# Patient Record
Sex: FEMALE | Race: WHITE | NOT HISPANIC OR LATINO | Employment: OTHER | URBAN - NONMETROPOLITAN AREA
[De-identification: names, ages, dates, MRNs, and addresses within clinical notes are randomized per-mention and may not be internally consistent; named-entity substitution may affect disease eponyms.]

---

## 2017-02-15 ENCOUNTER — OFFICE VISIT (OUTPATIENT)
Dept: FAMILY MEDICINE | Facility: OTHER | Age: 47
End: 2017-02-15
Payer: COMMERCIAL

## 2017-02-15 VITALS
OXYGEN SATURATION: 97 % | SYSTOLIC BLOOD PRESSURE: 136 MMHG | WEIGHT: 293 LBS | RESPIRATION RATE: 20 BRPM | DIASTOLIC BLOOD PRESSURE: 80 MMHG | HEIGHT: 72 IN | BODY MASS INDEX: 39.68 KG/M2 | HEART RATE: 108 BPM | TEMPERATURE: 97.6 F

## 2017-02-15 DIAGNOSIS — Z13.1 SCREENING FOR DIABETES MELLITUS: ICD-10-CM

## 2017-02-15 DIAGNOSIS — Z30.013 ENCOUNTER FOR INITIAL PRESCRIPTION OF INJECTABLE CONTRACEPTIVE: ICD-10-CM

## 2017-02-15 DIAGNOSIS — Z12.4 SCREENING FOR CERVICAL CANCER: ICD-10-CM

## 2017-02-15 DIAGNOSIS — Z00.00 ROUTINE GENERAL MEDICAL EXAMINATION AT A HEALTH CARE FACILITY: Primary | ICD-10-CM

## 2017-02-15 DIAGNOSIS — N94.6 DYSMENORRHEA: ICD-10-CM

## 2017-02-15 DIAGNOSIS — Z13.6 CARDIOVASCULAR SCREENING; LDL GOAL LESS THAN 160: ICD-10-CM

## 2017-02-15 LAB — BETA HCG QUAL IFA URINE: NEGATIVE

## 2017-02-15 PROCEDURE — 87624 HPV HI-RISK TYP POOLED RSLT: CPT | Performed by: NURSE PRACTITIONER

## 2017-02-15 PROCEDURE — 99386 PREV VISIT NEW AGE 40-64: CPT | Mod: 25 | Performed by: NURSE PRACTITIONER

## 2017-02-15 PROCEDURE — G0145 SCR C/V CYTO,THINLAYER,RESCR: HCPCS | Performed by: NURSE PRACTITIONER

## 2017-02-15 PROCEDURE — 84703 CHORIONIC GONADOTROPIN ASSAY: CPT | Performed by: NURSE PRACTITIONER

## 2017-02-15 PROCEDURE — 96372 THER/PROPH/DIAG INJ SC/IM: CPT | Performed by: NURSE PRACTITIONER

## 2017-02-15 RX ORDER — MEDROXYPROGESTERONE ACETATE 150 MG/ML
150 INJECTION, SUSPENSION INTRAMUSCULAR
Qty: 1 ML | Refills: 0 | OUTPATIENT
Start: 2017-02-15 | End: 2019-05-08

## 2017-02-15 RX ORDER — ALBUTEROL SULFATE 90 UG/1
1-2 AEROSOL, METERED RESPIRATORY (INHALATION)
COMMUNITY
Start: 2015-04-22 | End: 2017-02-15
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
## 2017-02-15 NOTE — PATIENT INSTRUCTIONS
Referral to OB/GYN for contraception discussion.     The results of the pap test take about 2 weeks to come back.  We will send a letter with these results and the recommendations for further pap tests in the future.     Return for fasting labs tomorrow.  I will call or send a letter with results.         Preventive Health Recommendations  Female Ages 40 to 49    Yearly exam:     See your health care provider every year in order to  1. Review health changes.   2. Discuss preventive care.    3. Review your medicines if your doctor prescribed any.      Get a Pap test every three years (unless you have an abnormal result and your provider advises testing more often).      If you get Pap tests with HPV test, you only need to test every 5 years, unless you have an abnormal result. You do not need a Pap test if your uterus was removed (hysterectomy) and you have not had cancer.      You should be tested each year for STDs (sexually transmitted diseases), if you're at risk.       Ask your doctor if you should have a mammogram.      Have a colonoscopy (test for colon cancer) if someone in your family has had colon cancer or polyps before age 50.       Have a cholesterol test every 5 years.       Have a diabetes test (fasting glucose) after age 45. If you are at risk for diabetes, you should have this test every 3 years.    Shots: Get a flu shot each year. Get a tetanus shot every 10 years.     Nutrition:     Eat at least 5 servings of fruits and vegetables each day.    Eat whole-grain bread, whole-wheat pasta and brown rice instead of white grains and rice.    Talk to your provider about Calcium and Vitamin D.     Lifestyle    Exercise at least 150 minutes a week (an average of 30 minutes a day, 5 days a week). This will help you control your weight and prevent disease.    Limit alcohol to one drink per day.    No smoking.     Wear sunscreen to prevent skin cancer.    See your dentist every six months for an exam and  cleaning.

## 2017-02-15 NOTE — MR AVS SNAPSHOT
After Visit Summary   2/15/2017    Paola Lobo    MRN: [id number]           Patient Information     Date Of Birth          1970        Visit Information        Provider Department      2/15/2017 2:20 PM Roshni Luevano APRN [facility]        Today's Diagnoses     Routine general medical examination at a health care facility    -  1    Screening for diabetes mellitus        CARDIOVASCULAR SCREENING; LDL GOAL LESS THAN 160        Screening for cervical cancer        Encounter for initial prescription of injectable contraceptive          Care Instructions    Referral to OB/GYN for contraception discussion.     The results of the pap test take about 2 weeks to come back.  We will send a letter with these results and the recommendations for further pap tests in the future.     Return for fasting labs tomorrow.  I will call or send a letter with results.         Preventive Health Recommendations  Female Ages 40 to 49    Yearly exam:     See your health care provider every year in order to  1. Review health changes.   2. Discuss preventive care.    3. Review your medicines if your doctor prescribed any.      Get a Pap test every three years (unless you have an abnormal result and your provider advises testing more often).      If you get Pap tests with HPV test, you only need to test every 5 years, unless you have an abnormal result. You do not need a Pap test if your uterus was removed (hysterectomy) and you have not had cancer.      You should be tested each year for STDs (sexually transmitted diseases), if you're at risk.       Ask your doctor if you should have a mammogram.      Have a colonoscopy (test for colon cancer) if someone in your family has had colon cancer or polyps before age 50.       Have a cholesterol test every 5 years.       Have a diabetes test (fasting glucose) after age 45. If you are at risk for diabetes, you should have this test every 3  years.    Shots: Get a flu shot each year. Get a tetanus shot every 10 years.     Nutrition:     Eat at least 5 servings of fruits and vegetables each day.    Eat whole-grain bread, whole-wheat pasta and brown rice instead of white grains and rice.    Talk to your provider about Calcium and Vitamin D.     Lifestyle    Exercise at least 150 minutes a week (an average of 30 minutes a day, 5 days a week). This will help you control your weight and prevent disease.    Limit alcohol to one drink per day.    No smoking.     Wear sunscreen to prevent skin cancer.    See your dentist every six months for an exam and cleaning.        Follow-ups after your visit        Your next 10 appointments already scheduled     Feb 16, 2017  8:30 AM CST   LAB with NL LAB [facility]    [address] [facility] [address] [phone]           Patient must bring picture ID.  Patient should be prepared to give a urine specimen  Please do not eat 10-12 hours before your appointment if you are coming in fasting for labs on lipids, cholesterol, or glucose (sugar).  Pregnant women should follow their Care Team instructions. Water with medications is okay. Do not drink coffee or other fluids.   If you have concerns about taking  your medications, please ask at office or if scheduling via [employer], send a message by clicking on Secure Messaging, Message Your Care Team.              Future tests that were ordered for you today     Open Future Orders        Priority Expected Expires Ordered    Glucose Routine  2/15/2018 2/15/2017    Lipid Profile (Chol, Trig, HDL, LDL calc) Routine  2/15/2018 2/15/2017            Who to contact     If you have questions or need follow up information about today's clinic visit or your schedule please contact [facility] directly at [phone].  Normal or non-critical lab and imaging results will be communicated to you by MyChart, letter or phone within 4  "business days after the clinic has received the results. If you do not hear from us within 7 days, please contact the clinic through [employer] or phone. If you have a critical or abnormal lab result, we will notify you by phone as soon as possible.  Submit refill requests through [employer] or call your pharmacy and they will forward the refill request to us. Please allow 3 business days for your refill to be completed.          Additional Information About Your Visit        [employer] Information     [employer] lets you send messages to your doctor, view your test results, renew your prescriptions, schedule appointments and more. To sign up, go to [email or web address] . Click on \"Log in\" on the left side of the screen, which will take you to the Welcome page. Then click on \"Sign up Now\" on the right side of the page.     You will be asked to enter the access code listed below, as well as some personal information. Please follow the directions to create your username and password.     Your access code is: [email or web address]  Expires: 2017  3:24 PM     Your access code will  in 90 days. If you need help or a new code, please call your [facility] clinic or [phone].        Care EveryWhere ID     This is your Care EveryWhere ID. This could be used by other organizations to access your [facility] medical records  [id number]        Your Vitals Were     Pulse Temperature Respirations Height Last Period Pulse Oximetry    108 97.6  F (36.4  C) (Tympanic) 20 5' 11.5\" (1.816 m) 2017 97%    Breastfeeding? BMI (Body Mass Index)                No 53.09 kg/m2           Blood Pressure from Last 3 Encounters:   02/15/17 136/80   13 134/74    Weight from Last 3 Encounters:   02/15/17 (!) 386 lb (175.1 kg)   13 (!) 362 lb 6.4 oz (164.4 kg)              We Performed the Following     Beta HCG qual IFA urine     HPV High Risk Types DNA Cervical     Pap imaged thin layer screen with HPV - recommended age 30 - 65        " Primary Care Provider    None Specified       No primary provider on file.        Thank you!     Thank you for choosing [facility]  for your care. Our goal is always to provide you with excellent care. Hearing back from our patients is one way we can continue to improve our services. Please take a few minutes to complete the written survey that you may receive in the mail after your visit with us. Thank you!             Your Updated Medication List - Protect others around you: Learn how to safely use, store and throw away your medicines at www.disposemymeds.org.          This list is accurate as of: 2/15/17  3:24 PM.  Always use your most recent med list.                   Brand Name Dispense Instructions for use    cetirizine HCl 10 MG Caps

## 2017-02-15 NOTE — NURSING NOTE
"Chief Complaint   Patient presents with     Physical       Initial /80 (BP Location: Right arm, Cuff Size: Adult Large)  Pulse 108  Temp 97.6  F (36.4  C) (Tympanic)  Resp 20  Ht 5' 11.5\" (1.816 m)  Wt (!) 386 lb (175.1 kg)  LMP 02/06/2017  SpO2 97%  Breastfeeding? No  BMI 53.09 kg/m2 Estimated body mass index is 53.09 kg/(m^2) as calculated from the following:    Height as of this encounter: 5' 11.5\" (1.816 m).    Weight as of this encounter: 386 lb (175.1 kg).  Medication Reconciliation: complete   ................Nehemiah Lang LPN,   February 15, 2017,      2:49 PM,   [facility]     "

## 2017-02-15 NOTE — NURSING NOTE
Prior to injection verified patient identity using patient's name and date of birth.  Per orders of Roshni Luevano APRN CNP, injection(s) given by Nehemiah Lang. Patient instructed to remain in clinic for 15-20 minutes afterwards, and to report any adverse reaction to me immediately. VIS given.  ................Nehemiah Lang LPN,   February 15, 2017,      4:46 PM,   [facility]     BLOOD PRESSURE: 136/80    DATE OF LAST PAP or ANNUAL EXAM: No results found for: PAP  URINE HCG:negative    The following medication was given:     MEDICATION: Depo Provera 150mg  ROUTE: IM  SITE: LUQ - Gluteus  : [employer]  LOT #: [id number]  EXPIRATION:8/30/19  NEXT INJECTION DUE: May 3-17, 2017  Provider: Roshni Luevano APRN CNP

## 2017-02-16 DIAGNOSIS — Z13.1 SCREENING FOR DIABETES MELLITUS: ICD-10-CM

## 2017-02-16 DIAGNOSIS — Z13.6 CARDIOVASCULAR SCREENING; LDL GOAL LESS THAN 160: ICD-10-CM

## 2017-02-16 LAB
CHOLEST SERPL-MCNC: 171 MG/DL
GLUCOSE SERPL-MCNC: 94 MG/DL (ref 70–99)
HDLC SERPL-MCNC: 41 MG/DL
LDLC SERPL CALC-MCNC: 103 MG/DL
NONHDLC SERPL-MCNC: 130 MG/DL
TRIGL SERPL-MCNC: 136 MG/DL

## 2017-02-16 PROCEDURE — 36415 COLL VENOUS BLD VENIPUNCTURE: CPT | Performed by: NURSE PRACTITIONER

## 2017-02-16 PROCEDURE — 82947 ASSAY GLUCOSE BLOOD QUANT: CPT | Performed by: NURSE PRACTITIONER

## 2017-02-16 PROCEDURE — 80061 LIPID PANEL: CPT | Performed by: NURSE PRACTITIONER

## 2017-02-16 NOTE — LETTER
[facility]  [address] [facility] [id number] [phone]      February 17, 2017      Paola Lobo  [address] [place] [address]        Dear Paola,      1.  Your glucose came back normal. No signs of diabetes.   2.  Your cholesterol levels came back just slightly elevated.  This can put you at increased risk for heart attacks and strokes.  At this time, you do not need medications, but I would like you to watch your diet and increase your exercise.  We will recheck these levels in 1 year.  I have included some information about ways to decrease your cholesterol.                 How can I control my cholesterol level?   High cholesterol may run in families. Know your family history and discuss it with your healthcare provider.   You can often control cholesterol levels by   eating right   exercising   not smoking   losing weight if you are overweight.   If you have a high risk for heart disease, your healthcare provider may prescribe cholesterol-lowering medicine as well as changes in lifestyle.   Eat right.   To control the cholesterol and types and amounts of fat you eat:   Check food labels for fat and cholesterol content. Choose the foods with less fat per serving.   Limit the amount of butter and margarine you eat.   Use olive, canola, sunflower, safflower, soybean, or corn oil. Avoid tropical oils such as palm or coconut oil. Also avoid oils that have been hydrogenated or partially hydrogenated.   Use salad dressings and margarine made with polyunsaturated and monounsaturated fats.   Use egg whites or egg substitutes rather than whole eggs.   Replace whole-milk dairy products with nonfat or low-fat milk, cheese, spreads, and yogurt.   Eat skinless chicken, turkey, fish, and meatless entrees more often than red meat.   Choose lean cuts of meat and trim off all visible fat. Keep portion sizes moderate.   Avoid fatty desserts such as ice cream, cream-filled cakes, and cheesecakes.  "Choose fresh fruits, nonfat frozen yogurt, Popsicles, etc.   Reduce the amount of fried foods, vending machine food, and fast food you eat.   Eat several daily servings of fruits and vegetables (especially fresh fruits and leafy vegetables), beans, and whole grains (such as whole wheat, bran, brown rice, oats, and oatmeal). The fiber in these foods helps lower cholesterol.   Eat 4 to 5 servings of nuts a week. Examples of nuts that can be a part of a healthy diet are walnuts, almonds, hazelnuts, peanuts, pecans, and pistachio nuts.   Look for low-fat or nonfat varieties of the foods you like to eat, or look for substitutes.   Exercise.   Exercise goes hand-in-hand with a healthy diet for controlling cholesterol. Exercise helps because it:   Keeps your weight down.   Decreases your total cholesterol level.   Decreases your LDL (bad cholesterol).   Increases your HDL (good cholesterol).   A good exercise program includes aerobic exercise. Aerobic exercise is any activity that keeps your heart rate up (such as swimming, jogging, walking, and bicycling). You should get at least 30 minutes of moderate aerobic exercise most days of the week. Moderate aerobic exercise is generally defined as requiring the energy it takes to walk 2 miles in 30 minutes. You may need to exercise 60 minutes a day to prevent weight gain and 90 minutes a day to lose weight.   If you haven't been exercising, ask your healthcare provider for an exercise prescription and start your new exercise program slowly.   Don't smoke.   Do not smoke. Smoking increases your risk of heart disease because it lowers HDL levels, increases your risk of blood clots, and decreases oxygen to the tissues.   Lose excess weight.   Extra weight increases your risk for heart and blood vessel disease. One way it does this is by causing your LDL (\"bad\") cholesterol to go up. Extra weight can also make you tired. It takes a lot of energy to carry all those pounds around. The " "result is that you are less active. This can mean that you don't get enough exercise and gain even more weight.   Losing excess weight:   Improves not only the bad LDL cholesterol but other blood fats as well.   Lowers your risk for heart attack or stroke.   Increases your energy and helps you feel better (both physically and mentally) and become more active.   Your weight is primarily the result of 2 factors. One is the number of calories you consume. The other is the number of calories you \"burn\". If you eat more calories than you use, your body will store the extra calories as fat and your weight will go up. If your body uses more calories than you eat, you will lose weight.   Here are some things you can do to lose weight.   Talk to your healthcare provider about your weight. Ask how a change in diet and exercise will change your cholesterol levels. Plan for gradual weight loss, just 1 or 2 pounds per week   Eat fewer calories.   Get more physical activity.   Keep a diary of your food and exercise for a couple of weeks to become more aware of your habits.     In summary, changes that you can make in your lifestyle to control your cholesterol level are:   Eat healthy.   Get regular exercise.   Don't smoke.   Keep a healthy weight.   Have your cholesterol levels checked as often as your provider recommends.     Sincerely,  Roshni Luevano APRN CNP    SAE/jamie,DERRICK  "

## 2017-02-17 LAB
COPATH REPORT: NORMAL
PAP: NORMAL

## 2017-02-17 NOTE — PROGRESS NOTES
Letter sent to patient informing of lab results.  ................Nehemiah Lang LPN,   February 17, 2017,      7:41 AM,   [facility]

## 2017-02-21 LAB
FINAL DIAGNOSIS: NORMAL
HPV HR 12 DNA CVX QL NAA+PROBE: NEGATIVE
HPV16 DNA SPEC QL NAA+PROBE: NEGATIVE
HPV18 DNA SPEC QL NAA+PROBE: NEGATIVE
SPECIMEN DESCRIPTION: NORMAL

## 2017-02-24 DIAGNOSIS — R87.618 BENIGN-APPEARING ENDOMETRIAL CELLS ON CERVICAL PAP SMEAR: Primary | ICD-10-CM

## 2017-02-24 DIAGNOSIS — E66.01 MORBID OBESITY DUE TO EXCESS CALORIES (H): ICD-10-CM

## 2017-03-02 ENCOUNTER — HOSPITAL ENCOUNTER (OUTPATIENT)
Dept: ULTRASOUND IMAGING | Facility: CLINIC | Age: 47
Discharge: HOME | End: 2017-03-02
Attending: NURSE PRACTITIONER | Admitting: NURSE PRACTITIONER
Payer: COMMERCIAL

## 2017-03-02 DIAGNOSIS — R87.618 BENIGN-APPEARING ENDOMETRIAL CELLS ON CERVICAL PAP SMEAR: ICD-10-CM

## 2017-03-02 DIAGNOSIS — E66.01 MORBID OBESITY DUE TO EXCESS CALORIES (H): ICD-10-CM

## 2017-03-02 PROCEDURE — 76830 TRANSVAGINAL US NON-OB: CPT

## 2017-03-03 NOTE — PROGRESS NOTES
Called pt - informed of NL u/s. Patient voiced understanding.   ................Nehemiah Lang LPN,   March 3, 2017,      2:42 PM,   [facility]

## 2017-03-24 ENCOUNTER — OFFICE VISIT (OUTPATIENT)
Dept: FAMILY MEDICINE | Facility: CLINIC | Age: 47
End: 2017-03-24
Payer: COMMERCIAL

## 2017-03-24 ENCOUNTER — TELEPHONE (OUTPATIENT)
Dept: FAMILY MEDICINE | Facility: OTHER | Age: 47
End: 2017-03-24

## 2017-03-24 ENCOUNTER — OFFICE VISIT (OUTPATIENT)
Dept: URGENT CARE | Facility: RETAIL CLINIC | Age: 47
End: 2017-03-24
Payer: COMMERCIAL

## 2017-03-24 VITALS
BODY MASS INDEX: 39.68 KG/M2 | WEIGHT: 293 LBS | SYSTOLIC BLOOD PRESSURE: 172 MMHG | HEIGHT: 72 IN | DIASTOLIC BLOOD PRESSURE: 91 MMHG | TEMPERATURE: 98.3 F | OXYGEN SATURATION: 99 % | HEART RATE: 101 BPM

## 2017-03-24 VITALS
TEMPERATURE: 97.1 F | OXYGEN SATURATION: 100 % | HEART RATE: 91 BPM | SYSTOLIC BLOOD PRESSURE: 146 MMHG | DIASTOLIC BLOOD PRESSURE: 86 MMHG

## 2017-03-24 DIAGNOSIS — Z76.89 ENCOUNTER FOR INCISION AND DRAINAGE PROCEDURE: ICD-10-CM

## 2017-03-24 DIAGNOSIS — L03.319 CELLULITIS AND ABSCESS OF TRUNK: Primary | ICD-10-CM

## 2017-03-24 DIAGNOSIS — L02.219 CELLULITIS AND ABSCESS OF TRUNK: Primary | ICD-10-CM

## 2017-03-24 PROCEDURE — 99207 ZZC NO CHARGE LOS: CPT | Performed by: INTERNAL MEDICINE

## 2017-03-24 PROCEDURE — 10060 I&D ABSCESS SIMPLE/SINGLE: CPT | Performed by: NURSE PRACTITIONER

## 2017-03-24 PROCEDURE — 99213 OFFICE O/P EST LOW 20 MIN: CPT | Mod: 25 | Performed by: NURSE PRACTITIONER

## 2017-03-24 RX ORDER — CEPHALEXIN 500 MG/1
500 CAPSULE ORAL 3 TIMES DAILY
Qty: 30 CAPSULE | Refills: 0 | Status: SHIPPED | OUTPATIENT
Start: 2017-03-24 | End: 2017-04-20

## 2017-03-24 NOTE — MR AVS SNAPSHOT
After Visit Summary   3/24/2017    Paola Lobo    MRN: [id number]           Patient Information     Date Of Birth          1970        Visit Information        Provider Department      3/24/2017 3:00 PM Liz Bell NP [facility]        Today's Diagnoses     Cellulitis and abscess of trunk    -  1    Encounter for incision and drainage procedure          Care Instructions    Take full course of antibiotics.  Take with daily yogurt or probiotics. Follow up if symptoms persist or worsen as we discussed.   Abscess (Incision & Drainage)  An abscess (sometimes called a  boil ) occurs when bacteria get trapped under the skin and begin to grow. Pus forms inside the abscess as the body responds to the bacteria. An abscess can occur with an insect bite, ingrown hair, blocked oil gland, pimple, cyst, or puncture wound.  Treatment of your abscess has required an incision to drain the pus. If the abscess pocket was large, a gauze packing may have been inserted. This will need to be removed and possibly replaced on your next visit. Antibiotics are not required in the treatment of a simple abscess, unless the infection is spreading into the skin around the wound (known as  cellulitis ).  Healing of the wound will take about one to two weeks depending on the size of the abscess. Healthy tissue will grow from the bottom and sides of the opening until it seals over.  Home care  The following home care guidelines can help your wound heal:    The wound may drain for the first two days. Cover the wound with a clean dry dressing. If the dressing becomes soaked with blood or pus, change it.    If a gauze packing was placed inside the abscess cavity, you may be advised to remove it yourself. You may do this in the shower. Once the packing is removed, you should wash the area in the shower or bath 3 to 4 times a day, until the skin opening has closed. Make sure you wash your hands after  changing the packing or cleaning the wound.    If you were prescribed antibiotics, take them as directed until they are all gone.    You may use acetaminophen (Tylenol) or ibuprofen (Motrin, Advil) to control pain, unless another pain medicine was prescribed. If you have liver disease or ever had a stomach ulcer, talk with your doctor before using these medicines.  Follow-up care  Follow up with your doctor as advised by our staff. If a gauze packing was inserted in your wound, it should be removed in 1 to 2 days. Check your wound every day for the signs of worsening infection listed below.  When to seek medical care  Get prompt medical attention if any of the following occur:    Increasing redness or swelling    Red streaks in the skin leading away from the wound    Increasing local pain or swelling    Continued pus draining from the wound two days after treatment    Fever of 100.4 F (38 C) or higher, or as directed by your health care provider    Boil returns when you are at home.    [id number] [employer]. [address] [place] [address]. All rights reserved. This information is not intended as a substitute for professional medical care. Always follow your healthcare professional's instructions.              Follow-ups after your visit        Follow-up notes from your care team     Return if symptoms worsen or fail to improve.      Who to contact     Normal or non-critical lab and imaging results will be communicated to you by [employer], letter or phone within 4 business days after the clinic has received the results. If you do not hear from us within 7 days, please contact the clinic through [employer] or phone. If you have a critical or abnormal lab result, we will notify you by phone as soon as possible.  Submit refill requests through [employer] or call your pharmacy and they will forward the refill request to us. Please allow 3 business days for your refill to be completed.          If you  need to speak with a  for additional information , please call: [phone]             Additional Information About Your Visit        Care EveryWhere ID     This is your Care EveryWhere ID. This could be used by other organizations to access your [place] medical records  [id number]        Your Vitals Were     Pulse Temperature Height Pulse Oximetry BMI (Body Mass Index)       101 98.3  F (36.8  C) (Oral) 6' (1.829 m) 99% 52.68 kg/m2        Blood Pressure from Last 3 Encounters:   03/24/17 (!) 172/91   03/24/17 146/86   02/15/17 136/80    Weight from Last 3 Encounters:   03/24/17 (!) 388 lb 6.4 oz (176.2 kg)   02/15/17 (!) 386 lb (175.1 kg)   11/19/13 (!) 362 lb 6.4 oz (164.4 kg)              Today, you had the following     No orders found for display         Today's Medication Changes          These changes are accurate as of: 3/24/17  3:03 PM.  If you have any questions, ask your nurse or doctor.               Start taking these medicines.        Dose/Directions    cephALEXin 500 MG capsule   Commonly known as:  KEFLEX   Used for:  Cellulitis and abscess of trunk, Encounter for incision and drainage procedure   Started by:  Liz Bell NP        Dose:  500 mg   Take 1 capsule (500 mg) by mouth 3 times daily   Quantity:  30 capsule   Refills:  0            Where to get your medicines      These medications were sent to [facility] - [id number] [facility] [address]  [address] [place] [address]     Phone:  [phone]     cephALEXin 500 MG capsule                Primary Care Provider Office Phone #    [facility] [phone]       No address on file        Thank you!     [facility]  for your care. Our goal is always to provide you with excellent care. Hearing back from our patients is one way we can continue to improve our services. Please take a few minutes to complete the written survey that you may receive in  the mail after your visit with us. Thank you!             Your Updated Medication List - Protect others around you: Learn how to safely use, store and throw away your medicines at www.disposemymeds.org.          This list is accurate as of: 3/24/17  3:03 PM.  Always use your most recent med list.                   Brand Name Dispense Instructions for use    cephALEXin 500 MG capsule    KEFLEX    30 capsule    Take 1 capsule (500 mg) by mouth 3 times daily       cetirizine HCl 10 MG Caps          medroxyPROGESTERone 150 MG/ML injection    DEPO-PROVERA    1 mL    Inject 1 mL (150 mg) into the muscle every 3 months

## 2017-03-24 NOTE — PATIENT INSTRUCTIONS
Take full course of antibiotics.  Take with daily yogurt or probiotics. Follow up if symptoms persist or worsen as we discussed.   Abscess (Incision & Drainage)  An abscess (sometimes called a  boil ) occurs when bacteria get trapped under the skin and begin to grow. Pus forms inside the abscess as the body responds to the bacteria. An abscess can occur with an insect bite, ingrown hair, blocked oil gland, pimple, cyst, or puncture wound.  Treatment of your abscess has required an incision to drain the pus. If the abscess pocket was large, a gauze packing may have been inserted. This will need to be removed and possibly replaced on your next visit. Antibiotics are not required in the treatment of a simple abscess, unless the infection is spreading into the skin around the wound (known as  cellulitis ).  Healing of the wound will take about one to two weeks depending on the size of the abscess. Healthy tissue will grow from the bottom and sides of the opening until it seals over.  Home care  The following home care guidelines can help your wound heal:    The wound may drain for the first two days. Cover the wound with a clean dry dressing. If the dressing becomes soaked with blood or pus, change it.    If a gauze packing was placed inside the abscess cavity, you may be advised to remove it yourself. You may do this in the shower. Once the packing is removed, you should wash the area in the shower or bath 3 to 4 times a day, until the skin opening has closed. Make sure you wash your hands after changing the packing or cleaning the wound.    If you were prescribed antibiotics, take them as directed until they are all gone.    You may use acetaminophen (Tylenol) or ibuprofen (Motrin, Advil) to control pain, unless another pain medicine was prescribed. If you have liver disease or ever had a stomach ulcer, talk with your doctor before using these medicines.  Follow-up care  Follow up with your doctor as advised by our  staff. If a gauze packing was inserted in your wound, it should be removed in 1 to 2 days. Check your wound every day for the signs of worsening infection listed below.  When to seek medical care  Get prompt medical attention if any of the following occur:    Increasing redness or swelling    Red streaks in the skin leading away from the wound    Increasing local pain or swelling    Continued pus draining from the wound two days after treatment    Fever of 100.4 F (38 C) or higher, or as directed by your health care provider    Boil returns when you are at home.    [id number] [employer]. [address] [place] [address]. All rights reserved. This information is not intended as a substitute for professional medical care. Always follow your healthcare professional's instructions.

## 2017-03-24 NOTE — PROGRESS NOTES
SUBJECTIVE:                                                    Paola Lobo is a 46 year old female who presents to clinic today for the following health issues:      ED/UC Followup:    Facility:  [facility]  Date of visit: 3/24/17  Reason for visit: spider bite-abcess  Current Status: told to come to clinic       Problem list and histories reviewed & adjusted, as indicated.  Additional history: as documented    There is no problem list on file for this patient.    Past Surgical History:   Procedure Laterality Date     ORTHOPEDIC SURGERY         Social History   Substance Use Topics     Smoking status: Never Smoker     Smokeless tobacco: Never Used     Alcohol use Yes      Comment: social     History reviewed. No pertinent family history.      Current Outpatient Prescriptions   Medication Sig Dispense Refill     cephALEXin (KEFLEX) 500 MG capsule Take 1 capsule (500 mg) by mouth 3 times daily 30 capsule 0     medroxyPROGESTERone (DEPO-PROVERA) 150 MG/ML injection Inject 1 mL (150 mg) into the muscle every 3 months 1 mL 0     cetirizine HCl 10 MG CAPS        Allergies   Allergen Reactions     Walnuts [Nuts]      Unable to breathe     Shellfish Allergy      Nausea     Grass      SOB, asthma       Prednisone Other (See Comments)     Severe headache     Wheat      Rash, Derm     BP Readings from Last 3 Encounters:   03/24/17 (!) 172/91   03/24/17 146/86   02/15/17 136/80    Wt Readings from Last 3 Encounters:   03/24/17 (!) 388 lb 6.4 oz (176.2 kg)   02/15/17 (!) 386 lb (175.1 kg)   11/19/13 (!) 362 lb 6.4 oz (164.4 kg)            Labs reviewed in EPIC    Reviewed and updated as needed this visit by clinical staff  Tobacco  Allergies  Meds  Med Hx  Surg Hx  Fam Hx  Soc Hx      Reviewed and updated as needed this visit by Provider         ROS:  Constitutional, HEENT, cardiovascular, pulmonary, GI, , musculoskeletal, neuro, skin, endocrine and psych systems are negative, except as otherwise  noted.    OBJECTIVE:                                                    BP (!) 172/91  Pulse 101  Temp 98.3  F (36.8  C) (Oral)  Ht 6' (1.829 m)  Wt (!) 388 lb 6.4 oz (176.2 kg)  SpO2 99%  BMI 52.68 kg/m2  Body mass index is 52.68 kg/(m^2).  GENERAL: healthy, alert and no distress  RESP: lungs clear to auscultation - no rales, rhonchi or wheezes  CV: regular rate and rhythm, normal S1 S2, no S3 or S4, no murmur, click or rub, no peripheral edema and peripheral pulses strong  ABDOMEN: soft, nontender, no hepatosplenomegaly, no masses and bowel sounds normal  MS: no gross musculoskeletal defects noted, no edema  SKIN: no suspicious rashes  POSITIVE for small abscess to L hip/ abdomen, approx 20 mm, firm. Erythema surrounding area.   NEURO: A&O, mentation intact and speech normal    Diagnostic Test Results:  none      I&D Procedure: Effected area cleaned with betadine x 3 then wiped away with alcoholol.  1 pecent lidocaine with epineprhine used to infiltrate the area with good anethesia.  Number 11 scapel used to make a stab incion.  Purlent drainage was removed . No gauze was needed as area was small. Appropraite wound care dressing applied.  Pt tolerated preocedure well.    ASSESSMENT/PLAN:                                                          ICD-10-CM    1. Cellulitis and abscess of trunk L03.319 cephALEXin (KEFLEX) 500 MG capsule    L02.219     Left hip area   2. Encounter for incision and drainage procedure Z01.89 cephALEXin (KEFLEX) 500 MG capsule       See Patient Instructions: Take full course of antibiotics.  Take with daily yogurt or probiotics. Follow up if symptoms persist or worsen as we discussed.     Liz Bell, [facility]

## 2017-03-24 NOTE — NURSING NOTE
"Chief Complaint   Patient presents with     Insect Bites     on left hip has been there for about 2 weeks getting worse       Initial /86  Pulse 91  Temp 97.1  F (36.2  C) (Oral)  SpO2 100% Estimated body mass index is 53.09 kg/(m^2) as calculated from the following:    Height as of 2/15/17: 5' 11.5\" (1.816 m).    Weight as of 2/15/17: 386 lb (175.1 kg).  Medication Reconciliation: complete   Denisse Rain      "

## 2017-03-24 NOTE — MR AVS SNAPSHOT
"              After Visit Summary   3/24/2017    Paola Lobo    MRN: [id number]           Patient Information     Date Of Birth          1970        Visit Information        Provider Department      3/24/2017 12:40 PM Nayan Garcia MD [facility]        Today's Diagnoses     Cellulitis and abscess of trunk    -  1       Follow-ups after your visit        Who to contact     You can reach your care team any time of the day by calling [phone].  Notification of test results:  If you have an abnormal lab result, we will notify you by phone as soon as possible.         Additional Information About Your Visit        MyChart Information     Ranker lets you send messages to your doctor, view your test results, renew your prescriptions, schedule appointments and more. To sign up, go to [email or web address] . Click on \"Log in\" on the left side of the screen, which will take you to the Welcome page. Then click on \"Sign up Now\" on the right side of the page.     You will be asked to enter the access code listed below, as well as some personal information. Please follow the directions to create your username and password.     Your access code is: [email or web address]  Expires: 2017  4:24 PM     Your access code will  in 90 days. If you need help or a new code, please call your [place] clinic or [phone].        Care EveryWhere ID     This is your [facility] EveryWhere ID. This could be used by other organizations to access your [place] medical records  [id number]        Your Vitals Were     Pulse Temperature Pulse Oximetry             91 97.1  F (36.2  C) (Oral) 100%          Blood Pressure from Last 3 Encounters:   17 146/86   02/15/17 136/80   13 134/74    Weight from Last 3 Encounters:   02/15/17 (!) 386 lb (175.1 kg)   13 (!) 362 lb 6.4 oz (164.4 kg)              Today, you had the following     No orders found for display       Primary Care Provider " Office Phone #    [facility] [phone]       No address on file        Thank you!     Thank you for choosing [facility]  for your care. Our goal is always to provide you with excellent care. Hearing back from our patients is one way we can continue to improve our services. Please take a few minutes to complete the written survey that you may receive in the mail after your visit with us. Thank you!             Your Updated Medication List - Protect others around you: Learn how to safely use, store and throw away your medicines at www.disposemymeds.org.          This list is accurate as of: 3/24/17  1:02 PM.  Always use your most recent med list.                   Brand Name Dispense Instructions for use    cetirizine HCl 10 MG Caps          medroxyPROGESTERone 150 MG/ML injection    DEPO-PROVERA    1 mL    Inject 1 mL (150 mg) into the muscle every 3 months

## 2017-03-24 NOTE — NURSING NOTE
Chief Complaint   Patient presents with     Mass       Initial BP (!) 172/91  Pulse 101  Temp 98.3  F (36.8  C) (Oral)  Ht 6' (1.829 m)  Wt (!) 388 lb 6.4 oz (176.2 kg)  SpO2 99%  BMI 52.68 kg/m2 Estimated body mass index is 52.68 kg/(m^2) as calculated from the following:    Height as of this encounter: 6' (1.829 m).    Weight as of this encounter: 388 lb 6.4 oz (176.2 kg).  Medication Reconciliation: complete     Radha Felder MA

## 2017-03-24 NOTE — TELEPHONE ENCOUNTER
Reason for call:  Patient reporting a symptom    Symptom or request: spider bite Possibly  infected  Duration (how long have symptoms been present): 2 weeks    Have you been treated for this before? No    Additional comments: Patient bit by a spider two weeks ago on left hip. Spider bite is the size of a half dollar. Center of the bite is hard and a purple ring around the bite.     Phone Number patient can be reached at:  Home number on file [phone] (home)    Best Time:  Anytime    Can we leave a detailed message on this number:  YES    Call taken on 3/24/2017 at 10:15 AM by Marleen Browning

## 2017-03-24 NOTE — NURSING NOTE
The following medication was given:     MEDICATION: 1% lidocaine with epi  LOT #: [id number]  :  [place]  EXPIRATION DATE:  10/1/17  NDC#: [id number]

## 2017-03-24 NOTE — TELEPHONE ENCOUNTER
Patient is reporting she was bit by a spider 2 weeks ago.  She usually has reaction to spider bites, but this one is believed to be infected.  She is reporting on Monday, 3/20/17 the site was the size of f rachael, but today, 3/24/17, it is the size of a 1/2 dollar.  She states she has had a history of blood boils in other areas of her body, but this one is different and 3x the size.  She states it has a purple [place] around it, it is red raised, and hot to the touch.  She is reporting it is very painful and when she accidentally bumps it, it feels like she is being bit all over again.  She is also reporting a headache for the past 3 days.      She is reporting she cannot take Benadryl as it makes her crazy.  She does take a daily dose of Zyrtec for allergies.      She is informed she needs to be seen for this as it sounds infected.  She is informed there are no openings in clinics from [place] to [place].  She can try [facility] in either [place], or [place], or go to the ED for further evaluation.  Patient understands and agrees to this plan.  Closing this encounter.  Monica Saba RN

## 2017-04-20 ENCOUNTER — OFFICE VISIT (OUTPATIENT)
Dept: FAMILY MEDICINE | Facility: OTHER | Age: 47
End: 2017-04-20
Payer: COMMERCIAL

## 2017-04-20 VITALS
RESPIRATION RATE: 24 BRPM | DIASTOLIC BLOOD PRESSURE: 90 MMHG | TEMPERATURE: 98.2 F | WEIGHT: 293 LBS | SYSTOLIC BLOOD PRESSURE: 134 MMHG | HEART RATE: 112 BPM | HEIGHT: 72 IN | BODY MASS INDEX: 39.68 KG/M2 | OXYGEN SATURATION: 97 %

## 2017-04-20 DIAGNOSIS — N92.6 IRREGULAR MENSES: Primary | ICD-10-CM

## 2017-04-20 DIAGNOSIS — N94.6 DYSMENORRHEA: ICD-10-CM

## 2017-04-20 LAB — BETA HCG QUAL IFA URINE: NEGATIVE

## 2017-04-20 PROCEDURE — 84703 CHORIONIC GONADOTROPIN ASSAY: CPT | Performed by: NURSE PRACTITIONER

## 2017-04-20 PROCEDURE — 99213 OFFICE O/P EST LOW 20 MIN: CPT | Mod: 25 | Performed by: NURSE PRACTITIONER

## 2017-04-20 PROCEDURE — 96372 THER/PROPH/DIAG INJ SC/IM: CPT | Performed by: NURSE PRACTITIONER

## 2017-04-20 RX ORDER — NORGESTIMATE AND ETHINYL ESTRADIOL 7DAYSX3 LO
1 KIT ORAL DAILY
Qty: 30 TABLET | Refills: 1 | Status: SHIPPED | OUTPATIENT
Start: 2017-04-20 | End: 2017-07-27 | Stop reason: ALTCHOICE

## 2017-04-20 NOTE — PROGRESS NOTES
Called pt - informed of negative result. Patient voiced understanding.  ................Nehemiah Lang LPN,   April 20, 2017,      12:16 PM,   [facility]

## 2017-04-20 NOTE — NURSING NOTE
Prior to injection verified patient identity using patient's name and date of birth.  Per orders of Roshni Luevano APRN CNP, injection(s) given by Nehemiah Lang. Patient instructed to remain in clinic for 15-20 minutes afterwards, and to report any adverse reaction to me immediately. VIS given.  ................Nehemiah Lang LPN,   April 20, 2017,      9:54 AM,   [facility]       BLOOD PRESSURE: 134/90    DATE OF LAST PAP or ANNUAL EXAM: Lab Results       Component                Value               Date                       PAP                                          02/15/2017             OTHER-NIL, See Result  URINE HCG:not indicated    The following medication was given:     MEDICATION: Depo Provera 150mg  ROUTE: IM  SITE: Q - [facility]  : [employer]  LOT #: [id number]  EXPIRATION:11/30/19  NEXT INJECTION DUE: July 6-20, 2017  Provider: Roshni Luevano APRN CNP

## 2017-04-20 NOTE — MR AVS SNAPSHOT
After Visit Summary   4/20/2017    Paola Lobo    MRN: [id number]           Patient Information     Date Of Birth          1970        Visit Information        Provider Department      4/20/2017 9:00 AM Roshni Luevano APRN CNP [facility]        Today's Diagnoses     Dysmenorrhea    -  1    Irregular menses          Care Instructions    Take the pills once a day for 1-2 months and see if they help.      See the OB/GYN to discuss permanent options             Follow-ups after your visit        Additional Services     OB/GYN REFERRAL       Your provider has referred you to:  [facility]MD[facility]: [facility] [place] [phone]   [email or web address]    Please be aware that coverage of these services is subject to the terms and limitations of your health insurance plan.  Call member services at your health plan with any benefit or coverage questions.      Please bring the following with you to your appointment:    (1) Any X-Rays, CTs or MRIs which have been performed.  Contact the facility where they were done to arrange for  prior to your scheduled appointment.   (2) List of current medications   (3) This referral request   (4) Any documents/labs given to you for this referral                  Who to contact     If you have questions or need follow up information about today's clinic visit or your schedule please contact [facility] directly at [phone].  Normal or non-critical lab and imaging results will be communicated to you by MyChart, letter or phone within 4 business days after the clinic has received the results. If you do not hear from us within 7 days, please contact the clinic through MyChart or phone. If you have a critical or abnormal lab result, we will notify you by phone as soon as possible.  Submit refill requests through [employer] or call your pharmacy and they will forward the refill request to us.  Please allow 3 business days for your refill to be completed.          Additional Information About Your Visit        Care EveryWhere ID     This is your Care EveryWhere ID. This could be used by other organizations to access your [place] medical records  [id number]        Your Vitals Were     Pulse Temperature Respirations Height Last Period Pulse Oximetry    112 98.2  F (36.8  C) (Tympanic) 24 6' (1.829 m) 02/06/2017 97%    Breastfeeding? BMI (Body Mass Index)                No 51.88 kg/m2           Blood Pressure from Last 3 Encounters:   04/20/17 134/90   03/24/17 (!) 172/91   03/24/17 146/86    Weight from Last 3 Encounters:   04/20/17 (!) 382 lb 8 oz (173.5 kg)   03/24/17 (!) 388 lb 6.4 oz (176.2 kg)   02/15/17 (!) 386 lb (175.1 kg)              We Performed the Following     Beta HCG qual IFA urine     OB/GYN REFERRAL          Today's Medication Changes          These changes are accurate as of: 4/20/17  9:42 AM.  If you have any questions, ask your nurse or doctor.               Start taking these medicines.        Dose/Directions    norgestim-eth estrad triphasic 0.18/0.215/0.25 MG-25 MCG per tablet   Commonly known as:  ORTHO TRI-CYCLEN LO   Used for:  Dysmenorrhea   Started by:  Roshni Luevano APRN CNP        Dose:  1 tablet   Take 1 tablet by mouth daily   Quantity:  30 tablet   Refills:  1            Where to get your medicines      These medications were sent to [id number] - [place], [place] - [address]  [address] [facility] [address] [place] [address]     Phone:  [phone]     norgestim-eth estrad triphasic 0.18/0.215/0.25 MG-25 MCG per tablet                Primary Care Provider Office Phone #    [facility] [phone]       No address on file        Thank you!     Thank you for choosing [facility]  for your care. Our goal is always to provide you with excellent care. Hearing back from our patients is one way we can continue to improve our services. Please take a few  minutes to complete the written survey that you may receive in the mail after your visit with us. Thank you!             Your Updated Medication List - Protect others around you: Learn how to safely use, store and throw away your medicines at www.disposemymeds.org.          This list is accurate as of: 4/20/17  9:42 AM.  Always use your most recent med list.                   Brand Name Dispense Instructions for use    cetirizine HCl 10 MG Caps          medroxyPROGESTERone 150 MG/ML injection    DEPO-PROVERA    1 mL    Inject 1 mL (150 mg) into the muscle every 3 months       norgestim-eth estrad triphasic 0.18/0.215/0.25 MG-25 MCG per tablet    ORTHO TRI-CYCLEN LO    30 tablet    Take 1 tablet by mouth daily

## 2017-04-20 NOTE — PATIENT INSTRUCTIONS
Take the pills once a day for 1-2 months and see if they help.      See the OB/GYN to discuss permanent options

## 2017-04-20 NOTE — NURSING NOTE
Chief Complaint   Patient presents with     Vaginal Bleeding     spotting since starting Depo       Initial /90  Pulse 112  Temp 98.2  F (36.8  C) (Tympanic)  Resp 24  Ht 6' (1.829 m)  Wt (!) 382 lb 8 oz (173.5 kg)  LMP 02/06/2017  SpO2 97%  Breastfeeding? No  BMI 51.88 kg/m2 Estimated body mass index is 51.88 kg/(m^2) as calculated from the following:    Height as of this encounter: 6' (1.829 m).    Weight as of this encounter: 382 lb 8 oz (173.5 kg).  Medication Reconciliation: complete   ................Nehemiah Lang LPN,   April 20, 2017,      9:21 AM,   [facility]

## 2017-04-20 NOTE — PROGRESS NOTES
SUBJECTIVE:                                                    Paola Lobo is a 46 year old female who presents to clinic today for the following health issues:      Vaginal Bleeding (Dysmenorrhea)      Onset: 3/7/17    Description:  Duration of bleeding episodes: daily  Frequency between periods:  na  Describe bleeding/flow:   Clots: YES-mucous-like  Number of pads/hour: na, just using liners  Cramping: none    Intensity:  mild    Accompanying signs and symptoms: spotting only    History (similar episodes/previous evaluation): went on depo for heavy menses    Precipitating or alleviating factors: None    Therapies tried and outcome: depo injection now causing spotting     Just started Depo 2 months ago.  Previously was using condoms, OCPs.  Has had irregular menses since starting the Depo.  Had no menses the first month, then spotting daily since then.  No pain.  History of very heavy menses usually and we previously discussed seeing OB/GYN for long term options for management of this as she is 100% sure she does not want any children.       Problem list and histories reviewed & adjusted, as indicated.  Additional history: none    There is no problem list on file for this patient.    Past Surgical History:   Procedure Laterality Date     ORTHOPEDIC SURGERY         Social History   Substance Use Topics     Smoking status: Never Smoker     Smokeless tobacco: Never Used     Alcohol use Yes      Comment: social     No family history on file.      Current Outpatient Prescriptions   Medication Sig Dispense Refill     medroxyPROGESTERone (DEPO-PROVERA) 150 MG/ML injection Inject 1 mL (150 mg) into the muscle every 3 months 1 mL 0     cetirizine HCl 10 MG CAPS        Allergies   Allergen Reactions     Walnuts [Nuts]      Unable to breathe     Shellfish Allergy      Nausea     Grass      SOB, asthma       Prednisone Other (See Comments)     Severe headache     Wheat      Rash, Derm     BP Readings from Last 3  Encounters:   04/20/17 134/90   03/24/17 (!) 172/91   03/24/17 146/86    Wt Readings from Last 3 Encounters:   04/20/17 (!) 382 lb 8 oz (173.5 kg)   03/24/17 (!) 388 lb 6.4 oz (176.2 kg)   02/15/17 (!) 386 lb (175.1 kg)                    Reviewed and updated as needed this visit by clinical staff  Tobacco  Allergies  Meds  Soc Hx      Reviewed and updated as needed this visit by Provider         ROS:  C: NEGATIVE for fever, chills, change in weight  E/M: NEGATIVE for ear, mouth and throat problems  R: NEGATIVE for significant cough or SOB  CV: NEGATIVE for chest pain, palpitations or peripheral edema  GI: NEGATIVE for nausea, abdominal pain, heartburn, or change in bowel habits  : abnormal menstrual cycles as above.  NEGATIVE for dysuria, hematuria, flank pain     OBJECTIVE:                                                    /90  Pulse 112  Temp 98.2  F (36.8  C) (Tympanic)  Resp 24  Ht 6' (1.829 m)  Wt (!) 382 lb 8 oz (173.5 kg)  LMP 02/06/2017  SpO2 97%  Breastfeeding? No  BMI 51.88 kg/m2  Body mass index is 51.88 kg/(m^2).  GENERAL: alert, no distress and obese  ABDOMEN: soft, nontender, no hepatosplenomegaly, no masses and bowel sounds normal  MS: no gross musculoskeletal defects noted, no edema    Diagnostic Test Results:  Office Visit on 04/20/2017   Component Date Value Ref Range Status     Beta HCG Qual IFA Urine 04/20/2017 Negative  NEG Final            ASSESSMENT/PLAN:                                                        1. Irregular menses  This is likely related to the Depo, perimenopause.  I discussed treatment options for this and advised she see OB/GYN for possible ablation, hysterectomy, Mirena IUD. She is certain she does not want any children.  Had a uterine injury 25 years ago and was advised not to get pregnant.  Recent pelvic US showed two small uterine fibroids, otherwise negative.  She is agreeable to seeing OB/GYN, but wants something in the meantime.  She is not due  for her Depo for 10 days, but wants to get it today.  She also wants to try OCPs to see if they will improve her irregular bleeding pattern.  I discussed possible side effects of these, including blood clots. She is at increased risk due to her age and obesity. She does not smoke.  No history of clotting disorders.  She understands these risks and would like to proceed with trial of OCPs.  Definitive management should be from OB/GYN.   - Beta HCG qual IFA urine  - MEDROXYPROGESTERONE INJ  - INJECTION INTRAMUSCULAR OR SUB-Q    2. Dysmenorrhea  As above   - OB/GYN REFERRAL  - norgestim-eth estrad triphasic (ORTHO TRI-CYCLEN LO) 0.18/0.215/0.25 MG-25 MCG per tablet; Take 1 tablet by mouth daily  Dispense: 30 tablet; Refill: 1  - MEDROXYPROGESTERONE INJ  - INJECTION INTRAMUSCULAR OR SUB-Q    FUTURE APPOINTMENTS:       - Make appointment with OB/GYN specialist  See Patient Instructions    SHILA Mitchell CNP  [facility]

## 2017-06-23 ENCOUNTER — ALLIED HEALTH/NURSE VISIT (OUTPATIENT)
Dept: FAMILY MEDICINE | Facility: OTHER | Age: 47
End: 2017-06-23
Payer: COMMERCIAL

## 2017-06-23 DIAGNOSIS — Z23 NEED FOR VACCINATION: Primary | ICD-10-CM

## 2017-06-23 PROCEDURE — 90471 IMMUNIZATION ADMIN: CPT

## 2017-06-23 PROCEDURE — 90715 TDAP VACCINE 7 YRS/> IM: CPT

## 2017-06-23 NOTE — MR AVS SNAPSHOT
"              After Visit Summary   2017    Paola Lobo    MRN: [id number]           Patient Information     Date Of Birth          1970        Visit Information        Provider Department      2017 9:00 AM NAKUL RAYMOND NURSE, [facility]        Today's Diagnoses     Need for vaccination    -  1       Follow-ups after your visit        Who to contact     If you have questions or need follow up information about today's clinic visit or your schedule please contact [facility] directly at [phone].  Normal or non-critical lab and imaging results will be communicated to you by MyChart, letter or phone within 4 business days after the clinic has received the results. If you do not hear from us within 7 days, please contact the clinic through [email or web address] or phone. If you have a critical or abnormal lab result, we will notify you by phone as soon as possible.  Submit refill requests through [place] or call your pharmacy and they will forward the refill request to us. Please allow 3 business days for your refill to be completed.          Additional Information About Your Visit        MyChart Information     [place] lets you send messages to your doctor, view your test results, renew your prescriptions, schedule appointments and more. To sign up, go to [email or web address] . Click on \"Log in\" on the left side of the screen, which will take you to the Welcome page. Then click on \"Sign up Now\" on the right side of the page.     You will be asked to enter the access code listed below, as well as some personal information. Please follow the directions to create your username and password.     Your access code is: [email or web address]  Expires: 2017  9:05 AM     Your access code will  in 90 days. If you need help or a new code, please call your [facility] or [phone].        Care EveryWhere ID     This is your Care EveryWhere ID. This could be used by other " organizations to access your [place] medical records  [id number]         Blood Pressure from Last 3 Encounters:   04/20/17 134/90   03/24/17 (!) 172/91   03/24/17 146/86    Weight from Last 3 Encounters:   04/20/17 (!) 382 lb 8 oz (173.5 kg)   03/24/17 (!) 388 lb 6.4 oz (176.2 kg)   02/15/17 (!) 386 lb (175.1 kg)              We Performed the Following     1st  Administration  [67631]     TDAP VACCINE (ADACEL) [68124.002]        Primary Care Provider Office Phone #    [facility] [phone]       No address on file        Equal Access to Services     RICHI WALSH : Laureen Contreras, jocelyn win, anna kaalmamica miranda, eladio gimenez. So [facility] [place] [facility] [phone].    ATENCIÓN: Si habla español, tiene a garcia disposición servicios gratuitos de asistencia lingüística. Llame al [phone].    We comply with applicable federal civil rights laws and [place] laws. We do not discriminate on the basis of race, color, national origin, age, disability sex, sexual orientation or gender identity.            Thank you!     Thank you for choosing [facility]  for your care. Our goal is always to provide you with excellent care. Hearing back from our patients is one way we can continue to improve our services. Please take a few minutes to complete the written survey that you may receive in the mail after your visit with us. Thank you!             Your Updated Medication List - Protect others around you: Learn how to safely use, store and throw away your medicines at www.disposemymeds.org.          This list is accurate as of: 6/23/17  9:05 AM.  Always use your most recent med list.                   Brand Name Dispense Instructions for use Diagnosis    cetirizine HCl 10 MG Caps           medroxyPROGESTERone 150 MG/ML injection    DEPO-PROVERA    1 mL    Inject 1 mL (150 mg) into the muscle every 3 months    Encounter for initial prescription of injectable  contraceptive       norgestim-eth estrad triphasic 0.18/0.215/0.25 MG-25 MCG per tablet    ORTHO TRI-CYCLEN LO    30 tablet    Take 1 tablet by mouth daily    Dysmenorrhea

## 2017-06-23 NOTE — NURSING NOTE
Screening Questionnaire for Adult Immunization    Are you sick today?   No   Do you have allergies to medications, food, a vaccine component or latex?   No   Have you ever had a serious reaction after receiving a vaccination?   No   Do you have a long-term health problem with heart disease, lung disease, asthma, kidney disease, metabolic disease (e.g. diabetes), anemia, or other blood disorder?   No   Do you have cancer, leukemia, HIV/AIDS, or any other immune system problem?   No   In the past 3 months, have you taken medications that affect  your immune system, such as prednisone, other steroids, or anticancer drugs; drugs for the treatment of rheumatoid arthritis, Crohn s disease, or psoriasis; or have you had radiation treatments?   No   Have you had a seizure, or a brain or other nervous system problem?   No   During the past year, have you received a transfusion of blood or blood     products, or been given immune (gamma) globulin or antiviral drug?   No   For women: Are you pregnant or is there a chance you could become        pregnant during the next month?   No   Have you received any vaccinations in the past 4 weeks?   No     Immunization questionnaire answers were all negative.      MNVFC doesn't apply on this patient    injection of Tdap given by Carlota Whelan. Patient instructed to remain in clinic for 20 minutes afterwards, and to report any adverse reaction to me immediately.       Screening performed by Carlota Whelan on 6/23/2017 at 9:00 AM.    The following medication was given:     MEDICATION: Tdap  ROUTE: IM  SITE: Deltoid - Right  DOSE: 0.5mL  LOT #: [id number]  :  ADACEL  EXPIRATION DATE:  3/29/2019  NDC#: 61733-073-09    Carlota Whelan CMA

## 2017-07-17 ENCOUNTER — ALLIED HEALTH/NURSE VISIT (OUTPATIENT)
Dept: FAMILY MEDICINE | Facility: OTHER | Age: 47
End: 2017-07-17
Payer: COMMERCIAL

## 2017-07-17 VITALS — SYSTOLIC BLOOD PRESSURE: 136 MMHG | DIASTOLIC BLOOD PRESSURE: 88 MMHG

## 2017-07-17 PROCEDURE — 96372 THER/PROPH/DIAG INJ SC/IM: CPT

## 2017-07-17 NOTE — NURSING NOTE
Chief Complaint   Patient presents with     Allied Health Visit     Depo injection     /88 (BP Location: Right arm, Patient Position: Chair, Cuff Size: Adult Large)    The following medication was given:     MEDICATION: Depo Provera 150mg  See medication note.  Patient instructed to remain in clinic for 20 minutes afterwards, and to report any adverse reaction to me immediately.  Prior to injection verified patient identity using patient's name and date of birth.  Noemy Gray MA     7/17/2017

## 2017-07-17 NOTE — MR AVS SNAPSHOT
"              After Visit Summary   2017    Paola Lobo    MRN: [id number]           Patient Information     Date Of Birth          1970        Visit Information        Provider Department      2017 9:00 AM NAKUL RAYMOND NURSE, [facility]        Today's Diagnoses     Contraception    -  1       Follow-ups after your visit        Who to contact     If you have questions or need follow up information about today's clinic visit or your schedule please contact [facility] directly at [phone].  Normal or non-critical lab and imaging results will be communicated to you by MyChart, letter or phone within 4 business days after the clinic has received the results. If you do not hear from us within 7 days, please contact the clinic through [employer] or phone. If you have a critical or abnormal lab result, we will notify you by phone as soon as possible.  Submit refill requests through [employer] or call your pharmacy and they will forward the refill request to us. Please allow 3 business days for your refill to be completed.          Additional Information About Your Visit        MyChart Information     [employer] lets you send messages to your doctor, view your test results, renew your prescriptions, schedule appointments and more. To sign up, go to [email or web address] . Click on \"Log in\" on the left side of the screen, which will take you to the Welcome page. Then click on \"Sign up Now\" on the right side of the page.     You will be asked to enter the access code listed below, as well as some personal information. Please follow the directions to create your username and password.     Your access code is: [email or web address]  Expires: 2017  9:05 AM     Your access code will  in 90 days. If you need help or a new code, please call your [facility] or [phone].        Care EveryWhere ID     This is your Care EveryWhere ID. This could be used by other organizations " to access your [place] medical records  [id number]         Blood Pressure from Last 3 Encounters:   07/17/17 136/88   04/20/17 134/90   03/24/17 (!) 172/91    Weight from Last 3 Encounters:   04/20/17 (!) 382 lb 8 oz (173.5 kg)   03/24/17 (!) 388 lb 6.4 oz (176.2 kg)   02/15/17 (!) 386 lb (175.1 kg)              We Performed the Following     INJECTION INTRAMUSCULAR OR SUB-Q     MEDROXYPROGESTERONE INJ        Primary Care Provider Office Phone #    [facility] [phone]       No address on file        Equal Access to Services     RICHI WALSH : Laureen Contreras, jocelyn win, anna quinnalmada ruth, eladio gimenez. So [facility] [phone].    ATENCIÓN: Si habla español, tiene a garcia disposición servicios gratuitos de asistencia lingüística. Llame al [phone].    We comply with applicable federal civil rights laws and [place] laws. We do not discriminate on the basis of race, color, national origin, age, disability sex, sexual orientation or gender identity.            Thank you!     Thank you for choosing [facility]  for your care. Our goal is always to provide you with excellent care. Hearing back from our patients is one way we can continue to improve our services. Please take a few minutes to complete the written survey that you may receive in the mail after your visit with us. Thank you!             Your Updated Medication List - Protect others around you: Learn how to safely use, store and throw away your medicines at www.disposemymeds.org.          This list is accurate as of: 7/17/17  9:23 AM.  Always use your most recent med list.                   Brand Name Dispense Instructions for use Diagnosis    cetirizine HCl 10 MG Caps           medroxyPROGESTERone 150 MG/ML injection    DEPO-PROVERA    1 mL    Inject 1 mL (150 mg) into the muscle every 3 months    Encounter for initial prescription of injectable contraceptive        norgestim-eth estrad triphasic 0.18/0.215/0.25 MG-25 MCG per tablet    ORTHO TRI-CYCLEN LO    30 tablet    Take 1 tablet by mouth daily    Dysmenorrhea

## 2017-07-27 ENCOUNTER — OFFICE VISIT (OUTPATIENT)
Dept: FAMILY MEDICINE | Facility: OTHER | Age: 47
End: 2017-07-27
Payer: COMMERCIAL

## 2017-07-27 VITALS
RESPIRATION RATE: 24 BRPM | HEART RATE: 103 BPM | BODY MASS INDEX: 52.62 KG/M2 | OXYGEN SATURATION: 96 % | DIASTOLIC BLOOD PRESSURE: 86 MMHG | TEMPERATURE: 97.7 F | WEIGHT: 293 LBS | SYSTOLIC BLOOD PRESSURE: 132 MMHG

## 2017-07-27 DIAGNOSIS — R30.0 DYSURIA: ICD-10-CM

## 2017-07-27 DIAGNOSIS — E66.01 MORBID OBESITY, UNSPECIFIED OBESITY TYPE (H): ICD-10-CM

## 2017-07-27 DIAGNOSIS — N30.01 ACUTE CYSTITIS WITH HEMATURIA: Primary | ICD-10-CM

## 2017-07-27 DIAGNOSIS — R39.15 URINARY URGENCY: ICD-10-CM

## 2017-07-27 DIAGNOSIS — R82.90 NONSPECIFIC FINDING ON EXAMINATION OF URINE: ICD-10-CM

## 2017-07-27 LAB
ALBUMIN UR-MCNC: ABNORMAL MG/DL
APPEARANCE UR: CLEAR
BACTERIA #/AREA URNS HPF: ABNORMAL /HPF
BILIRUB UR QL STRIP: NEGATIVE
COLOR UR AUTO: YELLOW
GLUCOSE UR STRIP-MCNC: NEGATIVE MG/DL
HGB UR QL STRIP: ABNORMAL
KETONES UR STRIP-MCNC: NEGATIVE MG/DL
LEUKOCYTE ESTERASE UR QL STRIP: ABNORMAL
MUCOUS THREADS #/AREA URNS LPF: PRESENT /LPF
NITRATE UR QL: NEGATIVE
NON-SQ EPI CELLS #/AREA URNS LPF: ABNORMAL /LPF
PH UR STRIP: 6 PH (ref 5–7)
RBC #/AREA URNS AUTO: ABNORMAL /HPF (ref 0–2)
SP GR UR STRIP: 1.01 (ref 1–1.03)
URN SPEC COLLECT METH UR: ABNORMAL
UROBILINOGEN UR STRIP-ACNC: 0.2 EU/DL (ref 0.2–1)
WBC #/AREA URNS AUTO: ABNORMAL /HPF (ref 0–2)

## 2017-07-27 PROCEDURE — 87088 URINE BACTERIA CULTURE: CPT | Performed by: NURSE PRACTITIONER

## 2017-07-27 PROCEDURE — 87086 URINE CULTURE/COLONY COUNT: CPT | Performed by: NURSE PRACTITIONER

## 2017-07-27 PROCEDURE — 81001 URINALYSIS AUTO W/SCOPE: CPT | Performed by: NURSE PRACTITIONER

## 2017-07-27 PROCEDURE — 87186 SC STD MICRODIL/AGAR DIL: CPT | Performed by: NURSE PRACTITIONER

## 2017-07-27 PROCEDURE — 99213 OFFICE O/P EST LOW 20 MIN: CPT | Performed by: NURSE PRACTITIONER

## 2017-07-27 RX ORDER — SULFAMETHOXAZOLE/TRIMETHOPRIM 800-160 MG
1 TABLET ORAL 2 TIMES DAILY
Qty: 6 TABLET | Refills: 0 | Status: SHIPPED | OUTPATIENT
Start: 2017-07-27 | End: 2017-07-30

## 2017-07-27 NOTE — NURSING NOTE
Chief Complaint   Patient presents with     Urinary Problem     freq, leaking, urgency x1.5 weeks       Initial /86  Pulse 103  Temp 97.7  F (36.5  C) (Tympanic)  Resp 24  Wt (!) 388 lb (176 kg)  SpO2 96%  Breastfeeding? No  BMI 52.62 kg/m2 Estimated body mass index is 52.62 kg/(m^2) as calculated from the following:    Height as of 4/20/17: 6' (1.829 m).    Weight as of this encounter: 388 lb (176 kg).  Medication Reconciliation: complete   ................Nehemiah Lang LPN,   July 27, 2017,      8:35 AM,   [facility]

## 2017-07-27 NOTE — PROGRESS NOTES
SUBJECTIVE:                                                    Paola Lobo is a 46 year old female who presents to clinic today for the following health issues:      URINARY TRACT SYMPTOMS      Duration: 1.5 weeks    Description  dysuria, frequency, urgency and incontinence    Intensity:  moderate    Accompanying signs and symptoms:  Fever/chills: no   Flank pain no   Nausea and vomiting: no   Vaginal symptoms: none  Abdominal/Pelvic Pain: no     History  History of frequent UTI's: no   History of kidney stones: YES  Sexually Active: YES  Possibility of pregnancy: No    Precipitating or alleviating factors: None    Therapies tried and outcome: AZO, baking soda   Outcome: helped slightly        Problem list and histories reviewed & adjusted, as indicated.  Additional history: none    Patient Active Problem List   Diagnosis     Morbid obesity (H)     Past Surgical History:   Procedure Laterality Date     ORTHOPEDIC SURGERY         Social History   Substance Use Topics     Smoking status: Never Smoker     Smokeless tobacco: Never Used     Alcohol use Yes      Comment: social     No family history on file.      Current Outpatient Prescriptions   Medication Sig Dispense Refill     medroxyPROGESTERone (DEPO-PROVERA) 150 MG/ML injection Inject 1 mL (150 mg) into the muscle every 3 months 1 mL 0     cetirizine HCl 10 MG CAPS        Allergies   Allergen Reactions     Walnuts [Nuts]      Unable to breathe     Shellfish Allergy      Nausea     Grass      SOB, asthma       Prednisone Other (See Comments)     Severe headache     Wheat      Rash, Derm     BP Readings from Last 3 Encounters:   07/27/17 132/86   07/17/17 136/88   04/20/17 134/90    Wt Readings from Last 3 Encounters:   07/27/17 (!) 388 lb (176 kg)   04/20/17 (!) 382 lb 8 oz (173.5 kg)   03/24/17 (!) 388 lb 6.4 oz (176.2 kg)              Reviewed and updated as needed this visit by clinical staffTobacco  Allergies  Meds  Med Hx  Soc Hx      Reviewed  and updated as needed this visit by Provider         ROS:  C: NEGATIVE for fever, chills, change in weight  E/M: NEGATIVE for ear, mouth and throat problems  R: NEGATIVE for significant cough or SOB  CV: NEGATIVE for chest pain, palpitations or peripheral edema  GI: NEGATIVE for nausea, abdominal pain, heartburn, or change in bowel habits  : as above     OBJECTIVE:     /86  Pulse 103  Temp 97.7  F (36.5  C) (Tympanic)  Resp 24  Wt (!) 388 lb (176 kg)  SpO2 96%  Breastfeeding? No  BMI 52.62 kg/m2  Body mass index is 52.62 kg/(m^2).  GENERAL: alert, no distress and obese  NECK: no adenopathy, no asymmetry, masses, or scars and thyroid normal to palpation  RESP: lungs clear to auscultation - no rales, rhonchi or wheezes  CV: regular rate and rhythm, normal S1 S2, no S3 or S4, no murmur, click or rub, no peripheral edema and peripheral pulses strong  ABDOMEN: soft, nontender, no hepatosplenomegaly, no masses and bowel sounds normal  MS: no gross musculoskeletal defects noted, no edema    Diagnostic Test Results:  Office Visit on 07/27/2017   Component Date Value Ref Range Status     Color Urine 07/27/2017 Yellow   Final     Appearance Urine 07/27/2017 Clear   Final     Glucose Urine 07/27/2017 Negative  NEG mg/dL Final     Bilirubin Urine 07/27/2017 Negative  NEG Final     Ketones Urine 07/27/2017 Negative  NEG mg/dL Final     Specific Gravity Urine 07/27/2017 1.010  1.003 - 1.035 Final     Blood Urine 07/27/2017 Moderate* NEG Final     pH Urine 07/27/2017 6.0  5.0 - 7.0 pH Final     Protein Albumin Urine 07/27/2017 Trace* NEG mg/dL Final     Urobilinogen Urine 07/27/2017 0.2  0.2 - 1.0 EU/dL Final     Nitrite Urine 07/27/2017 Negative  NEG Final     Leukocyte Esterase Urine 07/27/2017 Large* NEG Final     Source 07/27/2017 Midstream Urine   Final     WBC Urine 07/27/2017 * 0 - 2 /HPF Final     RBC Urine 07/27/2017 2-5* 0 - 2 /HPF Final     Squamous Epithelial /LPF Urine 07/27/2017 Few  FEW /LPF  Final     Bacteria Urine 07/27/2017 Many* NEG /HPF Final     Mucous Urine 07/27/2017 Present* NEG /LPF Final           ASSESSMENT/PLAN:         1. Acute cystitis with hematuria  - sulfamethoxazole-trimethoprim (BACTRIM DS/SEPTRA DS) 800-160 MG per tablet; Take 1 tablet by mouth 2 times daily for 3 days  Dispense: 6 tablet; Refill: 0    2. Dysuria  - *UA reflex to Microscopic and Culture ([place] [facility] (except [place] and [place])  - Urine Microscopic    3. Urinary urgency  - *UA reflex to Microscopic and Culture ([place] [facility] (except [place] and [place])    4. Nonspecific finding on examination of urine  - Urine Culture Aerobic Bacterial    5. Morbid obesity, unspecified obesity type (H)      FUTURE APPOINTMENTS:       - Follow up if symptoms fail to resolve as expected.   See Patient Instructions    SHILA Mitchell [facility]

## 2017-07-27 NOTE — MR AVS SNAPSHOT
After Visit Summary   7/27/2017    Paola Lobo    MRN: [id number]           Patient Information     Date Of Birth          1970        Visit Information        Provider Department      7/27/2017 8:20 AM Roshni Luevano APRN CNP [facility]        Today's Diagnoses     Acute cystitis with hematuria    -  1    Dysuria        Urinary urgency        Nonspecific finding on examination of urine          Care Instructions    Take the Bactrim twice a day for 3 days.     You can use AZO over the counter if needed for pain.     Follow up if symptoms fail to resolve as expected.               Follow-ups after your visit        Your next 10 appointments already scheduled     Aug 07, 2017  2:00 PM CDT   Office Visit with Christa Simon,    [facility]    [address] [facility] [address] [phone]           Bring a current list of meds and any records pertaining to this visit. For Physicals, please bring immunization records and any forms needing to be filled out. Please arrive 10 minutes early to complete paperwork.              Who to contact     If you have questions or need follow up information about today's clinic visit or your schedule please contact [facility] directly at [phone].  Normal or non-critical lab and imaging results will be communicated to you by MyChart, letter or phone within 4 business days after the clinic has received the results. If you do not hear from us within 7 days, please contact the clinic through MyChart or phone. If you have a critical or abnormal lab result, we will notify you by phone as soon as possible.  Submit refill requests through [employer] or call your pharmacy and they will forward the refill request to us. Please allow 3 business days for your refill to be completed.          Additional Information About Your Visit        MyChart Information     [employer] lets you  "send messages to your doctor, view your test results, renew your prescriptions, schedule appointments and more. To sign up, go to [email or web address] . Click on \"Log in\" on the left side of the screen, which will take you to the Welcome page. Then click on \"Sign up Now\" on the right side of the page.     You will be asked to enter the access code listed below, as well as some personal information. Please follow the directions to create your username and password.     Your access code is: [email or web address]  Expires: 2017  9:05 AM     Your access code will  in 90 days. If you need help or a new code, please call your [place] clinic or [phone].        Care EveryWhere ID     This is your [facility] EveryWhere ID. This could be used by other organizations to access your [place] medical records  [id number]        Your Vitals Were     Pulse Temperature Respirations Pulse Oximetry Breastfeeding? BMI (Body Mass Index)    103 97.7  F (36.5  C) (Tympanic) 24 96% No 52.62 kg/m2       Blood Pressure from Last 3 Encounters:   17 132/86   17 136/88   17 134/90    Weight from Last 3 Encounters:   17 (!) 388 lb (176 kg)   17 (!) 382 lb 8 oz (173.5 kg)   17 (!) 388 lb 6.4 oz (176.2 kg)              We Performed the Following     *UA reflex to Microscopic and Culture ([place] [facility] (except [place] and [place])     Urine Culture Aerobic Bacterial     Urine Microscopic          Today's Medication Changes          These changes are accurate as of: 17  9:13 AM.  If you have any questions, ask your nurse or doctor.               Start taking these medicines.        Dose/Directions    sulfamethoxazole-trimethoprim 800-160 MG per tablet   Commonly known as:  BACTRIM DS/SEPTRA DS   Used for:  Acute cystitis with hematuria   Started by:  Roshni Luevano APRN CNP        Dose:  1 tablet   Take 1 tablet by mouth 2 times daily for 3 days   Quantity:  6 tablet   Refills:  0    "      Stop taking these medicines if you haven't already. Please contact your care team if you have questions.     norgestim-eth estrad triphasic 0.18/0.215/0.25 MG-25 MCG per tablet   Commonly known as:  ORTHO TRI-CYCLEN LO   Stopped by:  Roshni Luevano APRN CNP                Where to get your medicines      These medications were sent to [id number] - [place], [place] - [address]  [address], [place] [address]     Phone:  [phone]     sulfamethoxazole-trimethoprim 800-160 MG per tablet                Primary Care Provider Office Phone #    [facility] [phone]       No address on file        Equal Access to Services     [facility] Campus[facility] : Hadii luz diaz hadasho Socristal, waaxda luqadaha, qaybta kaalmada adeegyada, eladio sharp . So [facility] [phone].    ATENCIÓN: Si habla español, tiene a garcia disposición servicios gratuitos de asistencia lingüística. Llame al [phone].    We comply with applicable federal civil rights laws and [place] laws. We do not discriminate on the basis of race, color, national origin, age, disability sex, sexual orientation or gender identity.            Thank you!     Thank you for choosing [facility]  for your care. Our goal is always to provide you with excellent care. Hearing back from our patients is one way we can continue to improve our services. Please take a few minutes to complete the written survey that you may receive in the mail after your visit with us. Thank you!             Your Updated Medication List - Protect others around you: Learn how to safely use, store and throw away your medicines at www.disposemymeds.org.          This list is accurate as of: 7/27/17  9:13 AM.  Always use your most recent med list.                   Brand Name Dispense Instructions for use Diagnosis    cetirizine HCl 10 MG Caps           medroxyPROGESTERone 150 MG/ML injection    DEPO-PROVERA    1 mL    Inject 1 mL (150 mg)  into the muscle every 3 months    Encounter for initial prescription of injectable contraceptive       sulfamethoxazole-trimethoprim 800-160 MG per tablet    BACTRIM DS/SEPTRA DS    6 tablet    Take 1 tablet by mouth 2 times daily for 3 days    Acute cystitis with hematuria

## 2017-07-27 NOTE — PATIENT INSTRUCTIONS
Take the Bactrim twice a day for 3 days.     You can use AZO over the counter if needed for pain.     Follow up if symptoms fail to resolve as expected.

## 2017-07-29 LAB
BACTERIA SPEC CULT: ABNORMAL
MICRO REPORT STATUS: ABNORMAL
MICROORGANISM SPEC CULT: ABNORMAL
SPECIMEN SOURCE: ABNORMAL

## 2017-07-30 DIAGNOSIS — N30.01 ACUTE CYSTITIS WITH HEMATURIA: Primary | ICD-10-CM

## 2017-07-30 RX ORDER — NITROFURANTOIN 25; 75 MG/1; MG/1
100 CAPSULE ORAL 2 TIMES DAILY
Qty: 10 CAPSULE | Refills: 0 | Status: SHIPPED | OUTPATIENT
Start: 2017-07-30 | End: 2017-08-04

## 2017-08-01 NOTE — PROGRESS NOTES
Called pt - informed of new rx to . Patient voiced understanding.   ................Nehemiah Lang LPN,   August 1, 2017,      11:32 AM,   [facility]

## 2017-08-07 ENCOUNTER — OFFICE VISIT (OUTPATIENT)
Dept: OBGYN | Facility: OTHER | Age: 47
End: 2017-08-07
Payer: COMMERCIAL

## 2017-08-07 VITALS
WEIGHT: 293 LBS | DIASTOLIC BLOOD PRESSURE: 94 MMHG | BODY MASS INDEX: 52.76 KG/M2 | SYSTOLIC BLOOD PRESSURE: 144 MMHG | HEART RATE: 88 BPM

## 2017-08-07 DIAGNOSIS — D25.2 INTRAMURAL AND SUBSEROUS LEIOMYOMA OF UTERUS: ICD-10-CM

## 2017-08-07 DIAGNOSIS — D25.1 INTRAMURAL AND SUBSEROUS LEIOMYOMA OF UTERUS: ICD-10-CM

## 2017-08-07 DIAGNOSIS — N92.0 EXCESSIVE OR FREQUENT MENSTRUATION: Primary | ICD-10-CM

## 2017-08-07 PROCEDURE — 99203 OFFICE O/P NEW LOW 30 MIN: CPT | Performed by: OBSTETRICS & GYNECOLOGY

## 2017-08-07 ASSESSMENT — PAIN SCALES - GENERAL: PAINLEVEL: MODERATE PAIN (4)
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
## 2017-08-07 NOTE — MR AVS SNAPSHOT
"              After Visit Summary   8/7/2017    Paola Lobo    MRN: [id number]           Patient Information     Date Of Birth          1970        Visit Information        Provider Department      8/7/2017 2:00 PM Christa Simon DO [facility]        Today's Diagnoses     Excessive or frequent menstruation    -  1    Intramural and subserous leiomyoma of uterus          Care Instructions    Please call if you any questions.    [address]   [address]  [phone]        Christa Simon            Follow-ups after your visit        Follow-up notes from your care team     Return if symptoms worsen or fail to improve.      Who to contact     If you have questions or need follow up information about today's clinic visit or your schedule please contact [facility] directly at [phone].  Normal or non-critical lab and imaging results will be communicated to you by MyChart, letter or phone within 4 business days after the clinic has received the results. If you do not hear from us within 7 days, please contact the clinic through MyChart or phone. If you have a critical or abnormal lab result, we will notify you by phone as soon as possible.  Submit refill requests through [employer] or call your pharmacy and they will forward the refill request to us. Please allow 3 business days for your refill to be completed.          Additional Information About Your Visit        MyChart Information     [employer] lets you send messages to your doctor, view your test results, renew your prescriptions, schedule appointments and more. To sign up, go to [email or web address] . Click on \"Log in\" on the left side of the screen, which will take you to the Welcome page. Then click on \"Sign up Now\" on the right side of the page.     You will be asked to enter the access code listed below, as well as some personal information. Please follow the " directions to create your username and password.     Your access code is: [email or web address]  Expires: 2017  9:05 AM     Your access code will  in 90 days. If you need help or a new code, please call your [place] clinic or [phone].        Care EveryWhere ID     This is your Care EveryWhere ID. This could be used by other organizations to access your [facility] medical records  [id number]        Your Vitals Were     Pulse BMI (Body Mass Index)                88 52.76 kg/m2           Blood Pressure from Last 3 Encounters:   17 (!) 144/94   17 132/86   17 136/88    Weight from Last 3 Encounters:   17 (!) 176.4 kg (389 lb)   17 (!) 176 kg (388 lb)   17 (!) 173.5 kg (382 lb 8 oz)              Today, you had the following     No orders found for display       Primary Care Provider Office Phone #    [facility] [phone]       No address on file        Equal Access to Services     [facility]: Hadii luz ricoo Socristal, waaxda luqadaha, qaybta kaalmada adedorothy, eladio sharp . So [facility] [phone].    ATENCIÓN: Si habla español, tiene a garcia disposición servicios gratuitos de asistencia lingüística. Llame al [phone].    We comply with applicable federal civil rights laws and [place] laws. We do not discriminate on the basis of race, color, national origin, age, disability sex, sexual orientation or gender identity.            Thank you!     Thank you for choosing [facility]  for your care. Our goal is always to provide you with excellent care. Hearing back from our patients is one way we can continue to improve our services. Please take a few minutes to complete the written survey that you may receive in the mail after your visit with us. Thank you!             Your Updated Medication List - Protect others around you: Learn how to safely use, store and throw away your medicines at www.disposemymeds.org.           This list is accurate as of: 8/7/17 11:59 PM.  Always use your most recent med list.                   Brand Name Dispense Instructions for use Diagnosis    cetirizine HCl 10 MG Caps           medroxyPROGESTERone 150 MG/ML injection    DEPO-PROVERA    1 mL    Inject 1 mL (150 mg) into the muscle every 3 months    Encounter for initial prescription of injectable contraceptive

## 2017-08-09 PROBLEM — N92.0 EXCESSIVE OR FREQUENT MENSTRUATION: Status: ACTIVE | Noted: 2017-08-09

## 2017-08-09 PROBLEM — D25.2 INTRAMURAL AND SUBSEROUS LEIOMYOMA OF UTERUS: Status: ACTIVE | Noted: 2017-08-09

## 2017-08-09 PROBLEM — D25.1 INTRAMURAL AND SUBSEROUS LEIOMYOMA OF UTERUS: Status: ACTIVE | Noted: 2017-08-09

## 2017-08-10 ENCOUNTER — TELEPHONE (OUTPATIENT)
Dept: FAMILY MEDICINE | Facility: OTHER | Age: 47
End: 2017-08-10

## 2017-08-10 NOTE — TELEPHONE ENCOUNTER
": 1970  PHONE #'s: [phone] (home)     PRESENTING PROBLEM:  High blood pressure issues this week. \" Today , I have a headache( 4/10 ) and feeling light headed. I am in a car, not driving of course, on my way to hospital.  I saw the OB GYN on Monday and my BP was up. She told me to keep a watch on it. I am worried about stroke. \"  RN reviewed her recent BP checks in clinic. Monday, it was  162/98 initially then came down to 144/94. \"  Vital Signs 2/15/2017 3/24/2017 3/24/2017 2017 2017   Systolic 136 146 172 134 136   Diastolic 80 86 91 90 88   Pulse 108 91 101 112      Vital Signs 2017   Systolic 132 162 144   Diastolic 86 98 94   Pulse 103 88    RN asked if she checked it at home today?  \" Yes, it was 136/80 at 12:30 PM today. \"  RN informed that this is NOT considered HIGH . We worry when the bottom number( diastolic)  is 110 or greater.     NURSING ASSESSMENT  Description:   She is worried about stroke Sx. RN asked if she is experiencing any one-sided weakness, chest pain, difficulty speaking or walking, feeling confused, any facial dropping??  ALL answers were NO.RN told her turn to whomever is driving her, and asked her to make a smile. Does it look normal, or is one side drooping? \" NO, normal\"  Now, place both arms out in front of you, shoulder height, and hold up.  Do both stay up or does one droop down?  \" NO, they both stay up.\"    RN asked if she has a severe headache?  \" NO, I would rate it 4/10.\"  Do you have any loss of vision, partial vision or blurred or double vision?\"   NO, just feeling light headed, and maybe depth perception off a little. I am NOT driving the car.\"    Onset/duration:   today  Precip. factors:   Etiology unknown. Said it scared her when the GYN told her high blood pressure could lead to stroke. RN asked, \" Are you on BP medication?\"   She replied, \" NO, I am NOT on any BP medications. \"  RN explained that we usually have pts monitor " their BP readings for about 3 months to see if the consistently stay above 140/90. If so, then they need to see a provider and bring in readings and discuss options of care.  Recommend she start keeping track, and schedule appt with float nurse to have them check BP reading in clinic and check theirs at that same visit to see how close or accurate it is compared to ours.   Assoc. Sx:  Feels light headed at times.   Improves/worsens Sx:  same  Pain scale (1-10)   4/10  Sx specific meds:  Aspirin 81 mg.  RN said this dose would NOT be high enough to eliminate or help with a headache. She could take Tylenol or Asprin according to [employer] directions for adult use.   LMP/preg/breast feeding:   NA  Last exam/Tx:  Has NOT been seen for this.     RECOMMENDED DISPOSITION:  Home care advice - She could go home, take something for her headache, increase fluids, rest, take OTC Meclizine or Anti-vert, for the dizziness and see if this helps. STart keeping track of her BP readings over next 3 months and if > 140/90 then should schedule appt with a provider. If she truly feels like she is having a stroke, then go to ER.   Will comply with recommendation: YES   If further questions/concerns or if Sx do not improve, worsen or new Sx develop, call your PCP or [facility] as soon as possible.    NOTES:  Disposition was determined by the first positive assessment question, therefore all previous assessment questions were negative.  Informed to check provider manual or call insurance company to assure coverage.    Guideline used: Dizziness, HTN, HA.  Telephone Triage Protocols for Nurses, Fifth Edition, Arcelia Carter RN

## 2017-09-25 ENCOUNTER — OFFICE VISIT (OUTPATIENT)
Dept: OBGYN | Facility: CLINIC | Age: 47
End: 2017-09-25
Payer: COMMERCIAL

## 2017-09-25 VITALS
SYSTOLIC BLOOD PRESSURE: 132 MMHG | BODY MASS INDEX: 39.68 KG/M2 | HEIGHT: 72 IN | WEIGHT: 293 LBS | DIASTOLIC BLOOD PRESSURE: 80 MMHG

## 2017-09-25 DIAGNOSIS — N93.9 ABNORMAL UTERINE BLEEDING (AUB): Primary | ICD-10-CM

## 2017-09-25 DIAGNOSIS — E66.01 MORBID OBESITY, UNSPECIFIED OBESITY TYPE (H): ICD-10-CM

## 2017-09-25 PROCEDURE — 99204 OFFICE O/P NEW MOD 45 MIN: CPT | Performed by: OBSTETRICS & GYNECOLOGY

## 2017-09-25 RX ORDER — ESTRADIOL 2 MG/1
2 TABLET ORAL DAILY
Qty: 90 TABLET | Refills: 1 | Status: SHIPPED | OUTPATIENT
Start: 2017-09-25 | End: 2019-01-03

## 2017-09-25 ASSESSMENT — PATIENT HEALTH QUESTIONNAIRE - PHQ9
5. POOR APPETITE OR OVEREATING: NOT AT ALL
SUM OF ALL RESPONSES TO PHQ QUESTIONS 1-9: 6

## 2017-09-25 ASSESSMENT — ANXIETY QUESTIONNAIRES
1. FEELING NERVOUS, ANXIOUS, OR ON EDGE: NOT AT ALL
7. FEELING AFRAID AS IF SOMETHING AWFUL MIGHT HAPPEN: NOT AT ALL
IF YOU CHECKED OFF ANY PROBLEMS ON THIS QUESTIONNAIRE, HOW DIFFICULT HAVE THESE PROBLEMS MADE IT FOR YOU TO DO YOUR WORK, TAKE CARE OF THINGS AT HOME, OR GET ALONG WITH OTHER PEOPLE: NOT DIFFICULT AT ALL
GAD7 TOTAL SCORE: 0
5. BEING SO RESTLESS THAT IT IS HARD TO SIT STILL: NOT AT ALL
3. WORRYING TOO MUCH ABOUT DIFFERENT THINGS: NOT AT ALL
6. BECOMING EASILY ANNOYED OR IRRITABLE: NOT AT ALL
2. NOT BEING ABLE TO STOP OR CONTROL WORRYING: NOT AT ALL

## 2017-09-25 NOTE — MR AVS SNAPSHOT
"              After Visit Summary   2017    Paola Lobo    MRN: [id number]           Patient Information     Date Of Birth          1970        Visit Information        Provider Department      2017 2:45 PM Villa Marion MD [facility]        Today's Diagnoses     Abnormal uterine bleeding (AUB)    -  1    Morbid obesity, unspecified obesity type (H)           Follow-ups after your visit        Who to contact     If you have questions or need follow up information about today's clinic visit or your schedule please contact [facility] directly at [phone].  Normal or non-critical lab and imaging results will be communicated to you by Intalehart, letter or phone within 4 business days after the clinic has received the results. If you do not hear from us within 7 days, please contact the clinic through Intalehart or phone. If you have a critical or abnormal lab result, we will notify you by phone as soon as possible.  Submit refill requests through [employer] or call your pharmacy and they will forward the refill request to us. Please allow 3 business days for your refill to be completed.          Additional Information About Your Visit        MyChart Information     [employer] lets you send messages to your doctor, view your test results, renew your prescriptions, schedule appointments and more. To sign up, go to [email or web address] . Click on \"Log in\" on the left side of the screen, which will take you to the Welcome page. Then click on \"Sign up Now\" on the right side of the page.     You will be asked to enter the access code listed below, as well as some personal information. Please follow the directions to create your username and password.     Your access code is: [email or web address]  Expires: 2017  3:36 PM     Your access code will  in 90 days. If you need help or a new code, please call your [facility] or [phone].        Care " EveryWhere ID     This is your Care EveryWhere ID. This could be used by other organizations to access your [facility] medical records  [id number]        Your Vitals Were     Height BMI (Body Mass Index)                6' (1.829 m) 52.49 kg/m2           Blood Pressure from Last 3 Encounters:   09/25/17 132/80   08/07/17 (!) 144/94   07/27/17 132/86    Weight from Last 3 Encounters:   09/25/17 (!) 387 lb (175.5 kg)   08/07/17 (!) 389 lb (176.4 kg)   07/27/17 (!) 388 lb (176 kg)              Today, you had the following     No orders found for display         Today's Medication Changes          These changes are accurate as of: 9/25/17  3:36 PM.  If you have any questions, ask your nurse or doctor.               Start taking these medicines.        Dose/Directions    estradiol 2 MG tablet   Commonly known as:  ESTRACE   Used for:  Abnormal uterine bleeding (AUB)   Started by:  Villa Marion MD        Dose:  2 mg   Take 1 tablet (2 mg) by mouth daily   Quantity:  90 tablet   Refills:  1            Where to get your medicines      These medications were sent to [id number] - [place], [place] - [address]  [address], [place] [address]     Phone:  [phone]     estradiol 2 MG tablet                Primary Care Provider Office Phone #    [facility] [phone]       No address on file        Equal Access to Services     RICHI WALSH AH: Hadii luz solo Socristal, waaxda luqadaha, qaybta kaalmada ruth, eladio sharp . So [facility] [phone].    ATENCIÓN: Si habla español, tiene a garcia disposición servicios gratuitos de asistencia lingüística. Llame al [phone].    We comply with applicable federal civil rights laws and [place] laws. We do not discriminate on the basis of race, color, national origin, age, disability sex, sexual orientation or gender identity.            Thank you!     Thank you for choosing [facility]  for your  care. Our goal is always to provide you with excellent care. Hearing back from our patients is one way we can continue to improve our services. Please take a few minutes to complete the written survey that you may receive in the mail after your visit with us. Thank you!             Your Updated Medication List - Protect others around you: Learn how to safely use, store and throw away your medicines at www.disposemymeds.org.          This list is accurate as of: 9/25/17  3:36 PM.  Always use your most recent med list.                   Brand Name Dispense Instructions for use Diagnosis    cetirizine HCl 10 MG Caps           estradiol 2 MG tablet    ESTRACE    90 tablet    Take 1 tablet (2 mg) by mouth daily    Abnormal uterine bleeding (AUB)       medroxyPROGESTERone 150 MG/ML injection    DEPO-PROVERA    1 mL    Inject 1 mL (150 mg) into the muscle every 3 months    Encounter for initial prescription of injectable contraceptive       VALERIAN ROOT PO      Take by mouth as needed

## 2017-09-25 NOTE — PROGRESS NOTES
SUBJECTIVE:                                                   Paola Lobo is a 46 year old female who presents to clinic today for the following health issue(s):  Patient presents with:  Consult: referred by OB/Gyn provider to discuss Davinci hysterectomy        HPI:  Patient is seen for consultation regarding treatment for menorrhagia.  The patient states that she is always had very heavy menses.  2 years ago they became even heavier and she occasionally would have more than 1 period per month.  In February she was started on Depo-Provera.  After period of vaccination she is generally doing well.  Her main complaint is that she has spotting.  An ultrasound examination in March showed a normal size uterus with 2 small fibroids.  Endometrial lining appeared normal at 7 mm.  She does have a family history for uterine and ovarian cancer.  She is wondering what her options are in regards to the menorrhagia.      The patient is also in weight loss program.      No LMP recorded. Patient has had an injection..   Patient is sexually active,   Using depo for contraception.    reports that she has never smoked. She has never used smokeless tobacco.  STD testing offered?  Accepted  Health maintenance updated:  yes    Today's PHQ-9 Score:   PHQ-9 SCORE 2017   Total Score 6     Today's SYDNI-7 Score:   SYDNI-7 SCORE 2017   Total Score 0       Problem list and histories reviewed & adjusted, as indicated.  Additional history: as documented.    Patient Active Problem List   Diagnosis     Morbid obesity (H)     Excessive or frequent menstruation     Intramural and subserous leiomyoma of uterus     Past Surgical History:   Procedure Laterality Date     ORTHOPEDIC SURGERY        Social History   Substance Use Topics     Smoking status: Never Smoker     Smokeless tobacco: Never Used     Alcohol use Yes      Comment: social      Problem (# of Occurrences) Relation (Name,Age of Onset)    Uterine Cancer (6) Mother,  Maternal Aunt, Maternal Aunt, Maternal Aunt, Maternal Aunt, Maternal Aunt            Current Outpatient Prescriptions   Medication Sig     VALERIAN ROOT PO Take by mouth as needed     estradiol (ESTRACE) 2 MG tablet Take 1 tablet (2 mg) by mouth daily     medroxyPROGESTERone (DEPO-PROVERA) 150 MG/ML injection Inject 1 mL (150 mg) into the muscle every 3 months     cetirizine HCl 10 MG CAPS      No current facility-administered medications for this visit.      Allergies   Allergen Reactions     Walnuts [Nuts]      Unable to breathe     Shellfish Allergy      Nausea     Grass      SOB, asthma       Prednisone Other (See Comments)     Severe headache     Wheat      Rash, Derm       ROS:  12 point review of systems negative other than symptoms noted below.  Genitourinary: Irregular Menses, Pelvic Pain, Significant PMS and Spotting  Skin: Skin Dryness    OBJECTIVE:     /80  Ht 6' (1.829 m)  Wt (!) 387 lb (175.5 kg)  BMI 52.49 kg/m2  Body mass index is 52.49 kg/(m^2).    Exam:  Constitutional:  Appearance: Well nourished, well developed alert, in no acute distress  Chest:  Respiratory Effort:  Breathing unlabored  Cardiovascular: No edema  Neurologic/Psychiatric:  Mental Status:  Oriented X3   No Pelvic Exam performed     In-Clinic Test Results:  No results found for this or any previous visit (from the past 24 hour(s)).    ASSESSMENT/PLAN:                                                        ICD-10-CM    1. Abnormal uterine bleeding (AUB) N93.9 estradiol (ESTRACE) 2 MG tablet   2. Morbid obesity, unspecified obesity type (H) E66.01          Plan: After long discussion with the patient reviewed several different options.    #1.  Continue with the Depo-Provera.  It's not uncommon with Depo-Provera shots to have spotting secondary to thinning of the endometrium.  This is usually controllable by taking extra estrogen for a short time.  The patient be certainly a candidate to try this.    #2.  Perform an  examination under anesthesia with hysteroscopy.  An endometrial biopsy couldn't be done at that time as well as to ensure the fibroids do not encroach on the uterine lining.  At the same time the patient could have an endometrial ablation and placement of her Mirena IUD.  This should control spotting and she currently has as well as give her local progestational suppression without defects of the systemic Depo-Provera.    #3.  Consider a DaVinci / laparoscopic total abdominal hysterectomy.  This would be the most permanent method of dealing with the bleeding and preventing uterine cancer.  In light of her size also the most risky.  I reviewed the risks of anesthesia, infection, hemorrhage and injury to other organs.    At this point the patient would like to continue with her current Depot Provera regimen and try the additional estrogen to see if that would control her spotting.  Patient should be followed by an ultrasound examination at least yearly.  She was given information on endometrial ablation.  She is also invited to call if she has any additional questions.    Villa Marion MD  [facility] [place]

## 2017-09-26 ASSESSMENT — ANXIETY QUESTIONNAIRES: GAD7 TOTAL SCORE: 0

## 2017-10-13 ENCOUNTER — ALLIED HEALTH/NURSE VISIT (OUTPATIENT)
Dept: FAMILY MEDICINE | Facility: OTHER | Age: 47
End: 2017-10-13
Payer: COMMERCIAL

## 2017-10-13 VITALS — SYSTOLIC BLOOD PRESSURE: 132 MMHG | DIASTOLIC BLOOD PRESSURE: 70 MMHG

## 2017-10-13 PROCEDURE — 96372 THER/PROPH/DIAG INJ SC/IM: CPT

## 2017-10-13 NOTE — MR AVS SNAPSHOT
"              After Visit Summary   10/13/2017    Paola Lobo    MRN: [id number]           Patient Information     Date Of Birth          1970        Visit Information        Provider Department      10/13/2017 3:15 PM NAKUL RAYMOND NURSE, [facility] [place]        Today's Diagnoses     Contraception    -  1       Follow-ups after your visit        Who to contact     If you have questions or need follow up information about today's clinic visit or your schedule please contact [facility] directly at [phone].  Normal or non-critical lab and imaging results will be communicated to you by Tower Visionhart, letter or phone within 4 business days after the clinic has received the results. If you do not hear from us within 7 days, please contact the clinic through Tower Vision[employer] or phone. If you have a critical or abnormal lab result, we will notify you by phone as soon as possible.  Submit refill requests through [email or web address] or call your pharmacy and they will forward the refill request to us. Please allow 3 business days for your refill to be completed.          Additional Information About Your Visit        MyChart Information     [email or web address] lets you send messages to your doctor, view your test results, renew your prescriptions, schedule appointments and more. To sign up, go to [email or web address] . Click on \"Log in\" on the left side of the screen, which will take you to the Welcome page. Then click on \"Sign up Now\" on the right side of the page.     You will be asked to enter the access code listed below, as well as some personal information. Please follow the directions to create your username and password.     Your access code is: [email or web address]  Expires: 2017  3:36 PM     Your access code will  in 90 days. If you need help or a new code, please call your [facility] or [phone].        Care EveryWhere ID     This is your Care EveryWhere ID. This could be used by other " organizations to access your [facility] medical records  [id number]         Blood Pressure from Last 3 Encounters:   10/13/17 132/70   09/25/17 132/80   08/07/17 (!) 144/94    Weight from Last 3 Encounters:   09/25/17 (!) 387 lb (175.5 kg)   08/07/17 (!) 389 lb (176.4 kg)   07/27/17 (!) 388 lb (176 kg)              We Performed the Following     INJECTION INTRAMUSCULAR OR SUB-Q     MEDROXYPROGESTERONE INJ        Primary Care Provider Office Phone # Fax #    [facility] [phone]       [address] [facility] [address]        Equal Access to Services     RICHI WALSH : Laureen Contreras, jocelyn win, anna miranda, eladio gimenez. So [facility] [phone].    ATENCIÓN: Si habla español, tiene a garcia disposición servicios gratuitos de asistencia lingüística. Llame al [phone].    We comply with applicable federal civil rights laws and [place] laws. We do not discriminate on the basis of race, color, national origin, age, disability, sex, sexual orientation, or gender identity.            Thank you!     Thank you for choosing [facility]  for your care. Our goal is always to provide you with excellent care. Hearing back from our patients is one way we can continue to improve our services. Please take a few minutes to complete the written survey that you may receive in the mail after your visit with us. Thank you!             Your Updated Medication List - Protect others around you: Learn how to safely use, store and throw away your medicines at www.disposemymeds.org.          This list is accurate as of: 10/13/17  3:26 PM.  Always use your most recent med list.                   Brand Name Dispense Instructions for use Diagnosis    cetirizine HCl 10 MG Caps           estradiol 2 MG tablet    ESTRACE    90 tablet    Take 1 tablet (2 mg) by mouth daily    Abnormal uterine bleeding (AUB)       medroxyPROGESTERone 150 MG/ML  injection    DEPO-PROVERA    1 mL    Inject 1 mL (150 mg) into the muscle every 3 months    Encounter for initial prescription of injectable contraceptive       VALERIAN ROOT PO      Take by mouth as needed

## 2017-10-13 NOTE — NURSING NOTE
BP: 132/70    LAST PAP/EXAM:   Lab Results   Component Value Date    PAP OTHER-NIL, See Result 02/15/2017     URINE HCG:not indicated    The following medication was given:     MEDICATION: Depo Provera 150mg  ROUTE: IM  SITE: RUQ - Gluteus  : Color Eight  LOT #: [id number]  EXP:02/20  NEXT INJECTION DUE: 12/29/17 - 1/12/18   Provider: Bassem    Patient instructed to remain in clinic for 20 minutes afterwards, and to report any adverse reaction to me immediately.  Prior to injection verified patient identity using patient's name and date of birth.  Noemy Gray MA     10/13/2017

## 2018-01-10 ENCOUNTER — ALLIED HEALTH/NURSE VISIT (OUTPATIENT)
Dept: FAMILY MEDICINE | Facility: OTHER | Age: 48
End: 2018-01-10
Payer: COMMERCIAL

## 2018-01-10 DIAGNOSIS — Z23 NEED FOR PROPHYLACTIC VACCINATION AND INOCULATION AGAINST INFLUENZA: Primary | ICD-10-CM

## 2018-01-10 PROCEDURE — 90686 IIV4 VACC NO PRSV 0.5 ML IM: CPT

## 2018-01-10 PROCEDURE — 96372 THER/PROPH/DIAG INJ SC/IM: CPT

## 2018-01-10 PROCEDURE — 90471 IMMUNIZATION ADMIN: CPT

## 2018-01-10 NOTE — PROGRESS NOTES
Injectable Influenza Immunization Documentation    1.  Is the person to be vaccinated sick today?   No    2. Does the person to be vaccinated have an allergy to a component   of the vaccine?   No  Egg Allergy Algorithm Link    3. Has the person to be vaccinated ever had a serious reaction   to influenza vaccine in the past?   No    4. Has the person to be vaccinated ever had Guillain-Barré syndrome?   No      The following medication was given:     MEDICATION: Depo Provera 150mg  See medication note.  Patient instructed to remain in clinic for 20 minutes afterwards, and to report any adverse reaction to me immediately.  Prior to injection verified patient identity using patient's name and date of birth.  Noemy Gray MA     1/10/2018

## 2018-01-10 NOTE — MR AVS SNAPSHOT
"              After Visit Summary   1/10/2018    Paola Lobo    MRN: [id number]           Patient Information     Date Of Birth          1970        Visit Information        Provider Department      1/10/2018 10:00 AM HERNANDEZ RAYMOND MA [facility]        Today's Diagnoses     Need for prophylactic vaccination and inoculation against influenza    -  1    Contraception           Follow-ups after your visit        Who to contact     If you have questions or need follow up information about today's clinic visit or your schedule please contact [facility] directly at [phone].  Normal or non-critical lab and imaging results will be communicated to you by [employer] Super[employer], letter or phone within 4 business days after the clinic has received the results. If you do not hear from us within 7 days, please contact the clinic through [employer] or phone. If you have a critical or abnormal lab result, we will notify you by phone as soon as possible.  Submit refill requests through [employer] or call your pharmacy and they will forward the refill request to us. Please allow 3 business days for your refill to be completed.          Additional Information About Your Visit        MyChart Information     [employer] lets you send messages to your doctor, view your test results, renew your prescriptions, schedule appointments and more. To sign up, go to [email or web address] . Click on \"Log in\" on the left side of the screen, which will take you to the Welcome page. Then click on \"Sign up Now\" on the right side of the page.     You will be asked to enter the access code listed below, as well as some personal information. Please follow the directions to create your username and password.     Your access code is: [email or web address]  Expires: 4/10/2018  1:08 PM     Your access code will  in 90 days. If you need help or a new code, please call your [facility] or [phone].        Care EveryWhere ID     This is " your Care EveryWhere ID. This could be used by other organizations to access your [place] medical records  [id number]         Blood Pressure from Last 3 Encounters:   10/13/17 132/70   09/25/17 132/80   08/07/17 (!) 144/94    Weight from Last 3 Encounters:   09/25/17 (!) 387 lb (175.5 kg)   08/07/17 (!) 389 lb (176.4 kg)   07/27/17 (!) 388 lb (176 kg)              We Performed the Following     FLU VAC, SPLIT VIRUS IM > 3 YO (QUADRIVALENT) [59036]     INJECTION INTRAMUSCULAR OR SUB-Q     MEDROXYPROGESTERONE INJ     Vaccine Administration, Initial [20969]        Primary Care Provider Office Phone # Fax #    [facility] [phone]       [address] [facility] [address]        Equal Access to Services     RICHI WALSH : Laureen solo Socristal, waaxda quirino, qaybta kaalmamica miranda, eladio sharp . So [facility] [phone].    ATENCIÓN: Si habla español, tiene a garcia disposición servicios gratuitos de asistencia lingüística. Llame al [phone].    We comply with applicable federal civil rights laws and [place] laws. We do not discriminate on the basis of race, color, national origin, age, disability, sex, sexual orientation, or gender identity.            Thank you!     Thank you for choosing [facility]  for your care. Our goal is always to provide you with excellent care. Hearing back from our patients is one way we can continue to improve our services. Please take a few minutes to complete the written survey that you may receive in the mail after your visit with us. Thank you!             Your Updated Medication List - Protect others around you: Learn how to safely use, store and throw away your medicines at www.disposemymeds.org.          This list is accurate as of: 1/10/18  1:08 PM.  Always use your most recent med list.                   Brand Name Dispense Instructions for use Diagnosis    cetirizine HCl 10 MG Caps            estradiol 2 MG tablet    ESTRACE    90 tablet    Take 1 tablet (2 mg) by mouth daily    Abnormal uterine bleeding (AUB)       medroxyPROGESTERone 150 MG/ML injection    DEPO-PROVERA    1 mL    Inject 1 mL (150 mg) into the muscle every 3 months    Encounter for initial prescription of injectable contraceptive       VALERIAN ROOT PO      Take by mouth as needed

## 2018-03-01 ENCOUNTER — TELEPHONE (OUTPATIENT)
Dept: FAMILY MEDICINE | Facility: OTHER | Age: 48
End: 2018-03-01

## 2018-03-19 ENCOUNTER — OFFICE VISIT (OUTPATIENT)
Dept: FAMILY MEDICINE | Facility: OTHER | Age: 48
End: 2018-03-19
Payer: COMMERCIAL

## 2018-03-19 VITALS
HEIGHT: 72 IN | OXYGEN SATURATION: 98 % | RESPIRATION RATE: 24 BRPM | DIASTOLIC BLOOD PRESSURE: 90 MMHG | HEART RATE: 97 BPM | WEIGHT: 293 LBS | SYSTOLIC BLOOD PRESSURE: 140 MMHG | TEMPERATURE: 99.1 F | BODY MASS INDEX: 39.68 KG/M2

## 2018-03-19 DIAGNOSIS — G47.30 SLEEP APNEA, UNSPECIFIED TYPE: ICD-10-CM

## 2018-03-19 DIAGNOSIS — R20.0 RIGHT LEG NUMBNESS: ICD-10-CM

## 2018-03-19 DIAGNOSIS — R10.13 ABDOMINAL PAIN, EPIGASTRIC: ICD-10-CM

## 2018-03-19 DIAGNOSIS — E66.01 MORBID OBESITY (H): ICD-10-CM

## 2018-03-19 DIAGNOSIS — M25.511 CHRONIC RIGHT SHOULDER PAIN: Primary | ICD-10-CM

## 2018-03-19 DIAGNOSIS — G89.29 CHRONIC RIGHT SHOULDER PAIN: Primary | ICD-10-CM

## 2018-03-19 DIAGNOSIS — Z87.81 HISTORY OF PELVIC FRACTURE: ICD-10-CM

## 2018-03-19 DIAGNOSIS — R23.2 FACIAL FLUSHING: ICD-10-CM

## 2018-03-19 PROCEDURE — 99214 OFFICE O/P EST MOD 30 MIN: CPT | Performed by: NURSE PRACTITIONER

## 2018-03-19 NOTE — MR AVS SNAPSHOT
After Visit Summary   3/19/2018    Paola Lobo    MRN: [id number]           Patient Information     Date Of Birth          1970        Visit Information        Provider Department      3/19/2018 2:20 PM Roshni Luevano APRN [facility]        Today's Diagnoses     Chronic right shoulder pain    -  1    History of pelvic fracture        Right leg numbness        Sleep apnea, unspecified type        Abdominal pain, epigastric          Care Instructions    We will check your labs when you come back for your physical.    See the orthopedic doctor about your shoulder and leg issues.     Start Omeprazole once a day on an empty stomach.                           Follow-ups after your visit        Additional Services     ORTHOPEDICS ADULT REFERRAL       Your provider has referred you to: [facility]: [facility] [phone]   [email or web address]    Please be aware that coverage of these services is subject to the terms and limitations of your health insurance plan.  Call member services at your health plan with any benefit or coverage questions.      Please bring the following to your appointment:    >>   Any x-rays, CTs or MRIs which have been performed.  Contact the facility where they were done to arrange for  prior to your scheduled appointment.    >>   List of current medications   >>   This referral request   >>   Any documents/labs given to you for this referral            SLEEP EVALUATION & MANAGEMENT REFERRAL - ADULT -[facility] [place] [phone] (Age 13 if over 100 lbs)       Please be aware that coverage of these services is subject to the terms and limitations of your health insurance plan.  Call member services at your health plan with any benefit or coverage questions.      Please bring the following to your appointment:    >>   List of current medications   >>   This referral request  "  >>   Any documents/labs given to you for this referral                      Your next 10 appointments already scheduled     Mar 23, 2018  8:20 AM CDT   [facility]    [address] [facility] [id number] [phone]              Future tests that were ordered for you today     Open Future Orders        Priority Expected Expires Ordered    SLEEP EVALUATION & MANAGEMENT REFERRAL - ADULT -[facility] [phone] (Age 13 if over 100 lbs) Routine  3/19/2019 3/19/2018            Who to contact     If you have questions or need follow up information about today's clinic visit or your schedule please contact [facility] directly at [phone].  Normal or non-critical lab and imaging results will be communicated to you by MyChart, letter or phone within 4 business days after the clinic has received the results. If you do not hear from us within 7 days, please contact the clinic through MyChart or phone. If you have a critical or abnormal lab result, we will notify you by phone as soon as possible.  Submit refill requests through [employer] or call your pharmacy and they will forward the refill request to us. Please allow 3 business days for your refill to be completed.          Additional Information About Your Visit        Tradier Information     Tradier lets you send messages to your doctor, view your test results, renew your prescriptions, schedule appointments and more. To sign up, go to [email or web address] . Click on \"Log in\" on the left side of the screen, which will take you to the Welcome page. Then click on \"Sign up Now\" on the right side of the page.     You will be asked to enter the access code listed below, as well as some personal information. Please follow the directions to create your username and password.     Your access code is: [email or web address]  Expires: 4/10/2018  2:08 PM     Your " access code will  in 90 days. If you need help or a new code, please call your [facility] clinic or [phone].        Care EveryWhere ID     This is your Care EveryWhere ID. This could be used by other organizations to access your [facility] medical records  [id number]        Your Vitals Were     Pulse Temperature Respirations Height Pulse Oximetry Breastfeeding?    97 99.1  F (37.3  C) (Tympanic) 24 6' (1.829 m) 98% No    BMI (Body Mass Index)                   51.81 kg/m2            Blood Pressure from Last 3 Encounters:   18 140/90   10/13/17 132/70   17 132/80    Weight from Last 3 Encounters:   18 (!) 382 lb (173.3 kg)   17 (!) 387 lb (175.5 kg)   17 (!) 389 lb (176.4 kg)              We Performed the Following     ORTHOPEDICS ADULT REFERRAL          Today's Medication Changes          These changes are accurate as of 3/19/18  3:21 PM.  If you have any questions, ask your nurse or doctor.               Start taking these medicines.        Dose/Directions    omeprazole 20 MG CR capsule   Commonly known as:  priLOSEC   Used for:  Abdominal pain, epigastric   Started by:  Roshni Luevano APRN CNP        Dose:  20 mg   Take 1 capsule (20 mg) by mouth daily   Quantity:  30 capsule   Refills:  1            Where to get your medicines      These medications were sent to [id number] - [place], [place] - [address]  [address], [place] [address]     Phone:  [phone]     omeprazole 20 MG CR capsule                Primary Care Provider Office Phone # Fax #    SHILA Mitchell [phone]       [address] [facility] [address]        Equal Access to Services     MARIA C WALSH AH: Laureen Contreras, wasaniada luqadaha, qaybta kaalmada kristyyada, eladio gimenez. So [facility] [phone].    ATENCIÓN: Si habla español, tiene a garcia disposición servicios gratuitos de asistencia lingüística. Llame al [phone].    We  comply with applicable federal civil rights laws and [place] laws. We do not discriminate on the basis of race, color, national origin, age, disability, sex, sexual orientation, or gender identity.            Thank you!     Thank you for choosing [facility]  for your care. Our goal is always to provide you with excellent care. Hearing back from our patients is one way we can continue to improve our services. Please take a few minutes to complete the written survey that you may receive in the mail after your visit with us. Thank you!             Your Updated Medication List - Protect others around you: Learn how to safely use, store and throw away your medicines at www.disposemymeds.org.          This list is accurate as of 3/19/18  3:21 PM.  Always use your most recent med list.                   Brand Name Dispense Instructions for use Diagnosis    cetirizine HCl 10 MG Caps           estradiol 2 MG tablet    ESTRACE    90 tablet    Take 1 tablet (2 mg) by mouth daily    Abnormal uterine bleeding (AUB)       medroxyPROGESTERone 150 MG/ML injection    DEPO-PROVERA    1 mL    Inject 1 mL (150 mg) into the muscle every 3 months    Encounter for initial prescription of injectable contraceptive       omeprazole 20 MG CR capsule    priLOSEC    30 capsule    Take 1 capsule (20 mg) by mouth daily    Abdominal pain, epigastric       VALERIAN ROOT PO      Take by mouth as needed

## 2018-03-19 NOTE — PATIENT INSTRUCTIONS
We will check your labs when you come back for your physical.    See the orthopedic doctor about your shoulder and leg issues.     Start Omeprazole once a day on an empty stomach.

## 2018-03-19 NOTE — NURSING NOTE
Chief Complaint   Patient presents with     Abdominal Pain     Derm Problem     flushing, itching, hot     Shoulder Pain     right shoulder bursitis     Musculoskeletal Problem     right leg numbness     Referral     cpap and supplies       Initial /90  Pulse 97  Temp 99.1  F (37.3  C) (Tympanic)  Resp 24  Ht 6' (1.829 m)  Wt (!) 382 lb (173.3 kg)  SpO2 98%  Breastfeeding? No  BMI 51.81 kg/m2 Estimated body mass index is 51.81 kg/(m^2) as calculated from the following:    Height as of this encounter: 6' (1.829 m).    Weight as of this encounter: 382 lb (173.3 kg).  Medication Reconciliation: complete   ................Nehemiah Lang LPN,   March 19, 2018,      2:41 PM,   [facility]
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
## 2018-03-23 ENCOUNTER — RESULT FOLLOW UP (OUTPATIENT)
Dept: FAMILY MEDICINE | Facility: OTHER | Age: 48
End: 2018-03-23

## 2018-03-23 ENCOUNTER — OFFICE VISIT (OUTPATIENT)
Dept: FAMILY MEDICINE | Facility: OTHER | Age: 48
End: 2018-03-23
Payer: COMMERCIAL

## 2018-03-23 VITALS
HEART RATE: 84 BPM | SYSTOLIC BLOOD PRESSURE: 134 MMHG | DIASTOLIC BLOOD PRESSURE: 80 MMHG | WEIGHT: 293 LBS | TEMPERATURE: 98.4 F | RESPIRATION RATE: 20 BRPM | BODY MASS INDEX: 39.68 KG/M2 | HEIGHT: 72 IN

## 2018-03-23 DIAGNOSIS — Z00.00 ROUTINE GENERAL MEDICAL EXAMINATION AT A HEALTH CARE FACILITY: Primary | ICD-10-CM

## 2018-03-23 DIAGNOSIS — Z12.31 ENCOUNTER FOR SCREENING MAMMOGRAM FOR BREAST CANCER: ICD-10-CM

## 2018-03-23 DIAGNOSIS — R87.612 PAPANICOLAOU SMEAR OF CERVIX WITH LOW GRADE SQUAMOUS INTRAEPITHELIAL LESION (LGSIL): ICD-10-CM

## 2018-03-23 DIAGNOSIS — R87.619 ENDOMETRIAL CELLS ON CERVICAL PAP SMEAR INCONSISTENT WITH LAST MENSTRUAL PERIOD: ICD-10-CM

## 2018-03-23 DIAGNOSIS — Z12.4 SCREENING FOR CERVICAL CANCER: ICD-10-CM

## 2018-03-23 DIAGNOSIS — E66.01 MORBID OBESITY (H): ICD-10-CM

## 2018-03-23 DIAGNOSIS — Z11.3 SCREENING FOR STD (SEXUALLY TRANSMITTED DISEASE): ICD-10-CM

## 2018-03-23 LAB
ALBUMIN SERPL-MCNC: 3.5 G/DL (ref 3.4–5)
ALP SERPL-CCNC: 86 U/L (ref 40–150)
ALT SERPL W P-5'-P-CCNC: 51 U/L (ref 0–50)
ANION GAP SERPL CALCULATED.3IONS-SCNC: 9 MMOL/L (ref 3–14)
AST SERPL W P-5'-P-CCNC: 19 U/L (ref 0–45)
BILIRUB SERPL-MCNC: 0.4 MG/DL (ref 0.2–1.3)
BUN SERPL-MCNC: 15 MG/DL (ref 7–30)
CALCIUM SERPL-MCNC: 8.8 MG/DL (ref 8.5–10.1)
CHLORIDE SERPL-SCNC: 109 MMOL/L (ref 94–109)
CHOLEST SERPL-MCNC: 143 MG/DL
CO2 SERPL-SCNC: 22 MMOL/L (ref 20–32)
CREAT SERPL-MCNC: 0.69 MG/DL (ref 0.52–1.04)
CRP SERPL-MCNC: 22.9 MG/L (ref 0–8)
ERYTHROCYTE [DISTWIDTH] IN BLOOD BY AUTOMATED COUNT: 14.5 % (ref 10–15)
ERYTHROCYTE [SEDIMENTATION RATE] IN BLOOD BY WESTERGREN METHOD: 30 MM/H (ref 0–20)
GFR SERPL CREATININE-BSD FRML MDRD: >90 ML/MIN/1.7M2
GLUCOSE SERPL-MCNC: 95 MG/DL (ref 70–99)
HCT VFR BLD AUTO: 41.8 % (ref 35–47)
HDLC SERPL-MCNC: 39 MG/DL
HGB BLD-MCNC: 13.4 G/DL (ref 11.7–15.7)
LDLC SERPL CALC-MCNC: 86 MG/DL
MCH RBC QN AUTO: 28 PG (ref 26.5–33)
MCHC RBC AUTO-ENTMCNC: 32.1 G/DL (ref 31.5–36.5)
MCV RBC AUTO: 87 FL (ref 78–100)
NONHDLC SERPL-MCNC: 104 MG/DL
PLATELET # BLD AUTO: 332 10E9/L (ref 150–450)
POTASSIUM SERPL-SCNC: 4.1 MMOL/L (ref 3.4–5.3)
PROT SERPL-MCNC: 7.7 G/DL (ref 6.8–8.8)
RBC # BLD AUTO: 4.79 10E12/L (ref 3.8–5.2)
SODIUM SERPL-SCNC: 140 MMOL/L (ref 133–144)
TRIGL SERPL-MCNC: 90 MG/DL
WBC # BLD AUTO: 8.3 10E9/L (ref 4–11)

## 2018-03-23 PROCEDURE — 99396 PREV VISIT EST AGE 40-64: CPT | Performed by: NURSE PRACTITIONER

## 2018-03-23 PROCEDURE — 85027 COMPLETE CBC AUTOMATED: CPT | Performed by: NURSE PRACTITIONER

## 2018-03-23 PROCEDURE — 80053 COMPREHEN METABOLIC PANEL: CPT | Performed by: NURSE PRACTITIONER

## 2018-03-23 PROCEDURE — 86140 C-REACTIVE PROTEIN: CPT | Performed by: NURSE PRACTITIONER

## 2018-03-23 PROCEDURE — 86431 RHEUMATOID FACTOR QUANT: CPT | Performed by: NURSE PRACTITIONER

## 2018-03-23 PROCEDURE — 36415 COLL VENOUS BLD VENIPUNCTURE: CPT | Performed by: NURSE PRACTITIONER

## 2018-03-23 PROCEDURE — G0476 HPV COMBO ASSAY CA SCREEN: HCPCS | Performed by: NURSE PRACTITIONER

## 2018-03-23 PROCEDURE — 80061 LIPID PANEL: CPT | Performed by: NURSE PRACTITIONER

## 2018-03-23 PROCEDURE — 87491 CHLMYD TRACH DNA AMP PROBE: CPT | Performed by: NURSE PRACTITIONER

## 2018-03-23 PROCEDURE — G0145 SCR C/V CYTO,THINLAYER,RESCR: HCPCS | Performed by: NURSE PRACTITIONER

## 2018-03-23 PROCEDURE — 86038 ANTINUCLEAR ANTIBODIES: CPT | Performed by: NURSE PRACTITIONER

## 2018-03-23 PROCEDURE — 87591 N.GONORRHOEAE DNA AMP PROB: CPT | Performed by: NURSE PRACTITIONER

## 2018-03-23 PROCEDURE — G0124 SCREEN C/V THIN LAYER BY MD: HCPCS | Performed by: NURSE PRACTITIONER

## 2018-03-23 PROCEDURE — 85652 RBC SED RATE AUTOMATED: CPT | Performed by: NURSE PRACTITIONER

## 2018-03-23 NOTE — PATIENT INSTRUCTIONS
Schedule a mammogram.     The results of the pap test take about 2 weeks to come back.  We will send a letter with these results and the recommendations for further pap tests in the future.     Labs will be done today.   For normal results, you will receive a letter with the results in about 2 weeks.  If anything is abnormal or unexpected, someone from the clinic will call you.        Preventive Health Recommendations  Female Ages 40 to 49    Yearly exam:     See your health care provider every year in order to  1. Review health changes.   2. Discuss preventive care.    3. Review your medicines if your doctor prescribed any.      Get a Pap test every three years (unless you have an abnormal result and your provider advises testing more often).      If you get Pap tests with HPV test, you only need to test every 5 years, unless you have an abnormal result. You do not need a Pap test if your uterus was removed (hysterectomy) and you have not had cancer.      You should be tested each year for STDs (sexually transmitted diseases), if you're at risk.       Ask your doctor if you should have a mammogram.      Have a colonoscopy (test for colon cancer) if someone in your family has had colon cancer or polyps before age 50.       Have a cholesterol test every 5 years.       Have a diabetes test (fasting glucose) after age 45. If you are at risk for diabetes, you should have this test every 3 years.    Shots: Get a flu shot each year. Get a tetanus shot every 10 years.     Nutrition:     Eat at least 5 servings of fruits and vegetables each day.    Eat whole-grain bread, whole-wheat pasta and brown rice instead of white grains and rice.    Talk to your provider about Calcium and Vitamin D.     Lifestyle    Exercise at least 150 minutes a week (an average of 30 minutes a day, 5 days a week). This will help you control your weight and prevent disease.    Limit alcohol to one drink per day.    No smoking.     Wear sunscreen to  prevent skin cancer.    See your dentist every six months for an exam and cleaning.

## 2018-03-23 NOTE — PROGRESS NOTES
ORTHOPEDIC CONSULT      Chief Complaint: Paola Lobo is a 47 year old right hand dominant female who works as a .      She is being seen for   Chief Complaints and History of Present Illnesses   Patient presents with     Consult     rt shoulder pain per SHILA Mitchell CNP         History of Present Illness:   Paola Lobo is a 47 year old female who is seen in consultation at the request of SHILA Mitchell CNP.  History of Present illness:  Paola presents for evaluation of:  1.) rt shoulder   Onset: December and 2017  Symptoms brought on by pt flinched while having a migraine.   Character:  dull ache.  sharp  Progression of symptoms:  same.    Previous similar pain: YES. 20 years ago bursitis relieved with injection.  Pain Level:  8/10.   Previous treatments:  heat, ice, Ibuprofen and icy hot, stretches  Currently on Blood thinners? no  Diagnosis of Diabetes? No  Worse in the morning, deep inside, zings when she reaches, no clicking, difficulty laying on it      Patient's past medical, surgical, social and family histories reviewed.     Past Medical History:   Diagnosis Date     Asthma      Obesity, morbid, BMI 50 or higher (H)        Past Surgical History:   Procedure Laterality Date     ORTHOPEDIC SURGERY         Medications:    Current Outpatient Prescriptions on File Prior to Visit:  omeprazole (PRILOSEC) 20 MG CR capsule Take 1 capsule (20 mg) by mouth daily   VALERIAN ROOT PO Take by mouth as needed   estradiol (ESTRACE) 2 MG tablet Take 1 tablet (2 mg) by mouth daily   medroxyPROGESTERone (DEPO-PROVERA) 150 MG/ML injection Inject 1 mL (150 mg) into the muscle every 3 months   cetirizine HCl 10 MG CAPS      No current facility-administered medications on file prior to visit.     Allergies   Allergen Reactions     Walnuts [Nuts]      Unable to breathe     Shellfish Allergy      Nausea     Grass      SOB, asthma       Prednisone Other (See Comments)     Severe  headache     Wheat      Rash, Derm       Social History     Occupational History     Not on file.     Social History Main Topics     Smoking status: Never Smoker     Smokeless tobacco: Never Used     Alcohol use Yes      Comment: social     Drug use: No     Sexual activity: Yes     Partners: Male     Birth control/ protection: Pill       Family History   Problem Relation Age of Onset     Uterine Cancer Mother      Uterine Cancer Maternal Aunt      Uterine Cancer Maternal Aunt      Uterine Cancer Maternal Aunt      Uterine Cancer Maternal Aunt      Uterine Cancer Maternal Aunt        REVIEW OF SYSTEMS  10 point review systems performed otherwise negative as noted as per history of present illness.    Physical Exam:  Vitals: Temp 98.2  F (36.8  C)  Ht 1.829 m (6')  Wt (!) 173.3 kg (382 lb)  BMI 51.81 kg/m2  BMI= Body mass index is 51.81 kg/(m^2).    Constitutional: healthy, alert and no acute distress   Psychiatric: mentation appears normal and affect normal/bright  NEURO: no focal deficits  RESP: Normal with easy respirations and no use of accessory muscles to breathe, no audible wheezing or retractions  CV: regular pulse  SKIN: No erythema, rashes, excoriation, or breakdown. No evidence of infection.   JOINT/EXTREMITIES:right shoulder - FROM, positive impingement, good strength in rotator cuff  GAIT: non-antalgic  Lymph: no palpable lymph nodes    Diagnostic Modalities:  right shoulder X-ray: Well preserved glenohumeral articular space. No fracture, dislocation and or lesion. , AC joint narrowing and irregularity, asx.  Independent visualization of the images was performed.      Impression: right Shoulder Subacromial Impingement    Plan:  All of the above pertinent physical exam and imaging modalities findings was reviewed with Paola.                                          CONSERVATIVE CARE:  I recommend conservative care for the patient to include NSAIDs, Tylenol, focused self directed physical therapy,  formal physical therapy, steroid injections, activity modifications. Today I provided or dispensed physical therapy, info, HEP.                                        INJECTION PROCEDURE:  The patient was counseled about an  injection, including discussion of risks (including infection), contents of the injection, rationale for performing the injection, and expected benefits of the injection. The skin was prepped with alcohol and betadine and then utilizing sterile technique an injection of the right shoulder subacromial space from the anterolateral approach  was performed. The injection consisted 1ml of Kenalog (40mg per 1ml) with 8ml 1% lidocaine plain. The patient tolerated the injection well, and there were no complications. The injection site was covered with a Band-Aid. The injection was performed by Rossy Ndiaye M.D.                                                FUTURE PLAN:  On their return if they still have symptoms we will consider MRI, NSAID's, injection of steroids.      Return to clinic 6, week(s), or sooner as needed for changes.  Re-x-ray on return: No    Rossy Ndiaye M.D.

## 2018-03-23 NOTE — MR AVS SNAPSHOT
After Visit Summary   3/23/2018    Paola Lobo    MRN: [id number]           Patient Information     Date Of Birth          1970        Visit Information        Provider Department      3/23/2018 8:20 AM Roshni Luevano APRN [facility]        Today's Diagnoses     Screening for cervical cancer    -  1    Abdominal pain, epigastric        Facial flushing        Morbid obesity (H)        Encounter for screening mammogram for breast cancer        Screening for STD (sexually transmitted disease)          Care Instructions    Schedule a mammogram.     The results of the pap test take about 2 weeks to come back.  We will send a letter with these results and the recommendations for further pap tests in the future.     Labs will be done today.   For normal results, you will receive a letter with the results in about 2 weeks.  If anything is abnormal or unexpected, someone from the clinic will call you.        Preventive Health Recommendations  Female Ages 40 to 49    Yearly exam:     See your health care provider every year in order to  1. Review health changes.   2. Discuss preventive care.    3. Review your medicines if your doctor prescribed any.      Get a Pap test every three years (unless you have an abnormal result and your provider advises testing more often).      If you get Pap tests with HPV test, you only need to test every 5 years, unless you have an abnormal result. You do not need a Pap test if your uterus was removed (hysterectomy) and you have not had cancer.      You should be tested each year for STDs (sexually transmitted diseases), if you're at risk.       Ask your doctor if you should have a mammogram.      Have a colonoscopy (test for colon cancer) if someone in your family has had colon cancer or polyps before age 50.       Have a cholesterol test every 5 years.       Have a diabetes test (fasting glucose) after age 45. If you are at risk for diabetes,  you should have this test every 3 years.    Shots: Get a flu shot each year. Get a tetanus shot every 10 years.     Nutrition:     Eat at least 5 servings of fruits and vegetables each day.    Eat whole-grain bread, whole-wheat pasta and brown rice instead of white grains and rice.    Talk to your provider about Calcium and Vitamin D.     Lifestyle    Exercise at least 150 minutes a week (an average of 30 minutes a day, 5 days a week). This will help you control your weight and prevent disease.    Limit alcohol to one drink per day.    No smoking.     Wear sunscreen to prevent skin cancer.    See your dentist every six months for an exam and cleaning.          Follow-ups after your visit        Your next 10 appointments already scheduled     Mar 26, 2018  9:20 AM CDT   [facility]    [address] [place] [address] [id number] [phone]              Future tests that were ordered for you today     Open Future Orders        Priority Expected Expires Ordered    *MA Screening Digital Bilateral Routine  3/23/2019 3/23/2018            Who to contact     If you have questions or need follow up information about today's clinic visit or your schedule please contact [facility] directly at [phone].  Normal or non-critical lab and imaging results will be communicated to you by [employer], letter or phone within 4 business days after the clinic has received the results. If you do not hear from us within 7 days, please contact the clinic through [employer] or phone. If you have a critical or abnormal lab result, we will notify you by phone as soon as possible.  Submit refill requests through [employer] or call your pharmacy and they will forward the refill request to us. Please allow 3 business days for your refill to be completed.          Additional Information About Your Visit        [employer] Information     [employer] lets you send  "messages to your doctor, view your test results, renew your prescriptions, schedule appointments and more. To sign up, go to [email or web address] . Click on \"Log in\" on the left side of the screen, which will take you to the Welcome page. Then click on \"Sign up Now\" on the right side of the page.     You will be asked to enter the access code listed below, as well as some personal information. Please follow the directions to create your username and password.     Your access code is: [email or web address]  Expires: 4/10/2018  2:08 PM     Your access code will  in 90 days. If you need help or a new code, please call your [facility] clinic or [phone].        Care EveryWhere ID     This is your Care EveryWhere ID. This could be used by other organizations to access your [facility] medical records  [id number]        Your Vitals Were     Pulse Temperature Respirations Height Breastfeeding? BMI (Body Mass Index)    84 98.4  F (36.9  C) (Tympanic) 20 6' (1.829 m) No 51.81 kg/m2       Blood Pressure from Last 3 Encounters:   18 134/80   18 140/90   10/13/17 132/70    Weight from Last 3 Encounters:   18 (!) 382 lb (173.3 kg)   18 (!) 382 lb (173.3 kg)   17 (!) 387 lb (175.5 kg)              We Performed the Following     **ESR FUTURE anytime     Anti Nuclear Natali IgG by IFA with Reflex     CBC with platelets     Chlamydia trachomatis PCR     Comprehensive metabolic panel (BMP + Alb, Alk Phos, ALT, AST, Total. Bili, TP)     CRP, inflammation     HPV High Risk Types DNA Cervical     Lipid panel reflex to direct LDL Fasting     Neisseria gonorrhoeae PCR     Pap imaged thin layer screen with HPV - recommended age 30 - 65 years (select HPV order below)     Rheumatoid factor        Primary Care Provider Office Phone # Fax #    SHILA Mitchell [phone]       [address] [facility], located within [facility] [address]        Equal Access to Services     RICHI WALSH AH: Laureen Contreras, " jocelyn win, qaybta kangoc eladorothy, eladio maribelin hayaan elaevan lucilaluis carlos laLyndsaygrecia ah. So [facility] [phone].    ATENCIÓN: Si mimi phillips, tiene a garcia disposición servicios gratuitos de asistencia lingüística. Bony al [phone].    We comply with applicable federal civil rights laws and [place] laws. We do not discriminate on the basis of race, color, national origin, age, disability, sex, sexual orientation, or gender identity.            Thank you!     Thank you for choosing [facility]  for your care. Our goal is always to provide you with excellent care. Hearing back from our patients is one way we can continue to improve our services. Please take a few minutes to complete the written survey that you may receive in the mail after your visit with us. Thank you!             Your Updated Medication List - Protect others around you: Learn how to safely use, store and throw away your medicines at www.disposemymeds.org.          This list is accurate as of 3/23/18  8:54 AM.  Always use your most recent med list.                   Brand Name Dispense Instructions for use Diagnosis    cetirizine HCl 10 MG Caps           estradiol 2 MG tablet    ESTRACE    90 tablet    Take 1 tablet (2 mg) by mouth daily    Abnormal uterine bleeding (AUB)       medroxyPROGESTERone 150 MG/ML injection    DEPO-PROVERA    1 mL    Inject 1 mL (150 mg) into the muscle every 3 months    Encounter for initial prescription of injectable contraceptive       omeprazole 20 MG CR capsule    priLOSEC    30 capsule    Take 1 capsule (20 mg) by mouth daily    Abdominal pain, epigastric       VALERIAN ROOT PO      Take by mouth as needed

## 2018-03-23 NOTE — PROGRESS NOTES
SUBJECTIVE:   CC: Paola Lobo is an 47 year old woman who presents for preventive health visit.     Physical   Annual:     Getting at least 3 servings of Calcium per day::  Yes    Bi-annual eye exam::  NO    Dental care twice a year::  NO    Sleep apnea or symptoms of sleep apnea::  Sleep apnea    Diet::  Regular (no restrictions)    Frequency of exercise::  2-3 days/week    Duration of exercise::  15-30 minutes    Taking medications regularly::  No    Barriers to taking medications::  Problems remembering to take them    Additional concerns today::  YES (wants STD testing today)            Would like to discuss STD testing.  Had an unfaithful partner.  She has no symptoms.     Today's PHQ-2 Score:   PHQ-2 ( 1999 [employer]) 3/23/2018   Q1: Little interest or pleasure in doing things 0   Q2: Feeling down, depressed or hopeless 0   PHQ-2 Score 0   Q1: Little interest or pleasure in doing things Not at all   Q2: Feeling down, depressed or hopeless Not at all   PHQ-2 Score 0       Abuse: Current or Past(Physical, Sexual or Emotional)- No  Do you feel safe in your environment - Yes    Social History   Substance Use Topics     Smoking status: Never Smoker     Smokeless tobacco: Never Used     Alcohol use Yes      Comment: social     Alcohol Use 3/23/2018   If you drink alcohol do you typically have greater than 3 drinks per day OR greater than 7 drinks per week? No   No flowsheet data found.    Reviewed orders with patient.  Reviewed health maintenance and updated orders accordingly - Yes  Labs reviewed in EPIC  BP Readings from Last 3 Encounters:   03/23/18 134/80   03/19/18 140/90   10/13/17 132/70    Wt Readings from Last 3 Encounters:   03/23/18 (!) 382 lb (173.3 kg)   03/19/18 (!) 382 lb (173.3 kg)   09/25/17 (!) 387 lb (175.5 kg)                  Patient Active Problem List   Diagnosis     Morbid obesity (H)     Excessive or frequent menstruation     Intramural and subserous leiomyoma of uterus     Past  Surgical History:   Procedure Laterality Date     ORTHOPEDIC SURGERY         Social History   Substance Use Topics     Smoking status: Never Smoker     Smokeless tobacco: Never Used     Alcohol use Yes      Comment: social     Family History   Problem Relation Age of Onset     Uterine Cancer Mother      Uterine Cancer Maternal Aunt      Uterine Cancer Maternal Aunt      Uterine Cancer Maternal Aunt      Uterine Cancer Maternal Aunt      Uterine Cancer Maternal Aunt          Current Outpatient Prescriptions   Medication Sig Dispense Refill     omeprazole (PRILOSEC) 20 MG CR capsule Take 1 capsule (20 mg) by mouth daily 30 capsule 1     VALERIAN ROOT PO Take by mouth as needed       estradiol (ESTRACE) 2 MG tablet Take 1 tablet (2 mg) by mouth daily 90 tablet 1     medroxyPROGESTERone (DEPO-PROVERA) 150 MG/ML injection Inject 1 mL (150 mg) into the muscle every 3 months 1 mL 0     cetirizine HCl 10 MG CAPS        Allergies   Allergen Reactions     Walnuts [Nuts]      Unable to breathe     Shellfish Allergy      Nausea     Grass      SOB, asthma       Prednisone Other (See Comments)     Severe headache     Wheat      Rash, Derm       Patient under age 50, mutual decision reflected in health maintenance.      Pertinent mammograms are reviewed under the imaging tab.  History of abnormal Pap smear:   Last 3 Pap Results:   PAP (no units)   Date Value   02/15/2017 OTHER-NIL, See Result     Previous endometrial cells seen on last pap 1 year ago.  Given her age, we are repeating pap today    Reviewed and updated as needed this visit by clinical staff  Tobacco  Allergies  Meds  Med Hx  Surg Hx  Fam Hx  Soc Hx        Reviewed and updated as needed this visit by Provider        Past Medical History:   Diagnosis Date     Asthma      Obesity, morbid, BMI 50 or higher (H)       Past Surgical History:   Procedure Laterality Date     ORTHOPEDIC SURGERY         Review of Systems  C: NEGATIVE for fever, chills, change in  weight  I: NEGATIVE for worrisome rashes, moles or lesions  E: NEGATIVE for vision changes or irritation  ENT: NEGATIVE for ear, mouth and throat problems  R: NEGATIVE for significant cough or SOB  B: NEGATIVE for masses, tenderness or discharge  CV: NEGATIVE for chest pain, palpitations or peripheral edema  GI: NEGATIVE for nausea, abdominal pain, heartburn, or change in bowel habits  : NEGATIVE for unusual urinary or vaginal symptoms. Periods are regular.  M: NEGATIVE for significant arthralgias or myalgia  N: NEGATIVE for weakness, dizziness or paresthesias  P: NEGATIVE for changes in mood or affect     OBJECTIVE:   /80  Pulse 84  Temp 98.4  F (36.9  C) (Tympanic)  Resp 20  Ht 6' (1.829 m)  Wt (!) 382 lb (173.3 kg)  Breastfeeding? No  BMI 51.81 kg/m2  Physical Exam  GENERAL: alert, no distress and obese  EYES: Eyes grossly normal to inspection, PERRL and conjunctivae and sclerae normal  HENT: ear canals and TM's normal, nose and mouth without ulcers or lesions  NECK: no adenopathy, no asymmetry, masses, or scars and thyroid normal to palpation  RESP: lungs clear to auscultation - no rales, rhonchi or wheezes  BREAST: normal without masses, tenderness or nipple discharge and no palpable axillary masses or adenopathy  CV: regular rate and rhythm, normal S1 S2, no S3 or S4, no murmur, click or rub, no peripheral edema and peripheral pulses strong  ABDOMEN: soft, nontender, no hepatosplenomegaly, no masses and bowel sounds normal   (female): normal female external genitalia, normal urethral meatus, vaginal mucosa pink, moist, well rugated, and normal cervix/adnexa/uterus without masses or discharge  MS: no gross musculoskeletal defects noted, no edema  SKIN: no suspicious lesions or rashes  NEURO: Normal strength and tone, mentation intact and speech normal  PSYCH: mentation appears normal, affect normal/bright    ASSESSMENT/PLAN:   1. Routine general medical examination at a [facility]  facility    2. Morbid obesity (H)  - Lipid panel reflex to direct LDL Fasting    3. Screening for cervical cancer  - HPV High Risk Types DNA Cervical  - Pap imaged thin layer screen with HPV - recommended age 30 - 65 years (select HPV order below)    4. Encounter for screening mammogram for breast cancer  - *MA Screening Digital Bilateral; Future    5. Screening for STD (sexually transmitted disease)  Discussed screening for STD.  She does not want blood testing at this time, will just do gonorrhea and chlamydia screening.   - Chlamydia trachomatis PCR  - Neisseria gonorrhoeae PCR    6. Endometrial cells on cervical Pap smear inconsistent with last menstrual period  Pap repeated today.       COUNSELING:  Reviewed preventive health counseling, as reflected in patient instructions       Regular exercise       Healthy diet/nutrition         reports that she has never smoked. She has never used smokeless tobacco.    Estimated body mass index is 51.81 kg/(m^2) as calculated from the following:    Height as of this encounter: 6' (1.829 m).    Weight as of this encounter: 382 lb (173.3 kg).   Weight management plan: Discussed healthy diet and exercise guidelines and patient will follow up in 12 months in clinic to re-evaluate.    Counseling Resources:  ATP IV Guidelines  Pooled Cohorts Equation Calculator  Breast Cancer Risk Calculator  FRAX Risk Assessment  ICSI Preventive Guidelines  Dietary Guidelines for Americans, 2010  USDA's MyPlate  ASA Prophylaxis  Lung CA Screening    SHILA Mitchell CNP  [facility]

## 2018-03-23 NOTE — NURSING NOTE
Chief Complaint   Patient presents with     Physical     pap, wants STD screen       Initial /80  Pulse 84  Temp 98.4  F (36.9  C) (Tympanic)  Resp 20  Ht 6' (1.829 m)  Wt (!) 382 lb (173.3 kg)  Breastfeeding? No  BMI 51.81 kg/m2 Estimated body mass index is 51.81 kg/(m^2) as calculated from the following:    Height as of this encounter: 6' (1.829 m).    Weight as of this encounter: 382 lb (173.3 kg).  Medication Reconciliation: complete   ................Nehemiah Lang LPN,   March 23, 2018,      8:24 AM,   [facility]

## 2018-03-23 NOTE — LETTER
December 17, 2019      Paola Lobo  [address]  [place] [address]    Dear ,      At [facility], your health and wellness is our primary concern. That is why we are following up on an abnormal pap from 8/29/19, which was reported as LSIL. Your provider had recommended that you have a Pap smear completed by 12/29/19. Our records do not show that this has been scheduled.    It is important to complete the follow up that your provider has suggested for you to ensure that there are no worsening changes which may, over time, develop into cancer.      Please contact our office at  [phone] to schedule an appointment for a Pap smear at your earliest convenience. If you have questions or concerns, please call the clinic and we will be happy to assist you.    If you have completed the tests outside of [facility], please have the results forwarded to our office. We will update the chart for your primary Physician to review before your next annual physical.     Thank you for choosing [facility]!    Sincerely,      Your [facility] Care Team/jennifer

## 2018-03-25 LAB
C TRACH DNA SPEC QL NAA+PROBE: NEGATIVE
N GONORRHOEA DNA SPEC QL NAA+PROBE: NEGATIVE
SPECIMEN SOURCE: NORMAL
SPECIMEN SOURCE: NORMAL

## 2018-03-26 ENCOUNTER — OFFICE VISIT (OUTPATIENT)
Dept: ORTHOPEDICS | Facility: CLINIC | Age: 48
End: 2018-03-26
Payer: COMMERCIAL

## 2018-03-26 ENCOUNTER — RADIANT APPOINTMENT (OUTPATIENT)
Dept: GENERAL RADIOLOGY | Facility: CLINIC | Age: 48
End: 2018-03-26
Attending: ORTHOPAEDIC SURGERY
Payer: COMMERCIAL

## 2018-03-26 VITALS — BODY MASS INDEX: 39.68 KG/M2 | WEIGHT: 293 LBS | TEMPERATURE: 98.2 F | HEIGHT: 72 IN

## 2018-03-26 DIAGNOSIS — M75.41 IMPINGEMENT SYNDROME, SHOULDER, RIGHT: Primary | ICD-10-CM

## 2018-03-26 DIAGNOSIS — M25.511 SHOULDER PAIN, RIGHT: ICD-10-CM

## 2018-03-26 LAB
ANA SER QL IF: NEGATIVE
RHEUMATOID FACT SER NEPH-ACNC: <20 IU/ML (ref 0–20)

## 2018-03-26 PROCEDURE — 73030 X-RAY EXAM OF SHOULDER: CPT | Mod: TC

## 2018-03-26 PROCEDURE — 99243 OFF/OP CNSLTJ NEW/EST LOW 30: CPT | Mod: 25 | Performed by: ORTHOPAEDIC SURGERY

## 2018-03-26 PROCEDURE — 20610 DRAIN/INJ JOINT/BURSA W/O US: CPT | Mod: RT | Performed by: ORTHOPAEDIC SURGERY

## 2018-03-26 NOTE — NURSING NOTE
Chief Complaint   Patient presents with     Consult     rt shoulder pain per SHILA Mitchell CNP       Initial Temp 98.2  F (36.8  C)  Ht 1.829 m (6')  Wt (!) 173.3 kg (382 lb)  BMI 51.81 kg/m2 Estimated body mass index is 51.81 kg/(m^2) as calculated from the following:    Height as of this encounter: 1.829 m (6').    Weight as of this encounter: 173.3 kg (382 lb).  Medication Reconciliation: complete    BP completed using cuff size: NA (Not Taken)    Nichelle Andrade MA

## 2018-03-26 NOTE — MR AVS SNAPSHOT
After Visit Summary   3/26/2018    Paola Lobo    MRN: [id number]           Patient Information     Date Of Birth          1970        Visit Information        Provider Department      3/26/2018 9:20 AM Rossy Ndiaye MD [facility]        Today's Diagnoses     Shoulder pain, right    -  1      Care Instructions      Shoulder Impingement Syndrome  The rotator cuff is a group of muscles and tendons that surround the shoulder joint. These muscles and tendons hold the arm in its joint. They help the shoulder move. The rotator cuff muscles and tendons can become irritated from repeated rubbing against the shoulder bone. This is called shoulder impingement syndrome or rotator cuff tendonitis.     If your case is mild, you may only need to rest the shoulder and then do certain exercises to strengthen the muscles. You can also take anti-inflammatory medicines. Steroid injections into the shoulder can ease inflammation. But you can have only a limited number of these. If the condition gets worse, your shoulder muscles may become thin and weak. This can lead to a rotator cuff tear.  Symptoms of shoulder impingement syndrome may include:    Shoulder pain that gets worse when you raise your arm overhead    Weakness of the shoulder muscles when you use your arm overhead    Popping and clicking when you move your shoulder    Shoulder pain that wakes you up at night, especially when you sleep on the affected shoulder    Sudden pain in your shoulder when you lift or reach  Home care  Follow these tips to take care of yourself at home:    Avoid activities that make your pain worse. These include raising your arms overhead, repeating the same motion over and over, or lifting heavy objects.    Don t hold your arm in one position for a long time. Keep it moving.    Put an ice pack on the sore area for 20 minutes every 1 to 2 hours for the first day. You can make an ice pack by putting  ice cubes in a plastic bag. Wrap the bag in a towel before putting it on your shoulder. A frozen bag of peas or something similar can also be used as an ice pack. Use the ice packs 3 to 4 times a day for the next 2 days. Continue using the ice to relieve of pain and swelling as needed.    You may take acetaminophen or ibuprofen to control pain, unless another medicine was prescribed. If prednisone was prescribed, don t take anti-inflammatory medicines. If you have chronic liver or kidney disease or ever had a stomach ulcer or gastrointestinal bleeding, talk with your doctor before using these medicines.    After your symptoms ease, you may get physical therapy or start a home exercise program. This can strengthen your shoulder muscles and help your range of motion. Talk with your doctor about what is best for your condition.  Follow-up care  Follow up with your healthcare provider, or as advised.  When to seek medical advice  Call your healthcare provider right away if any of these occur:    Shoulder pain that gets worse and wakes you up at night    Your shoulder or arm swells    Numbness, tingling, or pain that travels down the arm to the hand    Loss of shoulder strength    Fever or chills  Date Last Reviewed: 8/1/2016 2000-2017 [employer]. [address] [place] [address]. All rights reserved. This information is not intended as a substitute for professional medical care. Always follow your healthcare professional's instructions.        Understanding Shoulder Impingement Syndrome    Shoulder impingement syndrome is a problem with the shoulder joint. It occurs when certain parts within the joint swell and are pinched. This can cause nagging pain and problems with moving the arm.  What causes shoulder impingement syndrome?  It is possible to develop impingement after years of normal shoulder use. But in most cases the condition occurs because of repeated overhead movements. These include  such things as stocking shelves, painting, swimming, and throwing. These movements can irritate parts of the shoulder, leading to swelling. Swollen parts of the shoulder take up more room, making the joint space smaller. Some parts that may be involved include:    A sac of fluid (bursa) that cushions the shoulder joint.  When the bursa is irritated, it may lead to a condition called bursitis. This is when the bursa swells with fluid, filling and squeezing the joint space.    Fibrous tissues (tendons) that connect muscle to bone. When tendons are irritated, they may become swollen. This is called tendonitis.    The end (acromion) of the shoulder blade. This bone may be flat or hooked. If the acromion is hooked, the joint space may be smaller than normal. Growths (bone spurs) on the acromion can also narrow the joint space. Acromion problems don t often cause impingement. But they can make it worse.  Symptoms of shoulder impingement syndrome  Symptoms include pain, pinching, or stiffness in your shoulder. Pain often comes with movement, particularly reaching overhead or backward. It may also be felt when the shoulder is at rest. Pain at night during sleep is common.  Treatment for shoulder impingement syndrome  Treatment will depend on the cause of the problem, how bad the problem is, and if other parts of the shoulder are damaged. Treatment may include the following:    Active rest. This allows the shoulder to heal. It means using the arm and shoulder, but avoiding activities that cause pain. These likely include reaching overhead or sleeping on your shoulder.    Cold packs. These help reduce swelling and relieve pain.    Prescription or over-the-counter pain medicines. These help relieve pain and swelling.    Arm and shoulder exercises. These help keep your shoulder joint mobile as it heals. They also help improve muscle strength around the joint.    Shots of medicine into the joint. This can help reduce swelling  and pain for a short time.  If other measures don t work to relieve symptoms, you may need surgery. This opens up space in the joint to allow pain-free motion.  Possible complications of shoulder impingement syndrome  It might be tempting to stop using your shoulder completely to avoid pain. But doing so may lead to a condition called frozen shoulder. To help prevent this, follow instructions you are given for active rest and for doing exercises to help your shoulder heal.  When to call your healthcare provider  Call your healthcare provider right away if you have any of these:    Fever of 100.4 F (38 C) or higher, or as directed    Symptoms that don t get better, or get worse    New symptoms   Date Last Reviewed: 3/10/2016    6994-4822 [employer]. [address] [place] [address], [place] [address]. All rights reserved. This information is not intended as a substitute for professional medical care. Always follow your healthcare professional's instructions.        Nonsurgical Treatment Options for Shoulder Impingement    Rest is key to healing your shoulder. If an activity hurts, don t do it. Otherwise, you may prevent healing and increase pain. Your shoulder needs active rest. This means avoiding overhead movements and activities that cause pain. But DO NOT stop using your shoulder completely. This can cause it to stiffen or  freeze.  In addition to rest, impingement can be treated a number of ways. Your healthcare provider can help you find which of these is best for you.  A physical therapist can also help you with exercises specific for your condition.  ? Ice  Ice reduces inflammation and relieves pain. Apply an ice pack for about 15 minutes, 3 times a day. You can also use a bag of frozen peas instead of an ice pack. A pillow placed under your arm may help make you more comfortable.  Note: Don t put the cold item directly on your skin. Place it on top of your shirt, or wrap it in a thin towel or  washcloth.  ? Heat  Heat may soothe aching muscles, but it won t reduce inflammation. Use a heating pad or take a warm shower or bath. Do this for 15 minutes at a time.  Note: Avoid heat when pain is constant. Heat is best when used for warming up before an activity. You can also alternate ice and heat.  ? Medicine  To relieve pain and inflammation, try over-the-counter pain relievers, such as acetaminophen or ibuprofen. Or, your healthcare provider may prescribe medicines. Ask how and when to take your medicine. Be sure to follow all instructions you re given.  ? Electrical stimulation  Electrical stimulation can help reduce pain and swelling. Your healthcare provider attaches small pads to your shoulder. A mild electric current then flows into your shoulder. You may feel tingling, but you should not feel pain.  ? Ultrasound  Ultrasound can help reduce pain. First a slick gel or medicated cream is applied to your shoulder. Then your healthcare provider places a small device over the area. The device uses sound waves to loosen shoulder tightness. This treatment should be pain-free.  ? Injection therapy  Injection therapy may be used to help diagnose your problem. It may also be used to reduce pain and inflammation. The injection typically includes two medicines. One is an anesthetic to numb the shoulder. The other is a steroid, such as cortisone, to help reduce painful swelling. It can take from a few hours to a couple of days before the injection helps. Talk to your healthcare provider about the possible risks and benefits of this therapy.  Date Last Reviewed: 10/14/2015    3888-0854 [employer]. [address] [place] [address], [place] [address]. All rights reserved. This information is not intended as a substitute for professional medical care. Always follow your healthcare professional's instructions.        Exercises for Shoulder Flexibility: Wall Walk    Improving your flexibility can reduce pain.  Stretching exercises also can help increase your range of pain-free motion. Breathe normally when you exercise. Use smooth, fluid movements.  Note: Follow any special instructions you are given. If you feel pain, stop the exercise. If the pain continues after stopping, call your healthcare provider:    Stand with your shoulder about 2 feet from the wall.    Raise your arm to shoulder level and gently  walk  your fingers up the wall as high as you can.    Hold for a few seconds. Then walk your fingers back down.    Repeat 3 times. Move closer to the wall as you repeat.    Build up to holding each stretch for 30 seconds.  Caution: Do this stretch only if your healthcare provider recommends it. Don t do it when you are first injured.       Date Last Reviewed: 8/16/2015 2000-2017 [employer]. [address] [place] [address]. All rights reserved. This information is not intended as a substitute for professional medical care. Always follow your healthcare professional's instructions.        Shoulder Exercises: Side Raise    This exercise stretches and strengthens your shoulders. Before starting, read through all the instructions. During the exercise, breathe normally and use smooth movements. Stop if you feel any pain. If pain persists, call your healthcare provider.    Stand straight, holding a ____ pound weight in each hand, arms at sides, feet shoulder-width apart.    Slowly extend your arms up and out until weights are at shoulder level. Slowly return to starting position.    Repeat ____ times. Do ____ sets ____ times a day.     CAUTION: Don t swing the weights or raise weights above shoulder level.   Date Last Reviewed: 8/16/2015 2000-2017 [employer]. [address] [place] [address]. All rights reserved. This information is not intended as a substitute for professional medical care. Always follow your healthcare professional's instructions.        Shoulder  Exercises: Internal Rotation    Strengthening exercises help make your injured shoulder more stable. To warm up, do flexibility (stretching) exercises first. Your healthcare provider will tell you what size hand weights to use for the strengthening exercise below. If you don t have hand weights, try using cans of soup instead:    With knees bent, lie on a firm surface. Using the hand on the same side as your injured shoulder, grasp a weight. Bend that arm to a right angle (90 degrees).    Rest your elbow on the floor.    Keeping your elbow next to your side, lower your forearm toward the floor, away from your body. Do not lower your hand all the way to the floor.    Slowly return your forearm to your side. Repeat.    Work up to 5 to 15 lifts.     Note: Support your head and neck with a pillow.   Date Last Reviewed: 9/30/2015 2000-2017 [employer]. [address] [place] [address]. All rights reserved. This information is not intended as a substitute for professional medical care. Always follow your healthcare professional's instructions.        Shoulder External Rotation, Side-Lying (Strength)    This exercise is for your right shoulder joint. Switch sides if the problem is in your left shoulder joint.  1. Lie on your left side with your head supported by a pillow. Place a small rolled-up towel under your right elbow.  2. Hold a hand weight in your right hand. Your healthcare provider will tell you what size hand weight to use.  3. Bend your arm in front of you at a right angle. Then bend it down and bring the hand weight to the floor. Rest your forearm on your stomach.  4. Keep your elbow on the towel and slowly lift the hand weight up in front of you. Stop when the weight is raised slightly higher than your elbow.  5. Lower the weight back down.  6. Repeat 5 to 15 times, or as instructed.  Date Last Reviewed: 3/10/2016    0452-4549 [employer]. [address] [place] [address],  "[place] Luna [address]. All rights reserved. This information is not intended as a substitute for professional medical care. Always follow your healthcare professional's instructions.                Follow-ups after your visit        Who to contact     If you have questions or need follow up information about today's clinic visit or your schedule please contact [facility] directly at [phone].  Normal or non-critical lab and imaging results will be communicated to you by MyChart, letter or phone within 4 business days after the clinic has received the results. If you do not hear from us within 7 days, please contact the clinic through [employer] or phone. If you have a critical or abnormal lab result, we will notify you by phone as soon as possible.  Submit refill requests through [employer] or call your pharmacy and they will forward the refill request to us. Please allow 3 business days for your refill to be completed.          Additional Information About Your Visit        [employer] Information     [employer] lets you send messages to your doctor, view your test results, renew your prescriptions, schedule appointments and more. To sign up, go to [email or web address] . Click on \"Log in\" on the left side of the screen, which will take you to the Welcome page. Then click on \"Sign up Now\" on the right side of the page.     You will be asked to enter the access code listed below, as well as some personal information. Please follow the directions to create your username and password.     Your access code is: [email or web address]  Expires: 4/10/2018  2:08 PM     Your access code will  in 90 days. If you need help or a new code, please call your [place] clinic or [phone].        Care EveryWhere ID     This is your Care EveryWhere ID. This could be used by other organizations to access your [place] medical records  [id number]        Your Vitals Were     Temperature Height BMI (Body Mass Index)             " 98.2  F (36.8  C) 1.829 m (6') 51.81 kg/m2          Blood Pressure from Last 3 Encounters:   03/23/18 134/80   03/19/18 140/90   10/13/17 132/70    Weight from Last 3 Encounters:   03/26/18 (!) 173.3 kg (382 lb)   03/23/18 (!) 173.3 kg (382 lb)   03/19/18 (!) 173.3 kg (382 lb)               Primary Care Provider Office Phone # Fax #    Roshni Luevano, APRN [phone]       [address] [facility] [address]        Equal Access to Services     RICHI WALSH : Hadii luz solo Socristal, waaxda luqadaha, qaybta kaalmada adekarelda, eladio sharp . So [facility] [phone].    ATENCIÓN: Si habla español, tiene a garcia disposición servicios gratuitos de asistencia lingüística. Llame al [phone].    We comply with applicable federal civil rights laws and [place] laws. We do not discriminate on the basis of race, color, national origin, age, disability, sex, sexual orientation, or gender identity.            Thank you!     Thank you for choosing [facility]  for your care. Our goal is always to provide you with excellent care. Hearing back from our patients is one way we can continue to improve our services. Please take a few minutes to complete the written survey that you may receive in the mail after your visit with us. Thank you!             Your Updated Medication List - Protect others around you: Learn how to safely use, store and throw away your medicines at www.disposemymeds.org.          This list is accurate as of 3/26/18  9:51 AM.  Always use your most recent med list.                   Brand Name Dispense Instructions for use Diagnosis    cetirizine HCl 10 MG Caps           estradiol 2 MG tablet    ESTRACE    90 tablet    Take 1 tablet (2 mg) by mouth daily    Abnormal uterine bleeding (AUB)       medroxyPROGESTERone 150 MG/ML injection    DEPO-PROVERA    1 mL    Inject 1 mL (150 mg) into the muscle every 3 months    Encounter for initial prescription  of injectable contraceptive       omeprazole 20 MG CR capsule    priLOSEC    30 capsule    Take 1 capsule (20 mg) by mouth daily    Abdominal pain, epigastric       VALERIAN ROOT PO      Take by mouth as needed

## 2018-03-26 NOTE — LETTER
3/26/2018         RE: Paola Lobo  [address] [place] [address]        Dear Colleague,    Thank you for referring your patient, Paola Lobo, to the [facility]. Please see a copy of my visit note below.    ORTHOPEDIC CONSULT      Chief Complaint: Paola Lobo is a 47 year old right hand dominant female who works as a .      She is being seen for   Chief Complaints and History of Present Illnesses   Patient presents with     Consult     rt shoulder pain per SHILA Mitchell CNP         History of Present Illness:   Paola Lobo is a 47 year old female who is seen in consultation at the request of SHILA Mitchell CNP.  History of Present illness:  Paola presents for evaluation of:  1.) rt shoulder   Onset: December and 2017  Symptoms brought on by pt flinched while having a migraine.   Character:  dull ache.  sharp  Progression of symptoms:  same.    Previous similar pain: YES. 20 years ago bursitis relieved with injection.  Pain Level:  8/10.   Previous treatments:  heat, ice, Ibuprofen and icy hot, stretches  Currently on Blood thinners? no  Diagnosis of Diabetes? No  Worse in the morning, deep inside, zings when she reaches, no clicking, difficulty laying on it      Patient's past medical, surgical, social and family histories reviewed.     Past Medical History:   Diagnosis Date     Asthma      Obesity, morbid, BMI 50 or higher (H)        Past Surgical History:   Procedure Laterality Date     ORTHOPEDIC SURGERY         Medications:    Current Outpatient Prescriptions on File Prior to Visit:  omeprazole (PRILOSEC) 20 MG CR capsule Take 1 capsule (20 mg) by mouth daily   VALERIAN ROOT PO Take by mouth as needed   estradiol (ESTRACE) 2 MG tablet Take 1 tablet (2 mg) by mouth daily   medroxyPROGESTERone (DEPO-PROVERA) 150 MG/ML injection Inject 1 mL (150 mg) into the muscle every 3 months   cetirizine HCl 10 MG CAPS      No  current facility-administered medications on file prior to visit.     Allergies   Allergen Reactions     Walnuts [Nuts]      Unable to breathe     Shellfish Allergy      Nausea     Grass      SOB, asthma       Prednisone Other (See Comments)     Severe headache     Wheat      Rash, Derm       Social History     Occupational History     Not on file.     Social History Main Topics     Smoking status: Never Smoker     Smokeless tobacco: Never Used     Alcohol use Yes      Comment: social     Drug use: No     Sexual activity: Yes     Partners: Male     Birth control/ protection: Pill       Family History   Problem Relation Age of Onset     Uterine Cancer Mother      Uterine Cancer Maternal Aunt      Uterine Cancer Maternal Aunt      Uterine Cancer Maternal Aunt      Uterine Cancer Maternal Aunt      Uterine Cancer Maternal Aunt        REVIEW OF SYSTEMS  10 point review systems performed otherwise negative as noted as per history of present illness.    Physical Exam:  Vitals: Temp 98.2  F (36.8  C)  Ht 1.829 m (6')  Wt (!) 173.3 kg (382 lb)  BMI 51.81 kg/m2  BMI= Body mass index is 51.81 kg/(m^2).    Constitutional: healthy, alert and no acute distress   Psychiatric: mentation appears normal and affect normal/bright  NEURO: no focal deficits  RESP: Normal with easy respirations and no use of accessory muscles to breathe, no audible wheezing or retractions  CV: regular pulse  SKIN: No erythema, rashes, excoriation, or breakdown. No evidence of infection.   JOINT/EXTREMITIES:right shoulder - FROM, positive impingement, good strength in rotator cuff  GAIT: non-antalgic  Lymph: no palpable lymph nodes    Diagnostic Modalities:  right shoulder X-ray: Well preserved glenohumeral articular space. No fracture, dislocation and or lesion. , AC joint narrowing and irregularity, asx.  Independent visualization of the images was performed.      Impression: right Shoulder Subacromial Impingement    Plan:  All of the above pertinent  physical exam and imaging modalities findings was reviewed with Paola.                                          CONSERVATIVE CARE:  I recommend conservative care for the patient to include NSAIDs, Tylenol, focused self directed physical therapy, formal physical therapy, steroid injections, activity modifications. Today I provided or dispensed physical therapy, info, HEP.                                        INJECTION PROCEDURE:  The patient was counseled about an  injection, including discussion of risks (including infection), contents of the injection, rationale for performing the injection, and expected benefits of the injection. The skin was prepped with alcohol and betadine and then utilizing sterile technique an injection of the right shoulder subacromial space from the anterolateral approach  was performed. The injection consisted 1ml of Kenalog (40mg per 1ml) with 8ml 1% lidocaine plain. The patient tolerated the injection well, and there were no complications. The injection site was covered with a Band-Aid. The injection was performed by Rossy Ndiaye M.D.                                                FUTURE PLAN:  On their return if they still have symptoms we will consider MRI, NSAID's, injection of steroids.      Return to clinic 6, week(s), or sooner as needed for changes.  Re-x-ray on return: No    Rossy Ndiaye M.D.    Again, thank you for allowing me to participate in the care of your patient.        Sincerely,        Rossy Ndiaye MD

## 2018-03-26 NOTE — PATIENT INSTRUCTIONS
Shoulder Impingement Syndrome  The rotator cuff is a group of muscles and tendons that surround the shoulder joint. These muscles and tendons hold the arm in its joint. They help the shoulder move. The rotator cuff muscles and tendons can become irritated from repeated rubbing against the shoulder bone. This is called shoulder impingement syndrome or rotator cuff tendonitis.     If your case is mild, you may only need to rest the shoulder and then do certain exercises to strengthen the muscles. You can also take anti-inflammatory medicines. Steroid injections into the shoulder can ease inflammation. But you can have only a limited number of these. If the condition gets worse, your shoulder muscles may become thin and weak. This can lead to a rotator cuff tear.  Symptoms of shoulder impingement syndrome may include:    Shoulder pain that gets worse when you raise your arm overhead    Weakness of the shoulder muscles when you use your arm overhead    Popping and clicking when you move your shoulder    Shoulder pain that wakes you up at night, especially when you sleep on the affected shoulder    Sudden pain in your shoulder when you lift or reach  Home care  Follow these tips to take care of yourself at home:    Avoid activities that make your pain worse. These include raising your arms overhead, repeating the same motion over and over, or lifting heavy objects.    Don t hold your arm in one position for a long time. Keep it moving.    Put an ice pack on the sore area for 20 minutes every 1 to 2 hours for the first day. You can make an ice pack by putting ice cubes in a plastic bag. Wrap the bag in a towel before putting it on your shoulder. A frozen bag of peas or something similar can also be used as an ice pack. Use the ice packs 3 to 4 times a day for the next 2 days. Continue using the ice to relieve of pain and swelling as needed.    You may take acetaminophen or ibuprofen to control pain, unless another  medicine was prescribed. If prednisone was prescribed, don t take anti-inflammatory medicines. If you have chronic liver or kidney disease or ever had a stomach ulcer or gastrointestinal bleeding, talk with your doctor before using these medicines.    After your symptoms ease, you may get physical therapy or start a home exercise program. This can strengthen your shoulder muscles and help your range of motion. Talk with your doctor about what is best for your condition.  Follow-up care  Follow up with your healthcare provider, or as advised.  When to seek medical advice  Call your healthcare provider right away if any of these occur:    Shoulder pain that gets worse and wakes you up at night    Your shoulder or arm swells    Numbness, tingling, or pain that travels down the arm to the hand    Loss of shoulder strength    Fever or chills  Date Last Reviewed: 8/1/2016 2000-2017 [employer]. [address] [place] [address]. All rights reserved. This information is not intended as a substitute for professional medical care. Always follow your healthcare professional's instructions.        Understanding Shoulder Impingement Syndrome    Shoulder impingement syndrome is a problem with the shoulder joint. It occurs when certain parts within the joint swell and are pinched. This can cause nagging pain and problems with moving the arm.  What causes shoulder impingement syndrome?  It is possible to develop impingement after years of normal shoulder use. But in most cases the condition occurs because of repeated overhead movements. These include such things as stocking shelves, painting, swimming, and throwing. These movements can irritate parts of the shoulder, leading to swelling. Swollen parts of the shoulder take up more room, making the joint space smaller. Some parts that may be involved include:    A sac of fluid (bursa) that cushions the shoulder joint.  When the bursa is irritated, it may  lead to a condition called bursitis. This is when the bursa swells with fluid, filling and squeezing the joint space.    Fibrous tissues (tendons) that connect muscle to bone. When tendons are irritated, they may become swollen. This is called tendonitis.    The end (acromion) of the shoulder blade. This bone may be flat or hooked. If the acromion is hooked, the joint space may be smaller than normal. Growths (bone spurs) on the acromion can also narrow the joint space. Acromion problems don t often cause impingement. But they can make it worse.  Symptoms of shoulder impingement syndrome  Symptoms include pain, pinching, or stiffness in your shoulder. Pain often comes with movement, particularly reaching overhead or backward. It may also be felt when the shoulder is at rest. Pain at night during sleep is common.  Treatment for shoulder impingement syndrome  Treatment will depend on the cause of the problem, how bad the problem is, and if other parts of the shoulder are damaged. Treatment may include the following:    Active rest. This allows the shoulder to heal. It means using the arm and shoulder, but avoiding activities that cause pain. These likely include reaching overhead or sleeping on your shoulder.    Cold packs. These help reduce swelling and relieve pain.    Prescription or over-the-counter pain medicines. These help relieve pain and swelling.    Arm and shoulder exercises. These help keep your shoulder joint mobile as it heals. They also help improve muscle strength around the joint.    Shots of medicine into the joint. This can help reduce swelling and pain for a short time.  If other measures don t work to relieve symptoms, you may need surgery. This opens up space in the joint to allow pain-free motion.  Possible complications of shoulder impingement syndrome  It might be tempting to stop using your shoulder completely to avoid pain. But doing so may lead to a condition called frozen shoulder. To help  prevent this, follow instructions you are given for active rest and for doing exercises to help your shoulder heal.  When to call your healthcare provider  Call your healthcare provider right away if you have any of these:    Fever of 100.4 F (38 C) or higher, or as directed    Symptoms that don t get better, or get worse    New symptoms   Date Last Reviewed: 3/10/2016    5741-0310 [employer]. [address] [place] [address], [place] [address]. All rights reserved. This information is not intended as a substitute for professional medical care. Always follow your healthcare professional's instructions.        Nonsurgical Treatment Options for Shoulder Impingement    Rest is key to healing your shoulder. If an activity hurts, don t do it. Otherwise, you may prevent healing and increase pain. Your shoulder needs active rest. This means avoiding overhead movements and activities that cause pain. But DO NOT stop using your shoulder completely. This can cause it to stiffen or  freeze.  In addition to rest, impingement can be treated a number of ways. Your healthcare provider can help you find which of these is best for you.  A physical therapist can also help you with exercises specific for your condition.  ? Ice  Ice reduces inflammation and relieves pain. Apply an ice pack for about 15 minutes, 3 times a day. You can also use a bag of frozen peas instead of an ice pack. A pillow placed under your arm may help make you more comfortable.  Note: Don t put the cold item directly on your skin. Place it on top of your shirt, or wrap it in a thin towel or washcloth.  ? Heat  Heat may soothe aching muscles, but it won t reduce inflammation. Use a heating pad or take a warm shower or bath. Do this for 15 minutes at a time.  Note: Avoid heat when pain is constant. Heat is best when used for warming up before an activity. You can also alternate ice and heat.  ? Medicine  To relieve pain and inflammation, try over-the-counter  pain relievers, such as acetaminophen or ibuprofen. Or, your healthcare provider may prescribe medicines. Ask how and when to take your medicine. Be sure to follow all instructions you re given.  ? Electrical stimulation  Electrical stimulation can help reduce pain and swelling. Your healthcare provider attaches small pads to your shoulder. A mild electric current then flows into your shoulder. You may feel tingling, but you should not feel pain.  ? Ultrasound  Ultrasound can help reduce pain. First a slick gel or medicated cream is applied to your shoulder. Then your healthcare provider places a small device over the area. The device uses sound waves to loosen shoulder tightness. This treatment should be pain-free.  ? Injection therapy  Injection therapy may be used to help diagnose your problem. It may also be used to reduce pain and inflammation. The injection typically includes two medicines. One is an anesthetic to numb the shoulder. The other is a steroid, such as cortisone, to help reduce painful swelling. It can take from a few hours to a couple of days before the injection helps. Talk to your healthcare provider about the possible risks and benefits of this therapy.  Date Last Reviewed: 10/14/2015    6027-2718 [employer]). [address] [place] [address]. All rights reserved. This information is not intended as a substitute for professional medical care. Always follow your healthcare professional's instructions.        Exercises for Shoulder Flexibility: Wall Walk    Improving your flexibility can reduce pain. Stretching exercises also can help increase your range of pain-free motion. Breathe normally when you exercise. Use smooth, fluid movements.  Note: Follow any special instructions you are given. If you feel pain, stop the exercise. If the pain continues after stopping, call your healthcare provider:    Stand with your shoulder about 2 feet from the wall.    Raise your arm to  shoulder level and gently  walk  your fingers up the wall as high as you can.    Hold for a few seconds. Then walk your fingers back down.    Repeat 3 times. Move closer to the wall as you repeat.    Build up to holding each stretch for 30 seconds.  Caution: Do this stretch only if your healthcare provider recommends it. Don t do it when you are first injured.       Date Last Reviewed: 8/16/2015 2000-2017 [employer]. [address] [place] [address]. All rights reserved. This information is not intended as a substitute for professional medical care. Always follow your healthcare professional's instructions.        Shoulder Exercises: Side Raise    This exercise stretches and strengthens your shoulders. Before starting, read through all the instructions. During the exercise, breathe normally and use smooth movements. Stop if you feel any pain. If pain persists, call your healthcare provider.    Stand straight, holding a ____ pound weight in each hand, arms at sides, feet shoulder-width apart.    Slowly extend your arms up and out until weights are at shoulder level. Slowly return to starting position.    Repeat ____ times. Do ____ sets ____ times a day.     CAUTION: Don t swing the weights or raise weights above shoulder level.   Date Last Reviewed: 8/16/2015 2000-2017 [employer]. [address] [place] [address]. All rights reserved. This information is not intended as a substitute for professional medical care. Always follow your healthcare professional's instructions.        Shoulder Exercises: Internal Rotation    Strengthening exercises help make your injured shoulder more stable. To warm up, do flexibility (stretching) exercises first. Your healthcare provider will tell you what size hand weights to use for the strengthening exercise below. If you don t have hand weights, try using cans of soup instead:    With knees bent, lie on a firm surface. Using the  hand on the same side as your injured shoulder, grasp a weight. Bend that arm to a right angle (90 degrees).    Rest your elbow on the floor.    Keeping your elbow next to your side, lower your forearm toward the floor, away from your body. Do not lower your hand all the way to the floor.    Slowly return your forearm to your side. Repeat.    Work up to 5 to 15 lifts.     Note: Support your head and neck with a pillow.   Date Last Reviewed: 9/30/2015 2000-2017 [employer]. [address] [place] [address]. All rights reserved. This information is not intended as a substitute for professional medical care. Always follow your healthcare professional's instructions.        Shoulder External Rotation, Side-Lying (Strength)    This exercise is for your right shoulder joint. Switch sides if the problem is in your left shoulder joint.  1. Lie on your left side with your head supported by a pillow. Place a small rolled-up towel under your right elbow.  2. Hold a hand weight in your right hand. Your healthcare provider will tell you what size hand weight to use.  3. Bend your arm in front of you at a right angle. Then bend it down and bring the hand weight to the floor. Rest your forearm on your stomach.  4. Keep your elbow on the towel and slowly lift the hand weight up in front of you. Stop when the weight is raised slightly higher than your elbow.  5. Lower the weight back down.  6. Repeat 5 to 15 times, or as instructed.  Date Last Reviewed: 3/10/2016    4552-4151 [employer]. [address] [place] [address]. All rights reserved. This information is not intended as a substitute for professional medical care. Always follow your healthcare professional's instructions.

## 2018-03-28 LAB
COPATH REPORT: ABNORMAL
PAP: ABNORMAL

## 2018-03-29 DIAGNOSIS — L71.9 ROSACEA: Primary | ICD-10-CM

## 2018-03-29 RX ORDER — METRONIDAZOLE 10 MG/G
GEL TOPICAL DAILY
Qty: 60 G | Refills: 3 | Status: SHIPPED | OUTPATIENT
Start: 2018-03-29 | End: 2019-07-22

## 2018-03-29 NOTE — PROGRESS NOTES
Called pt - informed of results/recommendations. She would like to try a cream 1st, send to her pharmacy ([facility]). Also states she has lost 6 lbs since taking Prilosec, is this of any concern?  ................Nehemiah Lang LPN,   March 29, 2018,      12:00 PM,   [facility]

## 2018-03-29 NOTE — PROGRESS NOTES
Pt notified of results via [employer] and called and spoke to her. See previous note.  ................Nehemiah Lang LPN,   March 29, 2018,      12:03 PM,   [facility]

## 2018-03-30 LAB
FINAL DIAGNOSIS: ABNORMAL
HPV HR 12 DNA CVX QL NAA+PROBE: POSITIVE
HPV16 DNA SPEC QL NAA+PROBE: POSITIVE
HPV18 DNA SPEC QL NAA+PROBE: NEGATIVE
SPECIMEN DESCRIPTION: ABNORMAL
SPECIMEN SOURCE CVX/VAG CYTO: ABNORMAL

## 2018-04-02 NOTE — PROGRESS NOTES
3/23/18 LSIL pap, + HR HPV + 16 and other. Plan: colp bef 6/23/18 4/2/18 Pt. Notified.   5/3/18 Ovid bx @ 9 o'clock: UMAIR 1 and koilocytosis, with condyloma-like features. ECC: negative. Plan: cotest in 1 yr.  5/11/18 Pt. Notified by My Chart Results  04/19/19 Cotest reminder letter sent. ([facility])  5/8/19 ASCUS pap, + HR HPV (not 16 or 18). Plan: colp  5/15/19 Pt notified by phone.  7/12/19 Ovid bx: worrisome for HSIL. Plan: Consult visit with Dr. Cordova for possible CKC.  7/31/19 Consult visit. Plan: Pelvic US, then LEEP.   US showed two fibroids.   8/29/19 LEEP  12/17/19 My Chart pap reminder message sent ([facility]m)  1/14/20 Reminder call to pt, pt will call to schedule tomorrow (rlm)  1/22/20 Pap: NIL, + HR HPV (not 16 or 18). ECC: negative. Plan: cotest in 1 yr.  2/2/20 Pt read my chart

## 2018-04-09 ENCOUNTER — ALLIED HEALTH/NURSE VISIT (OUTPATIENT)
Dept: FAMILY MEDICINE | Facility: OTHER | Age: 48
End: 2018-04-09
Payer: COMMERCIAL

## 2018-04-09 PROCEDURE — 96372 THER/PROPH/DIAG INJ SC/IM: CPT

## 2018-04-09 NOTE — MR AVS SNAPSHOT
After Visit Summary   4/9/2018    Paola Lobo    MRN: [id number]           Patient Information     Date Of Birth          1970        Visit Information        Provider Department      4/9/2018 9:00 AM HERNANDEZ RAYMOND MA [facility]        Today's Diagnoses     Contraception    -  1       Follow-ups after your visit        Your next 10 appointments already scheduled     Apr 10, 2018  2:15 PM CDT   [facility]    [address] [place] [id number]   [phone]            Apr 20, 2018 11:00 AM CDT   New Sleep Patient with Aries Slade PA-C   [facility] ([facility]    [address] [place] [phone]            May 03, 2018  9:20 AM CDT   Colposcopy with Lele Mancuso MD, NL PROC ROOM, [facility], NL COLP EQUIP, [facility] ([facility])    [address] [place] [phone]            May 07, 2018 10:40 AM CDT   [facility] Visit with Rossy Ndiaye MD   [facility]    [address] [place] [id number] [phone]            May 07, 2018 10:50 AM CDT   New Visit with Rossy Ndiaye MD   [facility]    [address] [place] [phone]              Who to contact     If you have questions or need follow up information about today's clinic visit or your schedule please contact [facility] directly at [phone].  Normal or non-critical lab and imaging results will be communicated to you by MyChart, letter or phone within 4 business days after the clinic has received the results. If you do not hear from us within 7 days, please contact the clinic through MyChart or phone. If you have a critical or abnormal lab result, we will  notify you by phone as soon as possible.  Submit refill requests through [employer] or call your pharmacy and they will forward the refill request to us. Please allow 3 business days for your refill to be completed.          Additional Information About Your Visit        [employer] Information     [employer] gives you secure access to your electronic health record. If you see a primary care provider, you can also send messages to your care team and make appointments. If you have questions, please call your primary care clinic.  If you do not have a primary care provider, please call [phone] and they will assist you.        Care EveryWhere ID     This is your Care EveryWhere ID. This could be used by other organizations to access your [facility] medical records  [id number]         Blood Pressure from Last 3 Encounters:   03/23/18 134/80   03/19/18 140/90   10/13/17 132/70    Weight from Last 3 Encounters:   03/26/18 (!) 382 lb (173.3 kg)   03/23/18 (!) 382 lb (173.3 kg)   03/19/18 (!) 382 lb (173.3 kg)              We Performed the Following     INJECTION INTRAMUSCULAR OR SUB-Q     MEDROXYPROGESTERONE INJ        Primary Care Provider Office Phone # Fax #    SHILA Mitchell [phone]       [address] [facility] [address]        Equal Access to Services     RICHI WALSH : Hadii aad ku hadasho Soomaali, waaxda luqadaha, qaybta kaalmada adeegyada, waxay brigitte haygrecia sharp . So [facility] [phone].    ATENCIÓN: Si habla español, tiene a garcia disposición servicios gratuitos de asistencia lingüística. Llame al [phone].    We comply with applicable federal civil rights laws and [place] laws. We do not discriminate on the basis of race, color, national origin, age, disability, sex, sexual orientation, or gender identity.            Thank you!     Thank you for choosing [facility]  for your care. Our goal is always to provide you with excellent care. Hearing back from our  patients is one way we can continue to improve our services. Please take a few minutes to complete the written survey that you may receive in the mail after your visit with us. Thank you!             Your Updated Medication List - Protect others around you: Learn how to safely use, store and throw away your medicines at www.disposemymeds.org.          This list is accurate as of 4/9/18  4:01 PM.  Always use your most recent med list.                   Brand Name Dispense Instructions for use Diagnosis    cetirizine HCl 10 MG Caps           estradiol 2 MG tablet    ESTRACE    90 tablet    Take 1 tablet (2 mg) by mouth daily    Abnormal uterine bleeding (AUB)       medroxyPROGESTERone 150 MG/ML injection    DEPO-PROVERA    1 mL    Inject 1 mL (150 mg) into the muscle every 3 months    Encounter for initial prescription of injectable contraceptive       metroNIDAZOLE 1 % gel    METROGEL    60 g    Apply topically daily    Rosacea       omeprazole 20 MG CR capsule    priLOSEC    30 capsule    Take 1 capsule (20 mg) by mouth daily    Abdominal pain, epigastric       VALERIAN ROOT PO      Take by mouth as needed

## 2018-04-09 NOTE — NURSING NOTE
The following medication was given:     MEDICATION: Medroxyprogesterone 150 mg  See medication note.  Patient instructed to remain in clinic for 20 minutes afterwards, and to report any adverse reaction to me immediately.  Prior to injection verified patient identity using patient's name and date of birth.  Noemy Gray MA     4/9/2018

## 2018-04-10 ENCOUNTER — HOSPITAL ENCOUNTER (OUTPATIENT)
Dept: PHYSICAL THERAPY | Facility: CLINIC | Age: 48
Setting detail: THERAPIES SERIES
End: 2018-04-10
Attending: ORTHOPAEDIC SURGERY
Payer: COMMERCIAL

## 2018-04-10 PROCEDURE — 97161 PT EVAL LOW COMPLEX 20 MIN: CPT | Mod: GP | Performed by: PHYSICAL THERAPIST

## 2018-04-10 PROCEDURE — 97110 THERAPEUTIC EXERCISES: CPT | Mod: GP | Performed by: PHYSICAL THERAPIST

## 2018-04-10 PROCEDURE — 40000718 ZZHC STATISTIC PT DEPARTMENT ORTHO VISIT: Performed by: PHYSICAL THERAPIST

## 2018-04-17 ENCOUNTER — HOSPITAL ENCOUNTER (OUTPATIENT)
Dept: PHYSICAL THERAPY | Facility: CLINIC | Age: 48
Setting detail: THERAPIES SERIES
End: 2018-04-17
Attending: ORTHOPAEDIC SURGERY
Payer: COMMERCIAL

## 2018-04-17 PROCEDURE — 97110 THERAPEUTIC EXERCISES: CPT | Mod: GP | Performed by: PHYSICAL THERAPIST

## 2018-04-17 PROCEDURE — 40000718 ZZHC STATISTIC PT DEPARTMENT ORTHO VISIT: Performed by: PHYSICAL THERAPIST

## 2018-04-20 ENCOUNTER — OFFICE VISIT (OUTPATIENT)
Dept: SLEEP MEDICINE | Facility: CLINIC | Age: 48
End: 2018-04-20
Attending: NURSE PRACTITIONER
Payer: COMMERCIAL

## 2018-04-20 VITALS
HEART RATE: 75 BPM | SYSTOLIC BLOOD PRESSURE: 145 MMHG | RESPIRATION RATE: 20 BRPM | WEIGHT: 293 LBS | HEIGHT: 72 IN | DIASTOLIC BLOOD PRESSURE: 87 MMHG | BODY MASS INDEX: 39.68 KG/M2 | OXYGEN SATURATION: 98 %

## 2018-04-20 DIAGNOSIS — G47.30 SLEEP APNEA, UNSPECIFIED TYPE: ICD-10-CM

## 2018-04-20 PROCEDURE — 99213 OFFICE O/P EST LOW 20 MIN: CPT | Performed by: PHYSICIAN ASSISTANT

## 2018-04-20 NOTE — PROGRESS NOTES
"Obstructive Sleep Apnea- PAP Follow-Up Visit:    Chief Complaint   Patient presents with     Sleep Problem     AKBAR, needs a new machine & supplies. Sleep study completed 2003     Consult       Paola Lobo comes in today for follow-up of their severe sleep apnea, managed with CPAP @ 9 CMH2O. Her sleep [place] was done in [place] [facility]. She has still been using the original machine at the original pressure. We are unable to obtain a download from this machine.  She would like a new cpap/supplies.     No specialty comments available.    Overall, she rates the experience with PAP as 10 (0 poor, 10 great). The mask is comfortable.  The mask is uncomfortable because of \"she needs new supplies\".  The mask is not leaking .  She is not snoring with the mask on. She is not having gasp arousals.  She is not having significant oral/nasal dryness. The pressure is comfortable.   Her PAP interface is Nasal Mask.    Bedtime is typically 2100. Usually it takes about 30 minutes to fall asleep with the mask on. Wake time is typically 0530.  Patient is using PAP therapy 8 hours per night. The patient is usually getting 8 hours of sleep per night.    She does feel rested in the morning.    Thornton Sleepiness Scale: 10/24        Past medical/surgical history, family history, social history, medications and allergies were reviewed.      Problem List:  Patient Active Problem List    Diagnosis Date Noted     Impingement syndrome, shoulder, right 03/26/2018     Priority: Medium     Papanicolaou smear of cervix with low grade squamous intraepithelial lesion (LGSIL) 03/23/2018     Priority: Medium     3/23/18 LSIL pap, + HR HPV + 16 and other. Plan: colp bef 6/23/18         Excessive or frequent menstruation 08/09/2017     Priority: Medium     Intramural and subserous leiomyoma of uterus 08/09/2017     Priority: Medium     Morbid obesity (H) 07/27/2017     Priority: Medium        /87  Pulse 75  Resp 20  Ht 1.829 m (6')  Wt (!) " 175.1 kg (386 lb)  SpO2 98%  BMI 52.35 kg/m2        Impression/Plan:     Severe Sleep apnea. She is Tolerating PAP well but needs a new machine and equipment. This has been ordered.  She has gained approximately 60 # since original study. Will start auto pap 8-15 CMH2O. Daytime symptoms are stable..       Paola Lobo will follow up in about 4 week(s).     Twenty-five minutes spent with patient, all of which were spent face-to-face counseling, consulting, coordinating plan of care.            CC:  Roshni Luevano,

## 2018-04-20 NOTE — NURSING NOTE
Chief Complaint   Patient presents with     Sleep Problem     AKBAR, needs a new machine & supplies. Sleep study completed 2003     Consult       Initial /87  Pulse 75  Resp 20  Ht 1.829 m (6')  Wt (!) 175.1 kg (386 lb)  SpO2 98%  BMI 52.35 kg/m2 Estimated body mass index is 52.35 kg/(m^2) as calculated from the following:    Height as of this encounter: 1.829 m (6').    Weight as of this encounter: 175.1 kg (386 lb).  Medication Reconciliation: complete     Stop bang= 4    Neck circumference: 48cm    Helena Richardson CMA

## 2018-04-20 NOTE — MR AVS SNAPSHOT
After Visit Summary   4/20/2018    Paola Lobo    MRN: [id number]           Patient Information     Date Of Birth          1970        Visit Information        Provider Department      4/20/2018 11:00 AM Aries Slade PA-C [facility]        Today's Diagnoses     Sleep apnea, unspecified type           Follow-ups after your visit        Follow-up notes from your care team     Return in about 4 weeks (around 5/18/2018).      Your next 10 appointments already scheduled     Apr 24, 2018  2:00 PM CDT   PAP SETUP with  SLEEP CENTER DME   [facility] ([facility])    [address] [place] [address]   [phone]            Apr 25, 2018  9:45 AM CDT   [facility] with [facility]   [facility]    [address] [place] [id number]   [phone]            May 03, 2018  9:20 AM CDT   Colposcopy with Leel Mancuso MD, NL PROC ROOM, [facility], NL COLP EQUIP, [facility] ([facility])    [address] [facility] [address]   [phone]            May 07, 2018 10:40 AM CDT   [facility] Visit with Rossy Ndiaye MD   [facility]    [address] [place] [address]   [phone]            May 07, 2018 10:50 AM CDT   New Visit with Rossy Ndiaye MD   [facility]    [address] [place] [address]   [phone]            Jun 12, 2018  1:45 PM CDT   Return Sleep Patient with Víctor Carrera MD   [facility] ([facility])    [address] [place] [address]   [phone]              Who to contact     If you have questions or need follow up information about today's clinic visit or your schedule please contact [facility] directly at [phone].  Normal or non-critical lab and imaging results will be communicated to you by MyChart, letter or phone within 4 business days after the clinic has received the results. If you do not hear from us within 7 days, please contact the clinic through Aviasaleshart or phone. If you have a critical or abnormal lab result, we will notify you by phone as soon as possible.  Submit refill requests through Diana or call your pharmacy and they will forward the refill request to us. Please allow 3 business days for your refill to be completed.          Additional Information About Your Visit        [employer] Information     Diana gives you secure access to your electronic health record. If you see a primary care provider, you can also send messages to your care team and make appointments. If you have questions, please call your primary care clinic.  If you do not have a primary care provider, please call [phone] and they will assist you.        Care EveryWhere ID     This is your Care EveryWhere ID. This could be used by other organizations to access your [place] medical records  [id number]        Your Vitals Were     Pulse Respirations Height Pulse Oximetry BMI (Body Mass Index)       75 20 1.829 m (6') 98% 52.35 kg/m2        Blood Pressure from Last 3 Encounters:   04/20/18 145/87   03/23/18 134/80   03/19/18 140/90    Weight from Last 3 Encounters:   04/20/18 (!) 175.1 kg (386 lb)   03/26/18 (!) 173.3 kg (382 lb)   03/23/18 (!) 173.3 kg (382 lb)              We Performed the Following     [facility]     SLEEP EVALUATION & MANAGEMENT REFERRAL - ADULT -[facility] [phone] (Age 13 if over 100 lbs)        Primary Care Provider Office Phone # Fax #    SHILA Mitchell [phone]       [address] [facility], located within [facility] [address]        Equal Access to Services     RICHI WALSH AH: Laureen Contreras, jocelyn win, qanava romero  eladio mirandamechemaeve lottLyndsayaasharmin ah. [facility] [phone].    ATENCIÓN: Si habla jacqueline, tiene a garcia disposición servicios gratuitos de asistencia lingüística. Bony al [phone].    We comply with applicable federal civil rights laws and [place] laws. We do not discriminate on the basis of race, color, national origin, age, disability, sex, sexual orientation, or gender identity.            Thank you!     Thank you for choosing [facility]  for your care. Our goal is always to provide you with excellent care. Hearing back from our patients is one way we can continue to improve our services. Please take a few minutes to complete the written survey that you may receive in the mail after your visit with us. Thank you!             Your Updated Medication List - Protect others around you: Learn how to safely use, store and throw away your medicines at www.disposemymeds.org.          This list is accurate as of 4/20/18 12:51 PM.  Always use your most recent med list.                   Brand Name Dispense Instructions for use Diagnosis    cetirizine HCl 10 MG Caps           estradiol 2 MG tablet    ESTRACE    90 tablet    Take 1 tablet (2 mg) by mouth daily    Abnormal uterine bleeding (AUB)       FOLIC ACID PO      Take 1 mg by mouth daily        medroxyPROGESTERone 150 MG/ML injection    DEPO-PROVERA    1 mL    Inject 1 mL (150 mg) into the muscle every 3 months    Encounter for initial prescription of injectable contraceptive       metroNIDAZOLE 1 % gel    METROGEL    60 g    Apply topically daily    Rosacea       omeprazole 20 MG CR capsule    priLOSEC    30 capsule    Take 1 capsule (20 mg) by mouth daily    Abdominal pain, epigastric       VALERIAN ROOT PO      Take by mouth as needed        VITAMIN B 12 PO      Take 1,000 mcg by mouth        VITAMIN E NATURAL PO

## 2018-04-24 ENCOUNTER — DOCUMENTATION ONLY (OUTPATIENT)
Dept: SLEEP MEDICINE | Facility: CLINIC | Age: 48
End: 2018-04-24
Payer: COMMERCIAL

## 2018-04-24 NOTE — PROGRESS NOTES
Patient was offered choice of vendor and chose [facility].  Patient Paola Lobo was set up at [facility] on April 24, 2018. Patient received a Resmed AirSense 10 Auto. Pressures were set at 8-15 cm H2O.   Patient s ramp is 5 cm H2O for Off and FLEX/EPR is EPR.  Patient received a [employer] Mask name: DREAMWEAR GEL NASAL  Pillow mask Size Medium, heated tubing and heated humidifier.  Patient is not enrolled in the STM Program and does not need to meet compliance. Patient has a follow up on 6/12/18 with Dr. Carrera.    Marii Morejon

## 2018-04-25 ENCOUNTER — HOSPITAL ENCOUNTER (OUTPATIENT)
Dept: PHYSICAL THERAPY | Facility: CLINIC | Age: 48
Setting detail: THERAPIES SERIES
End: 2018-04-25
Attending: ORTHOPAEDIC SURGERY
Payer: COMMERCIAL

## 2018-04-25 PROCEDURE — 40000718 ZZHC STATISTIC PT DEPARTMENT ORTHO VISIT: Performed by: PHYSICAL THERAPIST

## 2018-04-25 PROCEDURE — 97110 THERAPEUTIC EXERCISES: CPT | Mod: GP | Performed by: PHYSICAL THERAPIST

## 2018-04-27 ENCOUNTER — DOCUMENTATION ONLY (OUTPATIENT)
Dept: SLEEP MEDICINE | Facility: CLINIC | Age: 48
End: 2018-04-27
Payer: COMMERCIAL

## 2018-04-27 NOTE — PROGRESS NOTES
3 DAY STM VISIT    Diagnostic AHI:      Patient contacted for 3 day STM visit  Subjective measures:  Patient doing well and is getting enough sleep during the night.      Device type: Auto-CPAP  PAP settings from order::  CPAP min 8 cm  H20       CPAP max 15 cm  H20  Device settings from machine      Min CPAP 8.0            Max CPAP 15.0      Assessment: Nightly usage over four hours.  Action plan: Pt to have f/u 14 day STM visit.

## 2018-05-03 ENCOUNTER — OFFICE VISIT (OUTPATIENT)
Dept: FAMILY MEDICINE | Facility: OTHER | Age: 48
End: 2018-05-03
Payer: COMMERCIAL

## 2018-05-03 VITALS
BODY MASS INDEX: 52.05 KG/M2 | WEIGHT: 293 LBS | OXYGEN SATURATION: 96 % | DIASTOLIC BLOOD PRESSURE: 96 MMHG | SYSTOLIC BLOOD PRESSURE: 150 MMHG | TEMPERATURE: 98.3 F | RESPIRATION RATE: 20 BRPM | HEART RATE: 87 BPM

## 2018-05-03 DIAGNOSIS — R30.0 DYSURIA: Primary | ICD-10-CM

## 2018-05-03 DIAGNOSIS — R87.810 CERVICAL HIGH RISK HUMAN PAPILLOMAVIRUS (HPV) DNA TEST POSITIVE: ICD-10-CM

## 2018-05-03 DIAGNOSIS — R87.612 PAPANICOLAOU SMEAR OF CERVIX WITH LOW GRADE SQUAMOUS INTRAEPITHELIAL LESION (LGSIL): ICD-10-CM

## 2018-05-03 LAB
ALBUMIN UR-MCNC: NEGATIVE MG/DL
APPEARANCE UR: CLEAR
BACTERIA #/AREA URNS HPF: ABNORMAL /HPF
BETA HCG QUAL IFA URINE: NEGATIVE
BILIRUB UR QL STRIP: NEGATIVE
COLOR UR AUTO: YELLOW
GLUCOSE UR STRIP-MCNC: NEGATIVE MG/DL
HGB UR QL STRIP: ABNORMAL
KETONES UR STRIP-MCNC: NEGATIVE MG/DL
LEUKOCYTE ESTERASE UR QL STRIP: ABNORMAL
NITRATE UR QL: NEGATIVE
PH UR STRIP: 7 PH (ref 5–7)
RBC #/AREA URNS AUTO: ABNORMAL /HPF
SOURCE: ABNORMAL
SP GR UR STRIP: 1.02 (ref 1–1.03)
UROBILINOGEN UR STRIP-ACNC: 0.2 EU/DL (ref 0.2–1)
WBC #/AREA URNS AUTO: ABNORMAL /HPF

## 2018-05-03 PROCEDURE — 57454 BX/CURETT OF CERVIX W/SCOPE: CPT | Performed by: FAMILY MEDICINE

## 2018-05-03 PROCEDURE — 84703 CHORIONIC GONADOTROPIN ASSAY: CPT | Performed by: FAMILY MEDICINE

## 2018-05-03 PROCEDURE — 81001 URINALYSIS AUTO W/SCOPE: CPT | Performed by: FAMILY MEDICINE

## 2018-05-03 PROCEDURE — 88305 TISSUE EXAM BY PATHOLOGIST: CPT | Performed by: FAMILY MEDICINE

## 2018-05-03 ASSESSMENT — PAIN SCALES - GENERAL: PAINLEVEL: MILD PAIN (3)
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
## 2018-05-03 NOTE — NURSING NOTE
Chief Complaint   Patient presents with     Colposcopy       Initial BP (!) 150/96 (Patient Position: Chair, Cuff Size: Adult Regular)  Pulse 87  Temp 98.3  F (36.8  C) (Temporal)  Resp 20  Wt (!) 383 lb 12.8 oz (174.1 kg)  SpO2 96%  BMI 52.05 kg/m2 Estimated body mass index is 52.05 kg/(m^2) as calculated from the following:    Height as of 4/20/18: 6' (1.829 m).    Weight as of this encounter: 383 lb 12.8 oz (174.1 kg).  Medication Reconciliation: complete     Nelly Whitlock MA 5/3/2018  9:46 AM

## 2018-05-03 NOTE — MR AVS SNAPSHOT
After Visit Summary   5/3/2018    Paola Lobo    MRN: [id number]           Patient Information     Date Of Birth          1970        Visit Information        Provider Department      5/3/2018 9:20 AM Lele Mancuso MD; NL COLP EQUIP, [facility]; NL PROC ROOM, [facility]        Today's Diagnoses     Dysuria    -  1    Papanicolaou smear of cervix with low grade squamous intraepithelial lesion (LGSIL)        Cervical high risk human papillomavirus (HPV) DNA test positive           Follow-ups after your visit        Your next 10 appointments already scheduled     May 07, 2018 10:40 AM CDT   [facility]y [facility]   [facility]    [address] [place] [address]   [phone]            May 07, 2018 10:50 AM CDT   New Visit with Rossy Ndiaye MD   [facility]    [address] [place] [address]   [phone]            Jun 12, 2018  1:45 PM CDT   Return Sleep Patient with Víctor Carrera MD   [facility] ([facility])    [address] [place] [address]   [phone]              Who to contact     If you have questions or need follow up information about today's clinic visit or your schedule please contact [facility] directly at [phone].  Normal or non-critical lab and imaging results will be communicated to you by MyChart, letter or phone within 4 business days after the clinic has received the results. If you do not hear from us within 7 days, please contact the clinic through MyChart or phone. If you have a critical or abnormal lab result, we will notify you by phone as soon as possible.  Submit refill requests through Stealz or call your pharmacy and they will forward the refill request to us. Please allow 3 business days for your refill to be completed.           Additional Information About Your Visit        MyChart Information     IndexTank gives you secure access to your electronic health record. If you see a primary care provider, you can also send messages to your care team and make appointments. If you have questions, please call your primary care clinic.  If you do not have a primary care provider, please call [phone] and they will assist you.        Care EveryWhere ID     This is your Care EveryWhere ID. This could be used by other organizations to access your [place] medical records  [id number]        Your Vitals Were     Pulse Temperature Respirations Pulse Oximetry BMI (Body Mass Index)       87 98.3  F (36.8  C) (Temporal) 20 96% 52.05 kg/m2        Blood Pressure from Last 3 Encounters:   05/03/18 (!) 150/96   04/20/18 145/87   03/23/18 134/80    Weight from Last 3 Encounters:   05/03/18 (!) 383 lb 12.8 oz (174.1 kg)   04/20/18 (!) 386 lb (175.1 kg)   03/26/18 (!) 382 lb (173.3 kg)              We Performed the Following     *UA reflex to Microscopic and Culture ([place] [facility] (except [place] and [place])     Beta HCG Qual, Urine - [facility] ([id number])     Colposcopy, with biopsy & curettage     SURGICAL PATHOLOGY EXAM     Urine Microscopic        Primary Care Provider Office Phone # Fax #    Roshni LuevanoSHILA -[phone]       [address] [facility] [address]        Equal Access to Services     RICHI WALSH : Hadii aad ku hadasho Soomaali, waaxda luqadaha, qaybta kaalmada adeegyada, waxleonidas brigitte haypatrickn kristy sharp . So [facility] [phone].    ATENCIÓN: Si habla español, tiene a garcia disposición servicios gratuitos de asistencia lingüística. Llame al [phone].    We comply with applicable federal civil rights laws and [place] laws. We do not discriminate on the basis of race, color, national origin, age, disability, sex, sexual orientation, or gender identity.            Thank you!     Thank you  for choosing [facility]  for your care. Our goal is always to provide you with excellent care. Hearing back from our patients is one way we can continue to improve our services. Please take a few minutes to complete the written survey that you may receive in the mail after your visit with us. Thank you!             Your Updated Medication List - Protect others around you: Learn how to safely use, store and throw away your medicines at www.disposemymeds.org.          This list is accurate as of 5/3/18  3:30 PM.  Always use your most recent med list.                   Brand Name Dispense Instructions for use Diagnosis    cetirizine HCl 10 MG Caps           estradiol 2 MG tablet    ESTRACE    90 tablet    Take 1 tablet (2 mg) by mouth daily    Abnormal uterine bleeding (AUB)       FOLIC ACID PO      Take 1 mg by mouth daily        medroxyPROGESTERone 150 MG/ML injection    DEPO-PROVERA    1 mL    Inject 1 mL (150 mg) into the muscle every 3 months    Encounter for initial prescription of injectable contraceptive       metroNIDAZOLE 1 % gel    METROGEL    60 g    Apply topically daily    Rosacea       omeprazole 20 MG CR capsule    priLOSEC    30 capsule    Take 1 capsule (20 mg) by mouth daily    Abdominal pain, epigastric       order for DME      Equipment ordered: RESMED Auto PAP Mask type: Nasal Settings: 8-15 CM H2O        VALERIAN ROOT PO      Take by mouth as needed        VITAMIN B 12 PO      Take 1,000 mcg by mouth        VITAMIN E NATURAL PO

## 2018-05-04 LAB — COPATH REPORT: NORMAL

## 2018-05-07 ENCOUNTER — RADIANT APPOINTMENT (OUTPATIENT)
Dept: GENERAL RADIOLOGY | Facility: CLINIC | Age: 48
End: 2018-05-07
Attending: ORTHOPAEDIC SURGERY
Payer: COMMERCIAL

## 2018-05-07 ENCOUNTER — OFFICE VISIT (OUTPATIENT)
Dept: ORTHOPEDICS | Facility: CLINIC | Age: 48
End: 2018-05-07
Payer: COMMERCIAL

## 2018-05-07 VITALS
WEIGHT: 293 LBS | SYSTOLIC BLOOD PRESSURE: 156 MMHG | DIASTOLIC BLOOD PRESSURE: 97 MMHG | TEMPERATURE: 97.8 F | BODY MASS INDEX: 51.94 KG/M2

## 2018-05-07 DIAGNOSIS — M54.16 LUMBAR RADICULOPATHY: Primary | ICD-10-CM

## 2018-05-07 DIAGNOSIS — M75.41 IMPINGEMENT SYNDROME, SHOULDER, RIGHT: Primary | ICD-10-CM

## 2018-05-07 DIAGNOSIS — M25.551 HIP PAIN, RIGHT: ICD-10-CM

## 2018-05-07 PROCEDURE — 99213 OFFICE O/P EST LOW 20 MIN: CPT | Performed by: ORTHOPAEDIC SURGERY

## 2018-05-07 PROCEDURE — 73502 X-RAY EXAM HIP UNI 2-3 VIEWS: CPT | Mod: TC

## 2018-05-07 ASSESSMENT — PAIN SCALES - GENERAL: PAINLEVEL: MODERATE PAIN (4)
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
## 2018-05-07 NOTE — MR AVS SNAPSHOT
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 arthritis pain. Therapies include: cognitive-behavioral therapy (CBT), pain coping skills training, biofeedback, stress management, and hypnosis. Ask your healthcare provider for more information about these therapies.  For more information about many of these methods, contact the National Center for Complementary and Alternative Medicine at http://www.United Hospitalam.nih.gov.  Date Last Reviewed: 2/14/2016 2000-2017 [employer]. [address] [place] [address]. All rights reserved. This information is not intended as a substitute for professional medical care. Always follow your healthcare professional's instructions.    Iliotibial Band Stretch (Flexibility)    1. Stand next to a chair. Hold onto the chair with your right hand for support. Cross your right leg behind your left leg.  2. Lean your right hip toward the right. Feel the stretch at the outside of your hip.  3. Hold for 30 to 60 seconds. Then relax.  4. Repeat 2 times, or as instructed.  5. Switch sides and repeat.  6. Do  this 3 times a day, or as instructed.     Tip: Don t bend forward or twist at the waist.   Date Last Reviewed: 3/29/2016    4655-7845 [employer]. [address] [place] [address]. All rights reserved. This information is not intended as a substitute for professional medical care. Always follow your healthcare professional's instructions.        Hip Rotation (Flexibility)    These instructions are for the right hip. Switch sides for your left hip.  7. Lie on your back on the floor, with your knees bent and feet flat on the floor. Don t press your neck or lower back to the floor.  8. Rest your right ankle on your left knee.  9. Place a towel around the back of your left thigh. Pull on the ends of the towel to pull your left knee toward your chest. Feel the stretch in your buttocks.  10. Hold for 30 to 60 seconds. Lower your leg back down.  11. Repeat 2 times, or as instructed.  12. Switch legs and repeat.   13. Do this 3 times a day, or as instructed.  Date Last Reviewed: 3/10/2016    0272-4522 [employer]. [address] [place] [address]. All rights reserved. This information is not intended as a substitute for professional medical care. Always follow your healthcare professional's instructions.        Hip Flexor Stretch (Flexibility)      14. Kneel on the floor on a mat or carpet. Put your right foot on the floor in front of you, with the knee bent. Hold on to a chair for balance if needed.  15. Press your hips forward, keeping your back and shoulders upright. Feel the stretch in the front of your left hip.  16. Hold for 30 to 60 seconds. Relax.  17. Repeat 2 times. Switch sides.   18. Repeat 3 times per day, or as instructed.  Date Last Reviewed: 3/10/2016    0078-1267 [employer]. [address] [place] [address]. All rights reserved. This information is not intended as a substitute for professional medical care. Always follow your healthcare  professional's instructions.  Hip Adductor Stretch (Flexibility)    19. Sit on the floor. Put the soles of your feet together so your knees are pointed outward.  20. Pull your heels in toward your groin, as close as is comfortable.  21. Put your hands on your knees, and gently push them closer to the floor.  22. Hold for 30 to 60 seconds.  23. Relax and repeat 2 times, or as instructed.  24. Repeat this exercise 3 times a day, or as instructed.  Date Last Reviewed: 3/10/2016    8799-2993 [employer]. [address] [place] [address]. All rights reserved. This information is not intended as a substitute for professional medical care. Always follow your healthcare professional's instructions.    Leg and Knee Exercises: Hip Pulls    The following exercise helps build strong, balanced leg muscles. Make sure to adjust exercise bands as instructed by your physical therapist. He or she will tell you how many times to do the exercise:    Stand with one leg about 1 foot away from a wall. The other foot (attached to a pulley or rubber tubing) should be a step behind.    Pull your attached foot forward, keeping your knees straight but not locked. (Point your toe straight forward unless told otherwise by your therapist.)    Return slowly and steadily to your starting position.  Note: To prevent injury, always warm up and stretch before your strengthening exercises. Stop any exercise that causes pain. Discuss it with your physical therapist or healthcare provider.   Date Last Reviewed: 10/4/2015    3088-5325 [employer]. [address] [place] [address]. All rights reserved. This information is not intended as a substitute for professional medical care. Always follow your healthcare professional's instructions.      Lower Body Exercises: Hip Flexor    This exercise stretches and strengthens your lower body to help your back. As you work out, don t rush or strain. Use an exercise mat,  pillow, or folded towel to protect your knees and other sensitive areas.    Kneel on the floor. Put one foot on the floor in front of you, with the knee slightly bent. If you need to, hold on to a chair for balance. Tighten your abdomen.    Move your hips forward, keeping your back and shoulders upright. Feel the stretch in the front of your hip.    Hold for 30-60 seconds. Return to starting position.    Repeat 2 times. Switch sides.     Repeat ___ times per day.  For your safety, check with your healthcare provider before starting an exercise program.   Date Last Reviewed: 8/16/2015 2000-2017 [employer]. [address] [place] [address], [place] [address]. All rights reserved. This information is not intended as a substitute for professional medical care. Always follow your healthcare professional's instructions.            [employer] and [employer] may offer reliable information regarding your diagnosis and treatment plan.    THANK YOU for coming in today. If you receive a survey via [employer] or mail please let us know if there was anything you especially appreciated today or if there is any way we can improve our clinic. We appreciate your input.    GENERAL INFORMATION:  Our hours are:  Monday :     Clinic 7:30 AM-430 PM ([facility])  Tuesday:      Operating Room All Day ([facility])  Wednesday: Clinic 7:30 AM - 11:15 AM ([facility])             Clinic 1:00 PM - 4:00PM ([facility])  Thursday:     Administrative Day  Friday:          Clinic 7:30 AM - 11:15 AM ([facility])            Clinic 1:00 PM - 4:00 PM ([facility])      [facility] for any issues or concerns: [phone]      We are not in the office Thursdays. Therefore non- urgent calls and medical messages received on Thursday will be addressed when we are back in the office on Wednesday. Urgent matters will be reviewed and  addressed by one of our partners in the office as needed.    If lab work was done today as part of your evaluation you will generally be contacted via Black Card Mediahart, mail, or phone with the results within 1-5 days. If there is an alarming result we will contact you by phone. Lab results come back at varying times, I generally wait until all labs are resulted before making comments on results. Please note labs are automatically released to Mila (if you have signed up for it) once available-at times you may see these prior to my having a chance to review them as well.    If you need refills please contact your pharmacist. They will send a refill request to me to review. Please allow 3 business days for us to process all refill requests. All narcotic refills should be handled in the clinic at the time of your visit.            Follow-ups after your visit        Follow-up notes from your care team     Return if symptoms worsen or fail to improve.      Your next 10 appointments already scheduled     Jun 12, 2018  1:45 PM CDT   Return Sleep Patient with Víctor Carrera MD   [facility] ([facility])    [address] [place] [address] [id number] [phone]              Who to contact     If you have questions or need follow up information about today's clinic visit or your schedule please contact [facility] directly at [phone].  Normal or non-critical lab and imaging results will be communicated to you by MyChart, letter or phone within 4 business days after the clinic has received the results. If you do not hear from us within 7 days, please contact the clinic through MyChart or phone. If you have a critical or abnormal lab result, we will notify you by phone as soon as possible.  Submit refill requests through Mila or call your pharmacy and they will forward the refill request to us. Please allow 3 business days for your refill to be completed.           Additional Information About Your Visit        MyChart Information     [employer] gives you secure access to your electronic health record. If you see a primary care provider, you can also send messages to your care team and make appointments. If you have questions, please call your primary care clinic.  If you do not have a primary care provider, please call [phone] and they will assist you.        Care EveryWhere ID     This is your Care EveryWhere ID. This could be used by other organizations to access your [facility] medical records  [id number]         Blood Pressure from Last 3 Encounters:   05/07/18 (!) 156/97   05/03/18 (!) 150/96   04/20/18 145/87    Weight from Last 3 Encounters:   05/07/18 (!) 173.7 kg (383 lb)   05/03/18 (!) 174.1 kg (383 lb 12.8 oz)   04/20/18 (!) 175.1 kg (386 lb)               Primary Care Provider Office Phone # Fax #    SHILA Mitchell [phone]       [address] [facility] [address]        Equal Access to Services     [facility]: Hadii aad ku hadasho Soomaali, waaxda luqadaha, qaybta kaalmada adeegyada, eladio sharp . So [facility] [phone].    ATENCIÓN: Si habla español, tiene a garcia disposición servicios gratuitos de asistencia lingüística. [facility] [phone].    We comply with applicable federal civil rights laws and [place] laws. We do not discriminate on the basis of race, color, national origin, age, disability, sex, sexual orientation, or gender identity.            Thank you!     Thank you for choosing [facility]  for your care. Our goal is always to provide you with excellent care. Hearing back from our patients is one way we can continue to improve our services. Please take a few minutes to complete the written survey that you may receive in the mail after your visit with us. Thank you!             Your Updated Medication List - Protect others around you: Learn how to safely use, store and  throw away your medicines at www.disposemymeds.org.          This list is accurate as of 5/7/18 11:24 AM.  Always use your most recent med list.                   Brand Name Dispense Instructions for use Diagnosis    cetirizine HCl 10 MG Caps           estradiol 2 MG tablet    ESTRACE    90 tablet    Take 1 tablet (2 mg) by mouth daily    Abnormal uterine bleeding (AUB)       FOLIC ACID PO      Take 1 mg by mouth daily        medroxyPROGESTERone 150 MG/ML injection    DEPO-PROVERA    1 mL    Inject 1 mL (150 mg) into the muscle every 3 months    Encounter for initial prescription of injectable contraceptive       metroNIDAZOLE 1 % gel    METROGEL    60 g    Apply topically daily    Rosacea       omeprazole 20 MG CR capsule    priLOSEC    30 capsule    Take 1 capsule (20 mg) by mouth daily    Abdominal pain, epigastric       order for DME      Equipment ordered: RESMED Auto PAP Mask type: Nasal Settings: 8-15 CM H2O        VALERIAN ROOT PO      Take by mouth as needed        VITAMIN B 12 PO      Take 1,000 mcg by mouth        VITAMIN E NATURAL PO

## 2018-05-07 NOTE — MR AVS SNAPSHOT
After Visit Summary   5/7/2018    Paola Lobo    MRN: [id number]           Patient Information     Date Of Birth          1970        Visit Information        Provider Department      5/7/2018 10:40 AM Rossy Ndiaye MD [facility]        Today's Diagnoses     Impingement syndrome, shoulder, right    -  1      Care Instructions    Encounter Diagnosis   Name Primary?     Impingement syndrome, shoulder, right Yes     Rest, ice and elevate above heart level as needed for pain control  1. You are doing great after your injection.  We are glad it helped you.  2. Continue doing your exercises.  It sounds like formal physical therapy is done.  Continue if you would like but you do not have to.  3. We discussed a cortisone injection that may provide some relief by taking inflamation away; but we decided to hold off on that today.  You are doing too well.  4. Follow up with Rossy Ndiaye MD and/or Abran Thornton PA-C on an as needed basis for your shoulder.  [employer] and [employer] may offer reliable information regarding your diagnosis and treatment plan.    THANK YOU for coming in today. If you receive a survey via [employer] or mail please let us know if there was anything you especially appreciated today or if there is any way we can improve our clinic. We appreciate your input.    GENERAL INFORMATION:  Our hours are:  Monday :     Clinic 7:30 AM-430 PM ([facility])  Tuesday:      Operating Room All Day ([facility])  Wednesday: Clinic 7:30 AM - 11:15 AM ([facility])             Clinic 1:00 PM - 4:00PM ([facility])  Thursday:     Administrative Day  Friday:          Clinic 7:30 AM - 11:15 AM ([facility])            Clinic 1:00 PM - 4:00 PM ([facility])      [facility] for any issues or concerns: [phone]      We are not in the office Thursdays.  Therefore non- urgent calls and medical messages received on Thursday will be addressed when we are back in the office on Wednesday. Urgent matters will be reviewed and addressed by one of our partners in the office as needed.    If lab work was done today as part of your evaluation you will generally be contacted via Sky[employer]t, mail, or phone with the results within 1-5 days. If there is an alarming result we will contact you by phone. Lab results come back at varying times, I generally wait until all labs are resulted before making comments on results. Please note labs are automatically released to [email or web address] (if you have signed up for it) once available-at times you may see these prior to my having a chance to review them as well.    If you need refills please contact your pharmacist. They will send a refill request to me to review. Please allow 3 business days for us to process all refill requests. All narcotic refills should be handled in the clinic at the time of your visit.           Follow-ups after your visit        Your next 10 appointments already scheduled     Jun 12, 2018  1:45 PM CDT   Return Sleep Patient with Víctor Carrera MD   [facility]    [address] [place] [id number] [phone]              Who to contact     If you have questions or need follow up information about today's clinic visit or your schedule please contact [facility] directly at [phone].  Normal or non-critical lab and imaging results will be communicated to you by DÃ³ndehart, letter or phone within 4 business days after the clinic has received the results. If you do not hear from us within 7 days, please contact the clinic through DÃ³ndehart or phone. If you have a critical or abnormal lab result, we will notify you by phone as soon as possible.  Submit refill requests through [email or web address] or call your pharmacy and they will forward the refill  request to us. Please allow 3 business days for your refill to be completed.          Additional Information About Your Visit        MyChart Information     [employer] gives you secure access to your electronic health record. If you see a primary care provider, you can also send messages to your care team and make appointments. If you have questions, please call your primary care clinic.  If you do not have a primary care provider, please call [phone] and they will assist you.        Care EveryWhere ID     This is your Care EveryWhere ID. This could be used by other organizations to access your [facility] medical records  [id number]        Your Vitals Were     Temperature BMI (Body Mass Index)                97.8  F (36.6  C) 51.94 kg/m2           Blood Pressure from Last 3 Encounters:   05/07/18 (!) 156/97   05/03/18 (!) 150/96   04/20/18 145/87    Weight from Last 3 Encounters:   05/07/18 (!) 173.7 kg (383 lb)   05/03/18 (!) 174.1 kg (383 lb 12.8 oz)   04/20/18 (!) 175.1 kg (386 lb)              Today, you had the following     No orders found for display       Primary Care Provider Office Phone # Fax #    SHILA Mitchell [phone]       [address] [facility] [address]        Equal Access to Services     [facility] : Hadii aad ku hadasho Soomaali, waaxda luqadaha, qaybta kaalmada adeegyada, waxleonidas briggs haygrecia sharp . So [facility] [phone].    ATENCIÓN: Si habla español, tiene a garcia disposición servicios gratuitos de asistencia lingüística. Llame al [phone].    We comply with applicable federal civil rights laws and [place] laws. We do not discriminate on the basis of race, color, national origin, age, disability, sex, sexual orientation, or gender identity.            Thank you!     Thank you for choosing [facility]  for your care. Our goal is always to provide you with excellent care. Hearing back from our patients is one way we can continue to  improve our services. Please take a few minutes to complete the written survey that you may receive in the mail after your visit with us. Thank you!             Your Updated Medication List - Protect others around you: Learn how to safely use, store and throw away your medicines at www.disposemymeds.org.          This list is accurate as of 5/7/18 11:15 AM.  Always use your most recent med list.                   Brand Name Dispense Instructions for use Diagnosis    cetirizine HCl 10 MG Caps           estradiol 2 MG tablet    ESTRACE    90 tablet    Take 1 tablet (2 mg) by mouth daily    Abnormal uterine bleeding (AUB)       FOLIC ACID PO      Take 1 mg by mouth daily        medroxyPROGESTERone 150 MG/ML injection    DEPO-PROVERA    1 mL    Inject 1 mL (150 mg) into the muscle every 3 months    Encounter for initial prescription of injectable contraceptive       metroNIDAZOLE 1 % gel    METROGEL    60 g    Apply topically daily    Rosacea       omeprazole 20 MG CR capsule    priLOSEC    30 capsule    Take 1 capsule (20 mg) by mouth daily    Abdominal pain, epigastric       order for DME      Equipment ordered: RESMED Auto PAP Mask type: Nasal Settings: 8-15 CM H2O        VALERIAN ROOT PO      Take by mouth as needed        VITAMIN B 12 PO      Take 1,000 mcg by mouth        VITAMIN E NATURAL PO

## 2018-05-07 NOTE — PATIENT INSTRUCTIONS
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 reserved. This information is not intended as a substitute for professional medical care. Always follow your healthcare professional's instructions.    Iliotibial Band Stretch (Flexibility)    1. Stand next to a chair. Hold onto the chair with your right hand for support. Cross your right leg behind your left leg.  2. Lean your right hip toward the right. Feel the stretch at the outside of your hip.  3. Hold for 30 to 60 seconds. Then relax.  4. Repeat 2 times, or as instructed.  5. Switch sides and repeat.  6. Do this 3 times a day, or as instructed.     Tip: Don t bend forward or twist at the waist.   Date Last Reviewed: 3/29/2016    0619-7151 [employer]. [address] [place] [address]. All rights reserved. This information is not intended as a substitute for professional medical care. Always follow your healthcare professional's instructions.        Hip Rotation (Flexibility)    These instructions are for the right hip.  Switch sides for your left hip.  7. Lie on your back on the floor, with your knees bent and feet flat on the floor. Don t press your neck or lower back to the floor.  8. Rest your right ankle on your left knee.  9. Place a towel around the back of your left thigh. Pull on the ends of the towel to pull your left knee toward your chest. Feel the stretch in your buttocks.  10. Hold for 30 to 60 seconds. Lower your leg back down.  11. Repeat 2 times, or as instructed.  12. Switch legs and repeat.   13. Do this 3 times a day, or as instructed.  Date Last Reviewed: 3/10/2016    3791-7259 [employer]. [address] [place] [address]. All rights reserved. This information is not intended as a substitute for professional medical care. Always follow your healthcare professional's instructions.        Hip Flexor Stretch (Flexibility)      14. Kneel on the floor on a mat or carpet. Put your right foot on the floor in front of you, with the knee bent. Hold on to a chair for balance if needed.  15. Press your hips forward, keeping your back and shoulders upright. Feel the stretch in the front of your left hip.  16. Hold for 30 to 60 seconds. Relax.  17. Repeat 2 times. Switch sides.   18. Repeat 3 times per day, or as instructed.  Date Last Reviewed: 3/10/2016    6142-5120 [employer]. [address] [place] [address]. All rights reserved. This information is not intended as a substitute for professional medical care. Always follow your healthcare professional's instructions.  Hip Adductor Stretch (Flexibility)    19. Sit on the floor. Put the soles of your feet together so your knees are pointed outward.  20. Pull your heels in toward your groin, as close as is comfortable.  21. Put your hands on your knees, and gently push them closer to the floor.  22. Hold for 30 to 60 seconds.  23. Relax and repeat 2 times, or as instructed.  24. Repeat this exercise 3 times a day, or as  instructed.  Date Last Reviewed: 3/10/2016    0959-2055 [employer]. [address] [place] [address]. All rights reserved. This information is not intended as a substitute for professional medical care. Always follow your healthcare professional's instructions.    Leg and Knee Exercises: Hip Pulls    The following exercise helps build strong, balanced leg muscles. Make sure to adjust exercise bands as instructed by your physical therapist. He or she will tell you how many times to do the exercise:    Stand with one leg about 1 foot away from a wall. The other foot (attached to a pulley or rubber tubing) should be a step behind.    Pull your attached foot forward, keeping your knees straight but not locked. (Point your toe straight forward unless told otherwise by your therapist.)    Return slowly and steadily to your starting position.  Note: To prevent injury, always warm up and stretch before your strengthening exercises. Stop any exercise that causes pain. Discuss it with your physical therapist or healthcare provider.   Date Last Reviewed: 10/4/2015    6779-6247 [employer]. [address] [place] [address]. All rights reserved. This information is not intended as a substitute for professional medical care. Always follow your healthcare professional's instructions.      Lower Body Exercises: Hip Flexor    This exercise stretches and strengthens your lower body to help your back. As you work out, don t rush or strain. Use an exercise mat, pillow, or folded towel to protect your knees and other sensitive areas.    Kneel on the floor. Put one foot on the floor in front of you, with the knee slightly bent. If you need to, hold on to a chair for balance. Tighten your abdomen.    Move your hips forward, keeping your back and shoulders upright. Feel the stretch in the front of your hip.    Hold for 30-60 seconds. Return to starting position.    Repeat 2 times. Switch  sides.     Repeat ___ times per day.  For your safety, check with your healthcare provider before starting an exercise program.   Date Last Reviewed: 8/16/2015 2000-2017 [employer]. [address] [place] [address], [place] [address]. All rights reserved. This information is not intended as a substitute for professional medical care. Always follow your healthcare professional's instructions.            [employer] and [employer] [facility] may offer reliable information regarding your diagnosis and treatment plan.    THANK YOU for coming in today. If you receive a survey via Partender or mail please let us know if there was anything you especially appreciated today or if there is any way we can improve our clinic. We appreciate your input.    GENERAL INFORMATION:  Our hours are:  Monday :     Clinic 7:30 AM-430 PM ([facility])  Tuesday:      Operating Room All Day ([facility])  Wednesday: Clinic 7:30 AM - 11:15 AM ([facility])             Clinic 1:00 PM - 4:00PM ([facility])  Thursday:     Administrative Day  Friday:          Clinic 7:30 AM - 11:15 AM ([facility])            Clinic 1:00 PM - 4:00 PM ([facility])      [facility] for any issues or concerns: [phone]      We are not in the office Thursdays. Therefore non- urgent calls and medical messages received on Thursday will be addressed when we are back in the office on Wednesday. Urgent matters will be reviewed and addressed by one of our partners in the office as needed.    If lab work was done today as part of your evaluation you will generally be contacted via Partender, mail, or phone with the results within 1-5 days. If there is an alarming result we will contact you by phone. Lab results come back at varying times, I generally wait until all labs are resulted before making comments on results. Please note labs are automatically released to  [employer] (if you have signed up for it) once available-at times you may see these prior to my having a chance to review them as well.    If you need refills please contact your pharmacist. They will send a refill request to me to review. Please allow 3 business days for us to process all refill requests. All narcotic refills should be handled in the clinic at the time of your visit.

## 2018-05-07 NOTE — LETTER
5/7/2018         RE: Paola Lobo  [address] [place] [address]        Dear Colleague,    Thank you for referring your patient, Paola Lobo, to the [facility]. Please see a copy of my visit note below.    See other progress note from today            Again, thank you for allowing me to participate in the care of your patient.        Sincerely,        Rossy Ndiaye MD

## 2018-05-07 NOTE — PATIENT INSTRUCTIONS
Encounter Diagnosis   Name Primary?     Impingement syndrome, shoulder, right Yes     Rest, ice and elevate above heart level as needed for pain control  1. You are doing great after your injection.  We are glad it helped you.  2. Continue doing your exercises.  It sounds like formal physical therapy is done.  Continue if you would like but you do not have to.  3. We discussed a cortisone injection that may provide some relief by taking inflamation away; but we decided to hold off on that today.  You are doing too well.  4. Follow up with Rossy Ndiaye MD and/or Abran Thornton PA-C on an as needed basis for your shoulder.  [employer] and [employer] may offer reliable information regarding your diagnosis and treatment plan.    THANK YOU for coming in today. If you receive a survey via LiveRSVP or mail please let us know if there was anything you especially appreciated today or if there is any way we can improve our clinic. We appreciate your input.    GENERAL INFORMATION:  Our hours are:  Monday :     Clinic 7:30 AM-430 PM ([facility])  Tuesday:      Operating Room All Day ([facility])  Wednesday: Clinic 7:30 AM - 11:15 AM ([facility])             Clinic 1:00 PM - 4:00PM ([facility])  Thursday:     Administrative Day  Friday:          Clinic 7:30 AM - 11:15 AM ([facility])            Clinic 1:00 PM - 4:00 PM ([facility])      [facility] for any issues or concerns: [phone]      We are not in the office Thursdays. Therefore non- urgent calls and medical messages received on Thursday will be addressed when we are back in the office on Wednesday. Urgent matters will be reviewed and addressed by one of our partners in the office as needed.    If lab work was done today as part of your evaluation you will generally be contacted via LiveRSVP, mail, or phone with the results within 1-5 days. If  there is an alarming result we will contact you by phone. Lab results come back at varying times, I generally wait until all labs are resulted before making comments on results. Please note labs are automatically released to [employer] (if you have signed up for it) once available-at times you may see these prior to my having a chance to review them as well.    If you need refills please contact your pharmacist. They will send a refill request to me to review. Please allow 3 business days for us to process all refill requests. All narcotic refills should be handled in the clinic at the time of your visit.
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
## 2018-05-09 ENCOUNTER — DOCUMENTATION ONLY (OUTPATIENT)
Dept: SLEEP MEDICINE | Facility: CLINIC | Age: 48
End: 2018-05-09
Payer: COMMERCIAL

## 2018-05-09 NOTE — PROGRESS NOTES
14 DAY STM VISIT    Diagnostic AHI: 30.8     Message left for patient to return call     Assessment: Pt meeting objective benchmarks.     Action plan: Waiting for patient to return call and pt to have 30 day STM visit.    Device type: Auto-CPAP  PAP settings:  CPAP min 8 cm  H20      CPAP max 15 cm  H20     95th% pressure 14.3 cm     Objective measures: 14 day rolling measures      Compliance  100 %      Leak  1.37 lpm  last  upload      AHI 0.73   last  upload      Average number of minutes 494     Average hours of usage 8.2          Objective measure goal  Compliance   Goal >70%  Leak   Goal < 24 lpm  AHI  Goal < 5  Usage  Goal >240

## 2018-05-22 ENCOUNTER — RADIANT APPOINTMENT (OUTPATIENT)
Dept: GENERAL RADIOLOGY | Facility: CLINIC | Age: 48
End: 2018-05-22
Attending: PHYSICAL MEDICINE & REHABILITATION
Payer: COMMERCIAL

## 2018-05-22 ENCOUNTER — OFFICE VISIT (OUTPATIENT)
Dept: ORTHOPEDICS | Facility: CLINIC | Age: 48
End: 2018-05-22
Payer: COMMERCIAL

## 2018-05-22 VITALS
WEIGHT: 293 LBS | SYSTOLIC BLOOD PRESSURE: 144 MMHG | BODY MASS INDEX: 39.68 KG/M2 | HEIGHT: 72 IN | DIASTOLIC BLOOD PRESSURE: 98 MMHG

## 2018-05-22 DIAGNOSIS — E66.01 MORBID OBESITY (H): ICD-10-CM

## 2018-05-22 DIAGNOSIS — M54.50 CHRONIC LOW BACK PAIN: ICD-10-CM

## 2018-05-22 DIAGNOSIS — M54.16 RIGHT LUMBAR RADICULOPATHY: Primary | ICD-10-CM

## 2018-05-22 DIAGNOSIS — G89.29 CHRONIC LOW BACK PAIN: ICD-10-CM

## 2018-05-22 DIAGNOSIS — R20.2 RIGHT LEG PARESTHESIAS: ICD-10-CM

## 2018-05-22 PROCEDURE — 99243 OFF/OP CNSLTJ NEW/EST LOW 30: CPT | Performed by: PHYSICAL MEDICINE & REHABILITATION

## 2018-05-22 PROCEDURE — 72100 X-RAY EXAM L-S SPINE 2/3 VWS: CPT | Mod: TC

## 2018-05-22 NOTE — MR AVS SNAPSHOT
After Visit Summary   5/22/2018    Paola Lobo    MRN: [id number]           Patient Information     Date Of Birth          1970        Visit Information        Provider Department      5/22/2018 1:40 PM Belle Tracey MD [facility]        Today's Diagnoses     Chronic low back pain    -  1    Right leg paresthesias        Morbid obesity (H)          Care Instructions    -Physical therapy [facility]. Please do 5-6 days of exercises per week between formal sessions and the home exercises they provide.  -Ice or heat 15-20 minutes as needed (Avoid sleeping on a heating pad or ice)  -Patient's preferred over the counter medications as directed on packaging as needed for pain or soreness.  Please take ibuprofen with food. Do not premedicate prior to activity.  -Discussed weight loss.    -Nutrition consult placed.    -Also discussed EMG, lumbar spine MRI, and starting gabapentin  -Patient to follow up with Primary Care provider regarding elevated blood pressure.    -Follow up in 4-6 weeks or sooner if symptoms fail to improve or worsen.  Please call with questions or concerns.              Follow-ups after your visit        Additional Services     NUTRITION REFERRAL       Your provider has referred you to: [facility]: [facility] [phone]   [email or web address]    Please be aware that coverage of these services is subject to the terms and limitations of your health insurance plan.  Call member services at your health plan with any benefit or coverage questions.      Please bring the following with you to your appointment:    (1) This referral request  (2) Any documents given to you regarding this referral  (3) Any specific questions you have about diet and/or food choices            PHYSICAL THERAPY REFERRAL       *This therapy referral will be filtered to a centralized scheduling office at [facility]  "Services and the patient will receive a call to schedule an appointment at a [facility] location most convenient for them. *     [facility] provides Physical Therapy evaluation and treatment and many specialty services across the [facility] system.  If requesting a specialty program, please choose from the list below.    If you have not heard from the scheduling office within 2 business days, please call [phone] for all locations, with the exception of [place], please call [phone] and [facility], please call [phone]  Treatment: Evaluation & Treatment  Special Instructions/Modalities: none  Special Programs: None    Please be aware that coverage of these services is subject to the terms and limitations of your health insurance plan.  Call member services at your health plan with any benefit or coverage questions.      **Note to Provider:  If you are referring outside of [facility] for the therapy appointment, please list the name of the location in the \"special instructions\" above, print the referral and give to the patient to schedule the appointment.                  Your next 10 appointments already scheduled     Jun 12, 2018  1:45 PM CDT   Return Sleep Patient with Víctor Carrera MD   [facility]    [address] [place] [id number] [phone]              Who to contact     If you have questions or need follow up information about today's clinic visit or your schedule please contact [facility] directly at [phone].  Normal or non-critical lab and imaging results will be communicated to you by MyChart, letter or phone within 4 business days after the clinic has received the results. If you do not hear from us within 7 days, please contact the clinic through MyChart or phone. If you have a critical or abnormal lab result, we will notify you by phone as soon as possible.  Submit refill " requests through Polleverywhere or call your pharmacy and they will forward the refill request to us. Please allow 3 business days for your refill to be completed.          Additional Information About Your Visit        [employer] Information     [employer] gives you secure access to your electronic health record. If you see a primary care provider, you can also send messages to your care team and make appointments. If you have questions, please call your primary care clinic.  If you do not have a primary care provider, please call [phone] and they will assist you.        Care EveryWhere ID     This is your Care EveryWhere ID. This could be used by other organizations to access your [facility] medical records  [id number]        Your Vitals Were     Height BMI (Body Mass Index)                6' (1.829 m) 53.1 kg/m2           Blood Pressure from Last 3 Encounters:   05/22/18 (!) 144/98   05/07/18 (!) 156/97   05/03/18 (!) 150/96    Weight from Last 3 Encounters:   05/22/18 (!) 391 lb 8 oz (177.6 kg)   05/07/18 (!) 383 lb (173.7 kg)   05/03/18 (!) 383 lb 12.8 oz (174.1 kg)              We Performed the Following     NUTRITION REFERRAL     PHYSICAL THERAPY REFERRAL        Primary Care Provider Office Phone # Fax #    SHILA Mitchell [phone]       [address] [facility] [address]        Equal Access to Services     [facility]: Hadii luz ku hadasho Soomaali, waaxda luqadaha, qaybta kaalmada adeevanyada, eladio sharp . So [facility] [phone].    ATENCIÓN: Si habla español, tiene a garcia disposición servicios gratuitos de asistencia lingüística. Llame al [phone].    We comply with applicable federal civil rights laws and [place] laws. We do not discriminate on the basis of race, color, national origin, age, disability, sex, sexual orientation, or gender identity.            Thank you!     Thank you for choosing [facility]  for your care. Our goal is always  to provide you with excellent care. Hearing back from our patients is one way we can continue to improve our services. Please take a few minutes to complete the written survey that you may receive in the mail after your visit with us. Thank you!             Your Updated Medication List - Protect others around you: Learn how to safely use, store and throw away your medicines at www.disposemymeds.org.          This list is accurate as of 5/22/18  2:27 PM.  Always use your most recent med list.                   Brand Name Dispense Instructions for use Diagnosis    cetirizine HCl 10 MG Caps           estradiol 2 MG tablet    ESTRACE    90 tablet    Take 1 tablet (2 mg) by mouth daily    Abnormal uterine bleeding (AUB)       FOLIC ACID PO      Take 1 mg by mouth daily        medroxyPROGESTERone 150 MG/ML injection    DEPO-PROVERA    1 mL    Inject 1 mL (150 mg) into the muscle every 3 months    Encounter for initial prescription of injectable contraceptive       metroNIDAZOLE 1 % gel    METROGEL    60 g    Apply topically daily    Rosacea       omeprazole 20 MG CR capsule    priLOSEC    30 capsule    Take 1 capsule (20 mg) by mouth daily    Abdominal pain, epigastric       order for DME      Equipment ordered: RESMED Auto PAP Mask type: Nasal Settings: 8-15 CM H2O        VALERIAN ROOT PO      Take by mouth as needed        VITAMIN B 12 PO      Take 1,000 mcg by mouth        VITAMIN E NATURAL PO
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
## 2018-05-22 NOTE — PATIENT INSTRUCTIONS
-Physical therapy [place]. Please do 5-6 days of exercises per week between formal sessions and the home exercises they provide.  -Ice or heat 15-20 minutes as needed (Avoid sleeping on a heating pad or ice)  -Patient's preferred over the counter medications as directed on packaging as needed for pain or soreness.  Please take ibuprofen with food. Do not premedicate prior to activity.  -Discussed weight loss.    -Nutrition consult placed.    -Also discussed EMG, lumbar spine MRI, and starting gabapentin  -Patient to follow up with Primary Care provider regarding elevated blood pressure.    -Follow up in 4-6 weeks or sooner if symptoms fail to improve or worsen.  Please call with questions or concerns.

## 2018-05-24 NOTE — PROGRESS NOTES
Outpatient Physical Therapy Discharge Note     Patient: Paola Lobo  : 1970    Beginning/End Dates of Reporting Period:  4/10/18 to 2018    Referring Provider: Rossy Razo Diagnosis: shoulder pain consistent with impingement     Client Self Report: Shoulder has been doing very well, spent a long day driving yesterday so stiff today.     Objective Measurements:  Objective Measure: ROM   Details: Flexion 180, abduction 170, IR/ER equivalent behind back L and R  Objective Measure: SPADI  Details: 5.38 (initial 33.8)            Goals:  Goal Identifier 1   Goal Description Patient will be independant with HEP for long term self management   Target Date 18   Date Met  18   Progress:     Goal Identifier 2   Goal Description Patient will have full active painfree ROM   Target Date 18   Date Met  18   Progress:     Goal Identifier 3   Goal Description Patient will be able to fasten bra with <5/10 difficulty   Target Date 18   Date Met   18   Progress:     Goal Identifier     Goal Description     Target Date     Date Met      Progress:     Goal Identifier     Goal Description     Target Date     Date Met      Progress:     Goal Identifier     Goal Description     Target Date     Date Met      Progress:     Goal Identifier     Goal Description     Target Date     Date Met      Progress:     Goal Identifier     Goal Description     Target Date     Date Met      Progress:     Progress Toward Goals:   Progress this reporting period: Pt reports shoulder pain symptoms have resolved. Goals met. Will continue HEP          Plan:  Discharge from therapy.    Discharge:    Reason for Discharge: Patient has met all goals.  Patient chooses to discontinue therapy.    Equipment Issued: na    Discharge Plan: Patient to continue home program.

## 2018-05-25 ENCOUNTER — DOCUMENTATION ONLY (OUTPATIENT)
Dept: SLEEP MEDICINE | Facility: CLINIC | Age: 48
End: 2018-05-25
Payer: COMMERCIAL

## 2018-05-25 NOTE — PROGRESS NOTES
30 DAY STM VISIT    Diagnostic AHI: 30.8     Subjective measures:   Patient stated things are going no questions or concerns.    Assessment: Pt meeting objective benchmarks.  Patient meeting subjective benchmarks.   Action plan: Pt to have 6 month [facility] visit.  Patient has a follow up visit with Dr. Carrera on 6/12/2018.   Device type: Auto-CPAP  PAP settings: CPAP min 8 cm  H20     CPAP max 15 cm  H20    95th% pressure 14.2 cm  H20   Mask type:  Nasal Mask  Objective measures: 14 day rolling measures      Compliance  100 %      Leak  1.68 lpm  last  upload      AHI 0.46   last  upload      Average number of minutes 508      Objective measure goal  Compliance   Goal >70%  Leak   Goal < 24 lpm  AHI  Goal < 5  Usage  Goal >240

## 2018-05-29 ENCOUNTER — HOSPITAL ENCOUNTER (OUTPATIENT)
Dept: PHYSICAL THERAPY | Facility: CLINIC | Age: 48
Setting detail: THERAPIES SERIES
End: 2018-05-29
Attending: PHYSICAL MEDICINE & REHABILITATION
Payer: COMMERCIAL

## 2018-05-29 PROCEDURE — 97110 THERAPEUTIC EXERCISES: CPT | Mod: GP | Performed by: PHYSICAL THERAPIST

## 2018-05-29 PROCEDURE — 97161 PT EVAL LOW COMPLEX 20 MIN: CPT | Mod: GP | Performed by: PHYSICAL THERAPIST

## 2018-05-29 PROCEDURE — 40000718 ZZHC STATISTIC PT DEPARTMENT ORTHO VISIT: Performed by: PHYSICAL THERAPIST
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
## 2018-06-06 ENCOUNTER — HOSPITAL ENCOUNTER (OUTPATIENT)
Dept: PHYSICAL THERAPY | Facility: CLINIC | Age: 48
Setting detail: THERAPIES SERIES
End: 2018-06-06
Attending: PHYSICAL MEDICINE & REHABILITATION
Payer: COMMERCIAL

## 2018-06-06 PROCEDURE — 97140 MANUAL THERAPY 1/> REGIONS: CPT | Mod: GP | Performed by: PHYSICAL MEDICINE & REHABILITATION

## 2018-06-06 PROCEDURE — 97110 THERAPEUTIC EXERCISES: CPT | Mod: GP | Performed by: PHYSICAL MEDICINE & REHABILITATION

## 2018-06-06 PROCEDURE — 40000718 ZZHC STATISTIC PT DEPARTMENT ORTHO VISIT: Performed by: PHYSICAL MEDICINE & REHABILITATION

## 2018-06-08 NOTE — PROGRESS NOTES
Obstructive Sleep Apnea- PAP Follow-Up Visit:    Chief Complaint   Patient presents with     CPAP Follow Up       Paola Lobo comes in today for follow-up of their moderate sleep apnea, managed with CPAP.     No specialty comments available.    Overall, she rates the experience with PAP as 10 (0 poor, 10 great). The mask is comfortable.    The mask is not leaking .  She is not snoring with the mask on. She is not having gasp arousals.  She is not having significant oral/nasal dryness. The pressure is comfortable.     Her PAP interface is Nasal Pillows.     The patient is usually getting 8 hours of sleep per night.    She does feel rested in the morning.    Total score - [place]: 10 (4/20/2018 11:06 AM)      [facility]   CPAP 0 cmH2O 30 day usage data:  96% of days with > 4 hours of use. 0/30 days with no use. Average use 492 minutes per day.   95%ile Leak 1.28 L/min.   AHI 0.34 events per hour.     Auto-PAP 8.0 - 15.0 cmH2O 30 day usage data:    96% of days with > 4 hours of use. 0/30 days with no use. Average use 492 minutes per day.   95%ile Leak 1.28 L/min.   CPAP 95% pressure 14.2 cm.   AHI 0.34 events per hour.         Past medical/surgical history, family history, social history, medications and allergies were reviewed.      Problem List:  Patient Active Problem List    Diagnosis Date Noted     Impingement syndrome, shoulder, right 03/26/2018     Priority: Medium     Papanicolaou smear of cervix with low grade squamous intraepithelial lesion (LGSIL) 03/23/2018     Priority: Medium     3/23/18 LSIL pap, + HR HPV + 16 and other. Plan: colp bef 6/23/18  5/3/18 [place] bx @ 9 o'clock: UMAIR 1 and koilocytosis, with condyloma-like features. ECC: negative. Plan: cotest in 1 yr.         Excessive or frequent menstruation 08/09/2017     Priority: Medium     Intramural and subserous leiomyoma of uterus 08/09/2017     Priority: Medium     Morbid obesity (H) 07/27/2017     Priority: Medium        BP (!) 165/94  Pulse  88  Resp 20  Ht 1.829 m (6')  Wt (!) 176.4 kg (389 lb)  SpO2 98%  BMI 52.76 kg/m2    [place]=10    Impression/Plan:  The patient is doing well with CPAP even though has elevated [place] mostly due to habit of a nap in the afternoon.   Moderate Sleep apnea.  Tolerating PAP well. Daytime symptoms are improved..       Paola Lobo will follow up in about 1 year(s).     Fifteen minutes spent with patient, all of which were spent face-to-face counseling, consulting, coordinating plan of care.            CC:  Roshni Luevano Oleg Froymovich, MD

## 2018-06-12 ENCOUNTER — TELEPHONE (OUTPATIENT)
Dept: FAMILY MEDICINE | Facility: OTHER | Age: 48
End: 2018-06-12

## 2018-06-12 ENCOUNTER — OFFICE VISIT (OUTPATIENT)
Dept: SLEEP MEDICINE | Facility: CLINIC | Age: 48
End: 2018-06-12
Payer: COMMERCIAL

## 2018-06-12 VITALS
BODY MASS INDEX: 39.68 KG/M2 | HEART RATE: 88 BPM | SYSTOLIC BLOOD PRESSURE: 165 MMHG | OXYGEN SATURATION: 98 % | WEIGHT: 293 LBS | HEIGHT: 72 IN | RESPIRATION RATE: 20 BRPM | DIASTOLIC BLOOD PRESSURE: 94 MMHG

## 2018-06-12 DIAGNOSIS — G47.33 OSA (OBSTRUCTIVE SLEEP APNEA): Primary | ICD-10-CM

## 2018-06-12 PROCEDURE — 99213 OFFICE O/P EST LOW 20 MIN: CPT | Performed by: OTOLARYNGOLOGY

## 2018-06-12 NOTE — TELEPHONE ENCOUNTER
Paola Lobo is a 47 year old female who calls with congestion and cough.     NURSING ASSESSMENT:  Description:  Cough and congestion, was in lungs but now better.  She is taking sudafed which may be why her BP is elevated.   Onset/duration:  11 days ago  Precip. factors:  Hx asthma and seasonal allergies  Associated symptoms: none   Improves/worsens symptoms: using sudafed  Pain scale (0-10)   0/10  LMP/preg/breast feeding:     Last exam/Treatment:  03/23/2018  Allergies:   Allergies   Allergen Reactions     Walnuts [Nuts]      Unable to breathe     Shellfish Allergy      Nausea     Grass      SOB, asthma       Prednisone Other (See Comments)     Severe headache     Wheat      Rash, Derm         RECOMMENDED DISPOSITION:  See in 72 hours - appt make for [place] since she will be there tomorrow for an appt.  Will comply with recommendation: Yes  If further questions/concerns or if symptoms do not improve, worsen or new symptoms develop, call your PCP or [facility] as soon as possible.      Guideline used:  Telephone Triage Protocols for Nurses, Fifth Edition, Arcelia Nava RN

## 2018-06-12 NOTE — MR AVS SNAPSHOT
After Visit Summary   6/12/2018    Paola Lobo    MRN: [id number]           Patient Information     Date Of Birth          1970        Visit Information        Provider Department      6/12/2018 1:45 PM Víctor Carrera MD [facility]        Today's Diagnoses     AKBAR (obstructive sleep apnea)    -  1       Follow-ups after your visit        Your next 10 appointments already scheduled     Jun 13, 2018  9:15 AM CDT   [facility]    [address] [address]   [phone]            Jun 20, 2018  9:15 AM CDT   [facility]tho Treatment with Poppy Andrade PT   [facility]    [address] [address]   [phone]            Jun 27, 2018  2:15 PM CDT   Consult HOD with Blank Cardenas RD   [facility] ([facility])    [address] [place] [id number] [phone]              Who to contact     If you have questions or need follow up information about today's clinic visit or your schedule please contact [facility] directly at [phone].  Normal or non-critical lab and imaging results will be communicated to you by 9+hart, letter or phone within 4 business days after the clinic has received the results. If you do not hear from us within 7 days, please contact the clinic through 9+hart or phone. If you have a critical or abnormal lab result, we will notify you by phone as soon as possible.  Submit refill requests through [employer] or call your pharmacy and they will forward the refill request to us. Please allow 3 business days for your refill to be completed.          Additional Information About Your Visit        [employer] Information     [employer] gives you secure access to your electronic health record. If you see a primary care  provider, you can also send messages to your care team and make appointments. If you have questions, please call your primary care clinic.  If you do not have a primary care provider, please call [phone] and they will assist you.        Care EveryWhere ID     This is your Care EveryWhere ID. This could be used by other organizations to access your [place] medical records  [id number]        Your Vitals Were     Pulse Respirations Height Pulse Oximetry BMI (Body Mass Index)       88 20 1.829 m (6') 98% 52.76 kg/m2        Blood Pressure from Last 3 Encounters:   06/12/18 (!) 165/94   05/22/18 (!) 144/98   05/07/18 (!) 156/97    Weight from Last 3 Encounters:   06/12/18 (!) 176.4 kg (389 lb)   05/22/18 (!) 177.6 kg (391 lb 8 oz)   05/07/18 (!) 173.7 kg (383 lb)              Today, you had the following     No orders found for display       Primary Care Provider Office Phone # Fax #    Roshni Luevano, SHILA [phone]       [address] [facility] [address]        Equal Access to Services     RICHI WALSH AH: Hadii aad ku hadasho Soomaali, waaxda luqadaha, qaybta kaalmada adeegyada, waxleonidas briggs haypatrickn kristy gilbert lastephen gimenez. So [facility] [phone].    ATENCIÓN: Si habla español, tiene a garcia disposición servicios gratuitos de asistencia lingüística. Bony al [phone].    We comply with applicable federal civil rights laws and [place] laws. We do not discriminate on the basis of race, color, national origin, age, disability, sex, sexual orientation, or gender identity.            Thank you!     Thank you for choosing [facility]  for your care. Our goal is always to provide you with excellent care. Hearing back from our patients is one way we can continue to improve our services. Please take a few minutes to complete the written survey that you may receive in the mail after your visit with us. Thank you!             Your Updated Medication List - Protect others around you:  Learn how to safely use, store and throw away your medicines at www.disposemymeds.org.          This list is accurate as of 6/12/18  1:58 PM.  Always use your most recent med list.                   Brand Name Dispense Instructions for use Diagnosis    cetirizine HCl 10 MG Caps           estradiol 2 MG tablet    ESTRACE    90 tablet    Take 1 tablet (2 mg) by mouth daily    Abnormal uterine bleeding (AUB)       FOLIC ACID PO      Take 1 mg by mouth daily        medroxyPROGESTERone 150 MG/ML injection    DEPO-PROVERA    1 mL    Inject 1 mL (150 mg) into the muscle every 3 months    Encounter for initial prescription of injectable contraceptive       metroNIDAZOLE 1 % gel    METROGEL    60 g    Apply topically daily    Rosacea       omeprazole 20 MG CR capsule    priLOSEC    30 capsule    Take 1 capsule (20 mg) by mouth daily    Abdominal pain, epigastric       order for DME      Equipment ordered: RESMED Auto PAP Mask type: Nasal Settings: 8-15 CM H2O        VALERIAN ROOT PO      Take by mouth as needed        VITAMIN B 12 PO      Take 1,000 mcg by mouth        VITAMIN E NATURAL PO

## 2018-06-12 NOTE — TELEPHONE ENCOUNTER
Reason for call:  Patient reporting a symptom    Symptom or request: Headache and BP is running at 166/99.  Paola also has a cold and is taking cold meds.    Duration (how long have symptoms been present): month or so    Have you been treated for this before? Yes    Additional comments: Please call back    Phone Number patient can be reached at:  Home number on file [phone] (home)    Best Time:  any    Can we leave a detailed message on this number:  YES    Call taken on 6/12/2018 at 2:05 PM by Marilee Ma

## 2018-06-13 ENCOUNTER — HOSPITAL ENCOUNTER (OUTPATIENT)
Dept: PHYSICAL THERAPY | Facility: CLINIC | Age: 48
Setting detail: THERAPIES SERIES
End: 2018-06-13
Attending: PHYSICAL MEDICINE & REHABILITATION
Payer: COMMERCIAL

## 2018-06-13 ENCOUNTER — OFFICE VISIT (OUTPATIENT)
Dept: FAMILY MEDICINE | Facility: CLINIC | Age: 48
End: 2018-06-13
Payer: COMMERCIAL

## 2018-06-13 VITALS
OXYGEN SATURATION: 95 % | RESPIRATION RATE: 24 BRPM | DIASTOLIC BLOOD PRESSURE: 104 MMHG | TEMPERATURE: 99.2 F | WEIGHT: 293 LBS | HEART RATE: 74 BPM | BODY MASS INDEX: 52.76 KG/M2 | SYSTOLIC BLOOD PRESSURE: 160 MMHG

## 2018-06-13 DIAGNOSIS — J01.90 ACUTE SINUSITIS WITH SYMPTOMS > 10 DAYS: Primary | ICD-10-CM

## 2018-06-13 DIAGNOSIS — E66.01 MORBID OBESITY (H): ICD-10-CM

## 2018-06-13 DIAGNOSIS — I10 BENIGN ESSENTIAL HYPERTENSION: ICD-10-CM

## 2018-06-13 PROCEDURE — 99214 OFFICE O/P EST MOD 30 MIN: CPT | Performed by: FAMILY MEDICINE

## 2018-06-13 PROCEDURE — 97110 THERAPEUTIC EXERCISES: CPT | Mod: GP | Performed by: PHYSICAL MEDICINE & REHABILITATION

## 2018-06-13 PROCEDURE — 40000718 ZZHC STATISTIC PT DEPARTMENT ORTHO VISIT: Performed by: PHYSICAL MEDICINE & REHABILITATION

## 2018-06-13 PROCEDURE — 97140 MANUAL THERAPY 1/> REGIONS: CPT | Mod: GP | Performed by: PHYSICAL MEDICINE & REHABILITATION

## 2018-06-13 RX ORDER — HYDROCHLOROTHIAZIDE 25 MG/1
25 TABLET ORAL DAILY
Qty: 90 TABLET | Refills: 0 | Status: SHIPPED | OUTPATIENT
Start: 2018-06-13 | End: 2018-06-27

## 2018-06-13 ASSESSMENT — PAIN SCALES - GENERAL: PAINLEVEL: MODERATE PAIN (4)

## 2018-06-13 NOTE — PROGRESS NOTES
SUBJECTIVE:   Paola Lobo is a 47 year old female who presents to clinic today for the following health issues:      ENT Symptoms             Symptoms: cc Present Absent Comment   Fever/Chills   x    Fatigue  x     Muscle Aches  x  headache   Eye Irritation   x    Sneezing   x    Nasal Jose/Drg  x     Sinus Pressure/Pain  x     Loss of smell  x     Dental pain   x    Sore Throat   x    Swollen Glands   x    Ear Pain/Fullness  x  itch   Cough x      Wheeze  x  sometimes   Chest Pain   x    Shortness of breath  x  When coughing, has asthma   Rash   x    Other         Symptom duration:  2 + weeks   Symptom severity:  severe   Treatments tried:  sudafed, cold and cough med. Mucinex DM   Contacts:  parents         Problem list and histories reviewed & adjusted, as indicated.  Additional history: as documented    Patient Active Problem List   Diagnosis     Morbid obesity (H)     Excessive or frequent menstruation     Intramural and subserous leiomyoma of uterus     Impingement syndrome, shoulder, right     Papanicolaou smear of cervix with low grade squamous intraepithelial lesion (LGSIL)     Past Surgical History:   Procedure Laterality Date     ORTHOPEDIC SURGERY         Social History   Substance Use Topics     Smoking status: Never Smoker     Smokeless tobacco: Never Used     Alcohol use Yes      Comment: social     Family History   Problem Relation Age of Onset     Uterine Cancer Mother      Uterine Cancer Maternal Aunt      Uterine Cancer Maternal Aunt      Uterine Cancer Maternal Aunt      Uterine Cancer Maternal Aunt      Uterine Cancer Maternal Aunt          Current Outpatient Prescriptions   Medication Sig Dispense Refill     cetirizine HCl 10 MG CAPS        Cyanocobalamin (VITAMIN B 12 PO) Take 1,000 mcg by mouth       estradiol (ESTRACE) 2 MG tablet Take 1 tablet (2 mg) by mouth daily 90 tablet 1     FOLIC ACID PO Take 1 mg by mouth daily       medroxyPROGESTERone (DEPO-PROVERA) 150 MG/ML injection  Inject 1 mL (150 mg) into the muscle every 3 months 1 mL 0     metroNIDAZOLE (METROGEL) 1 % gel Apply topically daily 60 g 3     omeprazole (PRILOSEC) 20 MG CR capsule Take 1 capsule (20 mg) by mouth daily 30 capsule 1     order for DME Equipment ordered: RESMED Auto PAP Mask type: Nasal Settings: 8-15 CM H2O       VALERIAN ROOT PO Take by mouth as needed       VITAMIN E NATURAL PO        Allergies   Allergen Reactions     Walnuts [Nuts]      Unable to breathe     Shellfish Allergy      Nausea     Grass      SOB, asthma       Prednisone Other (See Comments)     Severe headache     Wheat      Rash, Derm     Recent Labs   Lab Test  03/23/18   0808  02/16/17   0839  11/19/13   1416   A1C   --    --   5.7   LDL  86  103*   --    HDL  39*  41*   --    TRIG  90  136   --    ALT  51*   --    --    CR  0.69   --    --    GFRESTIMATED  >90   --    --    GFRESTBLACK  >90   --    --    POTASSIUM  4.1   --    --       BP Readings from Last 3 Encounters:   06/13/18 (!) 160/104   06/12/18 (!) 165/94   05/22/18 (!) 144/98    Wt Readings from Last 3 Encounters:   06/13/18 (!) 389 lb (176.4 kg)   06/12/18 (!) 389 lb (176.4 kg)   05/22/18 (!) 391 lb 8 oz (177.6 kg)                  Labs reviewed in EPIC    Reviewed and updated as needed this visit by clinical staff  Tobacco  Allergies  Meds       Reviewed and updated as needed this visit by Provider         ROS:  Constitutional, HEENT, cardiovascular, pulmonary, GI, , musculoskeletal, neuro, skin, endocrine and psych systems are negative, except as otherwise noted.    OBJECTIVE:     BP (!) 160/104  Pulse 74  Temp 99.2  F (37.3  C) (Tympanic)  Resp 24  Wt (!) 389 lb (176.4 kg)  SpO2 95%  BMI 52.76 kg/m2  Body mass index is 52.76 kg/(m^2).  GENERAL: alert, no distress and obese  EYES: Eyes grossly normal to inspection, PERRL and conjunctivae and sclerae normal  HENT: normal cephalic/atraumatic, ear canals and TM's normal, nasal mucosa edematous , oral mucous membranes moist  and sinuses: maxillary tenderness on bilateral  NECK: no adenopathy, no asymmetry, masses, or scars and thyroid normal to palpation  RESP: lungs clear to auscultation - no rales, rhonchi or wheezes  CV: regular rates and rhythm, normal S1 S2, no S3 or S4 and no murmur, click or rub  ABDOMEN: soft, nontender, no hepatosplenomegaly, no masses and bowel sounds normal  MS: no gross musculoskeletal defects noted, no edema  NEURO: Normal strength and tone, mentation intact and speech normal  PSYCH: mentation appears normal, affect normal/bright      ASSESSMENT/PLAN:       1. Acute sinusitis with symptoms > 10 days  - Suspect symptoms secondary to acute sinusitis   - Augmentin prescribed, common side effect discussed  -Recommended well hydration, over-the-counter analgesia, humidifier use, steam inhalation and warm fluids  - amoxicillin-clavulanate (AUGMENTIN) 875-125 MG per tablet; Take 1 tablet by mouth 2 times daily for 10 days  Dispense: 20 tablet; Refill: 0      2. Benign essential hypertension  -Blood pressure above target goal, has been the case in the past as well  -Hydrochlorothiazide prescribed, common side effect discussed  -Follow-up with PCP in 2 weeks for repeat blood pressure check and labs  - hydrochlorothiazide (HYDRODIURIL) 25 MG tablet; Take 1 tablet (25 mg) by mouth daily  Dispense: 90 tablet; Refill: 0      3. Morbid obesity (H)  - Healthy lifestyle modifications stressed including regular exercise, balanced diet, weight loss and limiting salt/caffeine/pop intake      Patient Instructions     Low-Salt Choices  Eating salt (sodium) can make your body retain too much water. Excess water makes your heart work harder. Canned, packaged, and frozen foods are easy to prepare. But they are often high in sodium. Here are some ideas for low-salt foods you can easily make yourself.    For breakfast    Fruit or 100% fruit juice    Whole-wheat bread or an English muffin. Look for sodium content on Nutrition Facts  labels.    Low-fat milk or yogurt    Unsalted eggs    Shredded wheat    Corn tortillas    Unsalted steamed rice    Regular (not instant) hot cereal, made without salt  Stay away from:    Sausage, espitia, and ham    Flour tortillas    Packaged muffins, pancakes, and biscuits    Instant hot cereals    Cottage cheese  For lunch and dinner    Fresh fish, chicken, turkey, or meat--baked, broiled, or roasted without salt    Dry beans, cooked without salt    Tofu, stir-fried without salt    Unsalted fresh fruit and vegetables, or frozen or canned fruit and vegetables with no added salt  Stay away from:    Lunch or deli meat that is cured or smoked    Cheese    Tomato juice and ketchup    Canned vegetables, soups, and fish not labeled as no-salt-added or reduced sodium    Packaged gravies and sauces    Olives, pickles, and relish    Bottled salad dressings  For snacks and desserts    Yogurt    Unsalted, air-popped popcorn    Unsalted nuts or seeds  Stay away from:    Pies and cakes    Packaged dessert mixes    Pizza    Canned and packaged puddings    Pretzels, chips, crackers, and nuts--unless the label says unsalted  Date Last Reviewed: 6/1/2017 2000-2017 [employer]. [address] [place] [address]. All rights reserved. This information is not intended as a substitute for professional medical care. Always follow your healthcare professional's instructions.        Sinusitis (Antibiotic Treatment)    The sinuses are air-filled spaces within the bones of the face. They connect to the inside of the nose. Sinusitis is an inflammation of the tissue that lines the sinuses. Sinusitis can occur during a cold. It can also happen due to allergies to pollens and other particles in the air. Sinusitis can cause symptoms of sinus congestion and a feeling of fullness. A sinus infection causes fever, headache, and facial pain. There is often green or yellow fluid draining from the nose or into the back of the  throat (post-nasal drip). You have been given antibiotics to treat this condition.  Home care    Take the full course of antibiotics as instructed. Do not stop taking them, even when you feel better.    Drink plenty of water, hot tea, and other liquids. This may help thin nasal mucus. It also may help your sinuses drain fluids.    Heat may help soothe painful areas of your face. Use a towel soaked in hot water. Or,  the shower and direct the warm spray onto your face. Using a vaporizer along with a menthol rub at night may also help soothe symptoms.     An expectorant with guaifenesin may help thin nasal mucus and help your sinuses drain fluids.    You can use an over-the-counter decongestant, unless a similar medicine was prescribed to you. Nasal sprays work the fastest. Use one that contains phenylephrine or oxymetazoline. First blow your nose gently. Then use the spray. Do not use these medicines more often than directed on the label. If you do, your symptoms may get worse. You may also take pills that contain pseudoephedrine. Don t use products that combine multiple medicines. This is because side effects may be increased. Read labels. You can also ask the pharmacist for help. (People with high blood pressure should not use decongestants. They can raise blood pressure.)    Over-the-counter antihistamines may help if allergies contributed to your sinusitis.      Do not use nasal rinses or irrigation during an acute sinus infection, unless your healthcare provider tells you to. Rinsing may spread the infection to other areas in your sinuses.    Use acetaminophen or ibuprofen to control pain, unless another pain medicine was prescribed to you. If you have chronic liver or kidney disease or ever had a stomach ulcer, talk with your healthcare provider before using these medicines. (Aspirin should never be taken by anyone under age 18 who is ill with a fever. It may cause severe liver damage.)    Don't smoke.  This can make symptoms worse.  Follow-up care  Follow up with your healthcare provider or our staff if you are better in 1 week.  When to seek medical advice  Call your healthcare provider if any of these occur:    Facial pain or headache that gets worse    Stiff neck    Unusual drowsiness or confusion    Swelling of your forehead or eyelids    Vision problems, such as blurred or double vision    Fever of 100.4 F (38 C) or higher, or as directed by your healthcare provider    Seizure    Breathing problems    Symptoms don't go away in 10 days  Prevention  Here are steps you can take to help prevent an infection:    Keep good hand washing habits.    Don t have close contact with people who have sore throats, colds, or other upper respiratory infections.    Don t smoke, and stay away from secondhand smoke.    Stay up to date with of your vaccines.  Date Last Reviewed: 11/1/2017 2000-2017 [employer]. [address] [place] [address]. All rights reserved. This information is not intended as a substitute for professional medical care. Always follow your healthcare professional's instructions.            Rubio Contreras MD  [facility]

## 2018-06-13 NOTE — NURSING NOTE
Chief Complaint   Patient presents with     Cough     congestion 2+ weeks       Initial BP (!) 160/104  Pulse 74  Temp 99.2  F (37.3  C) (Tympanic)  Resp 24  Wt (!) 389 lb (176.4 kg)  SpO2 95%  BMI 52.76 kg/m2 Estimated body mass index is 52.76 kg/(m^2) as calculated from the following:    Height as of 6/12/18: 6' (1.829 m).    Weight as of this encounter: 389 lb (176.4 kg).      Health Maintenance that is potentially due pending provider review:  NONE    n/a    Is there anyone who you would like to be able to receive your results? No  If yes have patient fill out KELLY

## 2018-06-13 NOTE — MR AVS SNAPSHOT
After Visit Summary   6/13/2018    Paola Lobo    MRN: [id number]           Patient Information     Date Of Birth          1970        Visit Information        Provider Department      6/13/2018 8:20 AM Rubio Contreras MD [facility]        Today's Diagnoses     Acute sinusitis with symptoms > 10 days    -  1    Benign essential hypertension        Morbid obesity (H)          Care Instructions      Low-Salt Choices  Eating salt (sodium) can make your body retain too much water. Excess water makes your heart work harder. Canned, packaged, and frozen foods are easy to prepare. But they are often high in sodium. Here are some ideas for low-salt foods you can easily make yourself.    For breakfast    Fruit or 100% fruit juice    Whole-wheat bread or an English muffin. Look for sodium content on Nutrition Facts labels.    Low-fat milk or yogurt    Unsalted eggs    Shredded wheat    Corn tortillas    Unsalted steamed rice    Regular (not instant) hot cereal, made without salt  Stay away from:    Sausage, espitia, and ham    Flour tortillas    Packaged muffins, pancakes, and biscuits    Instant hot cereals    Cottage cheese  For lunch and dinner    Fresh fish, chicken, turkey, or meat--baked, broiled, or roasted without salt    Dry beans, cooked without salt    Tofu, stir-fried without salt    Unsalted fresh fruit and vegetables, or frozen or canned fruit and vegetables with no added salt  Stay away from:    Lunch or deli meat that is cured or smoked    Cheese    Tomato juice and ketchup    Canned vegetables, soups, and fish not labeled as no-salt-added or reduced sodium    Packaged gravies and sauces    Olives, pickles, and relish    Bottled salad dressings  For snacks and desserts    Yogurt    Unsalted, air-popped popcorn    Unsalted nuts or seeds  Stay away from:    Pies and cakes    Packaged dessert mixes    Pizza    Canned and packaged puddings    Pretzels, chips, crackers,  and nuts--unless the label says unsalted  Date Last Reviewed: 6/1/2017 2000-2017 [employer]. [address] [place] [address], [place] [address]. All rights reserved. This information is not intended as a substitute for professional medical care. Always follow your healthcare professional's instructions.        Sinusitis (Antibiotic Treatment)    The sinuses are air-filled spaces within the bones of the face. They connect to the inside of the nose. Sinusitis is an inflammation of the tissue that lines the sinuses. Sinusitis can occur during a cold. It can also happen due to allergies to pollens and other particles in the air. Sinusitis can cause symptoms of sinus congestion and a feeling of fullness. A sinus infection causes fever, headache, and facial pain. There is often green or yellow fluid draining from the nose or into the back of the throat (post-nasal drip). You have been given antibiotics to treat this condition.  Home care    Take the full course of antibiotics as instructed. Do not stop taking them, even when you feel better.    Drink plenty of water, hot tea, and other liquids. This may help thin nasal mucus. It also may help your sinuses drain fluids.    Heat may help soothe painful areas of your face. Use a towel soaked in hot water. Or,  the shower and direct the warm spray onto your face. Using a vaporizer along with a menthol rub at night may also help soothe symptoms.     An expectorant with guaifenesin may help thin nasal mucus and help your sinuses drain fluids.    You can use an over-the-counter decongestant, unless a similar medicine was prescribed to you. Nasal sprays work the fastest. Use one that contains phenylephrine or oxymetazoline. First blow your nose gently. Then use the spray. Do not use these medicines more often than directed on the label. If you do, your symptoms may get worse. You may also take pills that contain pseudoephedrine. Don t use products that combine  multiple medicines. This is because side effects may be increased. Read labels. You can also ask the pharmacist for help. (People with high blood pressure should not use decongestants. They can raise blood pressure.)    Over-the-counter antihistamines may help if allergies contributed to your sinusitis.      Do not use nasal rinses or irrigation during an acute sinus infection, unless your healthcare provider tells you to. Rinsing may spread the infection to other areas in your sinuses.    Use acetaminophen or ibuprofen to control pain, unless another pain medicine was prescribed to you. If you have chronic liver or kidney disease or ever had a stomach ulcer, talk with your healthcare provider before using these medicines. (Aspirin should never be taken by anyone under age 18 who is ill with a fever. It may cause severe liver damage.)    Don't smoke. This can make symptoms worse.  Follow-up care  Follow up with your healthcare provider or our staff if you are better in 1 week.  When to seek medical advice  Call your healthcare provider if any of these occur:    Facial pain or headache that gets worse    Stiff neck    Unusual drowsiness or confusion    Swelling of your forehead or eyelids    Vision problems, such as blurred or double vision    Fever of 100.4 F (38 C) or higher, or as directed by your healthcare provider    Seizure    Breathing problems    Symptoms don't go away in 10 days  Prevention  Here are steps you can take to help prevent an infection:    Keep good hand washing habits.    Don t have close contact with people who have sore throats, colds, or other upper respiratory infections.    Don t smoke, and stay away from secondhand smoke.    Stay up to date with of your vaccines.  Date Last Reviewed: 11/1/2017 2000-2017 [employer]. [address] [place] [address], [place] [address]. All rights reserved. This information is not intended as a substitute for professional medical care. Always follow  your healthcare professional's instructions.                Follow-ups after your visit        Your next 10 appointments already scheduled     Jun 13, 2018  9:15 AM CDT   [facility] with Po[facility] Andrade PT   [facility]    [address] [address]   [phone]            Jun 20, 2018  9:15 AM CDT   Ortho Treatment with Poppy Andrade PT   [facility]    [address] [facility] [address]   [phone]            Jun 27, 2018  2:15 PM CDT   Consult HOD with Blank Cardenas RD   [facility] ([facility])    [address] [place] [address] [phone]              Who to contact     If you have questions or need follow up information about today's clinic visit or your schedule please contact [facility] directly at [phone].  Normal or non-critical lab and imaging results will be communicated to you by [employer], letter or phone within 4 business days after the clinic has received the results. If you do not hear from us within 7 days, please contact the clinic through [employer] or phone. If you have a critical or abnormal lab result, we will notify you by phone as soon as possible.  Submit refill requests through [employer] or call your pharmacy and they will forward the refill request to us. Please allow 3 business days for your refill to be completed.          Additional Information About Your Visit        [employer] Information     [employer] gives you secure access to your electronic health record. If you see a primary care provider, you can also send messages to your care team and make appointments. If you have questions, please call your primary care clinic.  If you do not have a primary care provider, please call [phone] and they will assist you.        Care EveryWhere ID     This is your Care EveryWhere ID. This could  be used by other organizations to access your [place] medical records  [id number]        Your Vitals Were     Pulse Temperature Respirations Pulse Oximetry BMI (Body Mass Index)       74 99.2  F (37.3  C) (Tympanic) 24 95% 52.76 kg/m2        Blood Pressure from Last 3 Encounters:   06/13/18 (!) 160/104   06/12/18 (!) 165/94   05/22/18 (!) 144/98    Weight from Last 3 Encounters:   06/13/18 (!) 389 lb (176.4 kg)   06/12/18 (!) 389 lb (176.4 kg)   05/22/18 (!) 391 lb 8 oz (177.6 kg)              Today, you had the following     No orders found for display         Today's Medication Changes          These changes are accurate as of 6/13/18  8:40 AM.  If you have any questions, ask your nurse or doctor.               Start taking these medicines.        Dose/Directions    amoxicillin-clavulanate 875-125 MG per tablet   Commonly known as:  AUGMENTIN   Used for:  Acute sinusitis with symptoms > 10 days   Started by:  Rubio Contreras MD        Dose:  1 tablet   Take 1 tablet by mouth 2 times daily for 10 days   Quantity:  20 tablet   Refills:  0       hydrochlorothiazide 25 MG tablet   Commonly known as:  HYDRODIURIL   Used for:  Benign essential hypertension   Started by:  Rubio Contreras MD        Dose:  25 mg   Take 1 tablet (25 mg) by mouth daily   Quantity:  90 tablet   Refills:  0            Where to get your medicines      These medications were sent to [id number] - [place], [place] - [address]  [address], [facility] [address]     Phone:  [phone]     amoxicillin-clavulanate 875-125 MG per tablet    hydrochlorothiazide 25 MG tablet                Primary Care Provider Office Phone # Fax #    SHILA Mitchell [phone]       [address] [facility] [address]        Equal Access to Services     RICHI WALSH AH: Laureen Contreras, wavivian luqmagalys, qaybta kaaleladio dimas. [facility] [phone].    ATENCIÓN: Si habla  español, tiene a garcia disposición servicios gratuitos de asistencia lingüística. Bony rios [phone].    We comply with applicable federal civil rights laws and [place] laws. We do not discriminate on the basis of race, color, national origin, age, disability, sex, sexual orientation, or gender identity.            Thank you!     Thank you for choosing [facility]  for your care. Our goal is always to provide you with excellent care. Hearing back from our patients is one way we can continue to improve our services. Please take a few minutes to complete the written survey that you may receive in the mail after your visit with us. Thank you!             Your Updated Medication List - Protect others around you: Learn how to safely use, store and throw away your medicines at www.disposemymeds.org.          This list is accurate as of 6/13/18  8:40 AM.  Always use your most recent med list.                   Brand Name Dispense Instructions for use Diagnosis    amoxicillin-clavulanate 875-125 MG per tablet    AUGMENTIN    20 tablet    Take 1 tablet by mouth 2 times daily for 10 days    Acute sinusitis with symptoms > 10 days       cetirizine HCl 10 MG Caps           estradiol 2 MG tablet    ESTRACE    90 tablet    Take 1 tablet (2 mg) by mouth daily    Abnormal uterine bleeding (AUB)       FOLIC ACID PO      Take 1 mg by mouth daily        hydrochlorothiazide 25 MG tablet    HYDRODIURIL    90 tablet    Take 1 tablet (25 mg) by mouth daily    Benign essential hypertension       medroxyPROGESTERone 150 MG/ML injection    DEPO-PROVERA    1 mL    Inject 1 mL (150 mg) into the muscle every 3 months    Encounter for initial prescription of injectable contraceptive       metroNIDAZOLE 1 % gel    METROGEL    60 g    Apply topically daily    Rosacea       omeprazole 20 MG CR capsule    priLOSEC    30 capsule    Take 1 capsule (20 mg) by mouth daily    Abdominal pain, epigastric       order for DME      Equipment  ordered: RESMED Auto PAP Mask type: Nasal Settings: 8-15 CM H2O        VALERIAN ROOT PO      Take by mouth as needed        VITAMIN B 12 PO      Take 1,000 mcg by mouth        VITAMIN E NATURAL PO

## 2018-06-13 NOTE — PATIENT INSTRUCTIONS
Low-Salt Choices  Eating salt (sodium) can make your body retain too much water. Excess water makes your heart work harder. Canned, packaged, and frozen foods are easy to prepare. But they are often high in sodium. Here are some ideas for low-salt foods you can easily make yourself.    For breakfast    Fruit or 100% fruit juice    Whole-wheat bread or an English muffin. Look for sodium content on Nutrition Facts labels.    Low-fat milk or yogurt    Unsalted eggs    Shredded wheat    Corn tortillas    Unsalted steamed rice    Regular (not instant) hot cereal, made without salt  Stay away from:    Sausage, espitia, and ham    Flour tortillas    Packaged muffins, pancakes, and biscuits    Instant hot cereals    Cottage cheese  For lunch and dinner    Fresh fish, chicken, turkey, or meat--baked, broiled, or roasted without salt    Dry beans, cooked without salt    Tofu, stir-fried without salt    Unsalted fresh fruit and vegetables, or frozen or canned fruit and vegetables with no added salt  Stay away from:    Lunch or deli meat that is cured or smoked    Cheese    Tomato juice and ketchup    Canned vegetables, soups, and fish not labeled as no-salt-added or reduced sodium    Packaged gravies and sauces    Olives, pickles, and relish    Bottled salad dressings  For snacks and desserts    Yogurt    Unsalted, air-popped popcorn    Unsalted nuts or seeds  Stay away from:    Pies and cakes    Packaged dessert mixes    Pizza    Canned and packaged puddings    Pretzels, chips, crackers, and nuts--unless the label says unsalted  Date Last Reviewed: 6/1/2017 2000-2017 [employer]. [address] [place] [address], [place] [address]. All rights reserved. This information is not intended as a substitute for professional medical care. Always follow your healthcare professional's instructions.        Sinusitis (Antibiotic Treatment)    The sinuses are air-filled spaces within the bones of the face. They connect to the  inside of the nose. Sinusitis is an inflammation of the tissue that lines the sinuses. Sinusitis can occur during a cold. It can also happen due to allergies to pollens and other particles in the air. Sinusitis can cause symptoms of sinus congestion and a feeling of fullness. A sinus infection causes fever, headache, and facial pain. There is often green or yellow fluid draining from the nose or into the back of the throat (post-nasal drip). You have been given antibiotics to treat this condition.  Home care    Take the full course of antibiotics as instructed. Do not stop taking them, even when you feel better.    Drink plenty of water, hot tea, and other liquids. This may help thin nasal mucus. It also may help your sinuses drain fluids.    Heat may help soothe painful areas of your face. Use a towel soaked in hot water. Or,  the shower and direct the warm spray onto your face. Using a vaporizer along with a menthol rub at night may also help soothe symptoms.     An expectorant with guaifenesin may help thin nasal mucus and help your sinuses drain fluids.    You can use an over-the-counter decongestant, unless a similar medicine was prescribed to you. Nasal sprays work the fastest. Use one that contains phenylephrine or oxymetazoline. First blow your nose gently. Then use the spray. Do not use these medicines more often than directed on the label. If you do, your symptoms may get worse. You may also take pills that contain pseudoephedrine. Don t use products that combine multiple medicines. This is because side effects may be increased. Read labels. You can also ask the pharmacist for help. (People with high blood pressure should not use decongestants. They can raise blood pressure.)    Over-the-counter antihistamines may help if allergies contributed to your sinusitis.      Do not use nasal rinses or irrigation during an acute sinus infection, unless your healthcare provider tells you to. Rinsing may  spread the infection to other areas in your sinuses.    Use acetaminophen or ibuprofen to control pain, unless another pain medicine was prescribed to you. If you have chronic liver or kidney disease or ever had a stomach ulcer, talk with your healthcare provider before using these medicines. (Aspirin should never be taken by anyone under age 18 who is ill with a fever. It may cause severe liver damage.)    Don't smoke. This can make symptoms worse.  Follow-up care  Follow up with your healthcare provider or our staff if you are better in 1 week.  When to seek medical advice  Call your healthcare provider if any of these occur:    Facial pain or headache that gets worse    Stiff neck    Unusual drowsiness or confusion    Swelling of your forehead or eyelids    Vision problems, such as blurred or double vision    Fever of 100.4 F (38 C) or higher, or as directed by your healthcare provider    Seizure    Breathing problems    Symptoms don't go away in 10 days  Prevention  Here are steps you can take to help prevent an infection:    Keep good hand washing habits.    Don t have close contact with people who have sore throats, colds, or other upper respiratory infections.    Don t smoke, and stay away from secondhand smoke.    Stay up to date with of your vaccines.  Date Last Reviewed: 11/1/2017 2000-2017 [employer]. [address] [place] [address], [place] [address]. All rights reserved. This information is not intended as a substitute for professional medical care. Always follow your healthcare professional's instructions.

## 2018-06-20 ENCOUNTER — HOSPITAL ENCOUNTER (OUTPATIENT)
Dept: PHYSICAL THERAPY | Facility: CLINIC | Age: 48
Setting detail: THERAPIES SERIES
End: 2018-06-20
Attending: PHYSICAL MEDICINE & REHABILITATION
Payer: COMMERCIAL

## 2018-06-20 PROCEDURE — 40000718 ZZHC STATISTIC PT DEPARTMENT ORTHO VISIT: Performed by: PHYSICAL MEDICINE & REHABILITATION

## 2018-06-20 PROCEDURE — 97110 THERAPEUTIC EXERCISES: CPT | Mod: GP | Performed by: PHYSICAL MEDICINE & REHABILITATION

## 2018-06-20 PROCEDURE — 97140 MANUAL THERAPY 1/> REGIONS: CPT | Mod: GP | Performed by: PHYSICAL MEDICINE & REHABILITATION

## 2018-06-25 ENCOUNTER — HOSPITAL ENCOUNTER (OUTPATIENT)
Dept: PHYSICAL THERAPY | Facility: CLINIC | Age: 48
Setting detail: THERAPIES SERIES
End: 2018-06-25
Attending: PHYSICAL MEDICINE & REHABILITATION
Payer: COMMERCIAL

## 2018-06-25 PROCEDURE — 97140 MANUAL THERAPY 1/> REGIONS: CPT | Mod: GP | Performed by: PHYSICAL MEDICINE & REHABILITATION

## 2018-06-25 PROCEDURE — 40000718 ZZHC STATISTIC PT DEPARTMENT ORTHO VISIT: Performed by: PHYSICAL MEDICINE & REHABILITATION

## 2018-06-25 PROCEDURE — 97110 THERAPEUTIC EXERCISES: CPT | Mod: GP | Performed by: PHYSICAL MEDICINE & REHABILITATION

## 2018-06-27 ENCOUNTER — OFFICE VISIT (OUTPATIENT)
Dept: FAMILY MEDICINE | Facility: OTHER | Age: 48
End: 2018-06-27
Payer: COMMERCIAL

## 2018-06-27 ENCOUNTER — HOSPITAL ENCOUNTER (OUTPATIENT)
Dept: NUTRITION | Facility: CLINIC | Age: 48
Discharge: HOME | End: 2018-06-27
Attending: PHYSICAL MEDICINE & REHABILITATION | Admitting: PHYSICAL MEDICINE & REHABILITATION
Payer: COMMERCIAL

## 2018-06-27 VITALS
TEMPERATURE: 98.8 F | OXYGEN SATURATION: 99 % | SYSTOLIC BLOOD PRESSURE: 148 MMHG | WEIGHT: 293 LBS | HEART RATE: 88 BPM | BODY MASS INDEX: 52.35 KG/M2 | DIASTOLIC BLOOD PRESSURE: 78 MMHG

## 2018-06-27 DIAGNOSIS — N92.0 EXCESSIVE OR FREQUENT MENSTRUATION: ICD-10-CM

## 2018-06-27 DIAGNOSIS — I10 BENIGN ESSENTIAL HYPERTENSION: Primary | ICD-10-CM

## 2018-06-27 PROCEDURE — 99214 OFFICE O/P EST MOD 30 MIN: CPT | Mod: 25 | Performed by: NURSE PRACTITIONER

## 2018-06-27 PROCEDURE — 97802 MEDICAL NUTRITION INDIV IN: CPT | Performed by: DIETITIAN, REGISTERED

## 2018-06-27 PROCEDURE — 96372 THER/PROPH/DIAG INJ SC/IM: CPT | Performed by: NURSE PRACTITIONER

## 2018-06-27 RX ORDER — HYDROCHLOROTHIAZIDE 50 MG/1
50 TABLET ORAL DAILY
Qty: 90 TABLET | Refills: 1 | Status: SHIPPED | OUTPATIENT
Start: 2018-06-27 | End: 2019-07-31

## 2018-06-27 ASSESSMENT — PAIN SCALES - GENERAL: PAINLEVEL: NO PAIN (0)

## 2018-06-27 NOTE — PATIENT INSTRUCTIONS
Increase your hydrochlorothiazide to 50 mg daily.  Take 2 of the pills you have now until you fill them again.     Labs will be done today.   For normal results, you will receive a letter with the results in about 2 weeks.  If anything is abnormal or unexpected, someone from the clinic will call you.      Have a nurse only visit in 2 weeks to recheck your blood pressure.

## 2018-06-27 NOTE — MR AVS SNAPSHOT
After Visit Summary   6/27/2018    Paola Lobo    MRN: [id number]           Patient Information     Date Of Birth          1970        Visit Information        Provider Department      6/27/2018 1:00 PM Roshni Luevano APRN CNP [facility]        Today's Diagnoses     Benign essential hypertension    -  1      Care Instructions    Increase your hydrochlorothiazide to 50 mg daily.  Take 2 of the pills you have now until you fill them again.     Labs will be done today.   For normal results, you will receive a letter with the results in about 2 weeks.  If anything is abnormal or unexpected, someone from the clinic will call you.      Have a nurse only visit in 2 weeks to recheck your blood pressure.               Follow-ups after your visit        Your next 10 appointments already scheduled     Jun 27, 2018  2:15 PM CDT   Consult HOD with Blank Cardenas RD   [facility] ([facility])    [address] [place] [address] Dr Garry GLASS [address]   [phone]            Jul 03, 2018  9:45 AM CDT   Ortho Treatment with Poppy Andrade PT   [facility]    [address] [facility] [id number] [phone]              Who to contact     If you have questions or need follow up information about today's clinic visit or your schedule please contact [facility] directly at [phone].  Normal or non-critical lab and imaging results will be communicated to you by MyChart, letter or phone within 4 business days after the clinic has received the results. If you do not hear from us within 7 days, please contact the clinic through MyChart or phone. If you have a critical or abnormal lab result, we will notify you by phone as soon as possible.  Submit refill requests through [employer] or call your pharmacy and they will forward the refill request to us. Please allow 3 business days for your  refill to be completed.          Additional Information About Your Visit        [email or web address] Information     [employer] gives you secure access to your electronic health record. If you see a primary care provider, you can also send messages to your care team and make appointments. If you have questions, please call your primary care clinic.  If you do not have a primary care provider, please call [phone] and they will assist you.        Care EveryWhere ID     This is your Care EveryWhere ID. This could be used by other organizations to access your [place] medical records  [id number]        Your Vitals Were     Pulse Temperature Pulse Oximetry Breastfeeding? BMI (Body Mass Index)       88 98.8  F (37.1  C) (Tympanic) 99% No 52.35 kg/m2        Blood Pressure from Last 3 Encounters:   06/27/18 148/78   06/13/18 (!) 160/104   06/12/18 (!) 165/94    Weight from Last 3 Encounters:   06/27/18 (!) 386 lb (175.1 kg)   06/13/18 (!) 389 lb (176.4 kg)   06/12/18 (!) 389 lb (176.4 kg)              We Performed the Following     Basic metabolic panel  (Ca, Cl, CO2, Creat, Gluc, K, Na, BUN)          Today's Medication Changes          These changes are accurate as of 6/27/18  1:14 PM.  If you have any questions, ask your nurse or doctor.               These medicines have changed or have updated prescriptions.        Dose/Directions    hydrochlorothiazide 50 MG tablet   Commonly known as:  HYDRODIURIL   This may have changed:    - medication strength  - how much to take   Used for:  Benign essential hypertension   Changed by:  Roshni Luevano APRN CNP        Dose:  50 mg   Take 1 tablet (50 mg) by mouth daily   Quantity:  90 tablet   Refills:  1            Where to get your medicines      These medications were sent to [id number] - [place], [place] - [address]  [address], [place] [address]     Phone:  [phone]     hydrochlorothiazide 50 MG tablet                Primary Care Provider Office Phone # Fax #    Roshni  Aggie Luevano, APRN [phone]       [address] [facility] [address]        Equal Access to Services     RICHI WALSH : Hadii aad ku hadchandleradams Contreras, mirandada camillemarilynnha, carmineta kaeddyda ruth, eladio maribelin hayaasharmin mahmoodevan lucilamechemaeve gimenez. So [facility] [phone].    ATENCIÓN: Si habla español, tiene a garcia disposición servicios gratuitos de asistencia lingüística. Llame al [phone].    We comply with applicable federal civil rights laws and [place] laws. We do not discriminate on the basis of race, color, national origin, age, disability, sex, sexual orientation, or gender identity.            Thank you!     Thank you for choosing [facility]  for your care. Our goal is always to provide you with excellent care. Hearing back from our patients is one way we can continue to improve our services. Please take a few minutes to complete the written survey that you may receive in the mail after your visit with us. Thank you!             Your Updated Medication List - Protect others around you: Learn how to safely use, store and throw away your medicines at www.disposemymeds.org.          This list is accurate as of 6/27/18  1:14 PM.  Always use your most recent med list.                   Brand Name Dispense Instructions for use Diagnosis    cetirizine HCl 10 MG Caps           estradiol 2 MG tablet    ESTRACE    90 tablet    Take 1 tablet (2 mg) by mouth daily    Abnormal uterine bleeding (AUB)       FOLIC ACID PO      Take 1 mg by mouth daily        hydrochlorothiazide 50 MG tablet    HYDRODIURIL    90 tablet    Take 1 tablet (50 mg) by mouth daily    Benign essential hypertension       medroxyPROGESTERone 150 MG/ML injection    DEPO-PROVERA    1 mL    Inject 1 mL (150 mg) into the muscle every 3 months    Encounter for initial prescription of injectable contraceptive       metroNIDAZOLE 1 % gel    METROGEL    60 g    Apply topically daily    Rosacea       omeprazole 20 MG CR capsule     priLOSEC    30 capsule    Take 1 capsule (20 mg) by mouth daily    Abdominal pain, epigastric       order for DME      Equipment ordered: RESMED Auto PAP Mask type: Nasal Settings: 8-15 CM H2O        VALERIAN ROOT PO      Take by mouth as needed        VITAMIN B 12 PO      Take 1,000 mcg by mouth        VITAMIN E NATURAL PO

## 2018-06-27 NOTE — PROGRESS NOTES
[facility]  Medical Nutrition Therapy    Visit Type: Initial Assessment    Paola Lobo referred by Dr. Tracey for MNT related to Obesity    Nutrition Assessment:  Anthropometrics  Wt Readings from Last 10 Encounters:   06/27/18 (!) 175.1 kg (386 lb)   06/13/18 (!) 176.4 kg (389 lb)   06/12/18 (!) 176.4 kg (389 lb)   05/22/18 (!) 177.6 kg (391 lb 8 oz)   05/07/18 (!) 173.7 kg (383 lb)   05/03/18 (!) 174.1 kg (383 lb 12.8 oz)   04/20/18 (!) 175.1 kg (386 lb)   03/26/18 (!) 173.3 kg (382 lb)   03/23/18 (!) 173.3 kg (382 lb)   03/19/18 (!) 173.3 kg (382 lb)     Nutrition History  Pt here d/t wt concerns. Reports she's been trying to lose wt and has had a lot of weight cycling. She's tried portion size management, low-fat diet, LA weight loss, and increased water intake. She reports having inconsistent eating patterns d/t job. She reports a wheat allergy. Eats out at fast food 2 - 3 times per week. Recently dx with high blood pressure.     Physical Activity  Minimal d/t back. Working with PT weekly.    Nutrition Prescription  Energy:  2512 kcal/day      Protein:  141 gm/day         Fluid:  1 mL/kcal     Food Record  B: coffee with creamer, yogurt with granola, egg with slice of white toast   S: protein bar  L: soup or leftovers or fast food   D: 2 brats with salad + dressing     Drinking water throughout day, maybe 1 pop.     Nutrition Diagnosis:  Obesity r/t wt cycling, hx of excessive energy intake, inadequate physical activity aeb BMI > 50.      Nutrition Intervention:  Discussed the importance of eating at consistent times. Recommend eating 3 meals/day and adding snacks in between meals if going longer than 4 hrs in between. Spent time discussing the plate method of eating and encouraged pt to follow this at lunch and dinner, which includes 1/4 plate protein, 1/4 plate grains, and 1/2 plate non-starchy vegetables. Encouraged pt to try to achieve diversity in her eating patterns and to  switch things up each week. Recommend that she continue with exercise as tolerated. Discussed some healthy snack ideas that include protein + carbohydrate and encouraged her to use those for times in between meals. Discussed making healthier choices at fast food that are lower in salt.    Nutrition Goals:  1. Make 1/2 plate non-starchy vegetable at lunch and dinner  2. Don't go longer that 4 hrs in between meals/snacks    Nutrition Follow Up / Monitoring:  Eating patterns; Meal/snack/beverage composition    Nutrition Recommendations:  Patient to follow-up with RD in 4 - 6 weeks.  Patient has RD contact information to call/email if needed.    Blank Cardenas RDN, LD  Clinical Dietitian  [phone]    Start time 1420  End time 8767

## 2018-07-02 DIAGNOSIS — I10 BENIGN ESSENTIAL HYPERTENSION: ICD-10-CM

## 2018-07-02 LAB
ANION GAP SERPL CALCULATED.3IONS-SCNC: 10 MMOL/L (ref 3–14)
BUN SERPL-MCNC: 14 MG/DL (ref 7–30)
CALCIUM SERPL-MCNC: 9.7 MG/DL (ref 8.5–10.1)
CHLORIDE SERPL-SCNC: 104 MMOL/L (ref 94–109)
CO2 SERPL-SCNC: 25 MMOL/L (ref 20–32)
CREAT SERPL-MCNC: 0.76 MG/DL (ref 0.52–1.04)
GFR SERPL CREATININE-BSD FRML MDRD: 81 ML/MIN/1.7M2
GLUCOSE SERPL-MCNC: 89 MG/DL (ref 70–99)
POTASSIUM SERPL-SCNC: 3.4 MMOL/L (ref 3.4–5.3)
SODIUM SERPL-SCNC: 139 MMOL/L (ref 133–144)

## 2018-07-02 PROCEDURE — 36415 COLL VENOUS BLD VENIPUNCTURE: CPT | Performed by: NURSE PRACTITIONER

## 2018-07-02 PROCEDURE — 80048 BASIC METABOLIC PNL TOTAL CA: CPT | Performed by: NURSE PRACTITIONER

## 2018-07-05 NOTE — PROGRESS NOTES
Pt notified of lab results via [employer] msg/release.  ................Nehemiah Lang LPN,   July 5, 2018,      4:00 PM,   [facility]

## 2018-07-06 ENCOUNTER — HOSPITAL ENCOUNTER (OUTPATIENT)
Dept: PHYSICAL THERAPY | Facility: CLINIC | Age: 48
Setting detail: THERAPIES SERIES
End: 2018-07-06
Attending: PHYSICAL MEDICINE & REHABILITATION
Payer: COMMERCIAL

## 2018-07-06 PROCEDURE — 97140 MANUAL THERAPY 1/> REGIONS: CPT | Mod: GP | Performed by: PHYSICAL MEDICINE & REHABILITATION

## 2018-07-06 PROCEDURE — 40000718 ZZHC STATISTIC PT DEPARTMENT ORTHO VISIT: Performed by: PHYSICAL MEDICINE & REHABILITATION

## 2018-07-06 PROCEDURE — 97110 THERAPEUTIC EXERCISES: CPT | Mod: GP | Performed by: PHYSICAL MEDICINE & REHABILITATION

## 2018-07-11 ENCOUNTER — ALLIED HEALTH/NURSE VISIT (OUTPATIENT)
Dept: FAMILY MEDICINE | Facility: OTHER | Age: 48
End: 2018-07-11
Payer: COMMERCIAL

## 2018-07-11 VITALS — DIASTOLIC BLOOD PRESSURE: 82 MMHG | HEART RATE: 86 BPM | SYSTOLIC BLOOD PRESSURE: 138 MMHG

## 2018-07-11 DIAGNOSIS — Z01.30 BP CHECK: Primary | ICD-10-CM

## 2018-07-11 PROCEDURE — 99207 ZZC NO CHARGE NURSE ONLY: CPT

## 2018-07-11 NOTE — NURSING NOTE
Paola Lobo is a 47 year old patient who comes in today for a Blood Pressure check.  Initial BP:  /82 (BP Location: Left arm, Patient Position: Chair, Cuff Size: Adult Large)  Pulse 86     86  Disposition: results routed to provider  .Noemy Dowling MA     7/11/2018

## 2018-07-11 NOTE — MR AVS SNAPSHOT
After Visit Summary   7/11/2018    Paola Lobo    MRN: [id number]           Patient Information     Date Of Birth          1970        Visit Information        Provider Department      7/11/2018 9:15 AM HERNANDEZ RAYMOND MA [facility]        Today's Diagnoses     BP check    -  1       Follow-ups after your visit        Your next 10 appointments already scheduled     Jul 16, 2018  9:15 AM CDT   [facility]    [address] [facility] [id number] [phone]              Who to contact     If you have questions or need follow up information about today's clinic visit or your schedule please contact [facility] directly at [phone].  Normal or non-critical lab and imaging results will be communicated to you by MyChart, letter or phone within 4 business days after the clinic has received the results. If you do not hear from us within 7 days, please contact the clinic through MyChart or phone. If you have a critical or abnormal lab result, we will notify you by phone as soon as possible.  Submit refill requests through [employer] or call your pharmacy and they will forward the refill request to us. Please allow 3 business days for your refill to be completed.          Additional Information About Your Visit        MyChart Information     [employer]mag gives you secure access to your electronic health record. If you see a primary care provider, you can also send messages to your care team and make appointments. If you have questions, please call your primary care clinic.  If you do not have a primary care provider, please call [phone] and they will assist you.        Care EveryWhere ID     This is your Care EveryWhere ID. This could be used by other organizations to access your [place] medical records  [id number]        Your Vitals Were     Pulse                   86             Blood Pressure from Last 3 Encounters:   07/11/18 138/82   06/27/18 148/78   06/13/18 (!) 160/104    Weight from Last 3 Encounters:   06/27/18 (!) 386 lb (175.1 kg)   06/13/18 (!) 389 lb (176.4 kg)   06/12/18 (!) 389 lb (176.4 kg)              Today, you had the following     No orders found for display       Primary Care Provider Office Phone # Fax #    Roshni Luevano, SHILA [phone]       [address] [facility] [address]        Equal Access to Services     [facility]: Hadii luz diaz hadasho Socrsital, waaxda luqadaha, qaybta kaalmada adeegyada, eladio sharp . So [facility] [phone].    ATENCIÓN: Si habla español, tiene a garcia disposición servicios gratuitos de asistencia lingüística. Llame al [phone].    We comply with applicable federal civil rights laws and [place] laws. We do not discriminate on the basis of race, color, national origin, age, disability, sex, sexual orientation, or gender identity.            Thank you!     Thank you for choosing [facility]  for your care. Our goal is always to provide you with excellent care. Hearing back from our patients is one way we can continue to improve our services. Please take a few minutes to complete the written survey that you may receive in the mail after your visit with us. Thank you!             Your Updated Medication List - Protect others around you: Learn how to safely use, store and throw away your medicines at www.disposemymeds.org.          This list is accurate as of 7/11/18 10:28 AM.  Always use your most recent med list.                   Brand Name Dispense Instructions for use Diagnosis    cetirizine HCl 10 MG Caps           estradiol 2 MG tablet    ESTRACE    90 tablet    Take 1 tablet (2 mg) by mouth daily    Abnormal uterine bleeding (AUB)       FOLIC ACID PO      Take 1 mg by mouth daily        hydrochlorothiazide 50 MG tablet    HYDRODIURIL    90 tablet    Take 1 tablet (50 mg)  by mouth daily    Benign essential hypertension       medroxyPROGESTERone 150 MG/ML injection    DEPO-PROVERA    1 mL    Inject 1 mL (150 mg) into the muscle every 3 months    Encounter for initial prescription of injectable contraceptive       metroNIDAZOLE 1 % gel    METROGEL    60 g    Apply topically daily    Rosacea       omeprazole 20 MG CR capsule    priLOSEC    30 capsule    Take 1 capsule (20 mg) by mouth daily    Abdominal pain, epigastric       order for DME      Equipment ordered: RESMED Auto PAP Mask type: Nasal Settings: 8-15 CM H2O        VALERIAN ROOT PO      Take by mouth as needed        VITAMIN B 12 PO      Take 1,000 mcg by mouth        VITAMIN E NATURAL PO

## 2018-07-16 ENCOUNTER — HOSPITAL ENCOUNTER (OUTPATIENT)
Dept: PHYSICAL THERAPY | Facility: CLINIC | Age: 48
Setting detail: THERAPIES SERIES
End: 2018-07-16
Attending: PHYSICAL MEDICINE & REHABILITATION
Payer: COMMERCIAL

## 2018-07-16 PROCEDURE — 97110 THERAPEUTIC EXERCISES: CPT | Mod: GP | Performed by: PHYSICAL MEDICINE & REHABILITATION

## 2018-07-16 PROCEDURE — 40000718 ZZHC STATISTIC PT DEPARTMENT ORTHO VISIT: Performed by: PHYSICAL MEDICINE & REHABILITATION

## 2018-08-01 ENCOUNTER — HOSPITAL ENCOUNTER (OUTPATIENT)
Dept: PHYSICAL THERAPY | Facility: CLINIC | Age: 48
Setting detail: THERAPIES SERIES
End: 2018-08-01
Attending: PHYSICAL MEDICINE & REHABILITATION
Payer: COMMERCIAL

## 2018-08-01 PROCEDURE — 97110 THERAPEUTIC EXERCISES: CPT | Mod: GP | Performed by: PHYSICAL MEDICINE & REHABILITATION

## 2018-08-01 PROCEDURE — 40000718 ZZHC STATISTIC PT DEPARTMENT ORTHO VISIT: Performed by: PHYSICAL MEDICINE & REHABILITATION

## 2018-08-01 NOTE — PROGRESS NOTES
"Outpatient Physical Therapy Discharge Note     Patient: Paola Lobo  : 1970    Beginning/End Dates of Reporting Period:  18 to 2018    Referring Provider: Belle Tracey Diagnosis: R lumbar radiculopathy     Client Self Report: Pt reports \"low back feels good\" upon starting tx session today. Pt states multifidus activation with rotation component exercise that is part of HEP is challenging (feels it is bothering thoracic spine). Pt states all other HEP exercises are going well.  Pt reports only waking up 2 times in the last 2 weeks during the night due to LBP (pt states this is more sleep than the past several years). Pt shared when she started PT she was waking up 6-7x/night due to LBP.    Objective Measurements:  Objective Measure: Lumbar ROM  Details: flexion: 4 inches from ground; R SB: 21 inches from ground; L SB: 21 inches; ext: no limitation      Outcome Measures (most recent score):  Claudette STarT Sub-Score (Q5-9): 0 (3 at initial eval)  Claudette STarT Total Score (all 9): 4 (5 at initial eval)  Oswestry Score (%): 18 % (20% disability at initial eval)    Goals:  Goal Identifier 1   Goal Description Patient will be able to stand 15 minutes without increase in symptoms   Target Date 18   Date Met  18   Progress:     Goal Identifier 2   Goal Description Patient will be able to walk 1/4 mile without increase in symptoms   Target Date 18   Date Met  18   Progress:     Goal Identifier 3   Goal Description Patient will report sleep disturbed 50% less frequently (currently 2x/hour)   Target Date 18   Date Met  18   Progress:       Progress Toward Goals:   Progress this reporting period: Pt met 1 goal today and has now met all goals (3/3). Pt demonstrated good form and good understanding of exercises throughout tx session. PT and pt discussed and agreed upon D/C at this time as pt is compliant with HEP and appropriate for " D/C.      Plan:  Discharge from therapy.    Discharge:    Reason for Discharge: Patient has met all goals.  Patient chooses to discontinue therapy.    Equipment Issued: therabands    Discharge Plan: Patient to continue home program.      Please contact me with any questions or concerns.    Thank you for your referral,     Poppy Andrade, PT, DPT  Physical Therapist  [address] [address] [phone]

## 2018-09-19 ENCOUNTER — ALLIED HEALTH/NURSE VISIT (OUTPATIENT)
Dept: FAMILY MEDICINE | Facility: OTHER | Age: 48
End: 2018-09-19
Payer: COMMERCIAL

## 2018-09-19 VITALS — BODY MASS INDEX: 52.08 KG/M2 | WEIGHT: 293 LBS

## 2018-09-19 DIAGNOSIS — N92.0 EXCESSIVE OR FREQUENT MENSTRUATION: Primary | ICD-10-CM

## 2018-09-19 PROCEDURE — 99207 ZZC NO CHARGE NURSE ONLY: CPT

## 2018-09-19 NOTE — PROGRESS NOTES
URINE HCG:not indicated    The following medication was given:     MEDICATION: Depo Provera 150mg  ROUTE: IM  SITE: [facility]  : [employer]   LOT #: [id number]  EXP:04/2020  NDC: 4243-7291-30  NEXT INJECTION DUE: 12/5/18 - 12/19/18   Provider: Roshni Luevano    LAST PAP/EXAM:   Lab Results   Component Value Date    PAP LSIL 03/23/2018     Marlene Kay MA on 9/19/2018 at 1:59 PM

## 2018-09-19 NOTE — MR AVS SNAPSHOT
After Visit Summary   9/19/2018    Paola Lobo    MRN: [id number]           Patient Information     Date Of Birth          1970        Visit Information        Provider Department      9/19/2018 1:30 PM HERNANDEZ RAYMOND MA [facility]        Today's Diagnoses     Excessive or frequent menstruation    -  1       Follow-ups after your visit        Who to contact     If you have questions or need follow up information about today's clinic visit or your schedule please contact [facility] directly at [phone].  Normal or non-critical lab and imaging results will be communicated to you by MyChart, letter or phone within 4 business days after the clinic has received the results. If you do not hear from us within 7 days, please contact the clinic through Zoon[employer] or phone. If you have a critical or abnormal lab result, we will notify you by phone as soon as possible.  Submit refill requests through [employer] or call your pharmacy and they will forward the refill request to us. Please allow 3 business days for your refill to be completed.          Additional Information About Your Visit        MyChart Information     [employer] gives you secure access to your electronic health record. If you see a primary care provider, you can also send messages to your care team and make appointments. If you have questions, please call your primary care clinic.  If you do not have a primary care provider, please call [phone] and they will assist you.        Care EveryWhere ID     This is your Care EveryWhere ID. This could be used by other organizations to access your [place] medical records  [id number]        Your Vitals Were     BMI (Body Mass Index)                   52.08 kg/m2            Blood Pressure from Last 3 Encounters:   07/11/18 138/82   06/27/18 148/78   06/13/18 (!) 160/104    Weight from Last 3 Encounters:   09/19/18 (!) 384 lb (174.2 kg)   06/27/18 (!) 386 lb (175.1 kg)    06/13/18 (!) 389 lb (176.4 kg)              Today, you had the following     No orders found for display       Primary Care Provider Office Phone # Fax #    SHILA Mitchell [phone]       [address] [facility] [address]        Equal Access to Services     RICHI WALSH : Hadii aad ku hadasho Soomaali, waaxda luqadaha, qaybta kaalmada adeegyada, waxay maribelin haypatrickn kristy gaytanmechemaeve gimenez. So [facility] [phone].    ATENCIÓN: Si habla español, tiene a garcia disposición servicios gratuitos de asistencia lingüística. Llame al [phone].    We comply with applicable federal civil rights laws and [place] laws. We do not discriminate on the basis of race, color, national origin, age, disability, sex, sexual orientation, or gender identity.            Thank you!     Thank you for choosing [facility]  for your care. Our goal is always to provide you with excellent care. Hearing back from our patients is one way we can continue to improve our services. Please take a few minutes to complete the written survey that you may receive in the mail after your visit with us. Thank you!             Your Updated Medication List - Protect others around you: Learn how to safely use, store and throw away your medicines at www.disposemymeds.org.          This list is accurate as of 9/19/18  2:00 PM.  Always use your most recent med list.                   Brand Name Dispense Instructions for use Diagnosis    cetirizine HCl 10 MG Caps           estradiol 2 MG tablet    ESTRACE    90 tablet    Take 1 tablet (2 mg) by mouth daily    Abnormal uterine bleeding (AUB)       FOLIC ACID PO      Take 1 mg by mouth daily        hydrochlorothiazide 50 MG tablet    HYDRODIURIL    90 tablet    Take 1 tablet (50 mg) by mouth daily    Benign essential hypertension       medroxyPROGESTERone 150 MG/ML injection    DEPO-PROVERA    1 mL    Inject 1 mL (150 mg) into the muscle every 3 months    Encounter for initial  prescription of injectable contraceptive       metroNIDAZOLE 1 % gel    METROGEL    60 g    Apply topically daily    Rosacea       omeprazole 20 MG CR capsule    priLOSEC    30 capsule    Take 1 capsule (20 mg) by mouth daily    Abdominal pain, epigastric       order for DME      Equipment ordered: RESMED Auto PAP Mask type: Nasal Settings: 8-15 CM H2O        VALERIAN ROOT PO      Take by mouth as needed        VITAMIN B 12 PO      Take 1,000 mcg by mouth        VITAMIN E NATURAL PO

## 2018-11-08 ENCOUNTER — DOCUMENTATION ONLY (OUTPATIENT)
Dept: SLEEP MEDICINE | Facility: CLINIC | Age: 48
End: 2018-11-08
Payer: COMMERCIAL

## 2018-11-08 NOTE — PROGRESS NOTES
6 month [facility] Recheck Visit     Diagnostic AHI: 30.8  PSG    Data only recheck     Assessment: Pt meeting objective benchmarks.   Patient compliance was 98% the last 180 days.     Action plan:   Pt to follow up per provider request.       Device type: Auto-CPAP  PAP settings: CPAP min 8.0 cm  H20     CPAP max 15.0 cm  H20    95th% pressure 13.4 cm  H20   Objective measures: 14 day rolling measures      Compliance  100 %      Leak  2.48 lpm  last  upload      AHI 0.59   last  upload      Average number of minutes 559      Objective measure goal  Compliance   Goal >70%  Leak   Goal < 24 lpm  AHI  Goal < 5  Usage  Goal >240

## 2018-12-11 RX ORDER — MEDROXYPROGESTERONE ACETATE 150 MG/ML
150 INJECTION, SUSPENSION INTRAMUSCULAR
Status: COMPLETED | OUTPATIENT
Start: 2018-12-14 | End: 2018-12-14

## 2018-12-14 ENCOUNTER — ALLIED HEALTH/NURSE VISIT (OUTPATIENT)
Dept: FAMILY MEDICINE | Facility: OTHER | Age: 48
End: 2018-12-14
Payer: COMMERCIAL

## 2018-12-14 VITALS — DIASTOLIC BLOOD PRESSURE: 76 MMHG | SYSTOLIC BLOOD PRESSURE: 148 MMHG | HEART RATE: 60 BPM

## 2018-12-14 DIAGNOSIS — Z23 NEED FOR PROPHYLACTIC VACCINATION AND INOCULATION AGAINST INFLUENZA: Primary | ICD-10-CM

## 2018-12-14 PROCEDURE — 96372 THER/PROPH/DIAG INJ SC/IM: CPT

## 2018-12-14 PROCEDURE — 90686 IIV4 VACC NO PRSV 0.5 ML IM: CPT

## 2018-12-14 PROCEDURE — 90471 IMMUNIZATION ADMIN: CPT

## 2018-12-14 RX ADMIN — MEDROXYPROGESTERONE ACETATE 150 MG: 150 INJECTION, SUSPENSION INTRAMUSCULAR at 09:14

## 2019-01-02 ENCOUNTER — TELEPHONE (OUTPATIENT)
Dept: FAMILY MEDICINE | Facility: OTHER | Age: 49
End: 2019-01-02

## 2019-01-02 NOTE — TELEPHONE ENCOUNTER
Reason for Call:  Same Day Appointment, Requested Provider:  Roshni Luevano CNP    PCP: Roshni Luevano    Reason for visit: right wrist pain    Duration of symptoms: injured on Monday    Have you been treated for this in the past? No    Additional comments: asking for Roshni to see her today.    Can we leave a detailed message on this number? YES    Phone number patient can be reached at: Home number on file [phone] (home)    Best Time: as soon as possible    Call taken on 1/2/2019 at 1:41 PM by Hillary Velarde

## 2019-01-02 NOTE — TELEPHONE ENCOUNTER
I am not able to add her on today.  I could possibly see her tomorrow afternoon, or any ACUTE slots Friday.     Electronically signed by Roshni Luevano CNP.

## 2019-01-02 NOTE — TELEPHONE ENCOUNTER
Roshni would like patient to come at 2 PM not 4 PM. Patient is called. She said 2 PM will work. RN noted in appointment notes that pt will be coming at 2.     BERNARD Enriquez, RN  [facility]

## 2019-01-02 NOTE — TELEPHONE ENCOUNTER
Called and spoke to the patient. She said Monday she slipped on something and grabbed a rail to stop her fall. She said Tuesday her wrist started to swell and now she has a lot of pain in the wrist. Patient said she is able to move the wrist but something clicks every time she moves it. Patient said she has been taking Ibuprofen to help with the swelling and pain. She said without the Ibuprofen, her pain is above an 8. Patient said she also has a lot of pain when she moves her thumb. She is able to do things with her wrist but has a lot of pain doing it.     Patient is wondering if Roshni can see her today. She said she needs to get this checked out. Patient has a 30 minute travel to get to the clinic if able to be seen.     Will route to provider to advise.     BERNARD Enriquez, RN  [facility]

## 2019-01-03 ENCOUNTER — ANCILLARY PROCEDURE (OUTPATIENT)
Dept: GENERAL RADIOLOGY | Facility: OTHER | Age: 49
End: 2019-01-03
Payer: COMMERCIAL

## 2019-01-03 ENCOUNTER — OFFICE VISIT (OUTPATIENT)
Dept: FAMILY MEDICINE | Facility: OTHER | Age: 49
End: 2019-01-03
Payer: COMMERCIAL

## 2019-01-03 VITALS
OXYGEN SATURATION: 98 % | HEIGHT: 72 IN | HEART RATE: 90 BPM | BODY MASS INDEX: 39.68 KG/M2 | DIASTOLIC BLOOD PRESSURE: 84 MMHG | WEIGHT: 293 LBS | SYSTOLIC BLOOD PRESSURE: 130 MMHG | RESPIRATION RATE: 20 BRPM | TEMPERATURE: 98.9 F

## 2019-01-03 DIAGNOSIS — M25.531 RIGHT WRIST PAIN: Primary | ICD-10-CM

## 2019-01-03 DIAGNOSIS — M25.531 RIGHT WRIST PAIN: ICD-10-CM

## 2019-01-03 PROCEDURE — 99213 OFFICE O/P EST LOW 20 MIN: CPT | Mod: 25 | Performed by: NURSE PRACTITIONER

## 2019-01-03 PROCEDURE — 29125 APPL SHORT ARM SPLINT STATIC: CPT | Performed by: NURSE PRACTITIONER

## 2019-01-03 PROCEDURE — 73110 X-RAY EXAM OF WRIST: CPT | Mod: RT

## 2019-01-03 ASSESSMENT — PAIN SCALES - GENERAL: PAINLEVEL: SEVERE PAIN (6)

## 2019-01-03 ASSESSMENT — MIFFLIN-ST. JEOR: SCORE: 2489.02

## 2019-01-03 NOTE — PROGRESS NOTES
SUBJECTIVE:   Paola Lobo is a 48 year old female who presents to clinic today for the following health issues:      Musculoskeletal problem/pain      Duration: 4 days    Description  Location: right wrist/hand    Intensity:  severe    Accompanying signs and symptoms: tingling and swelling    History  Previous similar problem: no   Previous evaluation:  none    Precipitating or alleviating factors:  Trauma or overuse: YES  Aggravating factors include: using hand at all    Therapies tried and outcome: ice, support wrap and Ibuprofen    She slipped on the ice and grabbed a handrail with her right hand.  This was on the 30th.  She had no pain initially, has been having increasing pain and swelling no.  To the point she is bearly  able to use her hand.  Has been using ice and Ibuprofen. This is not helping.     Problem list and histories reviewed & adjusted, as indicated.  Additional history: as documented    Patient Active Problem List   Diagnosis     Morbid obesity (H)     Excessive or frequent menstruation     Intramural and subserous leiomyoma of uterus     Impingement syndrome, shoulder, right     Papanicolaou smear of cervix with low grade squamous intraepithelial lesion (LGSIL)     Benign essential hypertension     Past Surgical History:   Procedure Laterality Date     ORTHOPEDIC SURGERY         Social History     Tobacco Use     Smoking status: Never Smoker     Smokeless tobacco: Never Used   Substance Use Topics     Alcohol use: Yes     Comment: social     Family History   Problem Relation Age of Onset     Uterine Cancer Mother      Uterine Cancer Maternal Aunt      Uterine Cancer Maternal Aunt      Uterine Cancer Maternal Aunt      Uterine Cancer Maternal Aunt      Uterine Cancer Maternal Aunt          Current Outpatient Medications   Medication Sig Dispense Refill     Cyanocobalamin (VITAMIN B 12 PO) Take 1,000 mcg by mouth       FOLIC ACID PO Take 1 mg by mouth daily       hydrochlorothiazide  "(HYDRODIURIL) 50 MG tablet Take 1 tablet (50 mg) by mouth daily 90 tablet 1     medroxyPROGESTERone (DEPO-PROVERA) 150 MG/ML injection Inject 1 mL (150 mg) into the muscle every 3 months 1 mL 0     metroNIDAZOLE (METROGEL) 1 % gel Apply topically daily 60 g 3     order for DME Equipment ordered: RESMED Auto PAP Mask type: Nasal Settings: 8-15 CM H2O       VALERIAN ROOT PO Take by mouth as needed       VITAMIN E NATURAL PO        cetirizine HCl 10 MG CAPS        Allergies   Allergen Reactions     Walnuts [Nuts]      Unable to breathe     Shellfish Allergy      Nausea     Grass      SOB, asthma       Prednisone Other (See Comments)     Severe headache     Wheat      Rash, Derm     BP Readings from Last 3 Encounters:   01/03/19 130/84   12/14/18 148/76   07/11/18 138/82    Wt Readings from Last 3 Encounters:   01/03/19 (!) 176 kg (388 lb)   09/19/18 (!) 174.2 kg (384 lb)   06/27/18 (!) 175.1 kg (386 lb)                    Reviewed and updated as needed this visit by clinical staff  Tobacco  Allergies  Meds       Reviewed and updated as needed this visit by Provider         ROS:  Constitutional, HEENT, cardiovascular, pulmonary, gi and gu systems are negative, except as otherwise noted.    OBJECTIVE:     /84   Pulse 90   Temp 98.9  F (37.2  C) (Temporal)   Resp 20   Ht 1.816 m (5' 11.5\")   Wt (!) 176 kg (388 lb)   LMP  (LMP Unknown)   SpO2 98%   Breastfeeding? No   BMI 53.36 kg/m    Body mass index is 53.36 kg/m .  GENERAL: healthy, alert and no distress  MS: right wrist has mild swelling, significant tenderness over the scaphoid bone, ROM is limited due to pain.  Distal CMS intact.      Diagnostic Test Results:  Xray - right wrist: Slight irregularity of the scaphoid, no other abnormalities noted.  Reviewed by myself.     Procedure note:   Right thumb spica splint-  Tube gauze was placed over right thumb, wrist  Cast padding was wrapped from fingers  to forearm  3 inch padded fiberglass was molded in " a thumb spica splint  Extremity was wrapped with  elastic bandages  Distal CMS intact prior to and after splint placement.    Patient more comfortable with splint in place.        ASSESSMENT/PLAN:         1. Right wrist pain  Given some concern for scaphoid fracture, I elected to splint her and send her to follow up with orthopedics in 1 week.   - XR Wrist Right G/E 3 Views; Future  - ORTHOPEDICS ADULT REFERRAL    See Patient Instructions    SHILA Mitchell CNP  [facility]

## 2019-01-03 NOTE — PATIENT INSTRUCTIONS
I am going to have you see the orthopedic doctor as I have some concerns you may have a fracture in your wrist.     Wear the splint until you see them.     Take Acetaminophen (Tylenol) 650 mg by mouth every 4-6 hours for fevers or pain. Do not take more than 4000 mg total in a 24 hour period.     Take Ibuprofen 600 mg by mouth every 6-8 hours for pain/fevers.  Take this with food to avoid an upset stomach.  Do not take more than 3200 mg total in a 24 hour period.

## 2019-01-10 ENCOUNTER — OFFICE VISIT (OUTPATIENT)
Dept: ORTHOPEDICS | Facility: CLINIC | Age: 49
End: 2019-01-10
Payer: COMMERCIAL

## 2019-01-10 VITALS — BODY MASS INDEX: 39.68 KG/M2 | HEIGHT: 72 IN | WEIGHT: 293 LBS

## 2019-01-10 PROCEDURE — 99243 OFF/OP CNSLTJ NEW/EST LOW 30: CPT | Performed by: ORTHOPAEDIC SURGERY

## 2019-01-10 ASSESSMENT — MIFFLIN-ST. JEOR: SCORE: 2489.02

## 2019-01-10 ASSESSMENT — PAIN SCALES - GENERAL: PAINLEVEL: MILD PAIN (2)

## 2019-01-10 NOTE — LETTER
"    1/10/2019         RE: Paola Lobo  [address] [place] [address]        Dear Colleague,    Thank you for referring your patient, Paola Lobo, to the [facility]. Please see a copy of my visit note below.    ORTHOPEDIC CONSULT      Chief Complaint: Paola Lobo is a 48 year old female who is being seen for Chief Complaint   Patient presents with     Musculoskeletal Problem     right wrist injury- DOI: 12/30/2018     Consult     ref: Roshni Luevano       History of Present Illness:   Paola Lobo is a 48 year old female who is seen in consultation at the request of Roshni Luevano for evaluation of right wrist pain.    She reports December 30, 2018 she had her hand on a hand rail when she slipped on the ice jamming her hand onto the rail.  She did not fall.  No pain.  The next day felt some \"tingling\" into the thumb and dorsal radial aspect of the wrist.  The following day she developed pain.  Worse with motion.  Fingers feel normal to her.  She was seen by her family doctor placed in a splint.  Had x-rays.  No pre-existing issues.        Patient's past medical, surgical, social and family histories reviewed.     Past Medical History:   Diagnosis Date     Asthma      Obesity, morbid, BMI 50 or higher (H)      Papanicolaou smear of cervix with low grade squamous intraepithelial lesion (LGSIL) 3/23/2018       Past Surgical History:   Procedure Laterality Date     ORTHOPEDIC SURGERY         Medications:    Current Outpatient Medications on File Prior to Visit:  cetirizine HCl 10 MG CAPS    Cyanocobalamin (VITAMIN B 12 PO) Take 1,000 mcg by mouth   FOLIC ACID PO Take 1 mg by mouth daily   hydrochlorothiazide (HYDRODIURIL) 50 MG tablet Take 1 tablet (50 mg) by mouth daily   medroxyPROGESTERone (DEPO-PROVERA) 150 MG/ML injection Inject 1 mL (150 mg) into the muscle every 3 months   metroNIDAZOLE (METROGEL) 1 % gel Apply topically daily   order for DME Equipment ordered: " "RESMED Auto PAP Mask type: Nasal Settings: 8-15 CM H2O   VALERIAN ROOT PO Take by mouth as needed   VITAMIN E NATURAL PO      No current facility-administered medications on file prior to visit.     Allergies   Allergen Reactions     Walnuts [Nuts]      Unable to breathe     Shellfish Allergy      Nausea     Grass      SOB, asthma       Prednisone Other (See Comments)     Severe headache     Wheat      Rash, Derm       Social History     Occupational History     Not on file   Tobacco Use     Smoking status: Never Smoker     Smokeless tobacco: Never Used   Substance and Sexual Activity     Alcohol use: Yes     Comment: social     Drug use: No     Sexual activity: Yes     Partners: Male     Birth control/protection: Pill       Family History   Problem Relation Age of Onset     Uterine Cancer Mother      Uterine Cancer Maternal Aunt      Uterine Cancer Maternal Aunt      Uterine Cancer Maternal Aunt      Uterine Cancer Maternal Aunt      Uterine Cancer Maternal Aunt        REVIEW OF SYSTEMS  10 point review systems performed otherwise negative as noted as per history of present illness.    Physical Exam:  Vitals: Ht 1.816 m (5' 11.5\")   Wt (!) 176 kg (388 lb)   LMP  (LMP Unknown)   BMI 53.36 kg/m     BMI= Body mass index is 53.36 kg/m .  Constitutional: healthy, alert and no acute distress   Psychiatric: mentation appears normal and affect normal/bright  NEURO: no focal deficits  RESP: Normal with easy respirations and no use of accessory muscles to breathe, no audible wheezing or retractions  CV: RUE:  no edema         Regular rate and rhythm by palpation  SKIN: No erythema, rashes, excoriation, or breakdown. No evidence of infection.   JOINT/EXTREMITIES:right wrist/hand: No gross deformity.  No swelling.  She has tenderness over the first CMC joint.  There is no distal radius or scaphoid/carpal bone tenderness.  Finger flexion and extension are intact.  Good cap refill.  Well-perfused distally.  Motion with thumb " and opposition of the small finger reproduces her symptoms.    GAIT: not tested     Diagnostic Modalities:  right hand X-ray: Normal alignment. No fractures, dislocation or lesions. No soft tissue abnormalities.  Independent visualization of the images was performed.      Impression: right first CMC joint strain    Plan:  All of the above pertinent physical exam and imaging modalities findings was reviewed with Paola.    Options discussed.  She has no carpal/scaphoid tenderness on today's exam.  Mechanism of injury is consistent with strain to the joint where she is focally tender.  Recommended a removable splint.  Wear when up and about but work on some gentle range of motion.  Also recommended anti-inflammatory 600 mg 3 times a day.  She has ibuprofen at home that she will take.  Work on gentle motion.  Plan to see her back in 2 weeks.  If continued symptoms would start occupational therapy.      Return to clinic 2, week(s), or sooner as needed for changes.  Re-x-ray on return: No    Wilbert Sanderson D.O.    Again, thank you for allowing me to participate in the care of your patient.        Sincerely,        Villa Sanderson, DO

## 2019-01-10 NOTE — PROGRESS NOTES
"ORTHOPEDIC CONSULT      Chief Complaint: Paola Lobo is a 48 year old female who is being seen for Chief Complaint   Patient presents with     Musculoskeletal Problem     right wrist injury- DOI: 12/30/2018     Consult     ref: Roshni Luevano       History of Present Illness:   Paola Lobo is a 48 year old female who is seen in consultation at the request of Roshni Luevano for evaluation of right wrist pain.    She reports December 30, 2018 she had her hand on a hand rail when she slipped on the ice jamming her hand onto the rail.  She did not fall.  No pain.  The next day felt some \"tingling\" into the thumb and dorsal radial aspect of the wrist.  The following day she developed pain.  Worse with motion.  Fingers feel normal to her.  She was seen by her family doctor placed in a splint.  Had x-rays.  No pre-existing issues.        Patient's past medical, surgical, social and family histories reviewed.     Past Medical History:   Diagnosis Date     Asthma      Obesity, morbid, BMI 50 or higher (H)      Papanicolaou smear of cervix with low grade squamous intraepithelial lesion (LGSIL) 3/23/2018       Past Surgical History:   Procedure Laterality Date     ORTHOPEDIC SURGERY         Medications:    Current Outpatient Medications on File Prior to Visit:  cetirizine HCl 10 MG CAPS    Cyanocobalamin (VITAMIN B 12 PO) Take 1,000 mcg by mouth   FOLIC ACID PO Take 1 mg by mouth daily   hydrochlorothiazide (HYDRODIURIL) 50 MG tablet Take 1 tablet (50 mg) by mouth daily   medroxyPROGESTERone (DEPO-PROVERA) 150 MG/ML injection Inject 1 mL (150 mg) into the muscle every 3 months   metroNIDAZOLE (METROGEL) 1 % gel Apply topically daily   order for DME Equipment ordered: RESMED Auto PAP Mask type: Nasal Settings: 8-15 CM H2O   VALERIAN ROOT PO Take by mouth as needed   VITAMIN E NATURAL PO      No current facility-administered medications on file prior to visit.     Allergies   Allergen Reactions     Walnuts [Nuts]  " "    Unable to breathe     Shellfish Allergy      Nausea     Grass      SOB, asthma       Prednisone Other (See Comments)     Severe headache     Wheat      Rash, Derm       Social History     Occupational History     Not on file   Tobacco Use     Smoking status: Never Smoker     Smokeless tobacco: Never Used   Substance and Sexual Activity     Alcohol use: Yes     Comment: social     Drug use: No     Sexual activity: Yes     Partners: Male     Birth control/protection: Pill       Family History   Problem Relation Age of Onset     Uterine Cancer Mother      Uterine Cancer Maternal Aunt      Uterine Cancer Maternal Aunt      Uterine Cancer Maternal Aunt      Uterine Cancer Maternal Aunt      Uterine Cancer Maternal Aunt        REVIEW OF SYSTEMS  10 point review systems performed otherwise negative as noted as per history of present illness.    Physical Exam:  Vitals: Ht 1.816 m (5' 11.5\")   Wt (!) 176 kg (388 lb)   LMP  (LMP Unknown)   BMI 53.36 kg/m    BMI= Body mass index is 53.36 kg/m .  Constitutional: healthy, alert and no acute distress   Psychiatric: mentation appears normal and affect normal/bright  NEURO: no focal deficits  RESP: Normal with easy respirations and no use of accessory muscles to breathe, no audible wheezing or retractions  CV: RUE:  no edema         Regular rate and rhythm by palpation  SKIN: No erythema, rashes, excoriation, or breakdown. No evidence of infection.   JOINT/EXTREMITIES:right wrist/hand: No gross deformity.  No swelling.  She has tenderness over the first CMC joint.  There is no distal radius or scaphoid/carpal bone tenderness.  Finger flexion and extension are intact.  Good cap refill.  Well-perfused distally.  Motion with thumb and opposition of the small finger reproduces her symptoms.    GAIT: not tested     Diagnostic Modalities:  right hand X-ray: Normal alignment. No fractures, dislocation or lesions. No soft tissue abnormalities.  Independent visualization of the " images was performed.      Impression: right first CMC joint strain    Plan:  All of the above pertinent physical exam and imaging modalities findings was reviewed with Paola.    Options discussed.  She has no carpal/scaphoid tenderness on today's exam.  Mechanism of injury is consistent with strain to the joint where she is focally tender.  Recommended a removable splint.  Wear when up and about but work on some gentle range of motion.  Also recommended anti-inflammatory 600 mg 3 times a day.  She has ibuprofen at home that she will take.  Work on gentle motion.  Plan to see her back in 2 weeks.  If continued symptoms would start occupational therapy.      Return to clinic 2, week(s), or sooner as needed for changes.  Re-x-ray on return: No    Wilbert Sanderson D.O.

## 2019-03-06 DIAGNOSIS — Z30.42 ENCOUNTER FOR SURVEILLANCE OF INJECTABLE CONTRACEPTIVE: Primary | ICD-10-CM

## 2019-03-06 RX ORDER — MEDROXYPROGESTERONE ACETATE 150 MG/ML
150 INJECTION, SUSPENSION INTRAMUSCULAR
Qty: 1 ML | Refills: 3 | OUTPATIENT
Start: 2019-03-06 | End: 2019-07-31

## 2019-03-06 NOTE — TELEPHONE ENCOUNTER
Patient is coming to clinic tomorrow for her DEPO and does not have any more refills on file.     Routed to pcp to sign.     Bernadine Olson MA

## 2019-03-07 ENCOUNTER — TELEPHONE (OUTPATIENT)
Dept: FAMILY MEDICINE | Facility: OTHER | Age: 49
End: 2019-03-07

## 2019-03-07 ENCOUNTER — ALLIED HEALTH/NURSE VISIT (OUTPATIENT)
Dept: FAMILY MEDICINE | Facility: OTHER | Age: 49
End: 2019-03-07
Payer: COMMERCIAL

## 2019-03-07 PROCEDURE — 96372 THER/PROPH/DIAG INJ SC/IM: CPT

## 2019-03-07 PROCEDURE — 99207 ZZC NO CHARGE NURSE ONLY: CPT

## 2019-03-07 RX ORDER — MEDROXYPROGESTERONE ACETATE 150 MG/ML
150 INJECTION, SUSPENSION INTRAMUSCULAR
Status: DISCONTINUED | OUTPATIENT
Start: 2019-03-07 | End: 2019-12-26

## 2019-03-07 RX ADMIN — MEDROXYPROGESTERONE ACETATE 150 MG: 150 INJECTION, SUSPENSION INTRAMUSCULAR at 09:26

## 2019-03-07 NOTE — NURSING NOTE
Prior to injection, verified patient identity using patient's name and date of birth.  Due to injection administration, patient instructed to remain in clinic for 15 minutes  afterwards, and to report any adverse reaction to me immediately.    BP: Data Unavailable    LAST PAP/EXAM:   Lab Results   Component Value Date    PAP LSIL 03/23/2018     URINE HCG:negative    NEXT INJECTION DUE: 5/23/19 - 6/6/19       Drug Amount Wasted:  None.  Vial/Syringe: Single dose vial  Expiration Date:  02/20

## 2019-05-03 DIAGNOSIS — G47.33 OSA (OBSTRUCTIVE SLEEP APNEA): Primary | ICD-10-CM

## 2019-05-07 ASSESSMENT — ENCOUNTER SYMPTOMS
DYSURIA: 0
COUGH: 0
NAUSEA: 0
EYE PAIN: 0
WEAKNESS: 0
FEVER: 0
ABDOMINAL PAIN: 0
NERVOUS/ANXIOUS: 0
HEARTBURN: 0
CHILLS: 0
DIZZINESS: 0
SHORTNESS OF BREATH: 0
HEMATOCHEZIA: 0
JOINT SWELLING: 0
HEADACHES: 1
CONSTIPATION: 0
HEMATURIA: 0
ARTHRALGIAS: 0
DIARRHEA: 0
FREQUENCY: 0
PARESTHESIAS: 0
PALPITATIONS: 0
SORE THROAT: 0
MYALGIAS: 1
BREAST MASS: 0

## 2019-05-08 ENCOUNTER — OFFICE VISIT (OUTPATIENT)
Dept: FAMILY MEDICINE | Facility: OTHER | Age: 49
End: 2019-05-08
Payer: COMMERCIAL

## 2019-05-08 VITALS
HEIGHT: 72 IN | OXYGEN SATURATION: 99 % | BODY MASS INDEX: 39.68 KG/M2 | WEIGHT: 293 LBS | SYSTOLIC BLOOD PRESSURE: 138 MMHG | RESPIRATION RATE: 20 BRPM | HEART RATE: 102 BPM | DIASTOLIC BLOOD PRESSURE: 88 MMHG | TEMPERATURE: 98.3 F

## 2019-05-08 DIAGNOSIS — R21 RASH AND NONSPECIFIC SKIN ERUPTION: ICD-10-CM

## 2019-05-08 DIAGNOSIS — Z12.4 SCREENING FOR CERVICAL CANCER: ICD-10-CM

## 2019-05-08 DIAGNOSIS — Z11.3 SCREEN FOR STD (SEXUALLY TRANSMITTED DISEASE): ICD-10-CM

## 2019-05-08 DIAGNOSIS — Z00.00 ROUTINE HISTORY AND PHYSICAL EXAMINATION OF ADULT: Primary | ICD-10-CM

## 2019-05-08 DIAGNOSIS — Z12.31 ENCOUNTER FOR SCREENING MAMMOGRAM FOR BREAST CANCER: ICD-10-CM

## 2019-05-08 PROCEDURE — G0476 HPV COMBO ASSAY CA SCREEN: HCPCS | Performed by: NURSE PRACTITIONER

## 2019-05-08 PROCEDURE — 88175 CYTOPATH C/V AUTO FLUID REDO: CPT | Performed by: NURSE PRACTITIONER

## 2019-05-08 PROCEDURE — 87389 HIV-1 AG W/HIV-1&-2 AB AG IA: CPT | Performed by: NURSE PRACTITIONER

## 2019-05-08 PROCEDURE — 99396 PREV VISIT EST AGE 40-64: CPT | Performed by: NURSE PRACTITIONER

## 2019-05-08 PROCEDURE — 36415 COLL VENOUS BLD VENIPUNCTURE: CPT | Performed by: NURSE PRACTITIONER

## 2019-05-08 PROCEDURE — 88141 CYTOPATH C/V INTERPRET: CPT | Performed by: NURSE PRACTITIONER

## 2019-05-08 ASSESSMENT — ENCOUNTER SYMPTOMS
MYALGIAS: 1
FEVER: 0
HEADACHES: 1
DYSURIA: 0
EYE PAIN: 0
DIZZINESS: 0
SHORTNESS OF BREATH: 0
PALPITATIONS: 0
WEAKNESS: 0
HEMATURIA: 0
NERVOUS/ANXIOUS: 0
ABDOMINAL PAIN: 0
BREAST MASS: 0
JOINT SWELLING: 0
DIARRHEA: 0
FREQUENCY: 0
HEARTBURN: 0
COUGH: 0
ARTHRALGIAS: 0
PARESTHESIAS: 0
CONSTIPATION: 0
CHILLS: 0
SORE THROAT: 0
HEMATOCHEZIA: 0
NAUSEA: 0

## 2019-05-08 ASSESSMENT — MIFFLIN-ST. JEOR: SCORE: 2484.48

## 2019-05-08 ASSESSMENT — PAIN SCALES - GENERAL: PAINLEVEL: SEVERE PAIN (6)

## 2019-05-08 NOTE — PATIENT INSTRUCTIONS
Dermatology referral placed.     The results of the pap test take about 2 weeks to come back.  We will send a letter with these results and the recommendations for further pap tests in the future.     Have a nurse only blood pressure recheck in 2 weeks.       Preventive Health Recommendations  Female Ages 40 to 49    Yearly exam:     See your health care provider every year in order to  1. Review health changes.   2. Discuss preventive care.    3. Review your medicines if your doctor prescribed any.      Get a Pap test every three years (unless you have an abnormal result and your provider advises testing more often).      If you get Pap tests with HPV test, you only need to test every 5 years, unless you have an abnormal result. You do not need a Pap test if your uterus was removed (hysterectomy) and you have not had cancer.      You should be tested each year for STDs (sexually transmitted diseases), if you're at risk.     Ask your doctor if you should have a mammogram.      Have a colonoscopy (test for colon cancer) if someone in your family has had colon cancer or polyps before age 50.       Have a cholesterol test every 5 years.       Have a diabetes test (fasting glucose) after age 45. If you are at risk for diabetes, you should have this test every 3 years.    Shots: Get a flu shot each year. Get a tetanus shot every 10 years.     Nutrition:     Eat at least 5 servings of fruits and vegetables each day.    Eat whole-grain bread, whole-wheat pasta and brown rice instead of white grains and rice.    Get adequate Calcium and Vitamin D.      Lifestyle    Exercise at least 150 minutes a week (an average of 30 minutes a day, 5 days a week). This will help you control your weight and prevent disease.    Limit alcohol to one drink per day.    No smoking.     Wear sunscreen to prevent skin cancer.    See your dentist every six months for an exam and cleaning.

## 2019-05-08 NOTE — LETTER
May 9, 2019      Paola Lobo  [address] [place] [address]        Dear ,    We are writing to inform you of your test results.    Your results were all normal or expected.  Please continue your current plan of care.     Resulted Orders   HIV Antigen Antibody Combo   Result Value Ref Range    HIV Antigen Antibody Combo Nonreactive NR^Nonreactive          Comment:      HIV-1 p24 Ag & HIV-1/HIV-2 Ab Not Detected       If you have any questions or concerns, please call the clinic at the number listed above.       Sincerely,        SHILA Mitchell CNP

## 2019-05-09 LAB — HIV 1+2 AB+HIV1 P24 AG SERPL QL IA: NONREACTIVE

## 2019-05-09 NOTE — RESULT ENCOUNTER NOTE
Please notify patient, call or letter    Your results were all normal or expected.  Please continue your current plan of care.     Electronically signed by Roshni Luevano CNP.  
WDL

## 2019-05-13 LAB
COPATH REPORT: ABNORMAL
PAP: ABNORMAL

## 2019-05-15 LAB
FINAL DIAGNOSIS: ABNORMAL
HPV HR 12 DNA CVX QL NAA+PROBE: POSITIVE
HPV16 DNA SPEC QL NAA+PROBE: NEGATIVE
HPV18 DNA SPEC QL NAA+PROBE: NEGATIVE
SPECIMEN DESCRIPTION: ABNORMAL
SPECIMEN SOURCE CVX/VAG CYTO: ABNORMAL

## 2019-06-10 ENCOUNTER — ALLIED HEALTH/NURSE VISIT (OUTPATIENT)
Dept: FAMILY MEDICINE | Facility: OTHER | Age: 49
End: 2019-06-10
Payer: COMMERCIAL

## 2019-06-10 VITALS — SYSTOLIC BLOOD PRESSURE: 128 MMHG | DIASTOLIC BLOOD PRESSURE: 74 MMHG

## 2019-06-10 DIAGNOSIS — Z30.42 ENCOUNTER FOR SURVEILLANCE OF INJECTABLE CONTRACEPTIVE: Primary | ICD-10-CM

## 2019-06-10 LAB — HCG UR QL: NEGATIVE

## 2019-06-10 PROCEDURE — 99207 ZZC NO CHARGE NURSE ONLY: CPT

## 2019-06-10 PROCEDURE — 81025 URINE PREGNANCY TEST: CPT | Performed by: NURSE PRACTITIONER

## 2019-06-10 PROCEDURE — 96372 THER/PROPH/DIAG INJ SC/IM: CPT

## 2019-06-10 RX ADMIN — MEDROXYPROGESTERONE ACETATE 150 MG: 150 INJECTION, SUSPENSION INTRAMUSCULAR at 15:18

## 2019-06-10 NOTE — PROGRESS NOTES
Prior to injection, verified patient identity using patient's name and date of birth.  Due to injection administration, patient instructed to remain in clinic for 15 minutes  afterwards, and to report any adverse reaction to me immediately.    BP: 128/74    LAST PAP/EXAM:   Lab Results   Component Value Date    PAP ASC-US 05/08/2019     URINE HCG:negative    NEXT INJECTION DUE: 8/26/19 - 9/9/19       Drug Amount Wasted:  None.  Vial/Syringe: Single dose vial  Expiration Date:  06/2020    Bernadine Olson MA

## 2019-07-12 ENCOUNTER — OFFICE VISIT (OUTPATIENT)
Dept: FAMILY MEDICINE | Facility: OTHER | Age: 49
End: 2019-07-12
Payer: COMMERCIAL

## 2019-07-12 VITALS
SYSTOLIC BLOOD PRESSURE: 124 MMHG | DIASTOLIC BLOOD PRESSURE: 80 MMHG | WEIGHT: 293 LBS | BODY MASS INDEX: 54.08 KG/M2 | OXYGEN SATURATION: 97 % | HEART RATE: 66 BPM | TEMPERATURE: 97 F | RESPIRATION RATE: 22 BRPM

## 2019-07-12 DIAGNOSIS — R87.810 CERVICAL HIGH RISK HUMAN PAPILLOMAVIRUS (HPV) DNA TEST POSITIVE: Primary | ICD-10-CM

## 2019-07-12 DIAGNOSIS — Z98.890 HX OF COLPOSCOPY WITH CERVICAL BIOPSY: ICD-10-CM

## 2019-07-12 DIAGNOSIS — R87.613 HIGH GRADE SQUAMOUS INTRAEPITHELIAL LESION (HGSIL) ON CERVICOVAGINAL CYTOLOGY: ICD-10-CM

## 2019-07-12 PROCEDURE — 88305 TISSUE EXAM BY PATHOLOGIST: CPT | Performed by: FAMILY MEDICINE

## 2019-07-12 PROCEDURE — 57454 BX/CURETT OF CERVIX W/SCOPE: CPT | Performed by: FAMILY MEDICINE

## 2019-07-12 ASSESSMENT — PAIN SCALES - GENERAL: PAINLEVEL: NO PAIN (0)

## 2019-07-12 NOTE — PROGRESS NOTES
Paola Lobo is a 48 year old female who presents for repeat colposcopy, referred by Roshni Luevano. Pap smear 3 months ago showed: ASC-US with HR HPV other. The prior pap showed ASC-US with HR HPV other    3/23/18 LSIL pap, + HR HPV + 16 and other. Plan: colp bef 6/23/18    5/3/18 [place] bx @ 9 o'clock: UMAIR 1 and koilocytosis, with condyloma-like features. ECC: negative. Plan: cotest in 1 yr.    5/8/19 ASCUS pap, + HR HPV (not 16 or 18). Plan: colp      Past Medical History:   Diagnosis Date     Asthma      Obesity, morbid, BMI 50 or higher (H)      Papanicolaou smear of cervix with low grade squamous intraepithelial lesion (LGSIL) 3/23/2018     Family History   Problem Relation Age of Onset     Uterine Cancer Mother      Uterine Cancer Maternal Aunt      Uterine Cancer Maternal Aunt      Uterine Cancer Maternal Aunt      Uterine Cancer Maternal Aunt      Uterine Cancer Maternal Aunt        Previous history of abnormal paps?: Yes 2  History of cryotherapy (freezing)?: : No  History of veneral diseases: : No  Do you desire testing for any of these diseases? : No  History of genital warts:  Yes with HPV   Visible warts now?:  No  Previously treated? If so, how?:  No     No LMP recorded. Patient has had an injection.  Type of contraception: Depo-Provera  Age at first sexual intercourse: 24  Number of sexual partners (lifetime): 8  History   Smoking Status     Never Smoker   Smokeless Tobacco     Never Used     History of sexual abuse:  No  Allergies as of 07/12/2019 - Reviewed 07/12/2019   Allergen Reaction Noted     Walnuts [nuts]  11/19/2013     Shellfish allergy  11/19/2013     Grass  11/19/2013     Prednisone Other (See Comments) 02/15/2017     Wheat  11/19/2013        PROCEDURE:  Before the procedure, it was ensured that the patient was educated regarding the nature of her findings to date, the implications of them, and what was to be done. She has been made aware of the role of HPV, the natural history of  infection, ways to minimize her future risk, the effect of HPV on the cervix, and treatment options available should they be indicated. The   pathophysiology of the cervix, including a discussion of squamous vs. endometrial cells, and squamous metaplasia have all been reviewed, using illustrations and sketches. The details of the colposcopic procedure were reviewed, as well as the risks of missed diagnoses, pain, infection and bleeding. All questions were answered before proceeding, and informed consent was therefore obtained.    Bimanual examination: was not done  Unenhanced examination of the cervix was normal without lesions.  Pap smear and endocervical sampling not obtained due to:    not due  Please refer to images section for details!  Pap repeated?:  No  SCJ seen?:  yes  Endocervical speculum needed?:  No  ECC done?:  Yes   Lugol's solution used?:  Yes   Satisfactory examination?:  yes    Vaginal vault: normal to cursory inspection   Urethra normal?:  yes  Labia normal?:  yes  Perineum normal?:  yes  Rectum normal?:  yes    FINDINGS:  Please see image   Cervix: acetowhite area(s) at 6:00  Procedure: biopsies taken (not including ECC): 1.    Procedure summary: Patient tolerated procedure well     Assessment: HPV related changes and UMAIR 1      Plan: Specimens labelled and sent to pathology., Will base further treatment on pathology findings., treatment options discussed with patient and post biopsy instructions given to patient      Electronically signed by Lele Mancuso MD

## 2019-07-16 LAB — COPATH REPORT: NORMAL

## 2019-07-17 ENCOUNTER — TELEPHONE (OUTPATIENT)
Dept: FAMILY MEDICINE | Facility: CLINIC | Age: 49
End: 2019-07-17

## 2019-07-17 ENCOUNTER — TELEPHONE (OUTPATIENT)
Dept: FAMILY MEDICINE | Facility: OTHER | Age: 49
End: 2019-07-17

## 2019-07-17 NOTE — TELEPHONE ENCOUNTER
Reason for Call:  Request for results:    Name of test or procedure:  Colposcopy     Date of test of procedure:  7/12  Location of the test or procedure:  [facility]    OK to leave the result message on voice mail or with a family member? YES    Phone number Patient can be reached at:  Cell number on file:    Telephone Information:   Mobile [phone]       Additional comments: returned your call regarding colposcopy results     Call taken on 7/17/2019 at 8:51 AM by Carlota Nava

## 2019-07-17 NOTE — TELEPHONE ENCOUNTER
Called patient to get her scheduled with Dr. Cordova for a consult for cold knife conization.   LM for patient to call [phone] a 40 minute appointment.   Roshni Cruz CMA

## 2019-07-17 NOTE — TELEPHONE ENCOUNTER
----- Message from Lele Mancuso MD sent at 7/17/2019  9:25 AM CDT -----  Patient notified of colposcopy results. Spoke with Dr. Cordova who recommends [facility]. Please call patient to schedule consult with Dr. Cordova.  Electronically signed by Lele Mancuso MD

## 2019-07-22 ENCOUNTER — OFFICE VISIT (OUTPATIENT)
Dept: FAMILY MEDICINE | Facility: OTHER | Age: 49
End: 2019-07-22
Payer: COMMERCIAL

## 2019-07-22 VITALS
HEIGHT: 72 IN | SYSTOLIC BLOOD PRESSURE: 134 MMHG | OXYGEN SATURATION: 97 % | HEART RATE: 103 BPM | TEMPERATURE: 99.1 F | DIASTOLIC BLOOD PRESSURE: 60 MMHG | RESPIRATION RATE: 20 BRPM | BODY MASS INDEX: 39.68 KG/M2 | WEIGHT: 293 LBS

## 2019-07-22 DIAGNOSIS — R82.90 CLOUDY URINE: ICD-10-CM

## 2019-07-22 DIAGNOSIS — N39.0 URINARY TRACT INFECTION WITH HEMATURIA, SITE UNSPECIFIED: Primary | ICD-10-CM

## 2019-07-22 DIAGNOSIS — R39.11 URINARY HESITANCY: ICD-10-CM

## 2019-07-22 DIAGNOSIS — R30.0 DYSURIA: ICD-10-CM

## 2019-07-22 DIAGNOSIS — R31.9 URINARY TRACT INFECTION WITH HEMATURIA, SITE UNSPECIFIED: Primary | ICD-10-CM

## 2019-07-22 LAB

## 2019-07-22 PROCEDURE — 87186 SC STD MICRODIL/AGAR DIL: CPT | Performed by: NURSE PRACTITIONER

## 2019-07-22 PROCEDURE — 87086 URINE CULTURE/COLONY COUNT: CPT | Performed by: NURSE PRACTITIONER

## 2019-07-22 PROCEDURE — 99213 OFFICE O/P EST LOW 20 MIN: CPT | Performed by: NURSE PRACTITIONER

## 2019-07-22 PROCEDURE — 87088 URINE BACTERIA CULTURE: CPT | Performed by: NURSE PRACTITIONER

## 2019-07-22 PROCEDURE — 81001 URINALYSIS AUTO W/SCOPE: CPT | Performed by: NURSE PRACTITIONER

## 2019-07-22 RX ORDER — NITROFURANTOIN 25; 75 MG/1; MG/1
100 CAPSULE ORAL 2 TIMES DAILY
Qty: 10 CAPSULE | Refills: 0 | Status: SHIPPED | OUTPATIENT
Start: 2019-07-22 | End: 2019-07-31

## 2019-07-22 ASSESSMENT — MIFFLIN-ST. JEOR: SCORE: 2507.16

## 2019-07-22 NOTE — LETTER
July 24, 2019      Paola MONA Lobo  [id number] [place] [address]        Dear ,    We are writing to inform you of your test results.    Your urine culture grew out bacteria that should be covered by the antibiotic I gave you.  Please finish that prescription and follow up if your symptoms do not resolve.     Electronically signed by Roshni Luevano CNP.     Resulted Orders   *UA reflex to Microscopic and Culture ([place] and [facility] (except [place] and [place])   Result Value Ref Range    Color Urine Yellow     Appearance Urine Clear     Glucose Urine Negative NEG^Negative mg/dL    Bilirubin Urine Negative NEG^Negative    Ketones Urine Negative NEG^Negative mg/dL    Specific Gravity Urine 1.015 1.003 - 1.035    Blood Urine Trace (A) NEG^Negative    pH Urine 6.5 5.0 - 7.0 pH    Protein Albumin Urine Negative NEG^Negative mg/dL    Urobilinogen Urine 0.2 0.2 - 1.0 EU/dL    Nitrite Urine Negative NEG^Negative    Leukocyte Esterase Urine Small (A) NEG^Negative    Source Midstream Urine    Urine Microscopic   Result Value Ref Range    WBC Urine 5-10 (A) OTO5^0 - 5 /HPF    RBC Urine O - 2 OTO2^O - 2 /HPF    Squamous Epithelial /LPF Urine Few FEW^Few /LPF    Bacteria Urine Few (A) NEG^Negative /HPF   Urine Culture Aerobic Bacterial   Result Value Ref Range    Specimen Description Midstream Urine     Special Requests Specimen received in preservative     Culture Micro 10,000 to 50,000 colonies/mL  Escherichia coli   (A)     Culture Micro <10,000 colonies/mL  urogenital loc          If you have any questions or concerns, please call the clinic at the number listed above.       Sincerely,        SHILA Mitchell CNP

## 2019-07-22 NOTE — PATIENT INSTRUCTIONS
Take Macrobid twice a day for 5 days.     A urine culture is pending and they will only call if you need a different antibiotic.  If you don't hear from us, finish all the antibiotics you were given.      Follow up if symptoms fail to resolve as expected.

## 2019-07-22 NOTE — PROGRESS NOTES
"Subjective     Paola Lobo is a 48 year old female who presents to clinic today for the following health issues:    HPI   URINARY TRACT SYMPTOMS      Duration: 1+ week    Description  dysuria, frequency, urgency and hesitancy    Intensity:  moderate    Accompanying signs and symptoms:  Fever/chills: no   Flank pain no   Nausea and vomiting: no   Vaginal symptoms: none  Abdominal/Pelvic Pain: no     History  History of frequent UTI's: no   History of kidney stones: no   Sexually Active: no   Possibility of pregnancy: No    Precipitating or alleviating factors: None    Therapies tried and outcome: cranberry juice AZO, increase fluid intake and they did not help                Review of Systems   ROS COMP: Constitutional, HEENT, cardiovascular, pulmonary, gi and gu systems are negative, except as otherwise noted.      Objective    /60   Pulse 103   Temp 99.1  F (37.3  C) (Temporal)   Resp 20   Ht 1.816 m (5' 11.5\")   Wt (!) 177.8 kg (392 lb)   SpO2 97%   Breastfeeding? No   BMI 53.91 kg/m    Body mass index is 53.91 kg/m .  Physical Exam   GENERAL: alert, no distress and obese  NECK: no adenopathy, no asymmetry, masses, or scars and thyroid normal to palpation  RESP: lungs clear to auscultation - no rales, rhonchi or wheezes  CV: regular rate and rhythm, normal S1 S2, no S3 or S4, no murmur, click or rub, no peripheral edema and peripheral pulses strong  ABDOMEN: soft, nontender, no hepatosplenomegaly, no masses and bowel sounds normal  MS: no gross musculoskeletal defects noted, no edema    Office Visit on 07/22/2019   Component Date Value Ref Range Status     Color Urine 07/22/2019 Yellow   Final     Appearance Urine 07/22/2019 Clear   Final     Glucose Urine 07/22/2019 Negative  NEG^Negative mg/dL Final     Bilirubin Urine 07/22/2019 Negative  NEG^Negative Final     Ketones Urine 07/22/2019 Negative  NEG^Negative mg/dL Final     Specific Gravity Urine 07/22/2019 1.015  1.003 - 1.035 Final     " Blood Urine 07/22/2019 Trace* NEG^Negative Final     pH Urine 07/22/2019 6.5  5.0 - 7.0 pH Final     Protein Albumin Urine 07/22/2019 Negative  NEG^Negative mg/dL Final     Urobilinogen Urine 07/22/2019 0.2  0.2 - 1.0 EU/dL Final     Nitrite Urine 07/22/2019 Negative  NEG^Negative Final     Leukocyte Esterase Urine 07/22/2019 Small* NEG^Negative Final     Source 07/22/2019 Midstream Urine   Final     WBC Urine 07/22/2019 5-10* OTO5^0 - 5 /HPF Final     RBC Urine 07/22/2019 O - 2  OTO2^O - 2 /HPF Final     Squamous Epithelial /LPF Urine 07/22/2019 Few  FEW^Few /LPF Final     Bacteria Urine 07/22/2019 Few* NEG^Negative /HPF Final     Specimen Description 07/22/2019 Midstream Urine   Final     Special Requests 07/22/2019 Specimen received in preservative   Final     Culture Micro 07/22/2019 *  Final                    Value:10,000 to 50,000 colonies/mL  Escherichia coli       Culture Micro 07/22/2019    Final                    Value:<10,000 colonies/mL  urogenital loc                 Assessment & Plan     1. Urinary tract infection with hematuria, site unspecified  Symptoms consistent, will treat with Macrobid as prescribed. Culture pending.  She is following up with Ob/gyn next week, recheck at that time.   - Urine Culture Aerobic Bacterial  - nitroFURantoin macrocrystal-monohydrate (MACROBID) 100 MG capsule; Take 1 capsule (100 mg) by mouth 2 times daily for 5 days  Dispense: 10 capsule; Refill: 0    2. Urinary hesitancy  - *UA reflex to Microscopic and Culture ([facility] (except [place] and [place])  - Urine Microscopic    3. Dysuria  - *UA reflex to Microscopic and Culture ([facility] and [facility] (except [place] and [place])    4. Cloudy urine  - *UA reflex to Microscopic and Culture ([facility] and [facility] (except [place] and [place])         Return in about 2 weeks (around 8/5/2019) for Recheck only if not improving.    Roshni Luevano, SHILA [facility]

## 2019-07-24 LAB
BACTERIA SPEC CULT: ABNORMAL
BACTERIA SPEC CULT: ABNORMAL
Lab: ABNORMAL
SPECIMEN SOURCE: ABNORMAL

## 2019-07-31 ENCOUNTER — OFFICE VISIT (OUTPATIENT)
Dept: FAMILY MEDICINE | Facility: CLINIC | Age: 49
End: 2019-07-31
Payer: COMMERCIAL

## 2019-07-31 VITALS
WEIGHT: 293 LBS | RESPIRATION RATE: 16 BRPM | BODY MASS INDEX: 54.41 KG/M2 | HEART RATE: 72 BPM | TEMPERATURE: 97.8 F | SYSTOLIC BLOOD PRESSURE: 142 MMHG | OXYGEN SATURATION: 97 % | DIASTOLIC BLOOD PRESSURE: 90 MMHG

## 2019-07-31 DIAGNOSIS — E66.01 MORBID OBESITY (H): ICD-10-CM

## 2019-07-31 DIAGNOSIS — D25.9 UTERINE LEIOMYOMA, UNSPECIFIED LOCATION: ICD-10-CM

## 2019-07-31 DIAGNOSIS — N87.9 CERVICAL DYSPLASIA: Primary | ICD-10-CM

## 2019-07-31 DIAGNOSIS — I10 BENIGN ESSENTIAL HYPERTENSION: ICD-10-CM

## 2019-07-31 PROCEDURE — 99214 OFFICE O/P EST MOD 30 MIN: CPT | Performed by: OBSTETRICS & GYNECOLOGY

## 2019-07-31 ASSESSMENT — PAIN SCALES - GENERAL: PAINLEVEL: NO PAIN (0)

## 2019-07-31 NOTE — PROGRESS NOTES
SUBJECTIVE:                                                      Referral from Roshni Luevano       Reason for consultation:  Cervical dysplasia    History:  Paola  Is a 48 year old female  0 para 0  who presents today because of recent abnormal pap with + HPV 16 which led to colposcopy.     recent cervical bx showed:    FINAL DIAGNOSIS:   A. Cervix, endocervical curettings:   - Rare and small fragments of squamous mucosa with atypia, favor dysplasia    - see comment 1.   - Cervical glandular epithelium with no evidence of glandular neoplasia.     B. Cervix, 6 o'clock, biopsy:   - Very small areas of squamous mucosa with atypia; favor high grade   squamous intraepithelial lesion - see   comment 2.   - Cervical glandular epithelium with no evidence of glandular neoplasia.     COMMENT:   1- The small size limits further characterization.     2- The atypia is seen on L1 which limits performing further studies. The   histologic findings , however, are   worrisome for high grade squamous intraepithelial lesion.     Intradepartmental consultation is obtained.     Electronically signed out by:     Isaiah Tafoya M.D., PhD           Patient Active Problem List   Diagnosis     Morbid obesity (H)     Excessive or frequent menstruation     Intramural and subserous leiomyoma of uterus     Impingement syndrome, shoulder, right     Papanicolaou smear of cervix with low grade squamous intraepithelial lesion (LGSIL)     Benign essential hypertension     Past Surgical History:   Procedure Laterality Date     ORTHOPEDIC SURGERY         Social History     Tobacco Use     Smoking status: Never Smoker     Smokeless tobacco: Never Used   Substance Use Topics     Alcohol use: Yes     Comment: social     Family History   Problem Relation Age of Onset     Uterine Cancer Mother      Uterine Cancer Maternal Aunt      Uterine Cancer Maternal Aunt      Uterine Cancer Maternal Aunt      Uterine Cancer Maternal Aunt      Uterine  Cancer Maternal Aunt          Current Outpatient Medications   Medication Sig Dispense Refill     cetirizine HCl 10 MG CAPS        Cyanocobalamin (VITAMIN B 12 PO) Take 1,000 mcg by mouth       FOLIC ACID PO Take 1 mg by mouth daily       order for DME Equipment ordered: RESMED Auto PAP Mask type: Nasal Settings: 8-15 CM H2O       VALERIAN ROOT PO Take by mouth as needed       VITAMIN E NATURAL PO          ROS:  A 12 point systems review is negative except for what is listed above in the Subjective history.            OBJECTIVE:                                                    Vital signs: Blood pressure (!) 142/90, pulse 72, temperature 97.8  F (36.6  C), temperature source Temporal, resp. rate 16, weight (!) 179.4 kg (395 lb 9.6 oz), SpO2 97 %, not currently breastfeeding.      HEENT is normal.      Neck is supple, mobile, no adenoapthy or masses palpable. Normal range of motion noted.     Chest is clear to auscultation. No wheezes, rales or rhonchi heard.  cardiac exam is normal with s1, s2, no murmurs or adventitious sounds.Normal rate and rhythm is heard.     Abdomen is soft,  nondistended, No masses felt.No HSM. No guarding or rigidity or rebound noted. Palpation reveals  no    tenderness   Normal bowel sounds heard.  She is morbidly obese.    Pelvic exam:My nurse Gabino was present to chaperone the exam.    The external genitalia appeared normal.      The vaginal vault was without bleeding  or   discharge or odor.      The cervix was smooth and shiny and normal in appearance.  It is markedly recessed to the point that I do not feel I could perform a cold knife conization but a LEEP may be possible.    No vaginal support defects were noted,      Bimanual exam revealed a ?  Nulliparous  sized uterus. It does not   descend  At all well in the vaginal vault.  She would not be a candidate for a vaginal hysterectomy and in fact any vaginal surgery would likely be challenging because of the morbid obesity as well  as the significant recessed nature of the uterus.    No adnexal masses were felt.      Exam was extremely limited by the patient's body habitus.           ASSESSMENT/PLAN:                                                        1.  Cervical aezqkosno-5-obqg-old female  0 para 0 with cervical dysplasia extending into the endocervix.  I would advise LEEP surgery.  I did caution her that this will be technically quite challenging because of the recessed nature of the cervix and her severe morbid obesity.  I have advised that we start with a pelvic ultrasound because she is asking about hysterectomy and states that she has had fibroids.  I have given her a booklet to read about LEEP surgery and I would like her to review this and we will arrange for a pelvic ultrasound and she will see me next week to go over results of the ultrasound and then make decisions about surgery.         2. Morbid obesity-this will affect the morbidity and risks with any surgical procedure that we attempt.                                                       3. Fibroid uterus-we will check pelvic ultrasound.    4.  Benign essential hypertension- she was treating this with hydrochlorothiazide but stopped it and has been trying natural methods.  I advised her to discuss this further with Roshni and consider getting on medication again.        Thank you for this consultation.    Copy to  Roshni Luevano MD  [facility]

## 2019-08-05 ENCOUNTER — MYC MEDICAL ADVICE (OUTPATIENT)
Dept: FAMILY MEDICINE | Facility: OTHER | Age: 49
End: 2019-08-05

## 2019-08-05 ENCOUNTER — HOSPITAL ENCOUNTER (OUTPATIENT)
Dept: ULTRASOUND IMAGING | Facility: CLINIC | Age: 49
Discharge: HOME | End: 2019-08-05
Attending: OBSTETRICS & GYNECOLOGY | Admitting: OBSTETRICS & GYNECOLOGY
Payer: COMMERCIAL

## 2019-08-05 DIAGNOSIS — D25.9 UTERINE LEIOMYOMA, UNSPECIFIED LOCATION: ICD-10-CM

## 2019-08-05 DIAGNOSIS — R30.0 DYSURIA: Primary | ICD-10-CM

## 2019-08-05 PROCEDURE — 76830 TRANSVAGINAL US NON-OB: CPT

## 2019-08-06 ENCOUNTER — TELEPHONE (OUTPATIENT)
Dept: FAMILY MEDICINE | Facility: OTHER | Age: 49
End: 2019-08-06

## 2019-08-06 DIAGNOSIS — R10.9 FLANK PAIN: Primary | ICD-10-CM

## 2019-08-06 DIAGNOSIS — R30.0 DYSURIA: ICD-10-CM

## 2019-08-06 LAB
ALBUMIN UR-MCNC: NEGATIVE MG/DL
ANION GAP SERPL CALCULATED.3IONS-SCNC: 5 MMOL/L (ref 3–14)
APPEARANCE UR: CLEAR
BASOPHILS # BLD AUTO: 0 10E9/L (ref 0–0.2)
BASOPHILS NFR BLD AUTO: 0.4 %
BILIRUB UR QL STRIP: NEGATIVE
BUN SERPL-MCNC: 15 MG/DL (ref 7–30)
CALCIUM SERPL-MCNC: 9 MG/DL (ref 8.5–10.1)
CHLORIDE SERPL-SCNC: 111 MMOL/L (ref 94–109)
CO2 SERPL-SCNC: 24 MMOL/L (ref 20–32)
COLOR UR AUTO: YELLOW
CREAT SERPL-MCNC: 0.86 MG/DL (ref 0.52–1.04)
DIFFERENTIAL METHOD BLD: NORMAL
EOSINOPHIL # BLD AUTO: 0.3 10E9/L (ref 0–0.7)
EOSINOPHIL NFR BLD AUTO: 3.7 %
ERYTHROCYTE [DISTWIDTH] IN BLOOD BY AUTOMATED COUNT: 14.2 % (ref 10–15)
GFR SERPL CREATININE-BSD FRML MDRD: 79 ML/MIN/{1.73_M2}
GLUCOSE SERPL-MCNC: 95 MG/DL (ref 70–99)
GLUCOSE UR STRIP-MCNC: NEGATIVE MG/DL
HCT VFR BLD AUTO: 43.8 % (ref 35–47)
HGB BLD-MCNC: 14.3 G/DL (ref 11.7–15.7)
HGB UR QL STRIP: ABNORMAL
KETONES UR STRIP-MCNC: NEGATIVE MG/DL
LEUKOCYTE ESTERASE UR QL STRIP: ABNORMAL
LYMPHOCYTES # BLD AUTO: 1.9 10E9/L (ref 0.8–5.3)
LYMPHOCYTES NFR BLD AUTO: 23 %
MCH RBC QN AUTO: 28.9 PG (ref 26.5–33)
MCHC RBC AUTO-ENTMCNC: 32.6 G/DL (ref 31.5–36.5)
MCV RBC AUTO: 89 FL (ref 78–100)
MONOCYTES # BLD AUTO: 0.5 10E9/L (ref 0–1.3)
MONOCYTES NFR BLD AUTO: 6.4 %
NEUTROPHILS # BLD AUTO: 5.4 10E9/L (ref 1.6–8.3)
NEUTROPHILS NFR BLD AUTO: 66.5 %
NITRATE UR QL: NEGATIVE
NON-SQ EPI CELLS #/AREA URNS LPF: ABNORMAL /LPF
PH UR STRIP: 7 PH (ref 5–7)
PLATELET # BLD AUTO: 294 10E9/L (ref 150–450)
POTASSIUM SERPL-SCNC: 4.1 MMOL/L (ref 3.4–5.3)
RBC # BLD AUTO: 4.95 10E12/L (ref 3.8–5.2)
RBC #/AREA URNS AUTO: ABNORMAL /HPF
SODIUM SERPL-SCNC: 140 MMOL/L (ref 133–144)
SOURCE: ABNORMAL
SP GR UR STRIP: 1.01 (ref 1–1.03)
UROBILINOGEN UR STRIP-ACNC: 0.2 EU/DL (ref 0.2–1)
WBC # BLD AUTO: 8.1 10E9/L (ref 4–11)
WBC #/AREA URNS AUTO: ABNORMAL /HPF

## 2019-08-06 PROCEDURE — 81001 URINALYSIS AUTO W/SCOPE: CPT | Performed by: NURSE PRACTITIONER

## 2019-08-06 PROCEDURE — 87086 URINE CULTURE/COLONY COUNT: CPT | Performed by: NURSE PRACTITIONER

## 2019-08-06 PROCEDURE — 80048 BASIC METABOLIC PNL TOTAL CA: CPT | Performed by: NURSE PRACTITIONER

## 2019-08-06 PROCEDURE — 36415 COLL VENOUS BLD VENIPUNCTURE: CPT | Performed by: NURSE PRACTITIONER

## 2019-08-06 PROCEDURE — 85025 COMPLETE CBC W/AUTO DIFF WBC: CPT | Performed by: NURSE PRACTITIONER

## 2019-08-06 NOTE — TELEPHONE ENCOUNTER
----- Message from SHILA Mitchell CNP sent at 8/6/2019  1:23 PM CDT -----  Please call her and let her know her urine did not look like an infection.  She had a little blood, which may indicate a kidney stone.  Has she ever had one of those previously?  If her back pain is worsening, she may need a CT scan to evaluate for that, but it does not appear to be a kidney or bladder infection at this point.     Electronically signed by Roshni Luevano CNP.

## 2019-08-06 NOTE — TELEPHONE ENCOUNTER
Spoke with patient and informed of results below. Patient understood. Has never had stones before. Will call clinic if symptoms get worse.     Bernadine Olson MA

## 2019-08-07 ENCOUNTER — OFFICE VISIT (OUTPATIENT)
Dept: FAMILY MEDICINE | Facility: CLINIC | Age: 49
End: 2019-08-07
Payer: COMMERCIAL

## 2019-08-07 ENCOUNTER — TELEPHONE (OUTPATIENT)
Dept: FAMILY MEDICINE | Facility: CLINIC | Age: 49
End: 2019-08-07

## 2019-08-07 VITALS
DIASTOLIC BLOOD PRESSURE: 82 MMHG | TEMPERATURE: 98.2 F | WEIGHT: 293 LBS | OXYGEN SATURATION: 95 % | SYSTOLIC BLOOD PRESSURE: 138 MMHG | HEIGHT: 72 IN | BODY MASS INDEX: 39.68 KG/M2 | HEART RATE: 74 BPM | RESPIRATION RATE: 18 BRPM

## 2019-08-07 DIAGNOSIS — N87.9 CERVICAL DYSPLASIA: Primary | ICD-10-CM

## 2019-08-07 DIAGNOSIS — Z01.812 PRE-OPERATIVE LABORATORY EXAMINATION: Primary | ICD-10-CM

## 2019-08-07 LAB
BACTERIA SPEC CULT: NO GROWTH
Lab: NORMAL
SPECIMEN SOURCE: NORMAL

## 2019-08-07 PROCEDURE — 99214 OFFICE O/P EST MOD 30 MIN: CPT | Performed by: OBSTETRICS & GYNECOLOGY

## 2019-08-07 ASSESSMENT — PAIN SCALES - GENERAL: PAINLEVEL: MILD PAIN (3)

## 2019-08-07 ASSESSMENT — MIFFLIN-ST. JEOR: SCORE: 2542.09

## 2019-08-07 NOTE — TELEPHONE ENCOUNTER
Type of surgery: Cold knife conization of cervix or LEEP surgery and endometrial biopsy.   Location of surgery: [facility] OR  Date and time of surgery: 08/29/2019 @ 1:00  Surgeon: Neil Cordova MD  Pre-Op Appt Date: 08/22/2019 @9:00AM  Post-Op Appt Date: 09/09/2019 @ 1:20 PM   Packet sent out: Yes  Pre-cert/Authorization completed:  Not Applicable  Jia Mott on 8/7/2019 at 2:43 PM

## 2019-08-07 NOTE — PROGRESS NOTES
Subjective: She is here to discuss results of the ultrasound. It showed:    FINDINGS:    The uterus measures 7.2 x 4.6 x 5.7 cm. There are two intramural  uterine fibroids measuring 1.3 x 1.3 x 1.4 cm and 2.0 x 1.6 x 1.9 cm,  respectively. Endometrial stripe thickness is 0.8 cm.     Right ovary measures 2.4 cm. The left ovary measures 2.6 cm.     No free fluid in the cul-de-sac.                                                                      IMPRESSION: Two intramural uterine fibroids measuring 1.4 and 2.0 cm,  respectively.     FREDDY LUNA MD      The past medical history, social history, past surgical history and family history as shown below have been reviewed by me today.    Past Medical History:   Diagnosis Date     Asthma      Obesity, morbid, BMI 50 or higher (H)      Papanicolaou smear of cervix with low grade squamous intraepithelial lesion (LGSIL) 3/23/2018        Allergies   Allergen Reactions     Walnuts [Nuts]      Unable to breathe     Shellfish Allergy      Nausea     Grass      SOB, asthma       Prednisone Other (See Comments)     Severe headache     Wheat      Rash, Derm     Current Outpatient Medications   Medication Sig Dispense Refill     cetirizine HCl 10 MG CAPS        Cyanocobalamin (VITAMIN B 12 PO) Take 1,000 mcg by mouth       FOLIC ACID PO Take 1 mg by mouth daily       order for DME Equipment ordered: RESMED Auto PAP Mask type: Nasal Settings: 8-15 CM H2O       VALERIAN ROOT PO Take by mouth as needed       VITAMIN E NATURAL PO        Past Surgical History:   Procedure Laterality Date     ORTHOPEDIC SURGERY       Social History     Socioeconomic History     Marital status: Single     Spouse name: None     Number of children: None     Years of education: None     Highest education level: None   Occupational History     None   Social Needs     Financial resource strain: None     Food insecurity:     Worry: None     Inability: None     Transportation needs:     Medical: None      "Non-medical: None   Tobacco Use     Smoking status: Never Smoker     Smokeless tobacco: Never Used   Substance and Sexual Activity     Alcohol use: Yes     Comment: social     Drug use: No     Sexual activity: Yes     Partners: Male     Birth control/protection: Pill, Injection   Lifestyle     Physical activity:     Days per week: None     Minutes per session: None     Stress: None   Relationships     Social connections:     Talks on phone: None     Gets together: None     Attends Oriental orthodox service: None     Active member of club or organization: None     Attends meetings of clubs or organizations: None     Relationship status: None     Intimate partner violence:     Fear of current or ex partner: None     Emotionally abused: None     Physically abused: None     Forced sexual activity: None   Other Topics Concern     Parent/sibling w/ CABG, MI or angioplasty before 65F 55M? Not Asked   Social History Narrative     None     Family History   Problem Relation Age of Onset     Uterine Cancer Mother      Uterine Cancer Maternal Aunt      Uterine Cancer Maternal Aunt      Uterine Cancer Maternal Aunt      Uterine Cancer Maternal Aunt      Uterine Cancer Maternal Aunt        ROS: A 12 point review of systems was done. Except for what is listed above in the HPI, the systems review is negative .      Objective: Vital signs: Blood pressure 138/82, pulse 74, temperature 98.2  F (36.8  C), temperature source Temporal, resp. rate 18, height 1.816 m (5' 11.5\"), weight (!) 181.3 kg (399 lb 11.2 oz), SpO2 95 %, not currently breastfeeding.    No exam is repeated today      Assessment/Plan:     1.  48-year-old female  0 with recent cervical biopsy showing high-grade LA and endocervical curettings showing atypical cells consistent with dysplasia.  I have advised cold knife conization of cervix or LEEP surgery depending upon what I am able to access of the cervix which will be potentially limited by her massive morbid " obesity.    I described the process of removing a cone of cervix for pathology. I told her this may need to be done with LEEP if surgical exposure warrants this. Either way, the specimen will be analyzed for margins and content. I explained that if margins are positive, more surgery may be necessary. I explained that the risks include but are not limited to bleeding, infection, injury to vaginal tissues, bladder and other organs such as bowel, and possible inability to remove all of the dysplastic tissue- the margins of the cone may be positive.also the risk of difficult conceiving if cervix is stenotic or possible failure to dilate and possible cervical incompetence.       She knows to have a preop exam and to be NPO for  8 hours  Prior to surgery. She knows that if hysterectomy is needed she will have to wait at least 6 weeks after the conization is done to allow the surgical site to heal.  A total of 25 minutes were spent face-to-face with this patient during today's consultation, with more than 50% of that time devoted to conversation and counseling about the management decisions.    I did caution her that I will not be able to know what option to employ until she is asleep and that she has greater than average risk because of her morbid obesity.  Surgical exposure may be very difficult since she is nulliparous and obese.  We will do my best to get the best exposure to the cervix and I am thinking there is a stronger chance than usual that I will need to employ the LEEP surgery over the cold knife cone.  Preference is cold knife cone if the cervix is accessible enough under anesthesia.  I did emphasize the option of a second opinion and she declined.    With respect to the ultrasound findings I explained that the ultrasound is essentially normal except for 2 small fibroids and there is no good indication based on this ultrasound that she needs a hysterectomy unless there is significant dysplasia noted on        The above information was dictating using Dragon voice software and as a result there may be some irregularities that were not detected in my review of this note.    MICAELA Cordova MD

## 2019-08-22 ENCOUNTER — OFFICE VISIT (OUTPATIENT)
Dept: FAMILY MEDICINE | Facility: OTHER | Age: 49
End: 2019-08-22
Payer: COMMERCIAL

## 2019-08-22 VITALS
WEIGHT: 293 LBS | BODY MASS INDEX: 39.68 KG/M2 | OXYGEN SATURATION: 96 % | SYSTOLIC BLOOD PRESSURE: 138 MMHG | DIASTOLIC BLOOD PRESSURE: 88 MMHG | HEART RATE: 74 BPM | RESPIRATION RATE: 24 BRPM | HEIGHT: 72 IN | TEMPERATURE: 98.1 F

## 2019-08-22 DIAGNOSIS — Z01.818 PRE-OPERATIVE EXAMINATION: Primary | ICD-10-CM

## 2019-08-22 DIAGNOSIS — E66.01 MORBID OBESITY (H): ICD-10-CM

## 2019-08-22 DIAGNOSIS — N87.9 CERVICAL DYSPLASIA: ICD-10-CM

## 2019-08-22 PROCEDURE — 99214 OFFICE O/P EST MOD 30 MIN: CPT | Performed by: NURSE PRACTITIONER

## 2019-08-22 ASSESSMENT — MIFFLIN-ST. JEOR: SCORE: 2525.3
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
## 2019-08-22 NOTE — PATIENT INSTRUCTIONS
Don't take any more Ibuprofen, Aleve, Naproxen or Aspirin prior to surgery.  Tylenol and/or prescription pain pills are okay to use if needed.       Before Your Surgery      Call your surgeon if there is any change in your health. This includes signs of a cold or flu (such as a sore throat, runny nose, cough, rash or fever).    Do not smoke, drink alcohol or take over the counter medicine (unless your surgeon or primary care doctor tells you to) for the 24 hours before and after surgery.    If you take prescribed drugs: Follow your doctor s orders about which medicines to take and which to stop until after surgery.    Eating and drinking prior to surgery: follow the instructions from your surgeon    Take a shower or bath the night before surgery. Use the soap your surgeon gave you to gently clean your skin. If you do not have soap from your surgeon, use your regular soap. Do not shave or scrub the surgery site.  Wear clean pajamas and have clean sheets on your bed.

## 2019-08-23 ENCOUNTER — AMBULATORY - HEALTHEAST (OUTPATIENT)
Dept: SURGERY | Facility: CLINIC | Age: 49
End: 2019-08-23

## 2019-08-23 DIAGNOSIS — E66.01 MORBID OBESITY (H): ICD-10-CM

## 2019-08-28 ENCOUNTER — ANESTHESIA EVENT (OUTPATIENT)
Dept: SURGERY | Facility: CLINIC | Age: 49
End: 2019-08-28
Payer: COMMERCIAL

## 2019-08-28 DIAGNOSIS — Z01.812 PRE-OPERATIVE LABORATORY EXAMINATION: ICD-10-CM

## 2019-08-28 LAB — B-HCG SERPL-ACNC: 1 IU/L (ref 0–5)

## 2019-08-28 PROCEDURE — 36415 COLL VENOUS BLD VENIPUNCTURE: CPT | Performed by: OBSTETRICS & GYNECOLOGY

## 2019-08-28 PROCEDURE — 84702 CHORIONIC GONADOTROPIN TEST: CPT | Performed by: OBSTETRICS & GYNECOLOGY

## 2019-08-29 ENCOUNTER — HOSPITAL ENCOUNTER (OUTPATIENT)
Facility: CLINIC | Age: 49
Discharge: HOME | End: 2019-08-29
Attending: OBSTETRICS & GYNECOLOGY | Admitting: OBSTETRICS & GYNECOLOGY
Payer: COMMERCIAL

## 2019-08-29 ENCOUNTER — SURGERY (OUTPATIENT)
Age: 49
End: 2019-08-29
Payer: COMMERCIAL

## 2019-08-29 ENCOUNTER — ANESTHESIA (OUTPATIENT)
Dept: SURGERY | Facility: CLINIC | Age: 49
End: 2019-08-29
Payer: COMMERCIAL

## 2019-08-29 VITALS
SYSTOLIC BLOOD PRESSURE: 169 MMHG | DIASTOLIC BLOOD PRESSURE: 88 MMHG | TEMPERATURE: 97.7 F | OXYGEN SATURATION: 100 % | RESPIRATION RATE: 18 BRPM | HEART RATE: 69 BPM

## 2019-08-29 DIAGNOSIS — Z98.890 S/P CONIZATION OF CERVIX: Primary | ICD-10-CM

## 2019-08-29 PROCEDURE — 25000132 ZZH RX MED GY IP 250 OP 250 PS 637: Performed by: OBSTETRICS & GYNECOLOGY

## 2019-08-29 PROCEDURE — 27210794 ZZH OR GENERAL SUPPLY STERILE: Performed by: OBSTETRICS & GYNECOLOGY

## 2019-08-29 PROCEDURE — 40000306 ZZH STATISTIC PRE PROC ASSESS II: Performed by: OBSTETRICS & GYNECOLOGY

## 2019-08-29 PROCEDURE — 25000128 H RX IP 250 OP 636: Performed by: OBSTETRICS & GYNECOLOGY

## 2019-08-29 PROCEDURE — 25800030 ZZH RX IP 258 OP 636: Performed by: OBSTETRICS & GYNECOLOGY

## 2019-08-29 PROCEDURE — 37000009 ZZH ANESTHESIA TECHNICAL FEE, EACH ADDTL 15 MIN: Performed by: OBSTETRICS & GYNECOLOGY

## 2019-08-29 PROCEDURE — 88342 IMHCHEM/IMCYTCHM 1ST ANTB: CPT | Mod: 26 | Performed by: OBSTETRICS & GYNECOLOGY

## 2019-08-29 PROCEDURE — 36000052 ZZH SURGERY LEVEL 2 EA 15 ADDTL MIN: Performed by: OBSTETRICS & GYNECOLOGY

## 2019-08-29 PROCEDURE — 88342 IMHCHEM/IMCYTCHM 1ST ANTB: CPT | Performed by: OBSTETRICS & GYNECOLOGY

## 2019-08-29 PROCEDURE — 88341 IMHCHEM/IMCYTCHM EA ADD ANTB: CPT | Performed by: OBSTETRICS & GYNECOLOGY

## 2019-08-29 PROCEDURE — 88307 TISSUE EXAM BY PATHOLOGIST: CPT | Performed by: OBSTETRICS & GYNECOLOGY

## 2019-08-29 PROCEDURE — 25800030 ZZH RX IP 258 OP 636: Performed by: NURSE ANESTHETIST, CERTIFIED REGISTERED

## 2019-08-29 PROCEDURE — 37000008 ZZH ANESTHESIA TECHNICAL FEE, 1ST 30 MIN: Performed by: OBSTETRICS & GYNECOLOGY

## 2019-08-29 PROCEDURE — 57522 CONIZATION OF CERVIX: CPT | Performed by: OBSTETRICS & GYNECOLOGY

## 2019-08-29 PROCEDURE — 36000050 ZZH SURGERY LEVEL 2 1ST 30 MIN: Performed by: OBSTETRICS & GYNECOLOGY

## 2019-08-29 PROCEDURE — 25000128 H RX IP 250 OP 636: Performed by: NURSE ANESTHETIST, CERTIFIED REGISTERED

## 2019-08-29 PROCEDURE — 88307 TISSUE EXAM BY PATHOLOGIST: CPT | Mod: 26 | Performed by: OBSTETRICS & GYNECOLOGY

## 2019-08-29 PROCEDURE — 88341 IMHCHEM/IMCYTCHM EA ADD ANTB: CPT | Mod: 26 | Performed by: OBSTETRICS & GYNECOLOGY

## 2019-08-29 PROCEDURE — 71000027 ZZH RECOVERY PHASE 2 EACH 15 MINS: Performed by: OBSTETRICS & GYNECOLOGY

## 2019-08-29 PROCEDURE — 25000125 ZZHC RX 250: Performed by: NURSE ANESTHETIST, CERTIFIED REGISTERED

## 2019-08-29 PROCEDURE — 25000125 ZZHC RX 250: Performed by: OBSTETRICS & GYNECOLOGY

## 2019-08-29 PROCEDURE — 88305 TISSUE EXAM BY PATHOLOGIST: CPT | Performed by: OBSTETRICS & GYNECOLOGY

## 2019-08-29 PROCEDURE — 88305 TISSUE EXAM BY PATHOLOGIST: CPT | Mod: 26,59 | Performed by: OBSTETRICS & GYNECOLOGY

## 2019-08-29 RX ORDER — CEFAZOLIN SODIUM 1 G/3ML
1 INJECTION, POWDER, FOR SOLUTION INTRAMUSCULAR; INTRAVENOUS SEE ADMIN INSTRUCTIONS
Status: DISCONTINUED | OUTPATIENT
Start: 2019-08-29 | End: 2019-08-29 | Stop reason: HOSPADM

## 2019-08-29 RX ORDER — PROPOFOL 10 MG/ML
INJECTION, EMULSION INTRAVENOUS CONTINUOUS PRN
Status: DISCONTINUED | OUTPATIENT
Start: 2019-08-29 | End: 2019-08-29

## 2019-08-29 RX ORDER — ONDANSETRON 2 MG/ML
4 INJECTION INTRAMUSCULAR; INTRAVENOUS ONCE
Status: COMPLETED | OUTPATIENT
Start: 2019-08-29 | End: 2019-08-29

## 2019-08-29 RX ORDER — LIDOCAINE HYDROCHLORIDE 20 MG/ML
INJECTION, SOLUTION INFILTRATION; PERINEURAL PRN
Status: DISCONTINUED | OUTPATIENT
Start: 2019-08-29 | End: 2019-08-29

## 2019-08-29 RX ORDER — OXYCODONE AND ACETAMINOPHEN 5; 325 MG/1; MG/1
1-2 TABLET ORAL EVERY 4 HOURS PRN
Qty: 6 TABLET | Refills: 0 | Status: SHIPPED | OUTPATIENT
Start: 2019-08-29 | End: 2019-11-22

## 2019-08-29 RX ORDER — SODIUM CHLORIDE, SODIUM LACTATE, POTASSIUM CHLORIDE, CALCIUM CHLORIDE 600; 310; 30; 20 MG/100ML; MG/100ML; MG/100ML; MG/100ML
INJECTION, SOLUTION INTRAVENOUS CONTINUOUS
Status: DISCONTINUED | OUTPATIENT
Start: 2019-08-29 | End: 2019-08-29 | Stop reason: HOSPADM

## 2019-08-29 RX ORDER — OXYCODONE AND ACETAMINOPHEN 5; 325 MG/1; MG/1
1-2 TABLET ORAL ONCE
Status: COMPLETED | OUTPATIENT
Start: 2019-08-29 | End: 2019-08-29

## 2019-08-29 RX ORDER — LIDOCAINE 40 MG/G
CREAM TOPICAL
Status: DISCONTINUED | OUTPATIENT
Start: 2019-08-29 | End: 2019-08-29 | Stop reason: HOSPADM

## 2019-08-29 RX ORDER — FERRIC SUBSULFATE 0.21 G/G
LIQUID TOPICAL PRN
Status: DISCONTINUED | OUTPATIENT
Start: 2019-08-29 | End: 2019-08-29 | Stop reason: HOSPADM

## 2019-08-29 RX ORDER — DOXYCYCLINE 100 MG/1
100 CAPSULE ORAL 2 TIMES DAILY
Qty: 6 CAPSULE | Refills: 0 | Status: SHIPPED | OUTPATIENT
Start: 2019-08-29 | End: 2019-11-22

## 2019-08-29 RX ORDER — CEFAZOLIN SODIUM IN 0.9 % NACL 3 G/100 ML
3 INTRAVENOUS SOLUTION, PIGGYBACK (ML) INTRAVENOUS
Status: COMPLETED | OUTPATIENT
Start: 2019-08-29 | End: 2019-08-29

## 2019-08-29 RX ORDER — BUPIVACAINE HYDROCHLORIDE AND EPINEPHRINE 2.5; 5 MG/ML; UG/ML
INJECTION, SOLUTION INFILTRATION; PERINEURAL PRN
Status: DISCONTINUED | OUTPATIENT
Start: 2019-08-29 | End: 2019-08-29 | Stop reason: HOSPADM

## 2019-08-29 RX ORDER — IBUPROFEN 600 MG/1
600 TABLET, FILM COATED ORAL EVERY 6 HOURS PRN
Qty: 20 TABLET | Refills: 1 | Status: SHIPPED | OUTPATIENT
Start: 2019-08-29 | End: 2019-11-22

## 2019-08-29 RX ORDER — FENTANYL CITRATE 50 UG/ML
INJECTION, SOLUTION INTRAMUSCULAR; INTRAVENOUS PRN
Status: DISCONTINUED | OUTPATIENT
Start: 2019-08-29 | End: 2019-08-29

## 2019-08-29 RX ORDER — PROPOFOL 10 MG/ML
INJECTION, EMULSION INTRAVENOUS PRN
Status: DISCONTINUED | OUTPATIENT
Start: 2019-08-29 | End: 2019-08-29

## 2019-08-29 RX ORDER — ONDANSETRON 4 MG/1
4-8 TABLET, ORALLY DISINTEGRATING ORAL EVERY 8 HOURS PRN
Qty: 4 TABLET | Refills: 0 | Status: SHIPPED | OUTPATIENT
Start: 2019-08-29 | End: 2019-11-22

## 2019-08-29 RX ADMIN — FENTANYL CITRATE 25 MCG: 50 INJECTION, SOLUTION INTRAMUSCULAR; INTRAVENOUS at 13:28

## 2019-08-29 RX ADMIN — LIDOCAINE HYDROCHLORIDE 0.1 ML: 10 INJECTION, SOLUTION EPIDURAL; INFILTRATION; INTRACAUDAL; PERINEURAL at 12:45

## 2019-08-29 RX ADMIN — PROPOFOL 20 MG: 10 INJECTION, EMULSION INTRAVENOUS at 13:25

## 2019-08-29 RX ADMIN — Medication 3 G: at 13:31

## 2019-08-29 RX ADMIN — PROPOFOL 20 MG: 10 INJECTION, EMULSION INTRAVENOUS at 13:29

## 2019-08-29 RX ADMIN — LIDOCAINE HYDROCHLORIDE 40 MG: 20 INJECTION, SOLUTION INFILTRATION; PERINEURAL at 13:21

## 2019-08-29 RX ADMIN — PROPOFOL 100 MCG/KG/MIN: 10 INJECTION, EMULSION INTRAVENOUS at 13:21

## 2019-08-29 RX ADMIN — SODIUM CHLORIDE, POTASSIUM CHLORIDE, SODIUM LACTATE AND CALCIUM CHLORIDE: 600; 310; 30; 20 INJECTION, SOLUTION INTRAVENOUS at 12:46

## 2019-08-29 RX ADMIN — MIDAZOLAM 1 MG: 1 INJECTION INTRAMUSCULAR; INTRAVENOUS at 13:17

## 2019-08-29 RX ADMIN — FENTANYL CITRATE 50 MCG: 50 INJECTION, SOLUTION INTRAMUSCULAR; INTRAVENOUS at 13:19

## 2019-08-29 RX ADMIN — PROPOFOL 20 MG: 10 INJECTION, EMULSION INTRAVENOUS at 13:31

## 2019-08-29 RX ADMIN — FENTANYL CITRATE 25 MCG: 50 INJECTION, SOLUTION INTRAMUSCULAR; INTRAVENOUS at 13:23

## 2019-08-29 RX ADMIN — PROPOFOL 20 MG: 10 INJECTION, EMULSION INTRAVENOUS at 13:21

## 2019-08-29 RX ADMIN — FAMOTIDINE 20 MG: 10 INJECTION, SOLUTION INTRAVENOUS at 12:46

## 2019-08-29 RX ADMIN — OXYCODONE HYDROCHLORIDE AND ACETAMINOPHEN 1 TABLET: 5; 325 TABLET ORAL at 14:22

## 2019-08-29 RX ADMIN — BUPIVACAINE HYDROCHLORIDE AND EPINEPHRINE BITARTRATE 18 ML: 2.5; .005 INJECTION, SOLUTION INFILTRATION; PERINEURAL at 13:52

## 2019-08-29 RX ADMIN — Medication 3 G: at 13:04

## 2019-08-29 RX ADMIN — ONDANSETRON 4 MG: 2 INJECTION INTRAMUSCULAR; INTRAVENOUS at 12:57

## 2019-08-29 RX ADMIN — MIDAZOLAM 1 MG: 1 INJECTION INTRAMUSCULAR; INTRAVENOUS at 13:19

## 2019-08-29 RX ADMIN — FERRIC SUBSULFATE 2 ML: 0.21 LIQUID TOPICAL at 13:52

## 2019-08-29 NOTE — OP NOTE
Procedure Date: 08/29/2019      PREOPERATIVE DIAGNOSIS:  Cervical dysplasia.      POSTOPERATIVE DIAGNOSIS:  Cervical dysplasia.      PROCEDURES:  LEEP conization of cervix and endometrial biopsy.      ANESTHESIA:  Monitored anesthesia care.      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      OPERATIVE FINDINGS:  The uterus was well suspended up inside the vaginal vault and difficult to access.  Therefore, I could not perform the cold knife conization that I had hoped for and I resorted to using the LEEP procedure because it was safer and improved access compared to the effort that would have been required with the cold knife conization.  I was able to remove a LEEP specimen as well as an endocervical curettage and an endometrial biopsy and 3 specimens were sent for pathology.      OPERATIVE DESCRIPTION:  After the establishment of monitored anesthesia care, the patient was prepped with Hibiclens externally.  I did not prep the cervix internally because she has a SHELLFISH ALLERGY and I was concerned about her reaction to iodine.  I did drain the bladder after prepping the area of the urethra with Hibiclens.  I did insert a Neal-coated speculum and then grasped the cervix anteriorly with a single-tooth tenaculum and injected circumferentially with 0.25% Marcaine with dilute epinephrine, I used 18 mL.  I then cut the LEEP cone and labeled it at 12 o'clock for analysis.  I used a single Vicryl suture for this.  I then obtained endocervical curettage and then an endometrial biopsy.  I then cauterized the base of the LEEP conization and applied some Monsel's solution and hemostasis was excellent.        Estimated blood loss was 10 mL.  She received 700 mL of crystalloid throughout the surgery.  The final sponge, needle and instrument counts were reported to me as correct and  she was taken to the recovery room in good condition without complications.         VIKRAM ARORA MD             D: 2019   T: 2019   MT: EMANUEL      Name:     DUANE WARREN   MRN:      [id number]        Account:        [id number]   :      1970           Procedure Date: 2019      Document: [id number]

## 2019-08-29 NOTE — ANESTHESIA PREPROCEDURE EVALUATION
Anesthesia Pre-Procedure Evaluation    Patient: Paola Lobo   MRN: [id number] : 1970          Preoperative Diagnosis: dysplasia    Procedure(s):  Cold knife conization of cervix or LEEP surgery  endometrial biopsy    Past Medical History:   Diagnosis Date     Asthma      Obesity, morbid, BMI 50 or higher (H)      Papanicolaou smear of cervix with low grade squamous intraepithelial lesion (LGSIL) 3/23/2018     Past Surgical History:   Procedure Laterality Date     ORTHOPEDIC SURGERY         Anesthesia Evaluation     . Pt has had prior anesthetic. Type: General           ROS/MED HX    ENT/Pulmonary:       Neurologic:  - neg neurologic ROS     Cardiovascular:     (+) hypertension----. : . . . :. . No previous cardiac testing       METS/Exercise Tolerance:     Hematologic:  - neg hematologic  ROS       Musculoskeletal:   (+) arthritis,  -       GI/Hepatic:  - neg GI/hepatic ROS       Renal/Genitourinary:  - ROS Renal section negative   (+) Pt has no history of transplant,       Endo:     (+) Obesity, .      Psychiatric:  - neg psychiatric ROS       Infectious Disease:  - neg infectious disease ROS       Malignancy:      - no malignancy   Other:    (+) No chance of pregnancy C-spine cleared: N/A, no H/O Chronic Pain,                        Physical Exam  Normal systems: cardiovascular, pulmonary and dental    Airway   Mallampati: III  TM distance: >3 FB  Neck ROM: full    Dental     Cardiovascular   Rhythm and rate: regular and normal      Pulmonary    breath sounds clear to auscultation            Lab Results   Component Value Date    WBC 8.1 2019    HGB 14.3 2019    HCT 43.8 2019     2019    CRP 22.9 (H) 2018    SED 30 (H) 2018     2019    POTASSIUM 4.1 2019    CHLORIDE 111 (H) 2019    CO2 24 2019    BUN 15 2019    CR 0.86 2019    GLC 95 2019    ALPA 9.0 2019    ALBUMIN 3.5 2018    PROTTOTAL 7.7  "03/23/2018    ALT 51 (H) 03/23/2018    AST 19 03/23/2018    ALKPHOS 86 03/23/2018    BILITOTAL 0.4 03/23/2018    HCG Negative 06/10/2019       Preop Vitals  BP Readings from Last 3 Encounters:   08/22/19 138/88   08/07/19 138/82   07/31/19 (!) 142/90    Pulse Readings from Last 3 Encounters:   08/22/19 74   08/07/19 74   07/31/19 72      Resp Readings from Last 3 Encounters:   08/22/19 24   08/07/19 18   07/31/19 16    SpO2 Readings from Last 3 Encounters:   08/22/19 96%   08/07/19 95%   07/31/19 97%      Temp Readings from Last 1 Encounters:   08/22/19 98.1  F (36.7  C) (Temporal)    Ht Readings from Last 1 Encounters:   08/22/19 1.816 m (5' 11.5\")      Wt Readings from Last 1 Encounters:   08/22/19 (!) 179.6 kg (396 lb)    Estimated body mass index is 54.46 kg/m  as calculated from the following:    Height as of 8/22/19: 1.816 m (5' 11.5\").    Weight as of 8/22/19: 179.6 kg (396 lb).       Anesthesia Plan      History & Physical Review  History and physical reviewed and following examination; no interval change.    ASA Status:  2 .    NPO Status:  > 8 hours    Plan for MAC with Intravenous and Propofol induction. Maintenance will be TIVA.  Reason for MAC:  Deep or markedly invasive procedure (G8)         Postoperative Care  Postoperative pain management:  Oral pain medications and IV analgesics.      Consents  Anesthetic plan, risks, benefits and alternatives discussed with:  Patient..                 SHILA Darnell CRNA  "

## 2019-08-29 NOTE — DISCHARGE INSTRUCTIONS
Discharge instructions for Dr. Cordova after your cervical conization surgery:         You will need to schedule a post operative appointment  Within the next week.. Please call [phone] or [phone]  to speak to Dr Cordova's nurse  or else call the main appointments line at [phone] if she is not available.         If you have unusually heavy vaginal bleeding, severe nausea with vomiting, or increased pain, or fever, call us at that same number to be seen earlier, or go to the emergency room if after hours or we are unavailable.          you should avoid intercourse for 2 weeks   after surgery.           you should not drive for 1 day   after surgery             you should not shower today but may resume showering tomorrow after the effects of anesthesia wear off.  Do not take a bath for 2 weeks after surgery.      You have been given a prescription for doxycycline 100 mg to take twice a day for 3 days. This is to help prevent infection. Stop taking it  if you break out in a rash or develop diarrhea. Also be careful with sun exposure because it can cause sunburn easier than usual.          If you have questions do not hesitate to call the office at above number. Thank you.         eNil Cordova MD, FACOG, FAAFP              , OB/GYN  Department.

## 2019-08-29 NOTE — ANESTHESIA CARE TRANSFER NOTE
Patient: Paola Lobo    Procedure(s):  LEEP  conization of cervix surgery  endometrial biopsy    Diagnosis: dysplasia  Diagnosis Additional Information: No value filed.    Anesthesia Type:   MAC     Note:  Airway :Room Air  Patient transferred to:Phase II  Handoff Report: Identifed the Patient, Identified the Reponsible Provider, Reviewed the pertinent medical history, Discussed the surgical course, Reviewed Intra-OP anesthesia mangement and issues during anesthesia, Set expectations for post-procedure period and Allowed opportunity for questions and acknowledgement of understanding      Vitals: (Last set prior to Anesthesia Care Transfer)    CRNA VITALS  8/29/2019 1321 - 8/29/2019 1358      8/29/2019             Pulse:  87    SpO2:  94 %    Resp Rate (observed):  21                Electronically Signed By: SHILA Darnell CRNA  August 29, 2019  1:58 PM

## 2019-08-29 NOTE — ANESTHESIA POSTPROCEDURE EVALUATION
Patient: Paola Lobo    Procedure(s):  LEEP  conization of cervix surgery  endometrial biopsy    Diagnosis:dysplasia  Diagnosis Additional Information: No value filed.    Anesthesia Type:  MAC    Note:  Anesthesia Post Evaluation    Patient location during evaluation: Phase 2  Patient participation: Able to fully participate in evaluation  Level of consciousness: awake and alert  Pain management: adequate  Airway patency: patent  Cardiovascular status: blood pressure returned to baseline  Respiratory status: spontaneous ventilation and room air  Hydration status: acceptable  PONV: none     Anesthetic complications: None    Comments: Patient was very pleased with her anesthesia today. No anesthesia concerns.         Last vitals:  Vitals:    08/29/19 1415 08/29/19 1420 08/29/19 1425   BP: (!) 163/82     Pulse: 72     Resp: 18     Temp:      SpO2: 100% 100% 100%         Electronically Signed By: SHILA Darnell CRNA  August 29, 2019  3:13 PM

## 2019-08-29 NOTE — OP NOTE
Brief Operative Note:  Preoperative Diagnosis:cervical dysplasia    Postoperative Diagnosis: same      Procedure: LEEP conization of cervix and endometrial biopsy    Anesthesia:   MAC(Monitored Anesthesia Care)      Surgeon: Neil Cordova MD    Findings: see dictation      Estimated blood loss:  10cc    Fluids:  700 cc crystalloid      Complications: none      See complete operative note-dictated separately.  MICAELA Cordova MD

## 2019-08-31 NOTE — OR NURSING
[facility] Same Day Surgery  Discharge Call Back  Paola Lobo  1970  MRN: [id number]  Home: [phone] (home)   PCP: Roshni Luevano    We are calling to see how you're doing since your surgery/procedure with us?   Comments: great  Clinical Questions  1. Have you had time to look at your discharge instructions? Do you have any questions in regards to the instructions?   Comment: no  2. Do you feel your pain is being controlled with the regimen the surgeon sent you home on? (ie: prescription medications, over the counter pain medications, ice packs)   Comments: no pain until today.  Now crampy, using ibuprofen  3. Have you noticed any drainage on your dressing? Do you know what to do if you have bleeding as a result of your procedure?   Comments: no vag bleeding noted  4. Have you had any nausea/vomiting? Do you know how to treat this?   Comment: none/yes  5. Have you had any signs/symptoms of infection? (ie: fever, swelling, heat, drainage or redness) Do you know what to do if you have?   Comment: no/yes  6. Do you have a follow up appointment made with your surgeon? Do you have a number to contact them at if you need it?   Comment: yes        You may be randomly selected to fill out a [facility] survey. We would appreciate you taking the time to fill this out. It is important to us if you would answer all of the questions on the survey.

## 2019-09-04 ENCOUNTER — TELEPHONE (OUTPATIENT)
Dept: FAMILY MEDICINE | Facility: OTHER | Age: 49
End: 2019-09-04

## 2019-09-04 ENCOUNTER — ALLIED HEALTH/NURSE VISIT (OUTPATIENT)
Dept: FAMILY MEDICINE | Facility: OTHER | Age: 49
End: 2019-09-04
Payer: COMMERCIAL

## 2019-09-04 ENCOUNTER — NURSE TRIAGE (OUTPATIENT)
Dept: FAMILY MEDICINE | Facility: CLINIC | Age: 49
End: 2019-09-04

## 2019-09-04 VITALS — DIASTOLIC BLOOD PRESSURE: 82 MMHG | SYSTOLIC BLOOD PRESSURE: 132 MMHG

## 2019-09-04 DIAGNOSIS — Z30.42 DEPO-PROVERA CONTRACEPTIVE STATUS: Primary | ICD-10-CM

## 2019-09-04 PROCEDURE — 96372 THER/PROPH/DIAG INJ SC/IM: CPT

## 2019-09-04 RX ADMIN — MEDROXYPROGESTERONE ACETATE 150 MG: 150 INJECTION, SUSPENSION INTRAMUSCULAR at 09:50

## 2019-09-04 NOTE — TELEPHONE ENCOUNTER
.Reason for call:  Patient reporting a symptom    Symptom or request: black vaginal discharge and increased spotting     Duration (how long have symptoms been present): started yesterday afternoon     Have you been treated for this before? No    Additional comments: Pt had a Leep last week. She is wondering if this is normal.     Phone Number patient can be reached at:  Home number on file [phone] (home)    Best Time:  Any     Can we leave a detailed message on this number:  YES    Call taken on 9/4/2019 at 11:55 AM by Rossy Hurt

## 2019-09-04 NOTE — NURSING NOTE
Clinic Administered Medication Documentation    MEDICATION LIST:   Depo Provera Documentation    Prior to injection, verified patient identity using patient's name and date of birth. Medication was administered. Please see MAR and medication order for additional information. Patient instructed to remain in clinic for 15 minutes and report any adverse reaction to staff immediately .    BP: 132/82    LAST PAP/EXAM:   Lab Results   Component Value Date    PAP ASC-US 05/08/2019     URINE HCG:not indicated    NEXT INJECTION DUE: 11/20/19 - 12/4/19    Was entire vial of medication used? Yes  Vial/Syringe: Single dose vial  Expiration Date:  1/2021

## 2019-09-04 NOTE — TELEPHONE ENCOUNTER
Additional Information    Negative: SEVERE vaginal bleeding (e.g., continuous red blood from vagina, or large blood clots) and very weak (can't stand)    Negative: Passed out (i.e., fainted, collapsed and was not responding)    Negative: Difficult to awaken or acting confused (e.g., disoriented, slurred speech)    Negative: Shock suspected (e.g., cold/pale/clammy skin, too weak to stand, low BP, rapid pulse)    Negative: Sounds like a life-threatening emergency to the triager    Negative: Pregnant > 20 weeks (5 months or more)    Negative: Pregnant < 20 weeks (less than 5 months)    Negative: Vaginal discharge is the main symptom and bleeding is slight    Negative: Postpartum < 1 month since delivery ('Did you recently give birth?')    Negative: SEVERE abdominal pain (e.g., excruciating)    Negative: SEVERE dizziness (e.g., unable to stand, requires support to walk, feels like passing out now)    Negative: SEVERE vaginal bleeding (i.e., soaking 2 pads or tampons per hour and present 2 or more hours; 1 menstrual cup every 2 hours)    Negative: MODERATE vaginal bleeding (i.e., soaking pad or tampon per hour and present > 6 hours; 1 menstrual cup every 6 hours)    Negative: Pale skin (pallor) of new onset or worsening    Negative: Constant abdominal pain lasting > 2 hours    Negative: Patient sounds very sick or weak to the triager    Negative: Taking Coumadin (warfarin) or other strong blood thinner, or known bleeding disorder (e.g., thrombocytopenia)    Negative: Skin bruises or nosebleed and not caused by an injury    Negative: Bleeding/spotting after procedure (e.g., biopsy) or pelvic examination (e.g., pap smear) that persists > 3 days    Negative: Patient wants to be seen    Negative: Passed tissue (e.g., gray-white)    Negative: Periods with > 6 soaked pads or tampons per day    Negative: Periods last > 7 days    Negative: Uses menstrual cups and more than 80 ml blood per menstrual period    Protocols used:  VAGINAL BLEEDING - [id number]

## 2019-09-06 ENCOUNTER — COMMUNICATION - HEALTHEAST (OUTPATIENT)
Dept: SURGERY | Facility: CLINIC | Age: 49
End: 2019-09-06

## 2019-09-09 ENCOUNTER — OFFICE VISIT (OUTPATIENT)
Dept: FAMILY MEDICINE | Facility: CLINIC | Age: 49
End: 2019-09-09
Payer: COMMERCIAL

## 2019-09-09 VITALS
RESPIRATION RATE: 20 BRPM | DIASTOLIC BLOOD PRESSURE: 90 MMHG | SYSTOLIC BLOOD PRESSURE: 140 MMHG | WEIGHT: 293 LBS | HEART RATE: 84 BPM | BODY MASS INDEX: 54.89 KG/M2 | TEMPERATURE: 99.1 F | OXYGEN SATURATION: 97 %

## 2019-09-09 DIAGNOSIS — Z09 POSTOPERATIVE EXAMINATION: Primary | ICD-10-CM

## 2019-09-09 PROCEDURE — 99024 POSTOP FOLLOW-UP VISIT: CPT | Performed by: OBSTETRICS & GYNECOLOGY

## 2019-09-09 ASSESSMENT — PAIN SCALES - GENERAL: PAINLEVEL: NO PAIN (0)

## 2019-09-09 NOTE — PROGRESS NOTES
Subjective: here for postop check from LEEP conization of cervix. She reports minimal bleeding and no discharge or fever.   Objective: BP (!) 150/86   Pulse 84   Temp 99.1  F (37.3  C) (Temporal)   Resp 20   Wt (!) 181 kg (399 lb 1.6 oz)   SpO2 97%   Breastfeeding? No   BMI 54.89 kg/m     Pelvic exam:My nurse was present to chaperone the exam. The external genitalia appeared normal. The vaginal vault was without bleeding or discharge or odor.  The cervix appears to be healing well at the conization site. No unusual discharge or bleeding noted.     BP rechekc was 140/90    Assessment: normal postop healing    Plan: pathology discussed with her.  It is listed below:     SPECIMEN(S):   A: Endometrial curettings   B: Cervical cone, 12 o'clock   C: Endocervical curettings     FINAL DIAGNOSIS:   Shawna Floyd may been because may be found within A. Endometrial   curettings:   - Benign endometrial polyp with non-phasic cystically inactive endometrial    glands with focal tubal metaplasia.     B. Cervical cone, 12 o'clock:   - Low-grade squamous intraepithelial lesion with koilocytosis.     C. Endocervical curettings:   - No diagnostic abnormalities identified, mucus with fragments of benign   endocervical glandular epithelium   without squamous epithelium.          The plan is to repeat a Pap in 4 months.  I suspect I have removed the dysplastic tissue  And the next Pap will come back normal.  If not we will address this appropriately.    Also I spoke to her about her blood pressure.  I advised considering treatment but she has been following this closely with her own physician and she checked it last week and it was 130/80.  She promises to get her blood pressure checked frequently in the next several weeks.  MICAELA Cordova MD

## 2019-09-10 ENCOUNTER — COMMUNICATION - HEALTHEAST (OUTPATIENT)
Dept: SURGERY | Facility: CLINIC | Age: 49
End: 2019-09-10

## 2019-09-24 LAB — COPATH REPORT: NORMAL

## 2019-10-03 ENCOUNTER — TRANSFERRED RECORDS (OUTPATIENT)
Dept: HEALTH INFORMATION MANAGEMENT | Facility: CLINIC | Age: 49
End: 2019-10-03

## 2019-10-03 ENCOUNTER — OFFICE VISIT - HEALTHEAST (OUTPATIENT)
Dept: SURGERY | Facility: CLINIC | Age: 49
End: 2019-10-03

## 2019-10-03 ENCOUNTER — AMBULATORY - HEALTHEAST (OUTPATIENT)
Dept: LAB | Facility: CLINIC | Age: 49
End: 2019-10-03

## 2019-10-03 DIAGNOSIS — Z87.09 HISTORY OF ASTHMA: ICD-10-CM

## 2019-10-03 DIAGNOSIS — I10 HYPERTENSION, UNSPECIFIED TYPE: ICD-10-CM

## 2019-10-03 DIAGNOSIS — E66.01 OBESITY, MORBID, BMI 50 OR HIGHER (H): ICD-10-CM

## 2019-10-03 DIAGNOSIS — Z86.018 HISTORY OF UTERINE FIBROID: ICD-10-CM

## 2019-10-03 DIAGNOSIS — R10.11 RUQ ABDOMINAL PAIN: ICD-10-CM

## 2019-10-03 DIAGNOSIS — L91.8 SKIN TAG: ICD-10-CM

## 2019-10-03 DIAGNOSIS — Z87.42 HX OF MENORRHAGIA: ICD-10-CM

## 2019-10-03 DIAGNOSIS — Z87.39 HISTORY OF ARTHRITIS: ICD-10-CM

## 2019-10-03 DIAGNOSIS — G47.33 OSA ON CPAP: ICD-10-CM

## 2019-10-03 LAB
ALBUMIN SERPL-MCNC: 4 G/DL (ref 3.5–5)
ALP SERPL-CCNC: 82 U/L (ref 45–120)
ALT SERPL W P-5'-P-CCNC: 24 U/L (ref 0–45)
ANION GAP SERPL CALCULATED.3IONS-SCNC: 12 MMOL/L (ref 5–18)
AST SERPL W P-5'-P-CCNC: 16 U/L (ref 0–40)
BILIRUB SERPL-MCNC: 0.5 MG/DL (ref 0–1)
BUN SERPL-MCNC: 12 MG/DL (ref 8–22)
CALCIUM SERPL-MCNC: 9.9 MG/DL (ref 8.5–10.5)
CHLORIDE BLD-SCNC: 107 MMOL/L (ref 98–107)
CHOLEST SERPL-MCNC: 197 MG/DL
CO2 SERPL-SCNC: 22 MMOL/L (ref 22–31)
CREAT SERPL-MCNC: 0.82 MG/DL (ref 0.6–1.1)
ERYTHROCYTE [DISTWIDTH] IN BLOOD BY AUTOMATED COUNT: 12.3 % (ref 11–14.5)
FASTING STATUS PATIENT QL REPORTED: ABNORMAL
FERRITIN SERPL-MCNC: 116 NG/ML (ref 10–130)
FOLATE SERPL-MCNC: 18.5 NG/ML
GFR SERPL CREATININE-BSD FRML MDRD: >60 ML/MIN/1.73M2
GLUCOSE BLD-MCNC: 90 MG/DL (ref 70–125)
HBA1C MFR BLD: 5.7 % (ref 3.5–6)
HCT VFR BLD AUTO: 43.6 % (ref 35–47)
HDLC SERPL-MCNC: 40 MG/DL
HGB BLD-MCNC: 14.4 G/DL (ref 12–16)
LDLC SERPL CALC-MCNC: 132 MG/DL
MCH RBC QN AUTO: 29.1 PG (ref 27–34)
MCHC RBC AUTO-ENTMCNC: 33.1 G/DL (ref 32–36)
MCV RBC AUTO: 88 FL (ref 80–100)
PLATELET # BLD AUTO: 343 THOU/UL (ref 140–440)
PMV BLD AUTO: 7.3 FL (ref 7–10)
POTASSIUM BLD-SCNC: 4.5 MMOL/L (ref 3.5–5)
PROT SERPL-MCNC: 7.6 G/DL (ref 6–8)
PTH-INTACT SERPL-MCNC: 43 PG/ML (ref 10–86)
RBC # BLD AUTO: 4.95 MILL/UL (ref 3.8–5.4)
SODIUM SERPL-SCNC: 141 MMOL/L (ref 136–145)
TRIGL SERPL-MCNC: 123 MG/DL
TSH SERPL DL<=0.005 MIU/L-ACNC: 4.32 UIU/ML (ref 0.3–5)
VIT B12 SERPL-MCNC: >2000 PG/ML (ref 213–816)
WBC: 9.2 THOU/UL (ref 4–11)

## 2019-10-03 ASSESSMENT — MIFFLIN-ST. JEOR
SCORE: 2514.17
SCORE: 2514.17

## 2019-10-04 LAB — 25(OH)D3 SERPL-MCNC: 24.2 NG/ML (ref 30–80)

## 2019-10-06 LAB
ANNOTATION COMMENT IMP: NORMAL
VIT A SERPL-MCNC: 0.55 MG/L (ref 0.3–1.2)
VITAMIN A (RETINYL PALMITATE): <0.02 MG/L (ref 0–0.1)
ZINC SERPL-MCNC: 75.8 UG/DL (ref 60–120)

## 2019-10-08 ENCOUNTER — MYC MEDICAL ADVICE (OUTPATIENT)
Dept: FAMILY MEDICINE | Facility: OTHER | Age: 49
End: 2019-10-08

## 2019-10-08 DIAGNOSIS — I10 HYPERTENSION, UNSPECIFIED TYPE: Primary | ICD-10-CM

## 2019-10-08 LAB — VIT B1 PYROPHOSHATE BLD-SCNC: 94 NMOL/L (ref 70–180)

## 2019-10-09 ENCOUNTER — COMMUNICATION - HEALTHEAST (OUTPATIENT)
Dept: SURGERY | Facility: CLINIC | Age: 49
End: 2019-10-09

## 2019-10-09 ENCOUNTER — AMBULATORY - HEALTHEAST (OUTPATIENT)
Dept: SURGERY | Facility: CLINIC | Age: 49
End: 2019-10-09

## 2019-10-09 RX ORDER — LISINOPRIL 10 MG/1
10 TABLET ORAL DAILY
Qty: 30 TABLET | Refills: 1 | Status: SHIPPED | OUTPATIENT
Start: 2019-10-09 | End: 2019-11-25

## 2019-10-10 ENCOUNTER — HOSPITAL ENCOUNTER (OUTPATIENT)
Dept: ULTRASOUND IMAGING | Facility: HOSPITAL | Age: 49
Discharge: HOME | End: 2019-10-10
Attending: EMERGENCY MEDICINE

## 2019-10-10 DIAGNOSIS — R10.11 RUQ ABDOMINAL PAIN: ICD-10-CM

## 2019-10-14 ENCOUNTER — COMMUNICATION - HEALTHEAST (OUTPATIENT)
Dept: SURGERY | Facility: CLINIC | Age: 49
End: 2019-10-14

## 2019-10-24 ENCOUNTER — TELEPHONE (OUTPATIENT)
Dept: FAMILY MEDICINE | Facility: OTHER | Age: 49
End: 2019-10-24

## 2019-10-24 NOTE — TELEPHONE ENCOUNTER
Panel Management Review      Patient has the following on her problem list:     Hypertension   Last three blood pressure readings:  BP Readings from Last 3 Encounters:   09/09/19 (!) 140/90   09/04/19 132/82   08/29/19 (!) 169/88     Blood pressure: FAILED    HTN Guidelines:  Less than 140/90      Composite cancer screening  Chart review shows that this patient is due/due soon for the following None  Summary:    Patient is due/failing the following:   Flu shot and BP CHECK    Action needed:   Patient needs nurse only appointment.    Type of outreach:    Phone, left message for patient to call back.     Questions for provider review:    None                                                                                                                                    ................Nehemiah Lang LPN,   October 24, 2019,      1:21 PM,   [facility]

## 2019-10-25 ENCOUNTER — ALLIED HEALTH/NURSE VISIT (OUTPATIENT)
Dept: FAMILY MEDICINE | Facility: OTHER | Age: 49
End: 2019-10-25
Payer: COMMERCIAL

## 2019-10-25 VITALS — HEART RATE: 76 BPM | DIASTOLIC BLOOD PRESSURE: 88 MMHG | SYSTOLIC BLOOD PRESSURE: 132 MMHG

## 2019-10-25 DIAGNOSIS — Z01.30 BLOOD PRESSURE CHECK: Primary | ICD-10-CM

## 2019-10-25 PROCEDURE — 99207 ZZC NO CHARGE NURSE ONLY: CPT

## 2019-10-25 NOTE — PROGRESS NOTES
Paola Lobo is a 48 year old patient who comes in today for a Blood Pressure check.  Initial BP:  /88 (BP Location: Left arm, Patient Position: Sitting, Cuff Size: Adult Large)   Pulse 76      76  Disposition: follow-up as previously indicated by provider and results routed to provider.     Bernadine Olson MA

## 2019-10-29 ENCOUNTER — HEALTH MAINTENANCE LETTER (OUTPATIENT)
Age: 49
End: 2019-10-29

## 2019-11-04 ENCOUNTER — OFFICE VISIT - HEALTHEAST (OUTPATIENT)
Dept: SURGERY | Facility: CLINIC | Age: 49
End: 2019-11-04

## 2019-11-04 DIAGNOSIS — I10 HYPERTENSION, UNSPECIFIED TYPE: ICD-10-CM

## 2019-11-04 DIAGNOSIS — Z71.3 NUTRITIONAL COUNSELING: ICD-10-CM

## 2019-11-04 DIAGNOSIS — E66.01 OBESITY, MORBID, BMI 50 OR HIGHER (H): ICD-10-CM

## 2019-11-08 ENCOUNTER — TRANSFERRED RECORDS (OUTPATIENT)
Dept: HEALTH INFORMATION MANAGEMENT | Facility: CLINIC | Age: 49
End: 2019-11-08

## 2019-11-08 ENCOUNTER — HOSPITAL ENCOUNTER (OUTPATIENT)
Dept: MAMMOGRAPHY | Facility: CLINIC | Age: 49
Discharge: HOME | End: 2019-11-08

## 2019-11-08 DIAGNOSIS — Z12.31 VISIT FOR SCREENING MAMMOGRAM: ICD-10-CM

## 2019-11-13 ENCOUNTER — OFFICE VISIT - HEALTHEAST (OUTPATIENT)
Dept: SURGERY | Facility: CLINIC | Age: 49
End: 2019-11-13

## 2019-11-13 DIAGNOSIS — E66.01 MORBID OBESITY (H): ICD-10-CM

## 2019-11-15 ENCOUNTER — AMBULATORY - HEALTHEAST (OUTPATIENT)
Dept: SURGERY | Facility: CLINIC | Age: 49
End: 2019-11-15

## 2019-11-18 ENCOUNTER — OFFICE VISIT (OUTPATIENT)
Dept: FAMILY MEDICINE | Facility: OTHER | Age: 49
End: 2019-11-18
Payer: COMMERCIAL

## 2019-11-18 VITALS
DIASTOLIC BLOOD PRESSURE: 90 MMHG | RESPIRATION RATE: 22 BRPM | TEMPERATURE: 97.6 F | SYSTOLIC BLOOD PRESSURE: 144 MMHG | HEART RATE: 74 BPM | OXYGEN SATURATION: 99 % | BODY MASS INDEX: 54.24 KG/M2 | WEIGHT: 293 LBS

## 2019-11-18 DIAGNOSIS — N92.0 EXCESSIVE OR FREQUENT MENSTRUATION: ICD-10-CM

## 2019-11-18 DIAGNOSIS — I10 BENIGN ESSENTIAL HYPERTENSION: Primary | ICD-10-CM

## 2019-11-18 DIAGNOSIS — E66.01 MORBID OBESITY (H): ICD-10-CM

## 2019-11-18 DIAGNOSIS — N87.9 CERVICAL DYSPLASIA: ICD-10-CM

## 2019-11-18 DIAGNOSIS — Z23 NEED FOR PROPHYLACTIC VACCINATION AND INOCULATION AGAINST INFLUENZA: ICD-10-CM

## 2019-11-18 PROCEDURE — 99213 OFFICE O/P EST LOW 20 MIN: CPT | Mod: 25 | Performed by: FAMILY MEDICINE

## 2019-11-18 PROCEDURE — 90471 IMMUNIZATION ADMIN: CPT | Performed by: FAMILY MEDICINE

## 2019-11-18 PROCEDURE — 90686 IIV4 VACC NO PRSV 0.5 ML IM: CPT | Performed by: FAMILY MEDICINE

## 2019-11-18 RX ORDER — ALBUTEROL SULFATE 90 UG/1
2 AEROSOL, METERED RESPIRATORY (INHALATION)
COMMUNITY
Start: 2019-10-03 | End: 2023-08-18

## 2019-11-18 ASSESSMENT — PAIN SCALES - GENERAL: PAINLEVEL: MODERATE PAIN (4)

## 2019-11-18 NOTE — PROGRESS NOTES
Subjective     Paola Lobo is a 48 year old female who presents to clinic today for the following health issues:    HPI   Discuss different birth control options.     She is seeing Dr. Brito for consideration for gastric sleeve. He recommends discontinuation of Depo Provera.    Plan:  1. Welcome to the bariatric surgery program, read through your manual a couple times monthly.  2. Intake labs can be done today.  3. RUQ ultrasound to evaluate your discomfort/gallbladder and liver.  4. Aiming for a weight under 390 lbs prior to meeting with the surgeon. Less is better.  5. Update mammogram and colonoscopy this fall/winter.  6. Anticipate actigall for 6 months unless clear gallbladder disease present to reduce the risk of gallstones.  7. STop Copaiba oils by mouth the month prior to surgery.  8. No alcohol the month prior to surgery and for 6 months minimum after.  9. Bring CPAP to your procedure.  10. Attend one support group meeting.  11. Follow up with PCP or GYN to discuss non Depo Provera treatment of menorrhagia/heavy menses due to obesogenic nature of Depo Provera. IUD/Ablation/pill is preferable but OCP pills would need to be held one month prior to surgery and restarted one month after to reduce the risk of blood clots/DVT    She recently underwent LEEP with Dr. Cordova 2 months ago for cervical dysplasia and has follow up pap with Dr. Cordova scheduled in 2 months. She has family history of uterine cancer. She wouldn't be good candidate for CPC. Suggest Mirena IUD.           Patient Active Problem List   Diagnosis     Morbid obesity (H)     Excessive or frequent menstruation     Intramural and subserous leiomyoma of uterus     Impingement syndrome, shoulder, right     Papanicolaou smear of cervix with low grade squamous intraepithelial lesion (LGSIL)     Benign essential hypertension     Past Surgical History:   Procedure Laterality Date     BIOPSY CERVICAL, LOCAL EXCISION, SINGLE/MULTIPLE  N/A 8/29/2019    Procedure: endometrial biopsy;  Surgeon: Neil Cordova MD;  Location: [facility]     CONIZATION LEEP N/A 8/29/2019    Procedure: LEEP  conization of cervix surgery;  Surgeon: Neil Cordova MD;  Location: [facility] OR     ORTHOPEDIC SURGERY         Social History     Tobacco Use     Smoking status: Never Smoker     Smokeless tobacco: Never Used   Substance Use Topics     Alcohol use: Yes     Comment: social     Family History   Problem Relation Age of Onset     Uterine Cancer Mother      Uterine Cancer Maternal Aunt      Uterine Cancer Maternal Aunt      Uterine Cancer Maternal Aunt      Uterine Cancer Maternal Aunt      Uterine Cancer Maternal Aunt          Current Outpatient Medications   Medication Sig Dispense Refill     albuterol (PROAIR HFA/PROVENTIL HFA/VENTOLIN HFA) 108 (90 Base) MCG/ACT inhaler Inhale 2 puffs into the lungs       cetirizine HCl 10 MG CAPS        Cyanocobalamin (VITAMIN B 12 PO) Take 1,000 mcg by mouth       FOLIC ACID PO Take 1 mg by mouth daily       lisinopril (PRINIVIL/ZESTRIL) 10 MG tablet Take 1 tablet (10 mg) by mouth daily 30 tablet 1     Omeprazole (PRILOSEC PO) Take 20 mg by mouth       order for DME Equipment ordered: RESMED Auto PAP Mask type: Nasal Settings: 8-15 CM H2O       VITAMIN E NATURAL PO        ibuprofen (ADVIL/MOTRIN) 600 MG tablet Take 1 tablet (600 mg) by mouth every 6 hours as needed for moderate pain (Patient not taking: Reported on 11/18/2019) 20 tablet 1     ondansetron (ZOFRAN-ODT) 4 MG ODT tab Take 1-2 tablets (4-8 mg) by mouth every 8 hours as needed for nausea (Patient not taking: Reported on 9/9/2019) 4 tablet 0     oxyCODONE-acetaminophen (PERCOCET) 5-325 MG tablet Take 1-2 tablets by mouth every 4 hours as needed for moderate to severe pain (Patient not taking: Reported on 9/9/2019) 6 tablet 0     VALERIAN ROOT PO Take by mouth as needed       Allergies   Allergen Reactions     Walnuts [Nuts]      Unable to breathe      Shellfish Allergy      Nausea     Grass      SOB, asthma       Plasticized Base [Bht-Mo-Polyethylene]      Can't wear Latex either or Skin peels     Prednisone Other (See Comments)     Severe headache     Wheat      Rash, Derm     Recent Labs   Lab Test 08/06/19  0858 07/02/18  0946 03/23/18  0808  02/16/17  0839 11/19/13  1416   A1C  --   --   --   --   --  5.7   LDL  --   --  86  --  103*  --    HDL  --   --  39*  --  41*  --    TRIG  --   --  90  --  136  --    ALT  --   --  51*  --   --   --    CR 0.86 0.76 0.69   < >  --   --    GFRESTIMATED 79 81 >90   < >  --   --    GFRESTBLACK >90 >90 >90   < >  --   --    POTASSIUM 4.1 3.4 4.1   < >  --   --     < > = values in this interval not displayed.      BP Readings from Last 3 Encounters:   11/18/19 (!) 144/90   10/25/19 132/88   09/09/19 (!) 140/90    Wt Readings from Last 3 Encounters:   11/18/19 (!) 178.9 kg (394 lb 6 oz)   09/09/19 (!) 181 kg (399 lb 1.6 oz)   08/22/19 (!) 179.6 kg (396 lb)                      Reviewed and updated as needed this visit by Provider  Tobacco  Allergies  Meds  Problems  Med Hx  Surg Hx  Fam Hx         Review of Systems   ROS COMP: Constitutional, HEENT, cardiovascular, pulmonary, gi and gu systems are negative, except as otherwise noted.      Objective    BP (!) 144/90 (Patient Position: Chair, Cuff Size: Adult Regular)   Pulse 74   Temp 97.6  F (36.4  C) (Temporal)   Resp 22   Wt (!) 178.9 kg (394 lb 6 oz)   SpO2 99%   BMI 54.24 kg/m    Body mass index is 54.24 kg/m .  Physical Exam   GENERAL: alert, no distress and obese  NECK: no adenopathy, no asymmetry, masses, or scars and thyroid normal to palpation  RESP: lungs clear to auscultation - no rales, rhonchi or wheezes  CV: regular rate and rhythm, normal S1 S2, no S3 or S4, no murmur, click or rub, no peripheral edema and peripheral pulses strong    Diagnostic Test Results:  Labs reviewed in Epic        Assessment & Plan     1. Excessive or frequent  "menstruation  She should follow up with her OB/Gyn, Dr. Cordova to discuss options for metrorrhagia other than systemic progestin. Mirena IUD discussed. Will message Dr. Cordova.   - OB/GYN REFERRAL    2. Morbid obesity (H)  She should follow up with her OB/Gyn, Dr. Cordova to discuss options for metrorrhagia other than systemic progestin. Mirena IUD discussed. Will message Dr. Cordova.   - OB/GYN REFERRAL    3. Cervical dysplasia  She should follow up with her OB/Gyn, Dr. Cordova to discuss options for metrorrhagia other than systemic progestin. Mirena IUD discussed. Will message Dr. Cordova.   - OB/GYN REFERRAL    4. Need for prophylactic vaccination and inoculation against influenza  - INFLUENZA VACCINE IM > 6 MONTHS VALENT IIV4 [65647]    5. Benign essential hypertension  Still elevated. Follow up recheck with Roshni Luevano in 2 weeks.       BMI:   Estimated body mass index is 54.24 kg/m  as calculated from the following:    Height as of 8/22/19: 1.816 m (5' 11.5\").    Weight as of this encounter: 178.9 kg (394 lb 6 oz).           Work on weight loss  Regular exercise    No follow-ups on file.    Lele Mancuso MD  [facility]      "

## 2019-11-22 ENCOUNTER — OFFICE VISIT (OUTPATIENT)
Dept: FAMILY MEDICINE | Facility: CLINIC | Age: 49
End: 2019-11-22
Payer: COMMERCIAL

## 2019-11-22 VITALS
BODY MASS INDEX: 54.79 KG/M2 | TEMPERATURE: 97.4 F | HEART RATE: 66 BPM | RESPIRATION RATE: 20 BRPM | WEIGHT: 293 LBS | SYSTOLIC BLOOD PRESSURE: 134 MMHG | DIASTOLIC BLOOD PRESSURE: 70 MMHG | OXYGEN SATURATION: 98 %

## 2019-11-22 DIAGNOSIS — N92.1 MENORRHAGIA WITH IRREGULAR CYCLE: ICD-10-CM

## 2019-11-22 DIAGNOSIS — N95.1 SYMPTOMATIC MENOPAUSAL OR FEMALE CLIMACTERIC STATES: Primary | ICD-10-CM

## 2019-11-22 LAB
ESTRADIOL SERPL-MCNC: 20 PG/ML
FSH SERPL-ACNC: 27.6 IU/L

## 2019-11-22 PROCEDURE — 82670 ASSAY OF TOTAL ESTRADIOL: CPT | Performed by: OBSTETRICS & GYNECOLOGY

## 2019-11-22 PROCEDURE — 36415 COLL VENOUS BLD VENIPUNCTURE: CPT | Performed by: OBSTETRICS & GYNECOLOGY

## 2019-11-22 PROCEDURE — 99213 OFFICE O/P EST LOW 20 MIN: CPT | Performed by: OBSTETRICS & GYNECOLOGY

## 2019-11-22 PROCEDURE — 83001 ASSAY OF GONADOTROPIN (FSH): CPT | Performed by: OBSTETRICS & GYNECOLOGY

## 2019-11-22 ASSESSMENT — PAIN SCALES - GENERAL: PAINLEVEL: MILD PAIN (2)

## 2019-11-22 NOTE — PROGRESS NOTES
Subjective:  She wants Mirena IUD for menses control. Uses dep[oprovera now but her bariatric physician told her that it is increasing her appetite and she should get off of it.  They are considering a gastric sleeve surgery.  He has had an ultrasound which shows 2 small fibroids.  They are intramural.      The past medical history, social history, past surgical history and family history as shown below have been reviewed by me today.    Past Medical History:   Diagnosis Date     Asthma      Obesity, morbid, BMI 50 or higher (H)      Papanicolaou smear of cervix with low grade squamous intraepithelial lesion (LGSIL) 3/23/2018        Allergies   Allergen Reactions     Walnuts [Nuts]      Unable to breathe     Shellfish Allergy      Nausea     Grass      SOB, asthma       Plasticized Base [Bht-Mo-Polyethylene]      Can't wear Latex either or Skin peels     Prednisone Other (See Comments)     Severe headache     Wheat      Rash, Derm     Current Outpatient Medications   Medication Sig Dispense Refill     albuterol (PROAIR HFA/PROVENTIL HFA/VENTOLIN HFA) 108 (90 Base) MCG/ACT inhaler Inhale 2 puffs into the lungs       cetirizine HCl 10 MG CAPS        Cyanocobalamin (VITAMIN B 12 PO) Take 1,000 mcg by mouth       FOLIC ACID PO Take 1 mg by mouth daily       lisinopril (PRINIVIL/ZESTRIL) 10 MG tablet Take 1 tablet (10 mg) by mouth daily 30 tablet 1     Omeprazole (PRILOSEC PO) Take 20 mg by mouth       order for DME Equipment ordered: RESMED Auto PAP Mask type: Nasal Settings: 8-15 CM H2O       VALERIAN ROOT PO Take by mouth as needed       VITAMIN E NATURAL PO        Past Surgical History:   Procedure Laterality Date     BIOPSY CERVICAL, LOCAL EXCISION, SINGLE/MULTIPLE N/A 8/29/2019    Procedure: endometrial biopsy;  Surgeon: Neil Cordova MD;  Location: [facility]     CONIZATION LEEP N/A 8/29/2019    Procedure: LEEP  conization of cervix surgery;  Surgeon: Neil Cordova MD;  Location: [facility] OR      ORTHOPEDIC SURGERY       Social History     Socioeconomic History     Marital status: Single     Spouse name: None     Number of children: None     Years of education: None     Highest education level: None   Occupational History     None   Social Needs     Financial resource strain: None     Food insecurity:     Worry: None     Inability: None     Transportation needs:     Medical: None     Non-medical: None   Tobacco Use     Smoking status: Never Smoker     Smokeless tobacco: Never Used   Substance and Sexual Activity     Alcohol use: Yes     Comment: social     Drug use: No     Sexual activity: Yes     Partners: Male     Birth control/protection: Pill, Injection   Lifestyle     Physical activity:     Days per week: None     Minutes per session: None     Stress: None   Relationships     Social connections:     Talks on phone: None     Gets together: None     Attends Hinduism service: None     Active member of club or organization: None     Attends meetings of clubs or organizations: None     Relationship status: None     Intimate partner violence:     Fear of current or ex partner: None     Emotionally abused: None     Physically abused: None     Forced sexual activity: None   Other Topics Concern     Parent/sibling w/ CABG, MI or angioplasty before 65F 55M? Not Asked   Social History Narrative     None     Family History   Problem Relation Age of Onset     Uterine Cancer Mother      Uterine Cancer Maternal Aunt      Uterine Cancer Maternal Aunt      Uterine Cancer Maternal Aunt      Uterine Cancer Maternal Aunt      Uterine Cancer Maternal Aunt        ROS: A 12 point review of systems was done. Except for what is listed above in the HPI, the systems review is negative .  However she does have some menopausal symptoms and would like her estrogen levels checked.    Objective: Vital signs: Blood pressure 134/70, pulse 66, temperature 97.4  F (36.3  C), temperature source Temporal, resp. rate 20, weight (!) 180.7 kg  (398 lb 6.4 oz), SpO2 98 %, not currently breastfeeding.    No exam is repeated today.    Assessment/Plan: A total of 20 minutes were spent face-to-face with this patient during today's consultation, with more than 50% of that time devoted to conversation and counseling about the management decisions.      1.  Menopausal symptoms- see orders for FSH and estradiol testing today.    2.  menorrhagia:She has heavy menses if she is not using Depo-Provera.  She would like to change to the Mirena IUD.  Since she is nulliparous I did advise her to consider the Ninoska instead.  She will call when her next menses starts and we will insert the Ninoska.  We did discuss risks and benefits in detail today.  Including risk of migration and that the strings might get lost and also that she might have cramping and it may not fit well because of the fibroids.         The above information was dictating using Dragon voice software and as a result there may be some irregularities that were not detected in my review of this note.    MICAELA Cordova MD

## 2019-11-24 NOTE — RESULT ENCOUNTER NOTE
Roshni/Jia,Please inform Paola/ or caretaker  that this result(s) is/are showing that she is in menopause and so her symptoms should improve fairly soon but I think the  IUD is a good idea anyway- thanks. MICAELA Cordova MD

## 2019-11-25 ENCOUNTER — TELEPHONE (OUTPATIENT)
Dept: FAMILY MEDICINE | Facility: OTHER | Age: 49
End: 2019-11-25

## 2019-11-25 ENCOUNTER — OFFICE VISIT (OUTPATIENT)
Dept: FAMILY MEDICINE | Facility: OTHER | Age: 49
End: 2019-11-25
Payer: COMMERCIAL

## 2019-11-25 VITALS
DIASTOLIC BLOOD PRESSURE: 64 MMHG | TEMPERATURE: 98.9 F | WEIGHT: 293 LBS | BODY MASS INDEX: 54.05 KG/M2 | OXYGEN SATURATION: 99 % | HEART RATE: 86 BPM | RESPIRATION RATE: 20 BRPM | SYSTOLIC BLOOD PRESSURE: 119 MMHG

## 2019-11-25 DIAGNOSIS — I10 HYPERTENSION, UNSPECIFIED TYPE: Primary | ICD-10-CM

## 2019-11-25 DIAGNOSIS — K80.20 GALLSTONES: ICD-10-CM

## 2019-11-25 DIAGNOSIS — R10.13 EPIGASTRIC PAIN: ICD-10-CM

## 2019-11-25 DIAGNOSIS — Z12.11 SPECIAL SCREENING FOR MALIGNANT NEOPLASMS, COLON: ICD-10-CM

## 2019-11-25 PROCEDURE — 99214 OFFICE O/P EST MOD 30 MIN: CPT | Performed by: NURSE PRACTITIONER

## 2019-11-25 RX ORDER — LISINOPRIL 10 MG/1
10 TABLET ORAL DAILY
Qty: 90 TABLET | Refills: 1 | Status: SHIPPED | OUTPATIENT
Start: 2019-11-25 | End: 2020-06-16

## 2019-11-25 ASSESSMENT — PAIN SCALES - GENERAL: PAINLEVEL: MILD PAIN (3)

## 2019-11-25 NOTE — PATIENT INSTRUCTIONS
Consult with Dr. Barriga - the general surgeon.     Eat a no fat diet as much as you can.

## 2019-11-25 NOTE — PROGRESS NOTES
Subjective     Paola Lobo is a 48 year old female who presents to clinic today for the following health issues:          HPI   ABDOMINAL   PAIN     Onset: 3 weeks ago    Description:   Character: Dull ache and feels like it needs to to be held  Location: in the gallbladder area   Radiation: Back    Intensity: moderate    Progression of Symptoms:  same and constant    Accompanying Signs & Symptoms:  Fever/Chills?: no   Gas/Bloating: YES-  Nausea: no   Vomitting: no   Diarrhea?: no   Constipation:YES  Dysuria or Hematuria: no    History:   Trauma: YES  Previous similar pain: no    Previous tests done: Ultrasound     Precipitating factors:   Does the pain change with:     Food: YES fatty foods    BM: no     Urination: no     Alleviating factors:  none    Therapies Tried and outcome: limited fatty foods    LMP:  not applicable       Her bariatic surgeon ordered an ultrasound after he noted some RUQ tenderness on her exam as part of her consult for weight loss surgery.  Results showed gallstones:       EXAM: US ABDOMEN LIMITED  LOCATION: [facility]  DATE/TIME: 10/10/2019 10:18 AM    INDICATION: ruq pain  COMPARISON: None.  TECHNIQUE: Limited abdominal ultrasound.  FINDINGS:  GALLBLADDER: Multiple mobile gallstones. No wall thickening or pericholecystic fluid. Negative sonographic Banuelos's sign.  BILE DUCTS: No biliary dilatation. The common duct measures 5 mm.  LIVER: Echogenic parenchyma consistent with hepatic steatosis. No focal mass.   RIGHT KIDNEY: No hydronephrosis.  PANCREAS: The visualized portions are normal.  No ascites.  IMPRESSION:   1.  Cholelithiasis.  2.  No evidence for cholecystitis.  3.  Hepatic steatosis.      She was not having any pain at that time, but over the past 3 weeks has had significant RUQ pain.  It is worse with eating and much worse with fatty foods.      Hypertension Follow-up      Do you check your blood pressure regularly outside of the clinic? Yes     Are you  following a low salt diet? Yes    Are your blood pressures ever more than 140 on the top number (systolic) OR more   than 90 on the bottom number (diastolic), for example 140/90? No      How many servings of fruits and vegetables do you eat daily?  0-1    On average, how many sweetened beverages do you drink each day (soda, juice, sweet tea, etc)?   0    How many days per week do you miss taking your medication? 0     BP is better today.  No symptoms.  On Lisinopril 10 mg daily.        She also would like a refill of her Omeprazole.  Taking this for epigastric pain.  It has been worse as she is eating less trying to prepare for weight loss surgery.     Review of Systems   ROS COMP: Constitutional, HEENT, cardiovascular, pulmonary, gi and gu systems are negative, except as otherwise noted.      Objective    /64   Pulse 86   Temp 98.9  F (37.2  C) (Temporal)   Resp 20   Wt (!) 178.3 kg (393 lb)   LMP  (LMP Unknown)   SpO2 99%   Breastfeeding No   BMI 54.05 kg/m    Body mass index is 54.05 kg/m .  Physical Exam   GENERAL: alert, no distress and obese  NECK: no adenopathy  RESP: lungs clear to auscultation - no rales, rhonchi or wheezes  CV: regular rate and rhythm, normal S1 S2, no S3 or S4, no murmur, click or rub  ABDOMEN: soft, RUQ tenderness, no guarding or rebound pain  MS: no gross musculoskeletal defects noted, no edema    Diagnostic Test Results:  none         Assessment & Plan     1. Hypertension, unspecified type  Chronic, controlled.  No change in treatment plan.   - lisinopril (PRINIVIL/ZESTRIL) 10 MG tablet; Take 1 tablet (10 mg) by mouth daily  Dispense: 90 tablet; Refill: 1    2. Gallstones  Will refer to general surgeon  - GENERAL SURG ADULT REFERRAL    3. Epigastric pain  Chronic, controlled.  No change in treatment plan.   - omeprazole (PRILOSEC) 20 MG DR capsule; Take 1 capsule (20 mg) by mouth daily  Dispense: 90 capsule; Refill: 1    4. Special screening for malignant neoplasms,  "colon  - GASTROENTEROLOGY ADULT REF PROCEDURE ONLY [facility] [phone]; [place] Provider     BMI:   Estimated body mass index is 54.05 kg/m  as calculated from the following:    Height as of 8/22/19: 1.816 m (5' 11.5\").    Weight as of this encounter: 178.3 kg (393 lb).   Weight management plan: Following with weight loss clinic        See Patient Instructions    Return in about 2 weeks (around 12/9/2019) for Follow up.    SHILA [facility]        "

## 2019-11-25 NOTE — TELEPHONE ENCOUNTER
Date of colonoscopy/EGD: 1/14  Surgeon: Dr. Davis  Prep:Miralax  Packet:Colonoscopy/EGD instructions mailed to patient's home address.   Date: 11/25/2019      Surgery Scheduler

## 2019-12-02 ENCOUNTER — OFFICE VISIT - HEALTHEAST (OUTPATIENT)
Dept: SURGERY | Facility: CLINIC | Age: 49
End: 2019-12-02

## 2019-12-02 DIAGNOSIS — I10 HYPERTENSION, UNSPECIFIED TYPE: ICD-10-CM

## 2019-12-02 DIAGNOSIS — Z71.3 NUTRITIONAL COUNSELING: ICD-10-CM

## 2019-12-02 DIAGNOSIS — E66.01 OBESITY, MORBID, BMI 50 OR HIGHER (H): ICD-10-CM

## 2019-12-02 ASSESSMENT — MIFFLIN-ST. JEOR: SCORE: 2511

## 2019-12-05 ENCOUNTER — OFFICE VISIT (OUTPATIENT)
Dept: SURGERY | Facility: OTHER | Age: 49
End: 2019-12-05
Payer: COMMERCIAL

## 2019-12-05 VITALS
HEART RATE: 87 BPM | BODY MASS INDEX: 39.68 KG/M2 | DIASTOLIC BLOOD PRESSURE: 78 MMHG | SYSTOLIC BLOOD PRESSURE: 116 MMHG | WEIGHT: 293 LBS | HEIGHT: 72 IN | TEMPERATURE: 97.8 F

## 2019-12-05 DIAGNOSIS — K80.20 SYMPTOMATIC CHOLELITHIASIS: Primary | ICD-10-CM

## 2019-12-05 PROCEDURE — 99204 OFFICE O/P NEW MOD 45 MIN: CPT | Performed by: SURGERY

## 2019-12-05 ASSESSMENT — MIFFLIN-ST. JEOR: SCORE: 2540.05

## 2019-12-05 NOTE — PROGRESS NOTES
Patient seen in consultation for symptomatic cholelithiasis by Roshni Luevano    HPI:  Patient is a 48 year old female with several month history of RUQ abdominal pain. She states that she wakes up with no pain but after she eats in the morning the RUQ pain starts. She has less pain when the foods she eats are low-fat. She denies acholic stools or dark urine. She is being evaluated by a bariatric surgery team and has met with bariatrician and dietician. Currently, she is on track for the weight loss goals but still needs to complete psychological testing prior to a discussion with the bariatric surgeon themselves. Unknown when this might happen. She has never had abdominal surgery but has had other operations.    Review Of Systems    Skin: acne  Ears/Nose/Throat: negative  Respiratory: Asthma  Cardiovascular: negative  Gastrointestinal: as above  Genitourinary: negative  Musculoskeletal: negative  Neurologic: negative  Hematologic/Lymphatic/Immunologic: negative  Endocrine: negative      Past Medical History:   Diagnosis Date     Asthma      Obesity, morbid, BMI 50 or higher (H)      Papanicolaou smear of cervix with low grade squamous intraepithelial lesion (LGSIL) 3/23/2018       Past Surgical History:   Procedure Laterality Date     BIOPSY CERVICAL, LOCAL EXCISION, SINGLE/MULTIPLE N/A 8/29/2019    Procedure: endometrial biopsy;  Surgeon: Neil Cordova MD;  Location: [facility]     CONIZATION LEEP N/A 8/29/2019    Procedure: LEEP  conization of cervix surgery;  Surgeon: Neil Cordova MD;  Location: [facility]     ORTHOPEDIC SURGERY         Family History   Problem Relation Age of Onset     Uterine Cancer Mother      Uterine Cancer Maternal Aunt      Uterine Cancer Maternal Aunt      Uterine Cancer Maternal Aunt      Uterine Cancer Maternal Aunt      Uterine Cancer Maternal Aunt        Social History     Socioeconomic History     Marital status: Single     Spouse name: Not on file     Number of  children: Not on file     Years of education: Not on file     Highest education level: Not on file   Occupational History     Not on file   Social Needs     Financial resource strain: Not on file     Food insecurity:     Worry: Not on file     Inability: Not on file     Transportation needs:     Medical: Not on file     Non-medical: Not on file   Tobacco Use     Smoking status: Never Smoker     Smokeless tobacco: Never Used   Substance and Sexual Activity     Alcohol use: Yes     Comment: social     Drug use: No     Sexual activity: Yes     Partners: Male     Birth control/protection: Pill, Injection   Lifestyle     Physical activity:     Days per week: Not on file     Minutes per session: Not on file     Stress: Not on file   Relationships     Social connections:     Talks on phone: Not on file     Gets together: Not on file     Attends Faith service: Not on file     Active member of club or organization: Not on file     Attends meetings of clubs or organizations: Not on file     Relationship status: Not on file     Intimate partner violence:     Fear of current or ex partner: Not on file     Emotionally abused: Not on file     Physically abused: Not on file     Forced sexual activity: Not on file   Other Topics Concern     Parent/sibling w/ CABG, MI or angioplasty before 65F 55M? Not Asked   Social History Narrative     Not on file       Current Outpatient Medications   Medication Sig Dispense Refill     albuterol (PROAIR HFA/PROVENTIL HFA/VENTOLIN HFA) 108 (90 Base) MCG/ACT inhaler Inhale 2 puffs into the lungs       cetirizine HCl 10 MG CAPS        Cyanocobalamin (VITAMIN B 12 PO) Take 1,000 mcg by mouth       FOLIC ACID PO Take 1 mg by mouth daily       lisinopril (PRINIVIL/ZESTRIL) 10 MG tablet Take 1 tablet (10 mg) by mouth daily 90 tablet 1     omeprazole (PRILOSEC) 20 MG DR capsule Take 1 capsule (20 mg) by mouth daily 90 capsule 1     order for DME Equipment ordered: RESMED Auto PAP Mask type: Nasal  "Settings: 8-15 CM H2O       VITAMIN E NATURAL PO          Medications and history reviewed    Physical exam:  Vitals: /78   Pulse 87   Temp 97.8  F (36.6  C) (Temporal)   Ht 1.854 m (6' 1\")   Wt (!) 178.7 kg (394 lb)   LMP  (LMP Unknown)   BMI 51.98 kg/m    BMI= Body mass index is 51.98 kg/m .    Constitutional:alert, non-distressed, obese  Head: Normo-cephalic, atraumatic, no lesions, masses or tenderness  Cardiovascular: RRR, no new murmurs, +S1, +S2 heart sounds, no clicks, rubs or gallops   Respiratory: CTAB, no rales, rhonchi or wheezing, equal chest rise, good respiratory effort   Gastrointestinal: Soft, +ttp with deep palpation to the RUQno rebound rigidity or guarding, body habitus limits exam  : Deferred  Musculoskeletal: Moves all extremities, normal  strength, no deformities noted   Skin: facial flushing  Psychiatric: Mentation appears normal, affect appropriate   Hematologic/Lymphatic/Immunologic: Normal cervical and supraclavicular lymph nodes   Patient able to get up on table without difficulty.    Labs show:  No results found for this or any previous visit (from the past 24 hour(s)).    Imaging shows:  US report with multiple gallstones, hepatic steatosis, normal caliber CBD    Assessment:     ICD-10-CM    1. Symptomatic cholelithiasis K80.20      Plan: I think Aung would benefit from a cholecystectomy. I did discuss with her that sometimes during weight loss surgery a cholecystectomy is performed at that same time if the patient has symptomatic cholelithiasis. Her bariatric team does know she has gallstones, but her symptoms have worsened and they are not aware of this yet. I am going to reach out to their team and see if they have any kind of timeline for possible surgery or if they routinely perform cholecystectomy at the time of surgery in their practice. If there is no definite timeline and they have no issue with her having cholecystectomy prior to weight loss surgery we could " perform lap cholecystectomy, possible open in the coming weeks. We discussed imaging findings and what to expect with surgery. Risks of bleeding, infection, anesthesia complications, conversion to open, injury to nearby structures and bile duct injury all discussed. When I hear back from her bariatric team, I will reach out to her to let her know the plan.    Ricardo Davis, DO

## 2019-12-05 NOTE — LETTER
12/5/2019         RE: Paola Lobo  [address] [place] [address]        Dear Colleague,    Thank you for referring your patient, Paola Lobo, to the [facility]. Please see a copy of my visit note below.    Patient seen in consultation for symptomatic cholelithiasis by Roshni Luevano    HPI:  Patient is a 48 year old female with several month history of RUQ abdominal pain. She states that she wakes up with no pain but after she eats in the morning the RUQ pain starts. She has less pain when the foods she eats are low-fat. She denies acholic stools or dark urine. She is being evaluated by a bariatric surgery team and has met with bariatrician and dietician. Currently, she is on track for the weight loss goals but still needs to complete psychological testing prior to a discussion with the bariatric surgeon themselves. Unknown when this might happen. She has never had abdominal surgery but has had other operations.    Review Of Systems    Skin: acne  Ears/Nose/Throat: negative  Respiratory: Asthma  Cardiovascular: negative  Gastrointestinal: as above  Genitourinary: negative  Musculoskeletal: negative  Neurologic: negative  Hematologic/Lymphatic/Immunologic: negative  Endocrine: negative      Past Medical History:   Diagnosis Date     Asthma      Obesity, morbid, BMI 50 or higher (H)      Papanicolaou smear of cervix with low grade squamous intraepithelial lesion (LGSIL) 3/23/2018       Past Surgical History:   Procedure Laterality Date     BIOPSY CERVICAL, LOCAL EXCISION, SINGLE/MULTIPLE N/A 8/29/2019    Procedure: endometrial biopsy;  Surgeon: Neil Cordova MD;  [facility]     CONIZATION LEEP N/A 8/29/2019    Procedure: LEEP  conization of cervix surgery;  Surgeon: Neil Cordova MD;  Location: [facility]     ORTHOPEDIC SURGERY         Family History   Problem Relation Age of Onset     Uterine Cancer Mother      Uterine Cancer Maternal Aunt      Uterine  Cancer Maternal Aunt      Uterine Cancer Maternal Aunt      Uterine Cancer Maternal Aunt      Uterine Cancer Maternal Aunt        Social History     Socioeconomic History     Marital status: Single     Spouse name: Not on file     Number of children: Not on file     Years of education: Not on file     Highest education level: Not on file   Occupational History     Not on file   Social Needs     Financial resource strain: Not on file     Food insecurity:     Worry: Not on file     Inability: Not on file     Transportation needs:     Medical: Not on file     Non-medical: Not on file   Tobacco Use     Smoking status: Never Smoker     Smokeless tobacco: Never Used   Substance and Sexual Activity     Alcohol use: Yes     Comment: social     Drug use: No     Sexual activity: Yes     Partners: Male     Birth control/protection: Pill, Injection   Lifestyle     Physical activity:     Days per week: Not on file     Minutes per session: Not on file     Stress: Not on file   Relationships     Social connections:     Talks on phone: Not on file     Gets together: Not on file     Attends Worship service: Not on file     Active member of club or organization: Not on file     Attends meetings of clubs or organizations: Not on file     Relationship status: Not on file     Intimate partner violence:     Fear of current or ex partner: Not on file     Emotionally abused: Not on file     Physically abused: Not on file     Forced sexual activity: Not on file   Other Topics Concern     Parent/sibling w/ CABG, MI or angioplasty before 65F 55M? Not Asked   Social History Narrative     Not on file       Current Outpatient Medications   Medication Sig Dispense Refill     albuterol (PROAIR HFA/PROVENTIL HFA/VENTOLIN HFA) 108 (90 Base) MCG/ACT inhaler Inhale 2 puffs into the lungs       cetirizine HCl 10 MG CAPS        Cyanocobalamin (VITAMIN B 12 PO) Take 1,000 mcg by mouth       FOLIC ACID PO Take 1 mg by mouth daily       lisinopril  "(PRINIVIL/ZESTRIL) 10 MG tablet Take 1 tablet (10 mg) by mouth daily 90 tablet 1     omeprazole (PRILOSEC) 20 MG DR capsule Take 1 capsule (20 mg) by mouth daily 90 capsule 1     order for DME Equipment ordered: RESMED Auto PAP Mask type: Nasal Settings: 8-15 CM H2O       VITAMIN E NATURAL PO          Medications and history reviewed    Physical exam:  Vitals: /78   Pulse 87   Temp 97.8  F (36.6  C) (Temporal)   Ht 1.854 m (6' 1\")   Wt (!) 178.7 kg (394 lb)   LMP  (LMP Unknown)   BMI 51.98 kg/m     BMI= Body mass index is 51.98 kg/m .    Constitutional:alert, non-distressed, obese  Head: Normo-cephalic, atraumatic, no lesions, masses or tenderness  Cardiovascular: RRR, no new murmurs, +S1, +S2 heart sounds, no clicks, rubs or gallops   Respiratory: CTAB, no rales, rhonchi or wheezing, equal chest rise, good respiratory effort   Gastrointestinal: Soft, +ttp with deep palpation to the RUQno rebound rigidity or guarding, body habitus limits exam  : Deferred  Musculoskeletal: Moves all extremities, normal  strength, no deformities noted   Skin: facial flushing  Psychiatric: Mentation appears normal, affect appropriate   Hematologic/Lymphatic/Immunologic: Normal cervical and supraclavicular lymph nodes   Patient able to get up on table without difficulty.    Labs show:  No results found for this or any previous visit (from the past 24 hour(s)).    Imaging shows:  US report with multiple gallstones, hepatic steatosis, normal caliber CBD    Assessment:     ICD-10-CM    1. Symptomatic cholelithiasis K80.20      Plan: I think Aung would benefit from a cholecystectomy. I did discuss with her that sometimes during weight loss surgery a cholecystectomy is performed at that same time if the patient has symptomatic cholelithiasis. Her bariatric team does know she has gallstones, but her symptoms have worsened and they are not aware of this yet. I am going to reach out to their team and see if they have any kind " of timeline for possible surgery or if they routinely perform cholecystectomy at the time of surgery in their practice. If there is no definite timeline and they have no issue with her having cholecystectomy prior to weight loss surgery we could perform lap cholecystectomy, possible open in the coming weeks. We discussed imaging findings and what to expect with surgery. Risks of bleeding, infection, anesthesia complications, conversion to open, injury to nearby structures and bile duct injury all discussed. When I hear back from her bariatric team, I will reach out to her to let her know the plan.    Ricardo Davis, DO      Again, thank you for allowing me to participate in the care of your patient.        Sincerely,        Ricardo Davis, DO

## 2019-12-18 ENCOUNTER — OFFICE VISIT (OUTPATIENT)
Dept: FAMILY MEDICINE | Facility: OTHER | Age: 49
End: 2019-12-18
Payer: COMMERCIAL

## 2019-12-18 VITALS
TEMPERATURE: 97.8 F | OXYGEN SATURATION: 97 % | DIASTOLIC BLOOD PRESSURE: 84 MMHG | BODY MASS INDEX: 51.57 KG/M2 | RESPIRATION RATE: 24 BRPM | WEIGHT: 293 LBS | HEART RATE: 76 BPM | SYSTOLIC BLOOD PRESSURE: 138 MMHG

## 2019-12-18 DIAGNOSIS — L03.116 CELLULITIS OF LEFT LOWER EXTREMITY: Primary | ICD-10-CM

## 2019-12-18 DIAGNOSIS — L73.9 FOLLICULITIS: ICD-10-CM

## 2019-12-18 PROCEDURE — 99203 OFFICE O/P NEW LOW 30 MIN: CPT | Performed by: INTERNAL MEDICINE

## 2019-12-18 RX ORDER — CEPHALEXIN 500 MG/1
500 CAPSULE ORAL 4 TIMES DAILY
Qty: 40 CAPSULE | Refills: 0 | Status: SHIPPED | OUTPATIENT
Start: 2019-12-18 | End: 2019-12-26

## 2019-12-18 ASSESSMENT — PAIN SCALES - GENERAL: PAINLEVEL: MODERATE PAIN (4)

## 2019-12-18 NOTE — PROGRESS NOTES
Chief Complaint   Patient presents with     Derm Problem     on left lower shin. scratched self with heel x5 w ago, started out the size of a quarter and has grown. denies pain but describes it as itchy                      Chief Complaint         The patient is a pleasant 48-year-old female who recently scratched her lower anterior tibial area on the left and now has a small area of cellulitis measuring approximately 3 inches in diameter.  She subsequently shaved her legs and now has some similar folliculitis on the contralateral lower leg.  She denies any fevers or chills.  She notes that it slightly tender to touch.  Has been doing a little weeping.  Her past history is most notably positive for the hypertension and she is tolerating her lisinopril without difficulty.                       PAST, FAMILY,SOCIAL HISTORY:     Medical  History:   has a past medical history of Asthma, Obesity, morbid, BMI 50 or higher (H), and Papanicolaou smear of cervix with low grade squamous intraepithelial lesion (LGSIL) (3/23/2018).     Surgical History:   has a past surgical history that includes orthopedic surgery; Conization leep (N/A, 8/29/2019); and Biopsy cervical, local excision, single/multiple (N/A, 8/29/2019).     Social History:   reports that she has never smoked. She has never used smokeless tobacco. She reports current alcohol use. She reports that she does not use drugs.     Family History:  family history includes Uterine Cancer in her maternal aunt, maternal aunt, maternal aunt, maternal aunt, maternal aunt, and mother.            MEDICATIONS  Current Outpatient Medications   Medication Sig Dispense Refill     albuterol (PROAIR HFA/PROVENTIL HFA/VENTOLIN HFA) 108 (90 Base) MCG/ACT inhaler Inhale 2 puffs into the lungs       cephALEXin (KEFLEX) 500 MG capsule Take 1 capsule (500 mg) by mouth 4 times daily 40 capsule 0     Cyanocobalamin (VITAMIN B 12 PO) Take 1,000 mcg by mouth       FOLIC ACID PO Take 1 mg by  mouth daily       lisinopril (PRINIVIL/ZESTRIL) 10 MG tablet Take 1 tablet (10 mg) by mouth daily 90 tablet 1     omeprazole (PRILOSEC) 20 MG DR capsule Take 1 capsule (20 mg) by mouth daily 90 capsule 1     order for DME Equipment ordered: RESMED Auto PAP Mask type: Nasal Settings: 8-15 CM H2O       VITAMIN E NATURAL PO        cetirizine HCl 10 MG CAPS            --------------------------------------------------------------------------------------------------------------------                              Review of Systems       LUNGS: Pt denies: cough, excess sputum, hemoptysis, or shortness of breath.   HEART: Pt denies: chest pain, arrhythmia, syncope, tachy or bradyarrhythmia.   GI: Pt denies: nausea, vomiting, diarrhea, constipation, melena, or hematochezia.   NEURO: Pt denies: seizures, strokes, diplopia, weakness, paraesthesias, or paralysis.   SKIN: Pt denies: itching, or additional lesions of concern. Denies recent hair loss.   PSYCH: The patient denies significant depression, anxiety, mood imbalance. Specifically denies any suicidal ideation.                                     Examination    /84   Pulse 76   Temp 97.8  F (36.6  C) (Temporal)   Resp 24   Wt (!) 177.3 kg (390 lb 14.4 oz)   LMP  (LMP Unknown)   SpO2 97%   BMI 51.57 kg/m       Constitutional: The patient appears to be in no acute distress. The patient appears to be adequately hydrated. No acute respiratory or hemodynamic distress is noted at this time.   LUNGS: clear bilaterally, airflow is brisk, no intercostal retraction or stridor is noted. No coughing is noted during visit.   HEART:  regular without rubs, clicks, gallops, or murmurs. PMI is nondisplaced. Upstrokes are brisk. S1,S2 are heard.   SKIN:  warm and dry.  The erythema in the small isolated spot on her lower anterior tibial area is noted.  This is on the left.  Some scattered minor folliculitis on the right lower leg is present.  No other rashes of concern are  noted.   PSYCH: The patient appears grossly appropriate. Maintains good eye contact, does not have any jittery or atypical motion. Displays appropriate affect.                                     Decision Making  1. Cellulitis of left lower extremity  Recommend Betadine scrubs  - cephALEXin (KEFLEX) 500 MG capsule; Take 1 capsule (500 mg) by mouth 4 times daily  Dispense: 40 capsule; Refill: 0    2. Folliculitis  As above  - cephALEXin (KEFLEX) 500 MG capsule; Take 1 capsule (500 mg) by mouth 4 times daily  Dispense: 40 capsule; Refill: 0                           FOLLOW UP   I have asked the patient to make an appointment for followup with me as needed.  She will follow-up with her primary care provider for her routine health care, screening, maintenance.            I have carefully explained the diagnosis and treatment options to the patient.  The patient has displayed an understanding of the above, and all subsequent questions were answered.        DO KORI Schmitt    Portions of this note were produced using [employer]  Although every attempt at real-time proof reading has been made, occasional grammar/syntax errors may have been missed.

## 2019-12-21 ENCOUNTER — NURSE TRIAGE (OUTPATIENT)
Dept: NURSING | Facility: CLINIC | Age: 49
End: 2019-12-21

## 2019-12-21 NOTE — TELEPHONE ENCOUNTER
Caller has been of keflex for  leg cellulitis for     3 days ; develop widespread hives today;   Triage protocol reviewed  ; advised in home care   Advised to stop Keflex and be seen in  tomorrow for evaluation of  Allergic reaction and  New antibiotic  Caller understands and will be seen tomorrow in     Keyanna Tamayo RN  FNA      Reason for Disposition    Taking new prescription antibiotic (EXCEPTION: finished taking new prescription antibiotic)    Additional Information    Negative: [1] Life-threatening reaction (anaphylaxis) in the past to similar substance (e.g., food, insect bite/sting, chemical, etc.) AND [2] < 2 hours since exposure    Negative: Difficulty breathing or wheezing now    Negative: [1] Swollen tongue AND [2] rapid onset    Negative: [1] Hoarseness or cough now AND [2] rapid onset    Negative: Shock suspected (e.g., cold/pale/clammy skin, too weak to stand, low BP, rapid pulse)    Negative: Difficult to awaken or acting confused (e.g., disoriented, slurred speech)    Negative: Sounds like a life-threatening emergency to the triager    Negative: [1] Bee, wasp, or yellow jacket sting AND [2] within last 24 hours    Negative: Blood-colored, dark red or purple rash    Negative: Doesn't match the SYMPTOMS of hives    Negative: Swollen tongue    Negative: [1] Widespread hives AND [2] onset < 2 hours of exposure to high-risk allergen (e.g., peanuts, tree nuts, fish or shellfish)    Negative: Patient sounds very sick or weak to the triager    Negative: [1] MODERATE-SEVERE hives persist (i.e., hives interfere with normal activities or work) AND [2] taking antihistamine (e.g., Benadryl, Claritin) > 24 hours    Negative: [1] Hives have become worse AND [2] taking oral steroids (e.g., prednisone) > 24 hours    Negative: Joint swelling    Negative: [1] Abdominal pain AND [2] pain present > 12 hours    Negative: Fever    Negative: [1] Widespread hives, itching or facial swelling AND [2] onset > 2 hours  after exposure to high-risk allergen (e.g., sting, nuts, 1st dose of antibiotic)    Negative: [1] Hives from food reaction AND [2] diagnosis never confirmed by a HCP    Negative: Hives persist > 1 week    Negative: [1] Hives has occurred 3 or more times in the last year AND [2] the cause was not found    Negative: [1] Life-threatening reaction (anaphylaxis) in the past to the same drug AND [2] < 2 hours since exposure    Negative: Difficulty breathing or wheezing    Negative: [1] Hoarseness or cough AND [2] started soon after 1st dose of drug    Negative: [1] Swollen tongue AND [2] started soon after 1st dose of drug    Negative: [1] Purple or blood-colored rash (spots or dots) AND [2] fever    Negative: Sounds like a life-threatening emergency to the triager    Negative: Rash is only on 1 part of the body (localized)    Negative: Taking new non-prescription (OTC) antihistamine, decongestant, ear drops, eye drops, or other OTC cough/cold medicine    Negative: Taking new prescription antihistamine, allergy medicine, asthma medicine, eye drops, ear drops or nose drops    Negative: Rash started more than 3 days after stopping new prescription medicine    Negative: Swollen tongue    Negative: [1] Widespread hives AND [2] onset < 2 hours of exposure to 1st dose of drug    Negative: [1] Purple or blood-colored rash (spots or dots) AND [2] no fever AND [3] sounds well to triager    Negative: [1] Taking new prescription medication AND [2] rash within 4 hours of 1st dose    Negative: Large or small blisters on skin (i.e., fluid filled bubbles or sacs)    Negative: Bloody crusts on lips or sores in mouth    Negative: Face or lip swelling    Negative: Widespread hives    [1] Drug rash suspected AND [2] started taking new medicine within last 2 weeks(Exception: antihistamine, eye drops, ear drops, decongestant or other OTC cough/cold medicines)    Protocols used: RASH - WIDESPREAD ON DRUGS-A-, HIVES-A-AH

## 2019-12-22 ENCOUNTER — OFFICE VISIT (OUTPATIENT)
Dept: URGENT CARE | Facility: URGENT CARE | Age: 49
End: 2019-12-22
Payer: COMMERCIAL

## 2019-12-22 VITALS
DIASTOLIC BLOOD PRESSURE: 58 MMHG | SYSTOLIC BLOOD PRESSURE: 136 MMHG | HEART RATE: 70 BPM | OXYGEN SATURATION: 99 % | TEMPERATURE: 98.3 F | RESPIRATION RATE: 20 BRPM

## 2019-12-22 DIAGNOSIS — L08.9 LOCAL INFECTION OF SKIN AND SUBCUTANEOUS TISSUE: ICD-10-CM

## 2019-12-22 DIAGNOSIS — T78.40XA ALLERGIC REACTION, INITIAL ENCOUNTER: Primary | ICD-10-CM

## 2019-12-22 DIAGNOSIS — T50.905A ADVERSE EFFECT OF DRUG, INITIAL ENCOUNTER: ICD-10-CM

## 2019-12-22 PROCEDURE — 99214 OFFICE O/P EST MOD 30 MIN: CPT | Performed by: NURSE PRACTITIONER

## 2019-12-22 RX ORDER — PREDNISONE 20 MG/1
40 TABLET ORAL DAILY
Qty: 10 TABLET | Refills: 0 | Status: SHIPPED | OUTPATIENT
Start: 2019-12-22 | End: 2019-12-26

## 2019-12-22 RX ORDER — MUPIROCIN CALCIUM 20 MG/G
CREAM TOPICAL 3 TIMES DAILY
Qty: 30 G | Refills: 0 | Status: ON HOLD | OUTPATIENT
Start: 2019-12-22 | End: 2020-01-14

## 2019-12-22 NOTE — PATIENT INSTRUCTIONS
"Take your Zyrtec daily to help with this reaction. Stop keflex and we will label your chart \"allergic to\" with rash reaction.    Take prednisone as directed.    Follow-up with your primary care provider next week and as needed.    Indications for emergent return to emergency department discussed with patient, who verbalized good understanding and agreement.  Patient understands the limitations of today's evaluation.         Patient Education     Reaction to Medicine (Other Type)  You are having a reaction to a medicine you have taken. This may not be the same as an allergic reaction. It is an undesired, unfavorable reaction, or a side effect of a medicine. This can cause a variety of symptoms, including:    Dizziness or headache    Rash    Flushing or hot sensation    Nausea, vomiting, or stomach pain    Diarrhea or constipation    Trouble breathing    High or low blood pressure  A reaction can be an upset stomach from something like aspirin or ibuprofen, feeling faint after taking a blood pressure medicine, feeling anxious, and many other things. Symptoms of a medicine reaction can range from very mild to very severe.  In most cases, the reaction goes away within 1 to 12 hours. But it will probably occur again if you take this same medicine. Your healthcare provider will advise you whether to change how much, when, or how often you take this medicine. He or she may also advise you to discontinue this medicine or switch to another one.   Home care    Another medicine may be recommended to reduce your symptoms until the medicine s effect wears off. Follow your healthcare provider s advice.    When the medicine s effect has worn off, there should be no further problem as long as you don't take the same medicine again.     Ask your healthcare provider if you should also avoid similar medicines. Write down the information so you will remember it.    Make certain the medicine reaction is documented in your medical " record.  Follow-up care  Follow up with your healthcare provider, or as advised if your symptoms are not better within 24 hours.  When to seek medical advice  Call your healthcare provider right away if any of these occur.    New symptoms that concern you    Worsening of your current symptoms, including rash or facial swelling    Symptoms that are not relieved by the treatment advised    Fever of 100.4 F (38 C) or higher, or as advised by your healthcare provider  Call 911  Call 911 if any of these occur:    Trouble breathing or swallowing, or wheezing    Hoarse voice or trouble speaking    Confusion    Extreme drowsiness or trouble awakening    Fainting or loss of consciousness    Rapid heart rate or slow heart rate    Very low or very high blood pressure    Vomiting blood, or large amounts of blood in stool    Seizure  Date Last Reviewed: 4/1/2017 2000-2018 [employer]. [address] [place] [id number]. All rights reserved. This information is not intended as a substitute for professional medical care. Always follow your healthcare professional's instructions.           Patient Education     Medicine Reaction: Allergic  You are having an allergic reaction to a medicine you have taken. This causes an itchy rash and sometimes swelling of various parts of the body. It may also cause trouble swallowing or breathing. The rash may take a few hours or up to 2 weeks to go away. In the future, remember to tell your healthcare provider about your allergy to this medicine so that medicines of this type won't be used again.  Any medicine can cause an allergic reaction. But the most allergic reactions are caused by:    Penicillin and related medicines    Aspirin    Ibuprofen    Seizure medicines  Vaccines may also trigger allergies. People whose parents or siblings have allergies are at a higher risk of developing a medicine allergy. Allergy testing may sometimes be needed to figure out the  cause.  Symptoms may occur within minutes, hours, or even weeks after exposure to the medicine. It can be a mild or severe reaction, or potentially life threatening. Most of us think of allergic reactions when we have a rash or itchy skin. Symptoms can include:    Rash, hives, redness, welts, blisters    Itching, burning, stinging, pain    Dry, flaky, cracking, scaly skin    Belly (abdominal) cramps or nausea or stomach pain    Fever.  Sometimes fever is the only symptom of a drug reaction. In older adults, the risk of fever increases with the number of medicines the person takes.  More severe symptoms include:    Swelling of the face or lips, or drooling    Trouble swallowing, feeling like your throat is closing    Trouble breathing, wheezing    Hoarse voice or trouble speaking    Severe nausea or vomiting or diarrhea      Feeling faint or lightheaded, rapid heart rate  Home care    The goal of treatment is to help relieve the symptoms, and get you feeling better. Mild to medium medicine reactions usually respond quickly to antihistamines and steroids. The rash will usually fade over several days. But it can sometimes last a couple of weeks. Over the next couple of days, there may be times when it is gets a little worse, and then better again. Here are some things to do:    Throw the medicine away and don t take it again. The next reaction may be much worse.    Add this medicine reaction to your electronic medical record.    When getting a new medicine, always tell the healthcare provider that you are allergic to this medicine. Make certain the provider writes it down in your medical record.    Avoid tight clothing and anything that heats up your skin (hot showers or baths, direct sunlight). Heat will make itching worse.    An ice pack will relieve local areas of intense itching and redness. To make an ice pack, put ice cubes in a plastic bag that seals at the top. Wrap the bag in a clean, thin towel or cloth.  Don t put ice directly on the skin.    To help prevent an infection, don't scratch the affected area. Scratching may worsen the reaction. It can damage your skin and lead to an infection. Always check the affected site for signs of an infection.    Your provider may give you a prescription antihistamine.    If you are not given a prescription antihistamine, oral diphenhydramine is an over-the-counter antihistamine available at pharmacies and grocery stores. This may be used to reduce itching if large areas of the skin are involved. This antihistamine may make you sleepy, so be careful using it in the daytime or when going to school, working, or driving. Note: Don t use diphenhydramine if you have glaucoma or if you are a man with trouble urinating due to an enlarged prostate. There are other antihistamines that cause less drowsiness and are a good choice for daytime use. Ask your pharmacist for suggestions.    Don't use diphenhydramine cream on your skin. It can cause a further skin reaction for some people.    Contact your healthcare provider and ask what can be used on the affected area to help decrease the itching.  Follow-up care  Follow up with your healthcare provider, or as advised if your symptoms do not continue to improve or they get worse.  Call 911  Call 911 if any of these occur:    Shortness of breath    Cool, moist, pale skin    Swelling in the face, eyelids, mouth, tongue, or lips    Drooling    Trouble breathing or swallowing, wheezing    New or worsening swelling in the mouth, throat, or tongue    Hoarse voice or trouble speaking     Fainting or loss of consciousness    Rapid heart rate    Feeling of dizziness or weakness or a sudden drop in blood pressure    Feeling of doom    Feeling lightheaded    Severe nausea, vomiting, or diarrhea  When to seek medical advice  Call your healthcare provider right away if any of these occur:    Continuing or recurring symptoms    Nausea, abdominal cramps or  stomach pain    Spreading areas of itching, redness or swelling    Signs of infection:  ? Spreading redness  ? Increased pain or swelling  ? Fever of 100.4 F (38 C) or above lasting for 24 to 48 hours, or as directed by your provider  ? Fluid or colored drainage from the affected area  Date Last Reviewed: 3/1/2017    4601-9143 [employer]. [address] [place] [address]. All rights reserved. This information is not intended as a substitute for professional medical care. Always follow your healthcare professional's instructions.

## 2019-12-22 NOTE — NURSING NOTE
"Chief Complaint   Patient presents with     Medication Problem     Thinks had reaction to medication that was given on 12/18.  No respiratory issues, just rash. Was on Cephalexin and has stopped. Has taken Benadryl        Initial /58 (BP Location: Right arm, Patient Position: Chair, Cuff Size: Adult Large)   Pulse 70   Temp 98.3  F (36.8  C) (Tympanic)   Resp 20   LMP  (LMP Unknown)   SpO2 99%  Estimated body mass index is 51.57 kg/m  as calculated from the following:    Height as of 12/5/19: 1.854 m (6' 1\").    Weight as of 12/18/19: 177.3 kg (390 lb 14.4 oz).    Patient presents to the clinic using No DME    Health Maintenance that is potentially due pending provider review:  NONE    n/a    Is there anyone who you would like to be able to receive your results? No  If yes have patient fill out KELLY  Rita Baeza M.A.        "

## 2019-12-22 NOTE — PROGRESS NOTES
Subjective     Paola Lobo is a 48 year old female who presents to clinic today for the following health issues:    HPI   Chief Complaint   Patient presents with     Medication Problem     Thinks had reaction to medication that was given on 12/18.  No respiratory issues, just rash. Was on Cephalexin and has stopped. Has taken Benadryl            Patient Active Problem List   Diagnosis     Morbid obesity (H)     Excessive or frequent menstruation     Intramural and subserous leiomyoma of uterus     Impingement syndrome, shoulder, right     Papanicolaou smear of cervix with low grade squamous intraepithelial lesion (LGSIL)     Benign essential hypertension     Past Surgical History:   Procedure Laterality Date     BIOPSY CERVICAL, LOCAL EXCISION, SINGLE/MULTIPLE N/A 8/29/2019    Procedure: endometrial biopsy;  Surgeon: Neil Cordova MD;  Location: [facility]     CONIZATION LEEP N/A 8/29/2019    Procedure: LEEP  conization of cervix surgery;  Surgeon: Neil Cordova MD;  Location: [facility] OR     ORTHOPEDIC SURGERY         Social History     Tobacco Use     Smoking status: Never Smoker     Smokeless tobacco: Never Used   Substance Use Topics     Alcohol use: Yes     Comment: social     Family History   Problem Relation Age of Onset     Uterine Cancer Mother      Uterine Cancer Maternal Aunt      Uterine Cancer Maternal Aunt      Uterine Cancer Maternal Aunt      Uterine Cancer Maternal Aunt      Uterine Cancer Maternal Aunt          Current Outpatient Medications   Medication Sig Dispense Refill     albuterol (PROAIR HFA/PROVENTIL HFA/VENTOLIN HFA) 108 (90 Base) MCG/ACT inhaler Inhale 2 puffs into the lungs       cetirizine HCl 10 MG CAPS        Cyanocobalamin (VITAMIN B 12 PO) Take 1,000 mcg by mouth       FOLIC ACID PO Take 1 mg by mouth daily       lisinopril (PRINIVIL/ZESTRIL) 10 MG tablet Take 1 tablet (10 mg) by mouth daily 90 tablet 1     mupirocin (BACTROBAN) 2 % external cream Apply  topically 3 times daily for 5 days To lower left shin 30 g 0     omeprazole (PRILOSEC) 20 MG DR capsule Take 1 capsule (20 mg) by mouth daily 90 capsule 1     order for DME Equipment ordered: RESMED Auto PAP Mask type: Nasal Settings: 8-15 CM H2O       predniSONE (DELTASONE) 20 MG tablet Take 2 tablets (40 mg) by mouth daily for 5 days 10 tablet 0     VITAMIN E NATURAL PO        cephALEXin (KEFLEX) 500 MG capsule Take 1 capsule (500 mg) by mouth 4 times daily (Patient not taking: Reported on 12/22/2019) 40 capsule 0     Allergies   Allergen Reactions     Walnuts [Nuts]      Unable to breathe     Shellfish Allergy      Nausea     Grass      SOB, asthma       Plasticized Base [Bht-Mo-Polyethylene]      Can't wear Latex either or Skin peels     Prednisone Other (See Comments)     Severe headache     Wheat      Rash, Derm     Keflex [Cephalexin] Rash         Reviewed and updated as needed this visit by Provider  Tobacco  Allergies  Meds  Problems  Med Hx  Surg Hx  Fam Hx         Review of Systems   ROS COMP: Constitutional, HEENT, cardiovascular, pulmonary, GI, , musculoskeletal, neuro, skin, endocrine and psych systems are negative, except as otherwise noted.      Objective    /58 (BP Location: Right arm, Patient Position: Chair, Cuff Size: Adult Large)   Pulse 70   Temp 98.3  F (36.8  C) (Tympanic)   Resp 20   LMP  (LMP Unknown)   SpO2 99%   There is no height or weight on file to calculate BMI.  Physical Exam   GENERAL: healthy, alert and no distress, nontoxic in appearance  EYES: Eyes grossly normal to inspection, PERRL and conjunctivae and sclerae normal  HENT: ear canals and TM's normal, nose and mouth without ulcers or lesions  NECK: no adenopathy, supple with full ROM  RESP: lungs clear to auscultation - no rales, rhonchi or wheezes  CV: regular rate and rhythm, normal S1 S2, no S3 or S4, no murmur, click or rub, no peripheral edema   ABDOMEN: soft, nontender, no hepatosplenomegaly, no masses  "and bowel sounds normal  MS: no gross musculoskeletal defects noted, no edema  Has rash on lower left shin which was being treated with keflex. Will use bactroban cream now instead.    Diagnostic Test Results:  Labs reviewed in Epic  No results found for this or any previous visit (from the past 24 hour(s)).        Assessment & Plan  probable reaction to keflex. She will use zyrtec and prednisone.   Problem List Items Addressed This Visit     None      Visit Diagnoses     Allergic reaction, initial encounter    -  Primary    Relevant Medications    predniSONE (DELTASONE) 20 MG tablet    Adverse effect of drug, initial encounter        Local infection of skin and subcutaneous tissue        Relevant Medications    predniSONE (DELTASONE) 20 MG tablet    mupirocin (BACTROBAN) 2 % external cream               Patient Instructions   Take your Zyrtec daily to help with this reaction. Stop keflex and we will label your chart \"allergic to\" with rash reaction.    Take prednisone as directed.    Follow-up with your primary care provider next week and as needed.    Indications for emergent return to emergency department discussed with patient, who verbalized good understanding and agreement.  Patient understands the limitations of today's evaluation.         Patient Education     Reaction to Medicine (Other Type)  You are having a reaction to a medicine you have taken. This may not be the same as an allergic reaction. It is an undesired, unfavorable reaction, or a side effect of a medicine. This can cause a variety of symptoms, including:    Dizziness or headache    Rash    Flushing or hot sensation    Nausea, vomiting, or stomach pain    Diarrhea or constipation    Trouble breathing    High or low blood pressure  A reaction can be an upset stomach from something like aspirin or ibuprofen, feeling faint after taking a blood pressure medicine, feeling anxious, and many other things. Symptoms of a medicine reaction can range from " very mild to very severe.  In most cases, the reaction goes away within 1 to 12 hours. But it will probably occur again if you take this same medicine. Your healthcare provider will advise you whether to change how much, when, or how often you take this medicine. He or she may also advise you to discontinue this medicine or switch to another one.   Home care    Another medicine may be recommended to reduce your symptoms until the medicine s effect wears off. Follow your healthcare provider s advice.    When the medicine s effect has worn off, there should be no further problem as long as you don't take the same medicine again.     Ask your healthcare provider if you should also avoid similar medicines. Write down the information so you will remember it.    Make certain the medicine reaction is documented in your medical record.  Follow-up care  Follow up with your healthcare provider, or as advised if your symptoms are not better within 24 hours.  When to seek medical advice  Call your healthcare provider right away if any of these occur.    New symptoms that concern you    Worsening of your current symptoms, including rash or facial swelling    Symptoms that are not relieved by the treatment advised    Fever of 100.4 F (38 C) or higher, or as advised by your healthcare provider  Call 911  Call 911 if any of these occur:    Trouble breathing or swallowing, or wheezing    Hoarse voice or trouble speaking    Confusion    Extreme drowsiness or trouble awakening    Fainting or loss of consciousness    Rapid heart rate or slow heart rate    Very low or very high blood pressure    Vomiting blood, or large amounts of blood in stool    Seizure  Date Last Reviewed: 4/1/2017 2000-2018 [employer]. [address] [place] [address], [place] [address]. All rights reserved. This information is not intended as a substitute for professional medical care. Always follow your healthcare professional's instructions.            Patient Education     Medicine Reaction: Allergic  You are having an allergic reaction to a medicine you have taken. This causes an itchy rash and sometimes swelling of various parts of the body. It may also cause trouble swallowing or breathing. The rash may take a few hours or up to 2 weeks to go away. In the future, remember to tell your healthcare provider about your allergy to this medicine so that medicines of this type won't be used again.  Any medicine can cause an allergic reaction. But the most allergic reactions are caused by:    Penicillin and related medicines    Aspirin    Ibuprofen    Seizure medicines  Vaccines may also trigger allergies. People whose parents or siblings have allergies are at a higher risk of developing a medicine allergy. Allergy testing may sometimes be needed to figure out the cause.  Symptoms may occur within minutes, hours, or even weeks after exposure to the medicine. It can be a mild or severe reaction, or potentially life threatening. Most of us think of allergic reactions when we have a rash or itchy skin. Symptoms can include:    Rash, hives, redness, welts, blisters    Itching, burning, stinging, pain    Dry, flaky, cracking, scaly skin    Belly (abdominal) cramps or nausea or stomach pain    Fever.  Sometimes fever is the only symptom of a drug reaction. In older adults, the risk of fever increases with the number of medicines the person takes.  More severe symptoms include:    Swelling of the face or lips, or drooling    Trouble swallowing, feeling like your throat is closing    Trouble breathing, wheezing    Hoarse voice or trouble speaking    Severe nausea or vomiting or diarrhea      Feeling faint or lightheaded, rapid heart rate  Home care    The goal of treatment is to help relieve the symptoms, and get you feeling better. Mild to medium medicine reactions usually respond quickly to antihistamines and steroids. The rash will usually fade over several days. But  it can sometimes last a couple of weeks. Over the next couple of days, there may be times when it is gets a little worse, and then better again. Here are some things to do:    Throw the medicine away and don t take it again. The next reaction may be much worse.    Add this medicine reaction to your electronic medical record.    When getting a new medicine, always tell the healthcare provider that you are allergic to this medicine. Make certain the provider writes it down in your medical record.    Avoid tight clothing and anything that heats up your skin (hot showers or baths, direct sunlight). Heat will make itching worse.    An ice pack will relieve local areas of intense itching and redness. To make an ice pack, put ice cubes in a plastic bag that seals at the top. Wrap the bag in a clean, thin towel or cloth. Don t put ice directly on the skin.    To help prevent an infection, don't scratch the affected area. Scratching may worsen the reaction. It can damage your skin and lead to an infection. Always check the affected site for signs of an infection.    Your provider may give you a prescription antihistamine.    If you are not given a prescription antihistamine, oral diphenhydramine is an over-the-counter antihistamine available at pharmacies and grocery stores. This may be used to reduce itching if large areas of the skin are involved. This antihistamine may make you sleepy, so be careful using it in the daytime or when going to school, working, or driving. Note: Don t use diphenhydramine if you have glaucoma or if you are a man with trouble urinating due to an enlarged prostate. There are other antihistamines that cause less drowsiness and are a good choice for daytime use. Ask your pharmacist for suggestions.    Don't use diphenhydramine cream on your skin. It can cause a further skin reaction for some people.    Contact your healthcare provider and ask what can be used on the affected area to help decrease  the itching.  Follow-up care  Follow up with your healthcare provider, or as advised if your symptoms do not continue to improve or they get worse.  Call 911  Call 911 if any of these occur:    Shortness of breath    Cool, moist, pale skin    Swelling in the face, eyelids, mouth, tongue, or lips    Drooling    Trouble breathing or swallowing, wheezing    New or worsening swelling in the mouth, throat, or tongue    Hoarse voice or trouble speaking     Fainting or loss of consciousness    Rapid heart rate    Feeling of dizziness or weakness or a sudden drop in blood pressure    Feeling of doom    Feeling lightheaded    Severe nausea, vomiting, or diarrhea  When to seek medical advice  Call your healthcare provider right away if any of these occur:    Continuing or recurring symptoms    Nausea, abdominal cramps or stomach pain    Spreading areas of itching, redness or swelling    Signs of infection:  ? Spreading redness  ? Increased pain or swelling  ? Fever of 100.4 F (38 C) or above lasting for 24 to 48 hours, or as directed by your provider  ? Fluid or colored drainage from the affected area  Date Last Reviewed: 3/1/2017    4849-1196 [employer]. [address] [place] [address]. All rights reserved. This information is not intended as a substitute for professional medical care. Always follow your healthcare professional's instructions.             Return in about 1 day (around 12/23/2019) for Follow up with your primary care provider.    SHILA Arias [facility]

## 2019-12-23 ENCOUNTER — TELEPHONE (OUTPATIENT)
Dept: FAMILY MEDICINE | Facility: OTHER | Age: 49
End: 2019-12-23

## 2019-12-23 ENCOUNTER — OFFICE VISIT - HEALTHEAST (OUTPATIENT)
Dept: SURGERY | Facility: CLINIC | Age: 49
End: 2019-12-23

## 2019-12-23 ENCOUNTER — AMBULATORY - HEALTHEAST (OUTPATIENT)
Dept: SURGERY | Facility: CLINIC | Age: 49
End: 2019-12-23

## 2019-12-23 DIAGNOSIS — E66.01 MORBID OBESITY (H): ICD-10-CM

## 2019-12-23 DIAGNOSIS — E66.01 OBESITY, MORBID, BMI 50 OR HIGHER (H): ICD-10-CM

## 2019-12-23 DIAGNOSIS — Z71.3 NUTRITIONAL COUNSELING: ICD-10-CM

## 2019-12-23 DIAGNOSIS — I10 HYPERTENSION, UNSPECIFIED TYPE: ICD-10-CM

## 2019-12-23 DIAGNOSIS — L03.116 CELLULITIS OF LEFT LOWER EXTREMITY: Primary | ICD-10-CM

## 2019-12-23 RX ORDER — MINOCYCLINE HYDROCHLORIDE 100 MG/1
100 TABLET ORAL 2 TIMES DAILY
Qty: 14 TABLET | Refills: 0 | Status: ON HOLD | OUTPATIENT
Start: 2019-12-23 | End: 2020-01-14

## 2019-12-23 ASSESSMENT — MIFFLIN-ST. JEOR: SCORE: 2511

## 2019-12-23 NOTE — TELEPHONE ENCOUNTER
Please triage.  I am not able to add anyone else on today.  Please advise as appropriate.     Electronically signed by Roshni Luevano CNP.

## 2019-12-23 NOTE — TELEPHONE ENCOUNTER
Reason for Call:  Same Day Appointment, Requested Provider:  Roshni Luevano CNP    PCP: Roshni Luevano    Reason for visit: allergic reaction to antibiotics    Duration of symptoms:     Have you been treated for this in the past? Yes - seen in [facility] yesterday and they want her seen by you this week.      Additional comments: She would like to see you today, she has appointments out of town starting at 1 so she would like to be seen right away.    Can we leave a detailed message on this number? YES    Phone number patient can be reached at: Home number on file [phone] (home)    Best Time: any    Call taken on 12/23/2019 at 9:11 AM by Grzegorz Landa

## 2019-12-23 NOTE — TELEPHONE ENCOUNTER
I have sent over prescription for minocycline which is completely unrelated to Keflex chemically and should not cause a cross allergic reaction.  It is also effective on skin rashes.  She should follow-up with Mr. Conway as scheduled.    Suleiman

## 2019-12-23 NOTE — TELEPHONE ENCOUNTER
Called and spoke to the patient. She saw Dr. Bearden on 12/18/19 and diagnosed with Cellulitis. She was started on Keflex 500 mg four times a day for 10 days. Patient completed about 3 days of medication and then broke out in a full body rash and hives. She was seen at [facility] yesterday and they verified it did look like an allergic reaction. They told patient to stop the Keflex. Prescribed an antibiotic cream, Prednisone, and Zyrtec.     Patient said she has had the prednisone in her about 24 hours and her rash is slowly improving. She was unable to  the antibiotic cream as it was $400 and she could not afford that. She is scheduled to see Teddy on 12/26/19 for a follow up. She said the cellulitis has gotten slightly worse since she stopped the Keflex Saturday evening.     Patient is wondering if Dr. Bearden thinks she should start up on another antibiotic before her follow up appointment with Teddy on Thursday.     Will route to provider to advise.     Otilia Rivero, LORENAN, RN  [facility]

## 2019-12-26 ENCOUNTER — OFFICE VISIT (OUTPATIENT)
Dept: FAMILY MEDICINE | Facility: OTHER | Age: 49
End: 2019-12-26
Payer: COMMERCIAL

## 2019-12-26 VITALS
OXYGEN SATURATION: 99 % | HEART RATE: 70 BPM | SYSTOLIC BLOOD PRESSURE: 150 MMHG | DIASTOLIC BLOOD PRESSURE: 100 MMHG | WEIGHT: 293 LBS | TEMPERATURE: 95.9 F | BODY MASS INDEX: 51.85 KG/M2 | RESPIRATION RATE: 20 BRPM

## 2019-12-26 DIAGNOSIS — R23.2 FACIAL FLUSHING: ICD-10-CM

## 2019-12-26 DIAGNOSIS — E66.01 MORBID OBESITY (H): ICD-10-CM

## 2019-12-26 DIAGNOSIS — L27.0 DRUG ERUPTION: Primary | ICD-10-CM

## 2019-12-26 DIAGNOSIS — I10 BENIGN ESSENTIAL HYPERTENSION: ICD-10-CM

## 2019-12-26 PROCEDURE — 99214 OFFICE O/P EST MOD 30 MIN: CPT | Performed by: PHYSICIAN ASSISTANT

## 2019-12-26 RX ORDER — TRIAMCINOLONE ACETONIDE 1 MG/G
CREAM TOPICAL 2 TIMES DAILY
Qty: 45 G | Refills: 0 | Status: SHIPPED | OUTPATIENT
Start: 2019-12-26 | End: 2019-12-26 | Stop reason: SINTOL

## 2019-12-26 RX ORDER — TRIAMCINOLONE ACETONIDE 1 MG/G
CREAM TOPICAL 2 TIMES DAILY
Qty: 45 G | Refills: 0 | Status: SHIPPED | OUTPATIENT
Start: 2019-12-26 | End: 2020-03-16

## 2019-12-26 ASSESSMENT — PAIN SCALES - GENERAL: PAINLEVEL: NO PAIN (0)

## 2019-12-26 NOTE — NURSING NOTE
Health Maintenance Due   Topic Date Due     ZOSTER IMMUNIZATION (1 of 2) 12/31/2019     PAP  01/12/2020     HPV  05/08/2020     Estephania KHAN LPN

## 2019-12-26 NOTE — PATIENT INSTRUCTIONS
First approach - double the cetrizine 10mg twice daily or at one time     If this is not successful or does not provide the relief -    A) hydroxyzine (call for rx)   B) Pepecid/tagament/zantac - for the added antihistamine       Topical triamcinolone cream (steroid) twice daily as needed over trouble spots.

## 2019-12-26 NOTE — PROGRESS NOTES
Subjective     Paola Lobo is a 48 year old female who presents to clinic today for the following health issues:    HPI   Rash  Onset: started 12-    Description:   Location: face  Character: blotchy, raised, red  Itching (Pruritis): YES    Progression of Symptoms:  improving    Accompanying Signs & Symptoms:  Fever: no   Body aches or joint pain: no   Sore throat symptoms: no   Recent cold symptoms: YES    History:   Previous similar rash: YES    Precipitating factors:   Exposure to similar rash: no  New exposures: Keflex  Recent travel: no     Alleviating factors:  prednisone    Therapies Tried and outcome: Prednisone, slight relief      Patient is a 48 year old female who presents today for follow up of drug eruption following use of keflex for folliculitis on 12/18. She was seen at  on 12/22 at which point the antibiotic was stopped and she was started on prednisone and zyrtec. Patient's chart shows that prednisone causes severe headaches. Patient reports that this did occur. Patient contacted clinic and was subsequently put on minocycline. She says that the rash did improve with use of this medication, but that she continues to have papular bumps over her arms, chest, back and neck.     Reviewed and updated as needed this visit by Provider         Review of Systems   ROS COMP: Constitutional, HEENT, cardiovascular, pulmonary, gi and gu systems are negative, except as otherwise noted.      Objective    BP (!) 150/100 (BP Location: Right arm, Patient Position: Chair, Cuff Size: Adult Large)   Pulse 70   Temp 95.9  F (35.5  C) (Oral)   Resp 20   Wt (!) 178.3 kg (393 lb)   SpO2 99%   BMI 51.85 kg/m    Body mass index is 51.85 kg/m .  Physical Exam   GENERAL: healthy, alert and no distress  RESP: lungs clear to auscultation - no rales, rhonchi or wheezes  CV: regular rate and rhythm, normal S1 S2, no S3 or S4, no murmur, click or rub, no peripheral edema and peripheral pulses  strong  ABDOMEN: soft, nontender, no hepatosplenomegaly, no masses and bowel sounds normal  MS: no gross musculoskeletal defects noted, no edema  SKIN: scattered papular lesions over upper and lower extremities, truck and neck, no evidence of infection, facial flushing noted  PSYCH: mentation appears normal and agitated     Diagnostic Test Results:  Labs reviewed in Epic        Assessment & Plan     1. Drug eruption  Patient will double dose of zyrtec for next week and may add H2RA blocker as well to reduce reactive state of skin. I advised her to avoid hot showers, apply non-scented lotion after PATTING herself dry. Patient interrupted during the education to inform me she already knew all this.   - triamcinolone (KENALOG) 0.1 % external cream; Apply topically 2 times daily As needed for itching  Dispense: 45 g; Refill: 0    2. Benign essential hypertension  Blood pressure is not well controlled today at 150/100. I suspect that this is partially due to the agitated state the patient is in. Will defer to PCP for future adjustments.     3. Morbid obesity (H)  Patient is aware that she is very overweight and would benefit from weight loss. Advised that she maintain a healthy diet low in salts/sugars/fatty&greasy foods with regular servings of fruits and vegetables. Also, try to get in 30 minutes of aerobic exercise 5 or more days each week as able. She will continue to follow with the bariatric specialists at [facility] in pursuit of surgical options.     4. Facial flushing  Unclear as to the cause of the flushing, possibly due to agitated state, body weight, elevated blood pressure and heat in room. Advise that this be monitored at future visits.        Return in about 3 months (around 3/26/2020) for BP Recheck with PCP.    Teddy Conway PA-C  [facility]

## 2020-01-13 ENCOUNTER — ANESTHESIA EVENT (OUTPATIENT)
Dept: GASTROENTEROLOGY | Facility: CLINIC | Age: 50
End: 2020-01-13
Payer: COMMERCIAL

## 2020-01-14 ENCOUNTER — HOSPITAL ENCOUNTER (OUTPATIENT)
Facility: CLINIC | Age: 50
Discharge: HOME | End: 2020-01-14
Attending: SURGERY | Admitting: SURGERY
Payer: COMMERCIAL

## 2020-01-14 ENCOUNTER — ANESTHESIA (OUTPATIENT)
Dept: GASTROENTEROLOGY | Facility: CLINIC | Age: 50
End: 2020-01-14
Payer: COMMERCIAL

## 2020-01-14 VITALS
OXYGEN SATURATION: 97 % | DIASTOLIC BLOOD PRESSURE: 78 MMHG | SYSTOLIC BLOOD PRESSURE: 138 MMHG | TEMPERATURE: 98.5 F | RESPIRATION RATE: 18 BRPM

## 2020-01-14 LAB — COLONOSCOPY: NORMAL

## 2020-01-14 PROCEDURE — 25000128 H RX IP 250 OP 636: Performed by: NURSE ANESTHETIST, CERTIFIED REGISTERED

## 2020-01-14 PROCEDURE — 25800030 ZZH RX IP 258 OP 636: Performed by: SURGERY

## 2020-01-14 PROCEDURE — 25000125 ZZHC RX 250: Performed by: NURSE ANESTHETIST, CERTIFIED REGISTERED

## 2020-01-14 PROCEDURE — 45378 DIAGNOSTIC COLONOSCOPY: CPT | Performed by: SURGERY

## 2020-01-14 PROCEDURE — 37000008 ZZH ANESTHESIA TECHNICAL FEE, 1ST 30 MIN: Performed by: SURGERY

## 2020-01-14 PROCEDURE — G0121 COLON CA SCRN NOT HI RSK IND: HCPCS | Performed by: SURGERY

## 2020-01-14 PROCEDURE — 25000125 ZZHC RX 250: Performed by: SURGERY

## 2020-01-14 PROCEDURE — 37000009 ZZH ANESTHESIA TECHNICAL FEE, EACH ADDTL 15 MIN: Performed by: SURGERY

## 2020-01-14 RX ORDER — PROPOFOL 10 MG/ML
INJECTION, EMULSION INTRAVENOUS CONTINUOUS PRN
Status: DISCONTINUED | OUTPATIENT
Start: 2020-01-14 | End: 2020-01-14

## 2020-01-14 RX ORDER — ONDANSETRON 4 MG/1
4 TABLET, ORALLY DISINTEGRATING ORAL EVERY 6 HOURS PRN
Status: DISCONTINUED | OUTPATIENT
Start: 2020-01-14 | End: 2020-01-14 | Stop reason: HOSPADM

## 2020-01-14 RX ORDER — FLUMAZENIL 0.1 MG/ML
0.2 INJECTION, SOLUTION INTRAVENOUS
Status: DISCONTINUED | OUTPATIENT
Start: 2020-01-14 | End: 2020-01-14 | Stop reason: HOSPADM

## 2020-01-14 RX ORDER — NALOXONE HYDROCHLORIDE 0.4 MG/ML
.1-.4 INJECTION, SOLUTION INTRAMUSCULAR; INTRAVENOUS; SUBCUTANEOUS
Status: DISCONTINUED | OUTPATIENT
Start: 2020-01-14 | End: 2020-01-14 | Stop reason: HOSPADM

## 2020-01-14 RX ORDER — SODIUM CHLORIDE, SODIUM LACTATE, POTASSIUM CHLORIDE, CALCIUM CHLORIDE 600; 310; 30; 20 MG/100ML; MG/100ML; MG/100ML; MG/100ML
INJECTION, SOLUTION INTRAVENOUS CONTINUOUS
Status: DISCONTINUED | OUTPATIENT
Start: 2020-01-14 | End: 2020-01-14 | Stop reason: HOSPADM

## 2020-01-14 RX ORDER — PROPOFOL 10 MG/ML
INJECTION, EMULSION INTRAVENOUS PRN
Status: DISCONTINUED | OUTPATIENT
Start: 2020-01-14 | End: 2020-01-14

## 2020-01-14 RX ORDER — LIDOCAINE 40 MG/G
CREAM TOPICAL
Status: DISCONTINUED | OUTPATIENT
Start: 2020-01-14 | End: 2020-01-14 | Stop reason: HOSPADM

## 2020-01-14 RX ORDER — ONDANSETRON 2 MG/ML
4 INJECTION INTRAMUSCULAR; INTRAVENOUS EVERY 6 HOURS PRN
Status: DISCONTINUED | OUTPATIENT
Start: 2020-01-14 | End: 2020-01-14 | Stop reason: HOSPADM

## 2020-01-14 RX ORDER — LIDOCAINE HYDROCHLORIDE 20 MG/ML
INJECTION, SOLUTION INFILTRATION; PERINEURAL PRN
Status: DISCONTINUED | OUTPATIENT
Start: 2020-01-14 | End: 2020-01-14

## 2020-01-14 RX ORDER — ONDANSETRON 2 MG/ML
4 INJECTION INTRAMUSCULAR; INTRAVENOUS
Status: DISCONTINUED | OUTPATIENT
Start: 2020-01-14 | End: 2020-01-14 | Stop reason: HOSPADM

## 2020-01-14 RX ADMIN — SODIUM CHLORIDE, POTASSIUM CHLORIDE, SODIUM LACTATE AND CALCIUM CHLORIDE: 600; 310; 30; 20 INJECTION, SOLUTION INTRAVENOUS at 10:21

## 2020-01-14 RX ADMIN — LIDOCAINE HYDROCHLORIDE 50 MG: 20 INJECTION, SOLUTION INFILTRATION; PERINEURAL at 11:00

## 2020-01-14 RX ADMIN — PROPOFOL 20 MG: 10 INJECTION, EMULSION INTRAVENOUS at 11:02

## 2020-01-14 RX ADMIN — PROPOFOL 120 MCG/KG/MIN: 10 INJECTION, EMULSION INTRAVENOUS at 11:00

## 2020-01-14 RX ADMIN — PROPOFOL 50 MG: 10 INJECTION, EMULSION INTRAVENOUS at 11:00

## 2020-01-14 RX ADMIN — LIDOCAINE HYDROCHLORIDE 1 ML: 10 INJECTION, SOLUTION EPIDURAL; INFILTRATION; INTRACAUDAL; PERINEURAL at 09:39

## 2020-01-14 NOTE — ANESTHESIA POSTPROCEDURE EVALUATION
Patient: Paola Lobo    Procedure(s):  COLONOSCOPY    Diagnosis:Special screening for malignant neoplasms, colon [Z12.11]  Diagnosis Additional Information: No value filed.    Anesthesia Type:  MAC    Note:  Anesthesia Post Evaluation    Patient location during evaluation: Phase 2  Patient participation: Able to fully participate in evaluation  Level of consciousness: awake and alert  Pain management: adequate  Airway patency: patent  Cardiovascular status: acceptable and stable  Respiratory status: acceptable and room air  Hydration status: acceptable  PONV: none     Anesthetic complications: None    Comments:  Patient was happy with the anesthesia care received and no anesthesia related complications were noted.  I will follow up with the patient again if it is needed.        Last vitals:  Vitals:    01/14/20 0938   BP: 138/78   Resp: 18   Temp: 98.5  F (36.9  C)   SpO2: 97%         Electronically Signed By: SHILA Dubois CRNA  January 14, 2020  12:24 PM

## 2020-01-14 NOTE — ANESTHESIA CARE TRANSFER NOTE
Patient: Paola Lobo    Procedure(s):  COLONOSCOPY    Diagnosis: Special screening for malignant neoplasms, colon [Z12.11]  Diagnosis Additional Information: No value filed.    Anesthesia Type:   MAC     Note:  Airway :Face Mask  Patient transferred to:Phase II  Handoff Report: Identifed the Patient, Identified the Reponsible Provider, Reviewed the pertinent medical history, Discussed the surgical course, Reviewed Intra-OP anesthesia mangement and issues during anesthesia, Set expectations for post-procedure period and Allowed opportunity for questions and acknowledgement of understanding      Vitals: (Last set prior to Anesthesia Care Transfer)    CRNA VITALS  1/14/2020 1100 - 1/14/2020 1142      1/14/2020             Resp Rate (observed):  28                Electronically Signed By: SHILA Dubois CRNA  January 14, 2020  11:42 AM

## 2020-01-14 NOTE — DISCHARGE INSTRUCTIONS
[facility]    Home Care Following Endoscopy      Activity:    You have just undergone an endoscopic procedure usually performed with conscious sedation.  Do not work or operate machinery (including a car) for at least 12 hours.      I encourage you to walk and attempt to pass this air as soon as possible.    Diet:    Return to the diet you were on before your procedure but eat lightly for the first 12-24 hours.    Drink plenty of water.    Resume any regular medications unless otherwise advised by your physician.  Please begin any new medication prescribed as a result of your procedure as directed by your physician.     If you had any biopsy or polyp removed please refrain from aspirin or aspirin products for 2 days.  If on Coumadin please restart as instructed by your physician.   Pain:    You may take Tylenol as needed for pain.  Expected Recovery:    You can expect some mild abdominal fullness and/or discomfort due to the air used to inflate your intestinal tract. It is also normal to have a mild sore throat after upper endoscopy.    Call Your Physician if You Have:    After Colonoscopy:  o Worsening persisting abdominal pain which is worse with activity.  o Fevers (>101 degrees F), chills or shakes.  o Passage of continued blood with bowel movements.   Any questions or concerns about your recovery, please call [phone] or after hours [phone] Nurse Advice Line.     For 24 hours after surgery    1. Get plenty of rest.  A responsible adult must stay with you for at least 24 hours after you leave the hospital.   2. Do not drive or use heavy equipment.  If you have weakness or tingling, don't drive or use heavy equipment until this feeling goes away.  3. Do not drink alcohol.  4. Avoid strenuous or risky activities.  Ask for help when climbing stairs.   5. You may feel lightheaded.  IF so, sit for a few minutes before standing.  Have someone help you get up.   6. If you have nausea (feel sick  to your stomach): Drink only clear liquids such as apple juice, ginger ale, broth or 7-Up.  Rest may also help.  Be sure to drink enough fluids.  Move to a regular diet as you feel able.  7. You may have a slight fever. Call the doctor if your fever is over 100 F (37.7 C) (taken under the tongue) or lasts longer than 24 hours.  8. You may have a dry mouth, a sore throat, muscle aches or trouble sleeping.  These should go away after 24 hours.  9. Do not make important or legal decisions.

## 2020-01-14 NOTE — ANESTHESIA PREPROCEDURE EVALUATION
Anesthesia Pre-Procedure Evaluation    Patient: Paola Lobo   MRN: [id number] : 1970          Preoperative Diagnosis: Special screening for malignant neoplasms, colon [Z12.11]    Procedure(s):  COLONOSCOPY    Past Medical History:   Diagnosis Date     Asthma      Obesity, morbid, BMI 50 or higher (H)      Papanicolaou smear of cervix with low grade squamous intraepithelial lesion (LGSIL) 3/23/2018     Past Surgical History:   Procedure Laterality Date     BIOPSY CERVICAL, LOCAL EXCISION, SINGLE/MULTIPLE N/A 2019    Procedure: endometrial biopsy;  Surgeon: Neil Cordova MD;  Location: [facility] OR     CONIZATION LEEP N/A 2019    Procedure: LEEP  conization of cervix surgery;  Surgeon: Neil Cordova MD;  Location: [facility] OR     ORTHOPEDIC SURGERY         Anesthesia Evaluation     . Pt has had prior anesthetic. Type: General and MAC    No history of anesthetic complications          ROS/MED HX    ENT/Pulmonary:     (+)AKBAR risk factors snores loudly, hypertension, obese, Intermittent asthma , . .    Neurologic:  - neg neurologic ROS     Cardiovascular:     (+) hypertension----. : . . . :. . No previous cardiac testing       METS/Exercise Tolerance:     Hematologic:  - neg hematologic  ROS       Musculoskeletal:   (+) arthritis,  -       GI/Hepatic:  - neg GI/hepatic ROS       Renal/Genitourinary:  - ROS Renal section negative   (+) Pt has no history of transplant,       Endo:     (+) Obesity, .      Psychiatric:  - neg psychiatric ROS       Infectious Disease:  - neg infectious disease ROS       Malignancy:      - no malignancy   Other:    (+) No chance of pregnancy C-spine cleared: N/A, no H/O Chronic Pain,                        Physical Exam  Normal systems: cardiovascular, pulmonary and dental    Airway   Mallampati: III  TM distance: >3 FB  Neck ROM: full    Dental     Cardiovascular   Rhythm and rate: regular and normal      Pulmonary    breath sounds clear to  "auscultation            Lab Results   Component Value Date    WBC 8.1 08/06/2019    HGB 14.3 08/06/2019    HCT 43.8 08/06/2019     08/06/2019    CRP 22.9 (H) 03/23/2018    SED 30 (H) 03/23/2018     08/06/2019    POTASSIUM 4.1 08/06/2019    CHLORIDE 111 (H) 08/06/2019    CO2 24 08/06/2019    BUN 15 08/06/2019    CR 0.86 08/06/2019    GLC 95 08/06/2019    ALPA 9.0 08/06/2019    ALBUMIN 3.5 03/23/2018    PROTTOTAL 7.7 03/23/2018    ALT 51 (H) 03/23/2018    AST 19 03/23/2018    ALKPHOS 86 03/23/2018    BILITOTAL 0.4 03/23/2018    HCG Negative 06/10/2019       Preop Vitals  BP Readings from Last 3 Encounters:   12/26/19 (!) 150/100   12/22/19 136/58   12/18/19 138/84    Pulse Readings from Last 3 Encounters:   12/26/19 70   12/22/19 70   12/18/19 76      Resp Readings from Last 3 Encounters:   12/26/19 20   12/22/19 20   12/18/19 24    SpO2 Readings from Last 3 Encounters:   12/26/19 99%   12/22/19 99%   12/18/19 97%      Temp Readings from Last 1 Encounters:   12/26/19 95.9  F (35.5  C) (Oral)    Ht Readings from Last 1 Encounters:   12/05/19 1.854 m (6' 1\")      Wt Readings from Last 1 Encounters:   12/26/19 (!) 178.3 kg (393 lb)    Estimated body mass index is 51.85 kg/m  as calculated from the following:    Height as of 12/5/19: 1.854 m (6' 1\").    Weight as of 12/26/19: 178.3 kg (393 lb).       Anesthesia Plan      History & Physical Review  History and physical reviewed and following examination; no interval change.Dr. Davis to complete pre op H&P    ASA Status:  2 .    NPO Status:  > 8 hours    Plan for MAC with Intravenous and Propofol induction. Maintenance will be TIVA.  Reason for MAC:  Deep or markedly invasive procedure (G8)         Postoperative Care      Consents  Anesthetic plan, risks, benefits and alternatives discussed with:  Patient..                 SHILA Dubois CRNA  "

## 2020-01-14 NOTE — H&P
Patient seen for Endoscopy    HPI:  Patient is a 49 year old female here for her first screening colonoscopy. Not taking blood thinning medications. No MI or CVA history. No issues with previous sedation. No recent acute illness.    Review Of Systems    Skin: negative  Ears/Nose/Throat: negative  Respiratory: No shortness of breath, dyspnea on exertion, cough, or hemoptysis  Cardiovascular: negative  Gastrointestinal: negative  Genitourinary: negative  Musculoskeletal: negative  Neurologic: negative  Hematologic/Lymphatic/Immunologic: negative  Endocrine: negative      Past Medical History:   Diagnosis Date     Arthritis      Asthma      History of blood transfusion      Hypertension      Obesity, morbid, BMI 50 or higher (H)      Papanicolaou smear of cervix with low grade squamous intraepithelial lesion (LGSIL) 3/23/2018     Sleep apnea        Past Surgical History:   Procedure Laterality Date     BIOPSY CERVICAL, LOCAL EXCISION, SINGLE/MULTIPLE N/A 8/29/2019    Procedure: endometrial biopsy;  Surgeon: Neil Cordova MD;  [facility]: [facility]     CONIZATION LEEP N/A 8/29/2019    Procedure: LEEP  conization of cervix surgery;  Surgeon: Neil Cordova MD;  Location: [facility] OR     ORTHOPEDIC SURGERY         Family History   Problem Relation Age of Onset     Uterine Cancer Mother      Uterine Cancer Maternal Aunt      Uterine Cancer Maternal Aunt      Uterine Cancer Maternal Aunt      Uterine Cancer Maternal Aunt      Uterine Cancer Maternal Aunt        Social History     Socioeconomic History     Marital status: Single     Spouse name: Not on file     Number of children: Not on file     Years of education: Not on file     Highest education level: Not on file   Occupational History     Not on file   Social Needs     Financial resource strain: Not on file     Food insecurity:     Worry: Not on file     Inability: Not on file     Transportation needs:     Medical: Not on file     Non-medical: Not on file    Tobacco Use     Smoking status: Never Smoker     Smokeless tobacco: Never Used   Substance and Sexual Activity     Alcohol use: Yes     Comment: social     Drug use: No     Sexual activity: Yes     Partners: Male     Birth control/protection: Pill, Injection   Lifestyle     Physical activity:     Days per week: Not on file     Minutes per session: Not on file     Stress: Not on file   Relationships     Social connections:     Talks on phone: Not on file     Gets together: Not on file     Attends Voodoo service: Not on file     Active member of club or organization: Not on file     Attends meetings of clubs or organizations: Not on file     Relationship status: Not on file     Intimate partner violence:     Fear of current or ex partner: Not on file     Emotionally abused: Not on file     Physically abused: Not on file     Forced sexual activity: Not on file   Other Topics Concern     Parent/sibling w/ CABG, MI or angioplasty before 65F 55M? Not Asked   Social History Narrative     Not on file       No current outpatient medications on file.       Medications and history reviewed    Physical exam:  Vitals: /78   Temp 98.5  F (36.9  C) (Oral)   Resp 18   SpO2 97%   BMI= There is no height or weight on file to calculate BMI.    Constitutional: Healthy, alert, non-distressed   Head: Normo-cephalic, atraumatic, no lesions, masses or tenderness   Cardiovascular: RRR, no new murmurs, +S1, +S2 heart sounds, no clicks, rubs or gallops   Respiratory: CTAB, no rales, rhonchi or wheezing, equal chest rise, good respiratory effort   Gastrointestinal: Soft, non-tender, non distended, no rebound rigidity or guarding, no masses or hernias palpated   : Deferred  Musculoskeletal: Moves all extremities, normal  strength, no deformities noted   Skin: No suspicious lesions or rashes   Psychiatric: Mentation appears normal, affect appropriate   Hematologic/Lymphatic/Immunologic: Normal cervical and supraclavicular  lymph nodes   Patient able to get up on table without difficulty.    Labs show:  No results found for this or any previous visit (from the past 24 hour(s)).    Assessment: Endoscopy  Plan: Pt cleared for anesthesia for proposed procedure.    Ricardo Davis, DO

## 2020-01-17 ENCOUNTER — OFFICE VISIT - HEALTHEAST (OUTPATIENT)
Dept: SURGERY | Facility: CLINIC | Age: 50
End: 2020-01-17

## 2020-01-17 ENCOUNTER — TRANSFERRED RECORDS (OUTPATIENT)
Dept: HEALTH INFORMATION MANAGEMENT | Facility: CLINIC | Age: 50
End: 2020-01-17

## 2020-01-17 ENCOUNTER — TELEPHONE (OUTPATIENT)
Dept: SURGERY | Facility: CLINIC | Age: 50
End: 2020-01-17

## 2020-01-17 DIAGNOSIS — E66.01 OBESITY, MORBID, BMI 50 OR HIGHER (H): ICD-10-CM

## 2020-01-17 DIAGNOSIS — Z71.3 NUTRITIONAL COUNSELING: ICD-10-CM

## 2020-01-17 DIAGNOSIS — I10 HYPERTENSION, UNSPECIFIED TYPE: ICD-10-CM

## 2020-01-17 ASSESSMENT — MIFFLIN-ST. JEOR: SCORE: 2500.11

## 2020-01-17 NOTE — TELEPHONE ENCOUNTER
Writer called patient to inform that if her bariatric surgery is scheduled soon she can have her gallbladder out, as long as the surgeon does this, at the same time. But if this surgery gets pushed out months, then to call us and Ryan will to the cholecystectomy.  She is seeing the dietician today to see if she is cleared for bariatric surgery. Patient verbalized understanding and had no further questions.   Ronald PRESLEY CMA

## 2020-01-20 ENCOUNTER — OFFICE VISIT - HEALTHEAST (OUTPATIENT)
Dept: SURGERY | Facility: CLINIC | Age: 50
End: 2020-01-20

## 2020-01-20 DIAGNOSIS — E66.01 MORBID OBESITY (H): ICD-10-CM

## 2020-01-20 DIAGNOSIS — I10 ESSENTIAL HYPERTENSION: ICD-10-CM

## 2020-01-20 DIAGNOSIS — G47.33 OBSTRUCTIVE SLEEP APNEA: ICD-10-CM

## 2020-01-20 ASSESSMENT — MIFFLIN-ST. JEOR: SCORE: 2496.03

## 2020-01-22 ENCOUNTER — OFFICE VISIT (OUTPATIENT)
Dept: FAMILY MEDICINE | Facility: OTHER | Age: 50
End: 2020-01-22
Payer: COMMERCIAL

## 2020-01-22 ENCOUNTER — OFFICE VISIT (OUTPATIENT)
Dept: FAMILY MEDICINE | Facility: CLINIC | Age: 50
End: 2020-01-22
Payer: COMMERCIAL

## 2020-01-22 VITALS
SYSTOLIC BLOOD PRESSURE: 130 MMHG | WEIGHT: 293 LBS | OXYGEN SATURATION: 99 % | BODY MASS INDEX: 41.02 KG/M2 | RESPIRATION RATE: 18 BRPM | TEMPERATURE: 98.1 F | HEIGHT: 71 IN | DIASTOLIC BLOOD PRESSURE: 86 MMHG | HEART RATE: 98 BPM

## 2020-01-22 VITALS
HEIGHT: 72 IN | BODY MASS INDEX: 39.68 KG/M2 | TEMPERATURE: 97 F | SYSTOLIC BLOOD PRESSURE: 128 MMHG | DIASTOLIC BLOOD PRESSURE: 70 MMHG | OXYGEN SATURATION: 98 % | RESPIRATION RATE: 12 BRPM | WEIGHT: 293 LBS | HEART RATE: 62 BPM

## 2020-01-22 DIAGNOSIS — R21 RASH AND NONSPECIFIC SKIN ERUPTION: ICD-10-CM

## 2020-01-22 DIAGNOSIS — N87.9 CERVICAL DYSPLASIA: Primary | ICD-10-CM

## 2020-01-22 DIAGNOSIS — L03.116 CELLULITIS OF LEFT LOWER EXTREMITY: Primary | ICD-10-CM

## 2020-01-22 PROCEDURE — 88305 TISSUE EXAM BY PATHOLOGIST: CPT | Performed by: OBSTETRICS & GYNECOLOGY

## 2020-01-22 PROCEDURE — 99213 OFFICE O/P EST LOW 20 MIN: CPT | Performed by: OBSTETRICS & GYNECOLOGY

## 2020-01-22 PROCEDURE — 99213 OFFICE O/P EST LOW 20 MIN: CPT | Performed by: NURSE PRACTITIONER

## 2020-01-22 PROCEDURE — 88175 CYTOPATH C/V AUTO FLUID REDO: CPT | Performed by: OBSTETRICS & GYNECOLOGY

## 2020-01-22 PROCEDURE — G0476 HPV COMBO ASSAY CA SCREEN: HCPCS | Performed by: OBSTETRICS & GYNECOLOGY

## 2020-01-22 RX ORDER — TRIAMCINOLONE ACETONIDE 5 MG/G
OINTMENT TOPICAL
Qty: 15 G | Refills: 0 | Status: SHIPPED | OUTPATIENT
Start: 2020-01-22 | End: 2020-01-30

## 2020-01-22 RX ORDER — MINOCYCLINE HYDROCHLORIDE 100 MG/1
100 TABLET ORAL 2 TIMES DAILY
Qty: 28 TABLET | Refills: 0 | Status: SHIPPED | OUTPATIENT
Start: 2020-01-22 | End: 2020-04-23

## 2020-01-22 ASSESSMENT — PAIN SCALES - GENERAL
PAINLEVEL: MILD PAIN (3)
PAINLEVEL: NO PAIN (0)

## 2020-01-22 ASSESSMENT — MIFFLIN-ST. JEOR
SCORE: 2491.5
SCORE: 2538.23

## 2020-01-22 NOTE — NURSING NOTE
Health Maintenance Due   Topic Date Due     PHQ-2  01/01/2020     ZOSTER IMMUNIZATION (1 of 2) 12/31/2019     HPV TEST  01/12/2020     PAP  01/12/2020      Esthela Herbert LPN........1/22/2020 12:53 PM

## 2020-01-22 NOTE — PROGRESS NOTES
Subjective: On August 29, 2019 she had a LEEP by me and the pathology showed low-grade squamous changes at 12:00 and the endocervical curettings were benign and the endometrial curettings were benign.  She is here for Pap today.      The past medical history, social history, past surgical history and family history as shown below have been reviewed by me today.    Past Medical History:   Diagnosis Date     Arthritis      Asthma      History of blood transfusion      Hypertension      Obesity, morbid, BMI 50 or higher (H)      Papanicolaou smear of cervix with low grade squamous intraepithelial lesion (LGSIL) 3/23/2018     Sleep apnea         Allergies   Allergen Reactions     Black [place] Flavor Anaphylaxis     Dust Mite Extract Other (See Comments) and Shortness Of Breath     Molds & Smuts Other (See Comments) and Shortness Of Breath     Walnuts [Nuts]      Unable to breathe     Shellfish Allergy      Nausea     Grass      SOB, asthma       Plasticized Base [Bht-Mo-Polyethylene]      Can't wear Latex either or Skin peels     Prednisone Other (See Comments)     Severe headache     Wheat      Rash, Derm     Keflex [Cephalexin] Rash     Current Outpatient Medications   Medication Sig Dispense Refill     albuterol (PROAIR HFA/PROVENTIL HFA/VENTOLIN HFA) 108 (90 Base) MCG/ACT inhaler Inhale 2 puffs into the lungs       cetirizine HCl 10 MG CAPS        Cyanocobalamin (VITAMIN B 12 PO) Take 1,000 mcg by mouth       FOLIC ACID PO Take 1 mg by mouth daily       lisinopril (PRINIVIL/ZESTRIL) 10 MG tablet Take 1 tablet (10 mg) by mouth daily 90 tablet 1     omeprazole (PRILOSEC) 20 MG DR capsule Take 1 capsule (20 mg) by mouth daily 90 capsule 1     order for DME Equipment ordered: RESMED Auto PAP Mask type: Nasal Settings: 8-15 CM H2O       triamcinolone (KENALOG) 0.1 % external cream Apply topically 2 times daily As needed for itching 45 g 0     VITAMIN E NATURAL PO        Past Surgical History:   Procedure Laterality Date      BIOPSY CERVICAL, LOCAL EXCISION, SINGLE/MULTIPLE N/A 8/29/2019    Procedure: endometrial biopsy;  Surgeon: Neil Cordova MD;  Location: [facility]     COLONOSCOPY N/A 1/14/2020    Procedure: COLONOSCOPY;  Surgeon: Ricardo Davis DO;  Location:  GI     CONIZATION LEEP N/A 8/29/2019    Procedure: LEEP  conization of cervix surgery;  Surgeon: Neil Cordova MD;  Location: [facility] OR     ORTHOPEDIC SURGERY       Social History     Socioeconomic History     Marital status: Single     Spouse name: None     Number of children: None     Years of education: None     Highest education level: None   Occupational History     None   Social Needs     Financial resource strain: None     Food insecurity:     Worry: None     Inability: None     Transportation needs:     Medical: None     Non-medical: None   Tobacco Use     Smoking status: Never Smoker     Smokeless tobacco: Never Used   Substance and Sexual Activity     Alcohol use: Yes     Comment: social     Drug use: No     Sexual activity: Yes     Partners: Male     Birth control/protection: Pill, Injection   Lifestyle     Physical activity:     Days per week: None     Minutes per session: None     Stress: None   Relationships     Social connections:     Talks on phone: None     Gets together: None     Attends Advent service: None     Active member of club or organization: None     Attends meetings of clubs or organizations: None     Relationship status: None     Intimate partner violence:     Fear of current or ex partner: None     Emotionally abused: None     Physically abused: None     Forced sexual activity: None   Other Topics Concern     Parent/sibling w/ CABG, MI or angioplasty before 65F 55M? Not Asked   Social History Narrative     None     Family History   Problem Relation Age of Onset     Uterine Cancer Mother      Uterine Cancer Maternal Aunt      Uterine Cancer Maternal Aunt      Uterine Cancer Maternal Aunt      Uterine Cancer  "Maternal Aunt      Uterine Cancer Maternal Aunt        ROS: A 12 point review of systems was done. Except for what is listed above in the HPI, the systems review is negative .      Objective: Vital signs: Blood pressure 128/70, pulse 62, temperature 97  F (36.1  C), temperature source Temporal, resp. rate 12, height 1.854 m (6' 1\"), weight (!) 179 kg (394 lb 11.2 oz), SpO2 98 %, not currently breastfeeding.    Chest is clear to auscultation.  No wheezes, rales or rhonchi heard.  Cardiac exam is normal with s1, s2, no murmurs or adventitious sounds.Normal rate and rhythm is heard.   Pelvic exam:My nurse Roshni  was present to chaperone the exam.  The external genitalia appeared normal.    The vaginal vault was without bleeding  or   discharge   or odor.   The cervix was smooth   and shiny and normal in appearance.    A pap was obtained.   A separate endocervical curetting was obtained.  No vaginal support defects were noted,    Bimanual exam revealed a  Nulliparous  sized uterus. It does not   descend   well in the vaginal vault.    No adnexal masses were felt.    There was no  cervical motion tenderness.    Exam was substantially limited by the patient's body habitus.            Assessment/Plan:      1.  Cervical dysplasia: Status post LEEP surgery in August 2019.  We will notify her of Pap results.  I told her this may take several weeks.  If the Pap is normal then I will advise her to go back to yearly Paps with cotesting.             The above information was dictating using Dragon voice software and as a result there may be some irregularities that were not detected in my review of this note.    MICAELA Cordova MD              "

## 2020-01-22 NOTE — PROGRESS NOTES
Subjective     Paola Lobo is a 49 year old female who presents to clinic today for the following health issues:    HPI   Rash  Onset: x1.5 weeks     Description:   Location: Lower legs, upper thighs, abdomen, buttock, face and arms  Character: round, blotchy, raised, flakey, painful, burning, draining, red  Itching (Pruritis): YES    Progression of Symptoms:  worsening    Accompanying Signs & Symptoms:  Fever: no   Body aches or joint pain: no   Sore throat symptoms: no   Recent cold symptoms: YES    History:   Previous similar rash: YES    Precipitating factors:   Exposure to similar rash: no   New exposures: None   Recent travel: no     Alleviating factors:  No     Therapies Tried and outcome: Keflex (caused allergic reaction) Triamcinolone cream (did not help), Mupirocin (did not help) Minocycline (helped)     She was seen for lower leg redness on 12/18/19 - diagnosed with cellulitis and given Keflex.  She developed an allergic reaction to this and was seen in Urgent Care on 12/22/19 - the Keflex was stopped and she was started on oral Prednisone, Zyrtec and topical Bactroban.   She followed up in clinic on 12/26/19 - She was having severe headaches from the Prednisone (previous reaction as well) so this was stopped and topical triamcinolone cream was prescribed in its place.  Her rash was slightly improved so other treatments were continued.   She follows up today because the rash is worsening again.  Has erythema and warmth of both lower legs and multiple papules on her upper arms.  They are not itchy.  Has been using the topical triamcinolone and that is no longer helpful.  No fevers/chills. Has not felt ill.  No one else in her home with a rash.  Has not traveled recently.  Is pursuing weight reduction surgery through the bariatric clinic.       Review of Systems   ROS COMP: Constitutional, HEENT, cardiovascular, pulmonary, gi and gu systems are negative, except as otherwise noted.      Objective   "  /86 (BP Location: Right arm, Patient Position: Sitting, Cuff Size: Adult Large)   Pulse 98   Temp 98.1  F (36.7  C) (Temporal)   Resp 18   Ht 1.815 m (5' 11.46\")   Wt (!) 176.8 kg (389 lb 12.8 oz)   SpO2 99%   Breastfeeding No   BMI 53.67 kg/m    Body mass index is 53.67 kg/m .  Physical Exam   GENERAL: alert, no distress and obese  MS: no gross musculoskeletal defects noted, no edema  SKIN: bilateral lower legs have areas of erythema, warmth and tenderness consistent with cellulitis.  There is scabbing, no current drainage.   Bilateral upper arms have multiple raised papules.  No drainage.      Diagnostic Test Results:  none         Assessment & Plan     1. Cellulitis of left lower extremity  Will treat lower legs for cellulitis.  I am going to increase the strength on her steroid cream for the upper arm rash.  She does not tolerate oral Prednisone well, but if this fails to improve may need to consider a short course.  Follow up if symptoms fail to resolve as expected.   - minocycline (DYNACIN) 100 MG tablet; Take 1 tablet (100 mg) by mouth 2 times daily for 14 days  Dispense: 28 tablet; Refill: 0    2. Rash and nonspecific skin eruption    - triamcinolone (KENALOG) 0.5 % external ointment; Use three times daily on rash until this resolves.  If this is not helping within 2 weeks, let me know  Dispense: 15 g; Refill: 0       See Patient Instructions    Return in about 2 weeks (around 2/5/2020) for Recheck only if not improving.    SHILA Mitchell [facility]    "

## 2020-01-22 NOTE — PATIENT INSTRUCTIONS
Minocycline twice a day for 14 days.     Eat yogurt while on this.     Use the stronger steroid cream.     Follow up with me in 2 weeks if not improving.

## 2020-01-23 ENCOUNTER — SURGERY - HEALTHEAST (OUTPATIENT)
Dept: SURGERY | Facility: CLINIC | Age: 50
End: 2020-01-23

## 2020-01-23 DIAGNOSIS — K81.0 ACUTE CHOLECYSTITIS: ICD-10-CM

## 2020-01-23 DIAGNOSIS — E66.01 MORBID OBESITY (H): ICD-10-CM

## 2020-01-27 LAB
COPATH REPORT: NORMAL
COPATH REPORT: NORMAL
PAP: NORMAL

## 2020-01-28 ENCOUNTER — COMMUNICATION - HEALTHEAST (OUTPATIENT)
Dept: SURGERY | Facility: CLINIC | Age: 50
End: 2020-01-28

## 2020-01-28 ENCOUNTER — TRANSFERRED RECORDS (OUTPATIENT)
Dept: HEALTH INFORMATION MANAGEMENT | Facility: CLINIC | Age: 50
End: 2020-01-28

## 2020-01-28 ENCOUNTER — AMBULATORY - HEALTHEAST (OUTPATIENT)
Dept: SURGERY | Facility: CLINIC | Age: 50
End: 2020-01-28

## 2020-01-28 DIAGNOSIS — Z01.818 PRE-OP TESTING: ICD-10-CM

## 2020-01-29 ENCOUNTER — AMBULATORY - HEALTHEAST (OUTPATIENT)
Dept: SURGERY | Facility: CLINIC | Age: 50
End: 2020-01-29

## 2020-01-29 DIAGNOSIS — Z71.89 ENCOUNTER FOR PRE-BARIATRIC SURGERY COUNSELING AND EDUCATION: ICD-10-CM

## 2020-01-29 ASSESSMENT — MIFFLIN-ST. JEOR: SCORE: 2482.42

## 2020-01-30 ENCOUNTER — MYC MEDICAL ADVICE (OUTPATIENT)
Dept: FAMILY MEDICINE | Facility: OTHER | Age: 50
End: 2020-01-30

## 2020-01-30 DIAGNOSIS — R21 RASH AND NONSPECIFIC SKIN ERUPTION: ICD-10-CM

## 2020-01-30 RX ORDER — TRIAMCINOLONE ACETONIDE 5 MG/G
OINTMENT TOPICAL
Qty: 15 G | Refills: 0 | Status: SHIPPED | OUTPATIENT
Start: 2020-01-30 | End: 2022-07-25

## 2020-02-05 ENCOUNTER — ANCILLARY PROCEDURE (OUTPATIENT)
Dept: GENERAL RADIOLOGY | Facility: OTHER | Age: 50
End: 2020-02-05
Attending: NURSE PRACTITIONER
Payer: COMMERCIAL

## 2020-02-05 ENCOUNTER — OFFICE VISIT (OUTPATIENT)
Dept: FAMILY MEDICINE | Facility: OTHER | Age: 50
End: 2020-02-05
Payer: COMMERCIAL

## 2020-02-05 ENCOUNTER — RECORDS - HEALTHEAST (OUTPATIENT)
Dept: ADMINISTRATIVE | Facility: OTHER | Age: 50
End: 2020-02-05

## 2020-02-05 VITALS
RESPIRATION RATE: 16 BRPM | OXYGEN SATURATION: 98 % | HEIGHT: 72 IN | BODY MASS INDEX: 39.68 KG/M2 | WEIGHT: 293 LBS | SYSTOLIC BLOOD PRESSURE: 138 MMHG | DIASTOLIC BLOOD PRESSURE: 86 MMHG | TEMPERATURE: 97.5 F | HEART RATE: 82 BPM

## 2020-02-05 DIAGNOSIS — I10 BENIGN ESSENTIAL HYPERTENSION: ICD-10-CM

## 2020-02-05 DIAGNOSIS — Z01.818 PREOP EXAMINATION: ICD-10-CM

## 2020-02-05 DIAGNOSIS — E66.01 MORBID OBESITY (H): ICD-10-CM

## 2020-02-05 DIAGNOSIS — J45.20 MILD INTERMITTENT ASTHMA WITHOUT COMPLICATION: ICD-10-CM

## 2020-02-05 DIAGNOSIS — Z01.818 PREOP GENERAL PHYSICAL EXAM: Primary | ICD-10-CM

## 2020-02-05 DIAGNOSIS — Z01.818 PREOP GENERAL PHYSICAL EXAM: ICD-10-CM

## 2020-02-05 LAB
ALBUMIN SERPL-MCNC: 3.9 G/DL (ref 3.4–5)
ALBUMIN UR-MCNC: NEGATIVE MG/DL
ALP SERPL-CCNC: 74 U/L (ref 40–150)
ALT SERPL W P-5'-P-CCNC: 32 U/L (ref 0–50)
ANION GAP SERPL CALCULATED.3IONS-SCNC: 7 MMOL/L (ref 3–14)
APPEARANCE UR: CLEAR
AST SERPL W P-5'-P-CCNC: 22 U/L (ref 0–45)
BACTERIA #/AREA URNS HPF: ABNORMAL /HPF
BASOPHILS # BLD AUTO: 0 10E9/L (ref 0–0.2)
BASOPHILS NFR BLD AUTO: 0.5 %
BILIRUB SERPL-MCNC: 0.3 MG/DL (ref 0.2–1.3)
BILIRUB UR QL STRIP: NEGATIVE
BUN SERPL-MCNC: 17 MG/DL (ref 7–30)
CALCIUM SERPL-MCNC: 9.4 MG/DL (ref 8.5–10.1)
CHLORIDE SERPL-SCNC: 106 MMOL/L (ref 94–109)
CO2 SERPL-SCNC: 25 MMOL/L (ref 20–32)
COLOR UR AUTO: YELLOW
CREAT SERPL-MCNC: 0.78 MG/DL (ref 0.52–1.04)
DIFFERENTIAL METHOD BLD: NORMAL
EOSINOPHIL # BLD AUTO: 0.3 10E9/L (ref 0–0.7)
EOSINOPHIL NFR BLD AUTO: 3.2 %
ERYTHROCYTE [DISTWIDTH] IN BLOOD BY AUTOMATED COUNT: 13.7 % (ref 10–15)
GFR SERPL CREATININE-BSD FRML MDRD: 89 ML/MIN/{1.73_M2}
GLUCOSE SERPL-MCNC: 83 MG/DL (ref 70–99)
GLUCOSE UR STRIP-MCNC: NEGATIVE MG/DL
HCT VFR BLD AUTO: 43 % (ref 35–47)
HGB BLD-MCNC: 14.2 G/DL (ref 11.7–15.7)
HGB UR QL STRIP: NEGATIVE
KETONES UR STRIP-MCNC: NEGATIVE MG/DL
LEUKOCYTE ESTERASE UR QL STRIP: ABNORMAL
LYMPHOCYTES # BLD AUTO: 1.6 10E9/L (ref 0.8–5.3)
LYMPHOCYTES NFR BLD AUTO: 18.7 %
MCH RBC QN AUTO: 29.5 PG (ref 26.5–33)
MCHC RBC AUTO-ENTMCNC: 33 G/DL (ref 31.5–36.5)
MCV RBC AUTO: 89 FL (ref 78–100)
MONOCYTES # BLD AUTO: 0.7 10E9/L (ref 0–1.3)
MONOCYTES NFR BLD AUTO: 7.7 %
NEUTROPHILS # BLD AUTO: 6.1 10E9/L (ref 1.6–8.3)
NEUTROPHILS NFR BLD AUTO: 69.9 %
NITRATE UR QL: NEGATIVE
NON-SQ EPI CELLS #/AREA URNS LPF: ABNORMAL /LPF
PH UR STRIP: 7 PH (ref 5–7)
PLATELET # BLD AUTO: 293 10E9/L (ref 150–450)
POTASSIUM SERPL-SCNC: 4.4 MMOL/L (ref 3.4–5.3)
PROT SERPL-MCNC: 7.5 G/DL (ref 6.8–8.8)
RBC # BLD AUTO: 4.81 10E12/L (ref 3.8–5.2)
RBC #/AREA URNS AUTO: ABNORMAL /HPF
SODIUM SERPL-SCNC: 138 MMOL/L (ref 133–144)
SOURCE: ABNORMAL
SP GR UR STRIP: 1.01 (ref 1–1.03)
UROBILINOGEN UR STRIP-ACNC: 0.2 EU/DL (ref 0.2–1)
WBC # BLD AUTO: 8.7 10E9/L (ref 4–11)
WBC #/AREA URNS AUTO: ABNORMAL /HPF

## 2020-02-05 PROCEDURE — 71046 X-RAY EXAM CHEST 2 VIEWS: CPT | Mod: FY

## 2020-02-05 PROCEDURE — 36415 COLL VENOUS BLD VENIPUNCTURE: CPT | Performed by: NURSE PRACTITIONER

## 2020-02-05 PROCEDURE — 80053 COMPREHEN METABOLIC PANEL: CPT | Performed by: NURSE PRACTITIONER

## 2020-02-05 PROCEDURE — 99215 OFFICE O/P EST HI 40 MIN: CPT | Performed by: NURSE PRACTITIONER

## 2020-02-05 PROCEDURE — 85610 PROTHROMBIN TIME: CPT | Performed by: NURSE PRACTITIONER

## 2020-02-05 PROCEDURE — 85730 THROMBOPLASTIN TIME PARTIAL: CPT | Performed by: NURSE PRACTITIONER

## 2020-02-05 PROCEDURE — 81001 URINALYSIS AUTO W/SCOPE: CPT | Performed by: NURSE PRACTITIONER

## 2020-02-05 PROCEDURE — 93000 ELECTROCARDIOGRAM COMPLETE: CPT | Performed by: NURSE PRACTITIONER

## 2020-02-05 PROCEDURE — 85025 COMPLETE CBC W/AUTO DIFF WBC: CPT | Performed by: NURSE PRACTITIONER

## 2020-02-05 ASSESSMENT — PAIN SCALES - GENERAL: PAINLEVEL: MODERATE PAIN (4)

## 2020-02-05 ASSESSMENT — MIFFLIN-ST. JEOR: SCORE: 2457.94

## 2020-02-05 NOTE — PROGRESS NOTES
[facility]  [address] [facility] [id number] [phone]  Dept: [phone]    PRE-OP EVALUATION:  Today's date: 2020    Paola Lobo (: 1970) presents for pre-operative evaluation assessment as requested by Dr. Badillo.  She requires evaluation and anesthesia risk assessment prior to undergoing surgery/procedure for treatment of weight .    Fax number for surgical facility: [phone]  Primary Physician: Roshni Luevano  Type of Anesthesia Anticipated: General    Patient has a Health Care Directive or Living Will:  YES but not on file right now     Preop Questions 2020   Who is doing your surgery? dr badillo   What are you having done? gastric sleeve and gallbladder removal   Date of Surgery/Procedure: 2020   Facility or Hospital where procedure/surgery will be performed: [facility]   1.  Do you have a history of Heart attack, stroke, stent, coronary bypass surgery, or other heart surgery? No   2.  Do you ever have any pain or discomfort in your chest? YES - Asthma related   3.  Do you have a history of  Heart Failure? No   4.   Are you troubled by shortness of breath when:  walking on a level surface, or up a slight hill, or at night? No   5.  Do you currently have a cold, bronchitis or other respiratory infection? No   6.  Do you have a cough, shortness of breath, or wheezing? No   7.  Do you sometimes get pains in the calves of your legs when you walk? No   8. Do you or anyone in your family have previous history of blood clots? No   9.  Do you or does anyone in your family have a serious bleeding problem such as prolonged bleeding following surgeries or cuts? No   10. Have you ever had problems with anemia or been told to take iron pills? No   11. Have you had any abnormal blood loss such as black, tarry or bloody stools, or abnormal vaginal bleeding? No   12. Have you ever had a blood transfusion? YES - 30 yrs ago    13. Have you or any of your relatives  ever had problems with anesthesia? No   14. Do you have sleep apnea, excessive snoring or daytime drowsiness? YES - Seep apnea    15. Do you have any prosthetic heart valves? No   16. Do you have prosthetic joints? No   17. Is there any chance that you may be pregnant? No         HPI:     HPI related to upcoming procedure: undergoing gastric sleeve procedure and cholecystectomy.       See problem list for active medical problems.  Problems all longstanding and stable, except as noted/documented.  See ROS for pertinent symptoms related to these conditions.      MEDICAL HISTORY:     Patient Active Problem List    Diagnosis Date Noted     Benign essential hypertension 06/13/2018     Priority: Medium     Impingement syndrome, shoulder, right 03/26/2018     Priority: Medium     Papanicolaou smear of cervix with low grade squamous intraepithelial lesion (LGSIL) 03/23/2018     Priority: Medium     3/23/18 LSIL pap, + HR HPV + 16 and other. Plan: colp bef 6/23/18  5/3/18 [place] bx @ 9 o'clock: UMAIR 1 and koilocytosis, with condyloma-like features. ECC: negative. Plan: cotest in 1 yr.  5/8/19 ASCUS pap, + HR HPV (not 16 or 18). Plan: colp  7/12/19 [place] bx: worrisome for HSIL. Plan: Consult visit with Dr. Cordova for possible CKC.  7/31/19 Consult visit. Plan: Pelvic US, then LEEP.   US showed two fibroids.   8/29/19 LEEP bx: LSIL. Plan: pap due in 4 mo  1/22/20 Pap: NIL, + HR HPV (not 16 or 18). ECC: negative. Plan: cotest in 1 yr.       Excessive or frequent menstruation 08/09/2017     Priority: Medium     Intramural and subserous leiomyoma of uterus 08/09/2017     Priority: Medium     Morbid obesity (H) 07/27/2017     Priority: Medium      Past Medical History:   Diagnosis Date     Arthritis      Asthma      History of blood transfusion      Hypertension      Obesity, morbid, BMI 50 or higher (H)      Papanicolaou smear of cervix with low grade squamous intraepithelial lesion (LGSIL) 3/23/2018     Sleep apnea      Past  Surgical History:   Procedure Laterality Date     BIOPSY CERVICAL, LOCAL EXCISION, SINGLE/MULTIPLE N/A 8/29/2019    Procedure: endometrial biopsy;  Surgeon: Neil Cordova MD;  Location: [facility] OR     COLONOSCOPY N/A 1/14/2020    Procedure: COLONOSCOPY;  Surgeon: Ricardo Davis DO;  Location:  GI     CONIZATION LEEP N/A 8/29/2019    Procedure: LEEP  conization of cervix surgery;  Surgeon: Neil Cordova MD;  Location: [facility] OR     ORTHOPEDIC SURGERY       Current Outpatient Medications   Medication Sig Dispense Refill     albuterol (PROAIR HFA/PROVENTIL HFA/VENTOLIN HFA) 108 (90 Base) MCG/ACT inhaler Inhale 2 puffs into the lungs       cetirizine HCl 10 MG CAPS        Cyanocobalamin (VITAMIN B 12 PO) Take 1,000 mcg by mouth       FOLIC ACID PO Take 1 mg by mouth daily       lisinopril (PRINIVIL/ZESTRIL) 10 MG tablet Take 1 tablet (10 mg) by mouth daily 90 tablet 1     minocycline (DYNACIN) 100 MG tablet Take 1 tablet (100 mg) by mouth 2 times daily for 14 days 28 tablet 0     omeprazole (PRILOSEC) 20 MG DR capsule Take 1 capsule (20 mg) by mouth daily 90 capsule 1     order for DME Equipment ordered: RESMED Auto PAP Mask type: Nasal Settings: 8-15 CM H2O       triamcinolone (KENALOG) 0.1 % external cream Apply topically 2 times daily As needed for itching 45 g 0     triamcinolone (KENALOG) 0.5 % external ointment Use three times daily on rash until this resolves.  If this is not helping within 2 weeks, let me know 15 g 0     VITAMIN E NATURAL PO        OTC products: None, except as noted above    Allergies   Allergen Reactions     Black [place] Flavor Anaphylaxis     Dust Mite Extract Other (See Comments) and Shortness Of Breath     Molds & Smuts Other (See Comments) and Shortness Of Breath     Walnuts [Nuts]      Unable to breathe     Shellfish Allergy      Nausea     Grass      SOB, asthma       Plasticized Base [Bht-Mo-Polyethylene]      Can't wear Latex either or Skin peels      "Prednisone Other (See Comments)     Severe headache     Wheat      Rash, Derm     Keflex [Cephalexin] Rash      Latex Allergy: NO    Social History     Tobacco Use     Smoking status: Never Smoker     Smokeless tobacco: Never Used   Substance Use Topics     Alcohol use: Yes     Comment: social     History   Drug Use No       REVIEW OF SYSTEMS:   CONSTITUTIONAL: NEGATIVE for fever, chills, change in weight  INTEGUMENTARY/SKIN: NEGATIVE for worrisome rashes, moles or lesions  EYES: NEGATIVE for vision changes or irritation  ENT/MOUTH: NEGATIVE for ear, mouth and throat problems  RESP: NEGATIVE for significant cough or SOB  BREAST: NEGATIVE for masses, tenderness or discharge  CV: NEGATIVE for chest pain, palpitations or peripheral edema  GI: NEGATIVE for nausea, abdominal pain, heartburn, or change in bowel habits  : NEGATIVE for frequency, dysuria, or hematuria  MUSCULOSKELETAL: NEGATIVE for significant arthralgias or myalgia  NEURO: NEGATIVE for weakness, dizziness or paresthesias  ENDOCRINE: NEGATIVE for temperature intolerance, skin/hair changes  HEME: NEGATIVE for bleeding problems  PSYCHIATRIC: NEGATIVE for changes in mood or affect    EXAM:   /86 (BP Location: Right arm, Patient Position: Sitting, Cuff Size: Adult Large)   Pulse 82   Temp 97.5  F (36.4  C) (Temporal)   Resp 16   Ht 1.854 m (6' 1\")   Wt (!) 171 kg (377 lb)   LMP  (LMP Unknown)   SpO2 98%   Breastfeeding No   BMI 49.74 kg/m      GENERAL APPEARANCE: alert, no distress and over weight     EYES: EOMI, PERRL     HENT: ear canals and TM's normal and nose and mouth without ulcers or lesions     NECK: no adenopathy, no asymmetry, masses, or scars and thyroid normal to palpation     RESP: lungs clear to auscultation - no rales, rhonchi or wheezes     CV: regular rates and rhythm, normal S1 S2, no S3 or S4 and no murmur, click or rub     ABDOMEN:  soft, nontender, no HSM or masses and bowel sounds normal     MS: extremities normal- no " gross deformities noted, no evidence of inflammation in joints, FROM in all extremities.     SKIN: no suspicious lesions or rashes     NEURO: Normal strength and tone, sensory exam grossly normal, mentation intact and speech normal     PSYCH: mentation appears normal. and affect normal/bright     LYMPHATICS: No cervical adenopathy    DIAGNOSTICS:   EKG: appears normal, NSR, normal axis, normal intervals, no acute ST/T changes c/w ischemia, no LVH by voltage criteria, there are no prior tracings available    Recent Labs   Lab Test 08/06/19  0858 07/02/18  0946 03/23/18  0808  11/19/13  1416   HGB 14.3  --  13.4  --   --      --  332  --   --     139 140   < >  --    POTASSIUM 4.1 3.4 4.1   < >  --    CR 0.86 0.76 0.69   < >  --    A1C  --   --   --   --  5.7    < > = values in this interval not displayed.      Office Visit on 02/05/2020   Component Date Value Ref Range Status     WBC 02/05/2020 8.7  4.0 - 11.0 10e9/L Final     RBC Count 02/05/2020 4.81  3.8 - 5.2 10e12/L Final     Hemoglobin 02/05/2020 14.2  11.7 - 15.7 g/dL Final     Hematocrit 02/05/2020 43.0  35.0 - 47.0 % Final     MCV 02/05/2020 89  78 - 100 fl Final     MCH 02/05/2020 29.5  26.5 - 33.0 pg Final     MCHC 02/05/2020 33.0  31.5 - 36.5 g/dL Final     RDW 02/05/2020 13.7  10.0 - 15.0 % Final     Platelet Count 02/05/2020 293  150 - 450 10e9/L Final     % Neutrophils 02/05/2020 69.9  % Final     % Lymphocytes 02/05/2020 18.7  % Final     % Monocytes 02/05/2020 7.7  % Final     % Eosinophils 02/05/2020 3.2  % Final     % Basophils 02/05/2020 0.5  % Final     Absolute Neutrophil 02/05/2020 6.1  1.6 - 8.3 10e9/L Final     Absolute Lymphocytes 02/05/2020 1.6  0.8 - 5.3 10e9/L Final     Absolute Monocytes 02/05/2020 0.7  0.0 - 1.3 10e9/L Final     Absolute Eosinophils 02/05/2020 0.3  0.0 - 0.7 10e9/L Final     Absolute Basophils 02/05/2020 0.0  0.0 - 0.2 10e9/L Final     Diff Method 02/05/2020 Automated Method   Final     Color Urine  02/05/2020 Yellow   Final     Appearance Urine 02/05/2020 Clear   Final     Glucose Urine 02/05/2020 Negative  NEG^Negative mg/dL Final     Bilirubin Urine 02/05/2020 Negative  NEG^Negative Final     Ketones Urine 02/05/2020 Negative  NEG^Negative mg/dL Final     Specific Gravity Urine 02/05/2020 1.015  1.003 - 1.035 Final     Blood Urine 02/05/2020 Negative  NEG^Negative Final     pH Urine 02/05/2020 7.0  5.0 - 7.0 pH Final     Protein Albumin Urine 02/05/2020 Negative  NEG^Negative mg/dL Final     Urobilinogen Urine 02/05/2020 0.2  0.2 - 1.0 EU/dL Final     Nitrite Urine 02/05/2020 Negative  NEG^Negative Final     Leukocyte Esterase Urine 02/05/2020 Trace* NEG^Negative Final     Source 02/05/2020 Midstream Urine   Final     WBC Urine 02/05/2020 0 - 5  OTO5^0 - 5 /HPF Final     RBC Urine 02/05/2020 O - 2  OTO2^O - 2 /HPF Final     Squamous Epithelial /LPF Urine 02/05/2020 Few  FEW^Few /LPF Final     Bacteria Urine 02/05/2020 Few* NEG^Negative /HPF Final     Chest x-ray: normal.   IMPRESSION:   Reason for surgery/procedure: morbid obesity    The proposed surgical procedure is considered INTERMEDIATE risk.    REVISED CARDIAC RISK INDEX  The patient has the following serious cardiovascular risks for perioperative complications such as (MI, PE, VFib and 3  AV Block):  No serious cardiac risks  INTERPRETATION: 1 risks: Class II (low risk - 0.9% complication rate)    The patient has the following additional risks for perioperative complications:  Morbid obesity      ICD-10-CM    1. Preop general physical exam Z01.818 EKG 12-lead complete w/read - Clinics     XR Chest 2 Views   2. Morbid obesity (H) E66.01 XR Chest 2 Views   3. Mild intermittent asthma without complication J45.20 XR Chest 2 Views   4. Benign essential hypertension I10    5. Preop examination Z01.818 CBC with platelets and differential     Comprehensive metabolic panel (BMP + Alb, Alk Phos, ALT, AST, Total. Bili, TP)     *UA reflex to Microscopic and  Culture ([facility] (except [place] and [place])     Partial thromboplastin time     INR       RECOMMENDATIONS:         Anticoagulant or Antiplatelet Medication Use  NSAIDS: Ibuprofen (Motrin):         Stop one day prior to surgery        ACE Inhibitor or Angiotensin Receptor Blocker (ARB) Use  Ace inhibitor or Angiotensin Receptor Blocker (ARB) and should HOLD this medication for the 24 hours prior to surgery.      APPROVAL GIVEN to proceed with proposed procedure, without further diagnostic evaluation       Signed Electronically by: SHILA Mitchell CNP    Copy of this evaluation report is provided to requesting physician.    [facility] Preop Guidelines    Revised Cardiac Risk Index

## 2020-02-05 NOTE — PATIENT INSTRUCTIONS
Hold your Lisinopril for 24 hours prior to surgery   Don't take any more Ibuprofen, Aleve, Naproxen or Aspirin prior to surgery.  Tylenol and/or prescription pain pills are okay to use if needed.     Labs and x-ray will be done today.   For normal results, you will receive a letter with the results in about 2 weeks.  If anything is abnormal or unexpected, someone from the clinic will call you.          Before Your Surgery      Call your surgeon if there is any change in your health. This includes signs of a cold or flu (such as a sore throat, runny nose, cough, rash or fever).    Do not smoke, drink alcohol or take over the counter medicine (unless your surgeon or primary care doctor tells you to) for the 24 hours before and after surgery.    If you take prescribed drugs: Follow your doctor s orders about which medicines to take and which to stop until after surgery.    Eating and drinking prior to surgery: follow the instructions from your surgeon    Take a shower or bath the night before surgery. Use the soap your surgeon gave you to gently clean your skin. If you do not have soap from your surgeon, use your regular soap. Do not shave or scrub the surgery site.  Wear clean pajamas and have clean sheets on your bed.

## 2020-02-05 NOTE — NURSING NOTE
Health Maintenance Due   Topic Date Due     ZOSTER IMMUNIZATION (1 of 2) 12/31/2019     PAP FOLLOW-UP  01/22/2021     HPV FOLLOW-UP  01/22/2021      Health Maintenance reviewed at today's visit patient asked to schedule/complete:   Cervical Cancer:  Patient agrees to schedule  Immunizations:  Patient declined     Esthela Herbert LPN........2/5/2020 10:11 AM

## 2020-02-06 LAB
APTT PPP: 29 SEC (ref 22–37)
INR PPP: 1.05 (ref 0.86–1.14)

## 2020-02-06 ASSESSMENT — ASTHMA QUESTIONNAIRES: ACT_TOTALSCORE: 25

## 2020-02-11 ENCOUNTER — ANESTHESIA - HEALTHEAST (OUTPATIENT)
Dept: SURGERY | Facility: CLINIC | Age: 50
End: 2020-02-11

## 2020-02-12 ENCOUNTER — SURGERY - HEALTHEAST (OUTPATIENT)
Dept: SURGERY | Facility: CLINIC | Age: 50
End: 2020-02-12

## 2020-02-12 ASSESSMENT — MIFFLIN-ST. JEOR: SCORE: 2410.3

## 2020-02-14 ENCOUNTER — COMMUNICATION - HEALTHEAST (OUTPATIENT)
Dept: SURGERY | Facility: CLINIC | Age: 50
End: 2020-02-14

## 2020-02-17 ENCOUNTER — COMMUNICATION - HEALTHEAST (OUTPATIENT)
Dept: SURGERY | Facility: CLINIC | Age: 50
End: 2020-02-17

## 2020-02-25 ENCOUNTER — AMBULATORY - HEALTHEAST (OUTPATIENT)
Dept: SURGERY | Facility: CLINIC | Age: 50
End: 2020-02-25

## 2020-02-25 DIAGNOSIS — Z98.84 BARIATRIC SURGERY STATUS: ICD-10-CM

## 2020-02-25 DIAGNOSIS — Z71.3 NUTRITIONAL COUNSELING: ICD-10-CM

## 2020-02-27 ENCOUNTER — OFFICE VISIT - HEALTHEAST (OUTPATIENT)
Dept: SURGERY | Facility: CLINIC | Age: 50
End: 2020-02-27

## 2020-02-27 DIAGNOSIS — Z48.89 POSTOPERATIVE VISIT: ICD-10-CM

## 2020-02-27 ASSESSMENT — MIFFLIN-ST. JEOR: SCORE: 2351.33

## 2020-03-12 ENCOUNTER — AMBULATORY - HEALTHEAST (OUTPATIENT)
Dept: SURGERY | Facility: CLINIC | Age: 50
End: 2020-03-12

## 2020-03-12 DIAGNOSIS — Z98.84 BARIATRIC SURGERY STATUS: ICD-10-CM

## 2020-03-16 ENCOUNTER — TELEPHONE (OUTPATIENT)
Dept: DERMATOLOGY | Facility: CLINIC | Age: 50
End: 2020-03-16

## 2020-03-16 ENCOUNTER — OFFICE VISIT (OUTPATIENT)
Dept: DERMATOLOGY | Facility: CLINIC | Age: 50
End: 2020-03-16
Payer: COMMERCIAL

## 2020-03-16 VITALS — HEART RATE: 74 BPM | BODY MASS INDEX: 51.13 KG/M2 | OXYGEN SATURATION: 99 % | HEIGHT: 72 IN

## 2020-03-16 DIAGNOSIS — L71.9 ACNE ROSACEA: ICD-10-CM

## 2020-03-16 DIAGNOSIS — L27.0 DRUG ERUPTION: ICD-10-CM

## 2020-03-16 DIAGNOSIS — L30.0 NUMMULAR DERMATITIS: Primary | ICD-10-CM

## 2020-03-16 PROCEDURE — 99214 OFFICE O/P EST MOD 30 MIN: CPT | Performed by: PHYSICIAN ASSISTANT

## 2020-03-16 RX ORDER — TRIAMCINOLONE ACETONIDE 1 MG/G
CREAM TOPICAL 2 TIMES DAILY
Qty: 80 G | Refills: 3 | Status: SHIPPED | OUTPATIENT
Start: 2020-03-16 | End: 2023-03-03

## 2020-03-16 RX ORDER — TRIAMCINOLONE ACETONIDE 5 MG/G
OINTMENT TOPICAL
Qty: 15 G | Refills: 5 | Status: SHIPPED | OUTPATIENT
Start: 2020-03-16 | End: 2023-03-03

## 2020-03-16 NOTE — LETTER
3/16/2020         RE: Paola Lobo  [id number] [place] [address]        Dear Colleague,    Thank you for referring your patient, Paola Lobo, to the [facility]. Please see a copy of my visit note below.    HPI:   Chief complaints: Paola Lobo is a 49 year old female who presents for evaluation of a rash. In December she went in for a dark spot and bumps on both shins. She was prescribed an antibiotic, which she was allergic to. The spots go away and then come back, first only on the lower legs and now oh her arms and shoulders. She has been using TAC which has been effective for itching. Gastric sleeve 2 weeks ago. Has very sensative skin Condition present for:  Since December.   Previous treatments include: TAC, metrocream    Review Of Systems  Eyes: negative  Ears/Nose/Throat: negative  Respiratory: No shortness of breath, dyspnea on exertion, cough, or hemoptysis  Cardiovascular: negative  Gastrointestinal: negative  Genitourinary: negative  Musculoskeletal: negative  Neurologic: negative  Psychiatric: negative  Skin: positive for rash and itching    This document serves as a record of the services and decisions personally performed and made by Sonya Tineo, MS, PA-C. It was created on her behalf by Dai Blancas, a trained medical scribe. The creation of this document is based on the provider's statements to the medical scribe.  Dai Blancas 10:48 AM March 16, 2020    PHYSICAL EXAM:    There were no vitals taken for this visit.  Skin exam performed as follows: Type 2 skin. Mood appropriate  Alert and Oriented X 3. Well developed, well nourished in no distress.  General appearance: Normal  Head including face: Normal  Eyes: conjunctiva and lids: Normal  Mouth: Lips, teeth, gums: Normal  Neck: Normal  Chest-breast/axillae: Normal  Back: Normal  Spleen and liver: Normal  Cardiovascular: Exam of peripheral vascular system by observation for swelling, varicosities,  edema: Normal  Genitalia: groin, buttocks: Normal  Extremities: digits/nails (clubbing): Normal  Eccrine and Apocrine glands: Normal  Right upper extremity: Normal  Left upper extremity: Normal  Right lower extremity: Normal  Left lower extremity: Normal  Skin: Scalp and body hair: See below    1. Xerosis and dermatitis on the back, lower legs  2. Dermatitis on bilateral lower legs  3. Papules and pustules with background erythema on face    ASSESSMENT/PLAN:     1. Atopic dermatitis - advised. Discussed diagnosis and chronic condition at length. Has tried TAC in the past. Using dial white antibacterial to bathe and cetaphil / curel  moisturizer after. Gentle skin care discussed in detail.   --continue TAC cream BID x 1-2 weeks, then PRN only   2. Venous stasis dermatitis of lower extremities - advised on diagnosis and treatment options. Discussed from sluggish venous return and from increased pressure in lower legs. Advised to elevate the legs to the level of the heart as much as possible throughout the day. Has tried TAC in the past.    --continue TAC to LE BID x 1-2 weeks, then PRN   3. Acne Rosacea - advised on diagnosis and treatment options. Discussed chronic condition of unknown etiology. Discussed anti-inflammatories, sunscreen, PO and topical medications. Using nothing currently. Has used metrocream in the past and had a rash.   --start metrocream BID   4. Aung to follow up with Primary Care provider regarding elevated blood pressure.        Follow-up: 4-6 weeks  CC:   Scribed By: Dai Blancas Medical Scribe    The information in this document, created by the medical scribe for me, accurately reflects the services I personally performed and the decisions made by me. I have reviewed and approved this document for accuracy prior to leaving the patient care area.  March 16, 2020 10:51 AM      Sonya Tineo, MS, PA-C        Again, thank you for allowing me to participate in the care of your patient.         Sincerely,        Sonya Irvin PA-C

## 2020-03-16 NOTE — PATIENT INSTRUCTIONS
For eczema (atopic dermatitis)  Bathe every day - we recommend short showers or baths (5 minutes or less) with lukewarm water.  Use a gentle soap such as Cetaphil, Vanicream or CeraVe   Wash the  dirty areas  only - this means armpits, groin, buttocks and feet.   After the bath or shower, within 2 minutes:   1. Pat dry with towel    2. Apply triamcinolone FIRST to any red/rashy/itchy areas on the body. Use this for only about 2 weeks at a time.     3. Apply a moisturizer to entire body, even where you just put the prescription medicine. We recommend CeraVe cream, Cetaphil cream, Eucerin cream or Vanicream. You will do this every single day. If you don t bathe every day we still recommend an application of body moisturizer.     For Rosacea, apply metrocream to the entire face twice per day.  Apply your Cetaphil moisturizer over this.     Follow up in 4-6 weeks if needed.

## 2020-03-16 NOTE — NURSING NOTE
Chief Complaint   Patient presents with     Rash       Vitals:    03/16/20 1021   Pulse: 74   SpO2: 99%   Height: 1.829 m (6')     Wt Readings from Last 1 Encounters:   02/05/20 (!) 171 kg (377 lb)       Ledy Barros LPN.................3/16/2020

## 2020-03-16 NOTE — PROGRESS NOTES
HPI:   Chief complaints: Paola Lobo is a 49 year old female who presents for evaluation of a rash. In December she went in for a dark spot and bumps on both shins. She was prescribed an antibiotic, which she was allergic to. The spots go away and then come back, first only on the lower legs and now oh her arms and shoulders. She has been using TAC which has been effective for itching. Gastric sleeve 2 weeks ago. Has very sensative skin Condition present for:  Since December.   Previous treatments include: TAC, metrocream    Review Of Systems  Eyes: negative  Ears/Nose/Throat: negative  Respiratory: No shortness of breath, dyspnea on exertion, cough, or hemoptysis  Cardiovascular: negative  Gastrointestinal: negative  Genitourinary: negative  Musculoskeletal: negative  Neurologic: negative  Psychiatric: negative  Skin: positive for rash and itching    This document serves as a record of the services and decisions personally performed and made by Sonya Tineo, MS, PA-C. It was created on her behalf by Dai Blancas, a trained medical scribe. The creation of this document is based on the provider's statements to the medical scribe.  Dai Blancas 10:48 AM March 16, 2020    PHYSICAL EXAM:    There were no vitals taken for this visit.  Skin exam performed as follows: Type 2 skin. Mood appropriate  Alert and Oriented X 3. Well developed, well nourished in no distress.  General appearance: Normal  Head including face: Normal  Eyes: conjunctiva and lids: Normal  Mouth: Lips, teeth, gums: Normal  Neck: Normal  Chest-breast/axillae: Normal  Back: Normal  Spleen and liver: Normal  Cardiovascular: Exam of peripheral vascular system by observation for swelling, varicosities, edema: Normal  Genitalia: groin, buttocks: Normal  Extremities: digits/nails (clubbing): Normal  Eccrine and Apocrine glands: Normal  Right upper extremity: Normal  Left upper extremity: Normal  Right lower extremity: Normal  Left lower extremity:  Normal  Skin: Scalp and body hair: See below    1. Xerosis and dermatitis on the back, lower legs  2. Dermatitis on bilateral lower legs  3. Papules and pustules with background erythema on face    ASSESSMENT/PLAN:     1. Atopic dermatitis - advised. Discussed diagnosis and chronic condition at length. Has tried TAC in the past. Using dial white antibacterial to bathe and cetaphil / curel  moisturizer after. Gentle skin care discussed in detail.   --continue TAC cream BID x 1-2 weeks, then PRN only   2. Venous stasis dermatitis of lower extremities - advised on diagnosis and treatment options. Discussed from sluggish venous return and from increased pressure in lower legs. Advised to elevate the legs to the level of the heart as much as possible throughout the day. Has tried TAC in the past.    --continue TAC to LE BID x 1-2 weeks, then PRN   3. Acne Rosacea - advised on diagnosis and treatment options. Discussed chronic condition of unknown etiology. Discussed anti-inflammatories, sunscreen, PO and topical medications. Using nothing currently. Has used metrocream in the past and had a rash.   --start metrocream BID   4. Aung to follow up with Primary Care provider regarding elevated blood pressure.        Follow-up: 4-6 weeks  CC:   Scribed By: Dai Blancas Medical Scribe    The information in this document, created by the medical scribe for me, accurately reflects the services I personally performed and the decisions made by me. I have reviewed and approved this document for accuracy prior to leaving the patient care area.  March 16, 2020 10:51 AM      Sonya Tineo MS, PA-C

## 2020-03-16 NOTE — TELEPHONE ENCOUNTER
Metrocream (or generic) not covered by patient's insurance.    Could we get an order for metronidazole 0.75% gel instead?      Juan Carlos Ernandez [facility], [facility] [phone]

## 2020-04-23 ENCOUNTER — VIRTUAL VISIT (OUTPATIENT)
Dept: DERMATOLOGY | Facility: CLINIC | Age: 50
End: 2020-04-23
Payer: COMMERCIAL

## 2020-04-23 DIAGNOSIS — L30.0 NUMMULAR DERMATITIS: Primary | ICD-10-CM

## 2020-04-23 DIAGNOSIS — L71.9 ACNE ROSACEA: ICD-10-CM

## 2020-04-23 PROCEDURE — 99442 ZZC PHYSICIAN TELEPHONE EVALUATION 11-20 MIN: CPT | Performed by: PHYSICIAN ASSISTANT

## 2020-04-23 RX ORDER — DOXYCYCLINE 50 MG/1
CAPSULE ORAL
Qty: 30 CAPSULE | Refills: 11 | Status: SHIPPED | OUTPATIENT
Start: 2020-04-23 | End: 2021-01-22

## 2020-04-23 NOTE — PROGRESS NOTES
"Paola Lobo is a 49 year old female who is being evaluated via a billable telephone visit.      The patient has been notified of following:     \"This telephone visit will be conducted via a call between you and your physician/provider. We have found that certain health care needs can be provided without the need for an in-person physical exam.  This service lets us provide the care you need with a video or telephone conversation.  If a prescription is necessary we can send it directly to your pharmacy.  If lab work is needed we can place an order for that and you can then stop by our lab to have the test done at a later time.    Video visits and telephone visits are billed at different rates depending on your insurance coverage.  Please reach out to your insurance provider with any questions.    If during the course of the call the physician/provider feels a video or telephone visit is not appropriate, you will not be charged for this service.\"    Patient has given verbal consent for Video or telephone visit? Yes    Paola Lobo is a 49 year old year old female patient here today for follow up of nummular dermatitis and rosacea. Dermatitis is overall improved; she has needed to use the TAC intermittently which does help. Switched her moisturizer and soap to Cetaphil. Rosacea is not improved. She has been using metrogel. Still getting pimples and flushing. Rosaea has been present for years. She is limited on topicals she can use due to insurance.  Patient has no other skin complaints today.  Remainder of the HPI, Meds, PMH, Allergies, FH, and SH was reviewed in chart.    Pertinent Hx:   Nummular dermatitis, acne rosaea  Past Medical History:   Diagnosis Date     Arthritis      Asthma      History of blood transfusion      Hypertension      Obesity, morbid, BMI 50 or higher (H)      Papanicolaou smear of cervix with low grade squamous intraepithelial lesion (LGSIL) 3/23/2018     Sleep apnea  "       Past Surgical History:   Procedure Laterality Date     BIOPSY CERVICAL, LOCAL EXCISION, SINGLE/MULTIPLE N/A 8/29/2019    Procedure: endometrial biopsy;  Surgeon: Neil Cordova MD;  Location: [facility] OR     COLONOSCOPY N/A 1/14/2020    Procedure: COLONOSCOPY;  Surgeon: Ricardo Davis DO;  Location:  GI     CONIZATION LEEP N/A 8/29/2019    Procedure: LEEP  conization of cervix surgery;  Surgeon: Neil Cordova MD;  Location: [facility] OR     ORTHOPEDIC SURGERY          Family History   Problem Relation Age of Onset     Uterine Cancer Mother      Uterine Cancer Maternal Aunt      Uterine Cancer Maternal Aunt      Uterine Cancer Maternal Aunt      Uterine Cancer Maternal Aunt      Uterine Cancer Maternal Aunt        Social History     Socioeconomic History     Marital status: Single     Spouse name: Not on file     Number of children: Not on file     Years of education: Not on file     Highest education level: Not on file   Occupational History     Not on file   Social Needs     Financial resource strain: Not on file     Food insecurity     Worry: Not on file     Inability: Not on file     Transportation needs     Medical: Not on file     Non-medical: Not on file   Tobacco Use     Smoking status: Never Smoker     Smokeless tobacco: Never Used   Substance and Sexual Activity     Alcohol use: Yes     Comment: social     Drug use: No     Sexual activity: Yes     Partners: Male     Birth control/protection: Pill, Injection   Lifestyle     Physical activity     Days per week: Not on file     Minutes per session: Not on file     Stress: Not on file   Relationships     Social connections     Talks on phone: Not on file     Gets together: Not on file     Attends Shinto service: Not on file     Active member of club or organization: Not on file     Attends meetings of clubs or organizations: Not on file     Relationship status: Not on file     Intimate partner violence     Fear of current or ex  partner: Not on file     Emotionally abused: Not on file     Physically abused: Not on file     Forced sexual activity: Not on file   Other Topics Concern     Parent/sibling w/ CABG, MI or angioplasty before 65F 55M? Not Asked   Social History Narrative     Not on file       Outpatient Encounter Medications as of 4/23/2020   Medication Sig Dispense Refill     albuterol (PROAIR HFA/PROVENTIL HFA/VENTOLIN HFA) 108 (90 Base) MCG/ACT inhaler Inhale 2 puffs into the lungs       cetirizine HCl 10 MG CAPS        Cyanocobalamin (VITAMIN B 12 PO) Take 1,000 mcg by mouth       FOLIC ACID PO Take 1 mg by mouth daily       lisinopril (PRINIVIL/ZESTRIL) 10 MG tablet Take 1 tablet (10 mg) by mouth daily 90 tablet 1     metroNIDAZOLE (METROCREAM) 0.75 % external cream Apply to face BID 45 g 11     omeprazole (PRILOSEC) 20 MG DR capsule Take 1 capsule (20 mg) by mouth daily 90 capsule 1     order for DME Equipment ordered: RESMED Auto PAP Mask type: Nasal Settings: 8-15 CM H2O       triamcinolone (KENALOG) 0.1 % external cream Apply topically 2 times daily As needed for itching 80 g 3     triamcinolone (KENALOG) 0.5 % external ointment Apply to AA on the lower legs BID x 1-2 weeks then PRN 15 g 5     VITAMIN E NATURAL PO        triamcinolone (KENALOG) 0.5 % external ointment Use three times daily on rash until this resolves.  If this is not helping within 2 weeks, let me know (Patient not taking: Reported on 4/23/2020) 15 g 0     [DISCONTINUED] minocycline (DYNACIN) 100 MG tablet Take 1 tablet (100 mg) by mouth 2 times daily for 14 days 28 tablet 0     No facility-administered encounter medications on file as of 4/23/2020.              Review Of Systems  Skin: As above  Eyes: negative  Ears/Nose/Throat: negative  Respiratory: No shortness of breath, dyspnea on exertion, cough, or hemoptysis  Cardiovascular: negative  Gastrointestinal: negative  Genitourinary: negative  Musculoskeletal: negative  Neurologic: negative  Psychiatric:  negative  Hematologic/Lymphatic/Immunologic: negative  Endocrine: negative      O:   Alert & Orientedx3, Mood & Affect wnl,    General appearance normal   Alert, oriented and in no acute distress     Photos: 2 reviewed:  1. Nummular dermatitis on the lower leg  2. 1-2+ papules on the face; background erythema on the face      Pulm: Breathing Normal, talking in normal sentences, no shortness of breath during conversation    Neuro/Psych: Orientation:Alert and Orientedx3 ; Mood/Affect:normal ; no anxiety or depression       A/P:  1. Nummular dermatitis - much improved; continue present regimen  2. Acne rosacea - not improved with metrogel. Gets flushing daily. She is very limited with topicals that are covered due to insurance.   --Start doxycycline 50 mg daily with food - give 30 day trial.   BENIGN LESIONS DISCUSSED WITH PATIENT:  I discussed the specifics of tumor, prognosis, and genetics of benign lesions.  I explained that treatment of these lesions would be purely cosmetic and not medically neccessary.  I discussed with patient different removal options including excision, cautery and /or laser.      Nature and genetics of benign skin lesions dicussed with patient.  Signs and Symptoms of skin cancer discussed with patient.  ABCDEs of melanoma reviewed with patient.  Patient encouraged to perform monthly skin exams.  UV precautions reviewed with patient.  Skin care regimen reviewed with patient: Eliminate harsh soaps, i.e. Dial, zest, irsih spring; Mild soaps such as Cetaphil or Dove sensitive skin, avoid hot or cold showers, aggressive use of emollients including vanicream, cetaphil or cerave discussed with patient.    Risks of non-melanoma skin cancer discussed with patient   Return to clinic 4 weeks    Teledermatology information:  - Location of patient: home  - Location of teledermatologist: [place]   - Reason teledermatology is appropriate: of National Emergency Regarding Coronavirus disease (COVID 19)  Outbreak  - The patient's condition can safely be assessed using telemedicine: yes  - Method of transmission: store and forward teledermatology  - Image quality and interpretability: acceptable   - Physician has received verbal consent for a Video/Photos Visit from the patient? Yes  - In-person dermatology visit recommendation: no  - Date of images: 4/23  - Length of call: 19 minutes 20 seconds  - Date of report: 04/23/20

## 2020-04-23 NOTE — NURSING NOTE
Follow up on rash. Is seeing improvement but still is having flare ups. Current flare up on right leg and under arms. Still using the Kenoalog which help the initial itch and decreasing the pain.   Rosacea seems to be improving with Metrogel. Photos uploaded to [facility]. Questions about current coarse of treatment.    Ledy Barros LPN.................4/23/2020

## 2020-05-11 ENCOUNTER — TRANSFERRED RECORDS (OUTPATIENT)
Dept: HEALTH INFORMATION MANAGEMENT | Facility: CLINIC | Age: 50
End: 2020-05-11

## 2020-05-11 ENCOUNTER — COMMUNICATION - HEALTHEAST (OUTPATIENT)
Dept: SURGERY | Facility: CLINIC | Age: 50
End: 2020-05-11

## 2020-05-11 ENCOUNTER — OFFICE VISIT - HEALTHEAST (OUTPATIENT)
Dept: SURGERY | Facility: CLINIC | Age: 50
End: 2020-05-11

## 2020-05-11 DIAGNOSIS — Z48.89 POSTOPERATIVE VISIT: ICD-10-CM

## 2020-05-11 DIAGNOSIS — I10 HYPERTENSION, UNSPECIFIED TYPE: ICD-10-CM

## 2020-05-11 DIAGNOSIS — E66.01 MORBID OBESITY (H): ICD-10-CM

## 2020-05-11 DIAGNOSIS — Z98.84 BARIATRIC SURGERY STATUS: ICD-10-CM

## 2020-05-11 DIAGNOSIS — Z71.3 NUTRITIONAL COUNSELING: ICD-10-CM

## 2020-06-15 ENCOUNTER — MYC MEDICAL ADVICE (OUTPATIENT)
Dept: SLEEP MEDICINE | Facility: CLINIC | Age: 50
End: 2020-06-15

## 2020-06-15 ENCOUNTER — MYC MEDICAL ADVICE (OUTPATIENT)
Dept: FAMILY MEDICINE | Facility: OTHER | Age: 50
End: 2020-06-15

## 2020-06-15 DIAGNOSIS — I10 HYPERTENSION, UNSPECIFIED TYPE: ICD-10-CM

## 2020-06-15 DIAGNOSIS — R10.13 EPIGASTRIC PAIN: ICD-10-CM

## 2020-06-16 DIAGNOSIS — R10.13 EPIGASTRIC PAIN: ICD-10-CM

## 2020-06-16 DIAGNOSIS — I10 HYPERTENSION, UNSPECIFIED TYPE: ICD-10-CM

## 2020-06-16 RX ORDER — LISINOPRIL 10 MG/1
10 TABLET ORAL DAILY
Qty: 90 TABLET | Refills: 1 | Status: CANCELLED | OUTPATIENT
Start: 2020-06-16

## 2020-06-16 RX ORDER — LISINOPRIL 10 MG/1
10 TABLET ORAL DAILY
Qty: 90 TABLET | Refills: 1 | Status: SHIPPED | OUTPATIENT
Start: 2020-06-16 | End: 2020-12-24

## 2020-06-16 NOTE — TELEPHONE ENCOUNTER
I am deferring any dosage changes in the medication to the patient's primary care provider.  I will be happy to refill a short duration of her current medications until her PCPs return.    Suleiman

## 2020-07-08 ENCOUNTER — COMMUNICATION - HEALTHEAST (OUTPATIENT)
Dept: SURGERY | Facility: CLINIC | Age: 50
End: 2020-07-08

## 2020-07-28 ENCOUNTER — COMMUNICATION - HEALTHEAST (OUTPATIENT)
Dept: SURGERY | Facility: CLINIC | Age: 50
End: 2020-07-28

## 2020-07-28 ENCOUNTER — TRANSFERRED RECORDS (OUTPATIENT)
Dept: HEALTH INFORMATION MANAGEMENT | Facility: CLINIC | Age: 50
End: 2020-07-28

## 2020-07-28 ENCOUNTER — AMBULATORY - HEALTHEAST (OUTPATIENT)
Dept: SURGERY | Facility: CLINIC | Age: 50
End: 2020-07-28

## 2020-07-28 DIAGNOSIS — Z98.84 S/P LAPAROSCOPIC SLEEVE GASTRECTOMY: ICD-10-CM

## 2020-07-28 DIAGNOSIS — Z90.3 INTESTINAL MALABSORPTION FOLLOWING GASTRECTOMY: ICD-10-CM

## 2020-07-28 DIAGNOSIS — K91.2 POSTSURGICAL MALABSORPTION: ICD-10-CM

## 2020-07-28 DIAGNOSIS — K91.2 INTESTINAL MALABSORPTION FOLLOWING GASTRECTOMY: ICD-10-CM

## 2020-07-29 NOTE — PROGRESS NOTES
"Paola Lobo is a 49 year old female who is being evaluated via a billable video visit.      The patient has been notified of following:     \"This video visit will be conducted via a call between you and your physician/provider. We have found that certain health care needs can be provided without the need for an in-person physical exam.  This service lets us provide the care you need with a video conversation.  If a prescription is necessary we can send it directly to your pharmacy.  If lab work is needed we can place an order for that and you can then stop by our lab to have the test done at a later time.    Video visits are billed at different rates depending on your insurance coverage.  Please reach out to your insurance provider with any questions.    If during the course of the call the physician/provider feels a video visit is not appropriate, you will not be charged for this service.\"    Patient has given verbal consent for Video visit? Yes  How would you like to obtain your AVS? MyChart  If you are dropped from the video visit, the video invite should be resent to: Send to e-mail at: [email or web address]  Will anyone else be joining your video visit? No      Video-Visit Details    Type of service:  Video Visit    Video Start Time: 3:22  Video End Time: 3:40    Originating Location (pt. Location): Home    Distant Location (provider location):  [facility]     Platform used for Video Visit: Doximity        Does Paola have a CPAP/Bipap?  Yes   Type of mask: nasal      [place] Sleep Scale: 3           Obstructive Sleep Apnea - PAP Follow-Up Visit:    No chief complaint on file.      Paola Lobo comes in today for follow-up of their moderate sleep apnea, managed with CPAP.     No specialty comments available.    Overall, she rates the experience with PAP as 7 (0 poor, 10 great). The mask is comfortable.  .  The mask is leaking and she feels the air on her tongue. This " started after her bariatric surgery in the last year.  .  She is not snoring with the mask on. She is not having gasp arousals.  She is not having significant oral/nasal dryness. The pressure is comfortable.   Her PAP interface is Nasal Mask.    The patient is usually getting 7 hours of sleep per night.    She does feel rested in the morning.    [place] Sleepiness Scale: 3/24      No data recorded    ResMed        Auto-PAP 8.0 - 15.0 cmH2O 30 day usage data:    96% of days with > 4 hours of use. 1/30 days with no use.   Average use 415 minutes per day.   95%ile Leak 17.72 L/min.   CPAP 95% pressure 11.2 cm.   AHI 2.94 events per hour.         Past medical/surgical history, family history, social history, medications and allergies were reviewed.      Problem List:  Patient Active Problem List    Diagnosis Date Noted     Mild intermittent asthma without complication 02/05/2020     Priority: Medium     Benign essential hypertension 06/13/2018     Priority: Medium     Impingement syndrome, shoulder, right 03/26/2018     Priority: Medium     Papanicolaou smear of cervix with low grade squamous intraepithelial lesion (LGSIL) 03/23/2018     Priority: Medium     3/23/18 LSIL pap, + HR HPV + 16 and other. Plan: colp bef 6/23/18  5/3/18 Irrigon bx @ 9 o'clock: UMAIR 1 and koilocytosis, with condyloma-like features. ECC: negative. Plan: cotest in 1 yr.  5/8/19 ASCUS pap, + HR HPV (not 16 or 18). Plan: colp  7/12/19 Irrigon bx: worrisome for HSIL. Plan: Consult visit with Dr. Cordova for possible CKC.  7/31/19 Consult visit. Plan: Pelvic US, then LEEP.   US showed two fibroids.   8/29/19 LEEP bx: LSIL. Plan: pap due in 4 mo  1/22/20 Pap: NIL, + HR HPV (not 16 or 18). ECC: negative. Plan: cotest in 1 yr.       Excessive or frequent menstruation 08/09/2017     Priority: Medium     Intramural and subserous leiomyoma of uterus 08/09/2017     Priority: Medium     Morbid obesity (H) 07/27/2017     Priority: Medium        There were no  vitals taken for this visit.    Impression/Plan:  The patient with moderate AKBAR but since had bariatric surgery and lost 97 lbs.  HEr goal is another 100 lbs.  She is complaining of this leak on her tongue. I shall reduce upper limit of AUTOPAP from 15 to 12 cm.  If she continues to have same leak issues may have to discuss mask change. Otherwise will follow up in 1 year and ifthe patient reaches her weight goal, we may restudy her to see if there is still any residual AKBAR present.   18 minutes spent with patient, all of which were spent virtual counseling, consulting, coordinating plan of care.      CC:  Roshni Luevano Oleg Froymovich, MD

## 2020-07-30 ENCOUNTER — VIRTUAL VISIT (OUTPATIENT)
Dept: SLEEP MEDICINE | Facility: CLINIC | Age: 50
End: 2020-07-30
Payer: COMMERCIAL

## 2020-07-30 DIAGNOSIS — G47.33 OSA (OBSTRUCTIVE SLEEP APNEA): Primary | ICD-10-CM

## 2020-07-30 PROCEDURE — 99213 OFFICE O/P EST LOW 20 MIN: CPT | Mod: 95 | Performed by: OTOLARYNGOLOGY

## 2020-08-05 DIAGNOSIS — Z90.3 INTESTINAL MALABSORPTION FOLLOWING GASTRECTOMY: ICD-10-CM

## 2020-08-05 DIAGNOSIS — K91.2 INTESTINAL MALABSORPTION FOLLOWING GASTRECTOMY: ICD-10-CM

## 2020-08-05 DIAGNOSIS — K91.2 POSTSURGICAL MALABSORPTION: Primary | ICD-10-CM

## 2020-08-05 DIAGNOSIS — Z98.84 STATUS POST LAPAROSCOPIC SLEEVE GASTRECTOMY: ICD-10-CM

## 2020-08-05 LAB
1,25(OH)2D SERPL-MCNC: 44.5 PG/ML (ref 19.9–79.3)
ALBUMIN SERPL-MCNC: 3.7 G/DL (ref 3.4–5)
ALP SERPL-CCNC: 98 U/L (ref 40–150)
ALT SERPL W P-5'-P-CCNC: 28 U/L (ref 0–50)
ANION GAP SERPL CALCULATED.3IONS-SCNC: 2 MMOL/L (ref 3–14)
AST SERPL W P-5'-P-CCNC: 19 U/L (ref 0–45)
BILIRUB SERPL-MCNC: 0.5 MG/DL (ref 0.2–1.3)
BUN SERPL-MCNC: 16 MG/DL (ref 7–30)
CALCIUM SERPL-MCNC: 9.6 MG/DL (ref 8.5–10.1)
CHLORIDE SERPL-SCNC: 110 MMOL/L (ref 94–109)
CHOLEST SERPL-MCNC: 203 MG/DL
CO2 SERPL-SCNC: 28 MMOL/L (ref 20–32)
CREAT SERPL-MCNC: 0.71 MG/DL (ref 0.52–1.04)
ERYTHROCYTE [DISTWIDTH] IN BLOOD BY AUTOMATED COUNT: 13.2 % (ref 10–15)
FERRITIN SERPL-MCNC: 129 NG/ML (ref 8–252)
FOLATE SERPL-MCNC: 35.2 NG/ML
GFR SERPL CREATININE-BSD FRML MDRD: >90 ML/MIN/{1.73_M2}
GLUCOSE SERPL-MCNC: 89 MG/DL (ref 70–99)
HBA1C MFR BLD: 5.2 % (ref 0–5.6)
HCT VFR BLD AUTO: 40.7 % (ref 35–47)
HDLC SERPL-MCNC: 50 MG/DL
HGB BLD-MCNC: 13.1 G/DL (ref 11.7–15.7)
LDLC SERPL CALC-MCNC: 130 MG/DL
MCH RBC QN AUTO: 30 PG (ref 26.5–33)
MCHC RBC AUTO-ENTMCNC: 32.2 G/DL (ref 31.5–36.5)
MCV RBC AUTO: 93 FL (ref 78–100)
NONHDLC SERPL-MCNC: 153 MG/DL
PLATELET # BLD AUTO: 250 10E9/L (ref 150–450)
POTASSIUM SERPL-SCNC: 3.8 MMOL/L (ref 3.4–5.3)
PROT SERPL-MCNC: 7.7 G/DL (ref 6.8–8.8)
PTH-INTACT SERPL-MCNC: 30 PG/ML (ref 18–80)
RBC # BLD AUTO: 4.37 10E12/L (ref 3.8–5.2)
SODIUM SERPL-SCNC: 140 MMOL/L (ref 133–144)
TRIGL SERPL-MCNC: 116 MG/DL
VIT B12 SERPL-MCNC: 2881 PG/ML (ref 193–986)
WBC # BLD AUTO: 8.8 10E9/L (ref 4–11)

## 2020-08-05 PROCEDURE — 82728 ASSAY OF FERRITIN: CPT | Performed by: EMERGENCY MEDICINE

## 2020-08-05 PROCEDURE — 82746 ASSAY OF FOLIC ACID SERUM: CPT | Performed by: EMERGENCY MEDICINE

## 2020-08-05 PROCEDURE — 80061 LIPID PANEL: CPT | Performed by: EMERGENCY MEDICINE

## 2020-08-05 PROCEDURE — 80053 COMPREHEN METABOLIC PANEL: CPT | Performed by: EMERGENCY MEDICINE

## 2020-08-05 PROCEDURE — 83970 ASSAY OF PARATHORMONE: CPT | Performed by: EMERGENCY MEDICINE

## 2020-08-05 PROCEDURE — 82607 VITAMIN B-12: CPT | Performed by: EMERGENCY MEDICINE

## 2020-08-05 PROCEDURE — 83036 HEMOGLOBIN GLYCOSYLATED A1C: CPT | Mod: QW | Performed by: EMERGENCY MEDICINE

## 2020-08-05 PROCEDURE — 84590 ASSAY OF VITAMIN A: CPT | Mod: 90 | Performed by: EMERGENCY MEDICINE

## 2020-08-05 PROCEDURE — 84630 ASSAY OF ZINC: CPT | Mod: 90 | Performed by: EMERGENCY MEDICINE

## 2020-08-05 PROCEDURE — 99000 SPECIMEN HANDLING OFFICE-LAB: CPT | Performed by: EMERGENCY MEDICINE

## 2020-08-05 PROCEDURE — 84425 ASSAY OF VITAMIN B-1: CPT | Mod: 90 | Performed by: EMERGENCY MEDICINE

## 2020-08-05 PROCEDURE — 82652 VIT D 1 25-DIHYDROXY: CPT | Performed by: EMERGENCY MEDICINE

## 2020-08-05 PROCEDURE — 85027 COMPLETE CBC AUTOMATED: CPT | Performed by: EMERGENCY MEDICINE

## 2020-08-05 PROCEDURE — 36415 COLL VENOUS BLD VENIPUNCTURE: CPT | Performed by: EMERGENCY MEDICINE

## 2020-08-07 LAB
ANNOTATION COMMENT IMP: NORMAL
RETINYL PALMITATE SERPL-MCNC: <0.02 MG/L (ref 0–0.1)
VIT A SERPL-MCNC: 0.48 MG/L (ref 0.3–1.2)

## 2020-08-08 LAB — VIT B1 BLD-MCNC: 102 NMOL/L (ref 70–180)

## 2020-08-09 LAB — ZINC SERPL-MCNC: 79.5 UG/DL (ref 60–120)

## 2020-08-11 ENCOUNTER — COMMUNICATION - HEALTHEAST (OUTPATIENT)
Dept: SURGERY | Facility: CLINIC | Age: 50
End: 2020-08-11

## 2020-08-12 ENCOUNTER — COMMUNICATION - HEALTHEAST (OUTPATIENT)
Dept: SURGERY | Facility: CLINIC | Age: 50
End: 2020-08-12

## 2020-08-14 ENCOUNTER — AMBULATORY - HEALTHEAST (OUTPATIENT)
Dept: SURGERY | Facility: CLINIC | Age: 50
End: 2020-08-14

## 2020-08-17 ENCOUNTER — TRANSFERRED RECORDS (OUTPATIENT)
Dept: HEALTH INFORMATION MANAGEMENT | Facility: CLINIC | Age: 50
End: 2020-08-17

## 2020-08-17 ENCOUNTER — OFFICE VISIT - HEALTHEAST (OUTPATIENT)
Dept: SURGERY | Facility: CLINIC | Age: 50
End: 2020-08-17

## 2020-08-17 DIAGNOSIS — K91.2 POSTOPERATIVE MALABSORPTION: ICD-10-CM

## 2020-08-17 DIAGNOSIS — Z90.3 HISTORY OF SLEEVE GASTRECTOMY: ICD-10-CM

## 2020-08-17 DIAGNOSIS — G47.33 OSA (OBSTRUCTIVE SLEEP APNEA): ICD-10-CM

## 2020-08-17 ASSESSMENT — MIFFLIN-ST. JEOR: SCORE: 2093.23

## 2020-08-25 ENCOUNTER — TELEPHONE (OUTPATIENT)
Dept: FAMILY MEDICINE | Facility: OTHER | Age: 50
End: 2020-08-25

## 2020-08-25 DIAGNOSIS — K21.9 GASTROESOPHAGEAL REFLUX DISEASE, ESOPHAGITIS PRESENCE NOT SPECIFIED: Primary | ICD-10-CM

## 2020-08-25 RX ORDER — PANTOPRAZOLE SODIUM 20 MG/1
20 TABLET, DELAYED RELEASE ORAL DAILY
Qty: 90 TABLET | Refills: 3 | Status: SHIPPED | OUTPATIENT
Start: 2020-08-25 | End: 2021-09-17

## 2020-08-25 NOTE — TELEPHONE ENCOUNTER
Omeprazole is no longer on insurance formulary for Paola, they will pay for Protonix, if you are ok with switching her please send new RX to [facility], thanks.    Ayde Cummins-Technician  [facility]  [phone]

## 2020-10-05 DIAGNOSIS — G47.33 OSA (OBSTRUCTIVE SLEEP APNEA): Primary | ICD-10-CM

## 2020-10-12 ENCOUNTER — VIRTUAL VISIT (OUTPATIENT)
Dept: FAMILY MEDICINE | Facility: OTHER | Age: 50
End: 2020-10-12

## 2020-10-12 NOTE — PROGRESS NOTES
"Date: 10/12/2020 09:00:44  Clinician: Adrián Gee  Clinician NPI: [id number]  Patient: Paola Lobo  Patient : 1970  Patient Address: [facility] [place] [address] [address]  Patient Phone: [phone]  Visit Protocol: URI  Patient Summary:  Paola is a 49 year old ( : 1970 ) female who initiated a [facility] Visit for cold, sinus infection, or influenza. When asked the question \"Please sign me up to receive news, health information and promotions. \", Paola responded \"No\".    Paola states her symptoms started gradually 3-4 days ago.   Her symptoms consist of a headache, enlarged lymph nodes, anosmia, rhinitis, myalgia, chills, malaise, a sore throat, tooth pain, and diarrhea. Paola also feels feverish.   Symptom details     Nasal secretions: The color of her mucus is white.    Sore throat: Paola reports having moderate throat pain (4-6 on a 10 point pain scale), does not have exudate on her tonsils, and can swallow liquids. The lymph nodes in her neck are enlarged. A rash has not appeared on the skin since the sore throat started.     Temperature: Her current temperature is 96.3 degrees Fahrenheit.     Headache: She states the headache is mild (1-3 on a 10 point pain scale).     Tooth pain: The tooth pain is not caused by a cavity, recent dental work, or other mouth problems.      Paola denies having ear pain, wheezing, cough, nasal congestion, vomiting, nausea, facial pain or pressure, and ageusia. She also denies double sickening (worsening symptoms after initial improvement), taking antibiotic medication in the past month, and having recent facial or sinus surgery in the past 60 days. She is not experiencing dyspnea.   Precipitating events  Within the past week, Paola has not been exposed to someone with strep throat. She has not recently been exposed to someone with influenza. Paola has been in close contact with the following high risk individuals: adults 65 or " older and people with asthma, heart disease or diabetes.   Pertinent COVID-19 (Coronavirus) information  In the past 14 days, Paola has not worked in a congregate living setting.   She does not work or volunteer as healthcare worker or a  and does not work or volunteer in a healthcare facility.   Paola also has not lived in a congregate living setting in the past 14 days. She does not live with a healthcare worker.   Paola has not had a close contact with a laboratory-confirmed COVID-19 patient within 14 days of symptom onset.   Since December 2019, Paola and has had upper respiratory infection (URI) or influenza-like illness. Has not been diagnosed with lab-confirmed COVID-19 test      Date(s) of previous URI or influenza-like illness (free-text): Early December I had the flu     Symptoms Paola experienced during previous URI or influenza-like illness as reported by the patient (free-text): cough, fever, congestion, extremely tired - two week duration        Pertinent medical history  Paola had 1 sinus infection within the past year.   Paola does not get yeast infections when she takes antibiotics.   Paola does not need a return to work/school note.   Weight: 291 lbs   Paola does not smoke or use smokeless tobacco.   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  I check my temp daily for work normal on my thermometer is 95.5 so it is slight at this point.   Weight: 291 lbs    MEDICATIONS: omeprazole oral, lisinopril-hydrochlorothiazide oral, ALLERGIES: cephalexin  Clinician Response:  Dear Paola,   Your symptoms show that you may have coronavirus (COVID-19). This illness can cause fever, cough and trouble breathing. Many people get a mild case and get better on their own. Some people can get very sick.  What should I do?  We would like to test you for this virus.   1. Please call [phone] to  "schedule your visit. Explain that you were referred by [facility] to have a COVID-19 test. Be ready to share your [facility] visit ID number.  The following will serve as your written order for this COVID Test, ordered by me, for the indication of suspected COVID [Z20.828]: The test will be ordered in [facility], our electronic health record, after you are scheduled. It will show as ordered and authorized by Junior Frias MD.  Order: COVID-19 (Coronavirus) PCR for SYMPTOMATIC testing from [facility].      2. When it's time for your COVID test:  Stay at least 6 feet away from others. (If someone will drive you to your test, stay in the backseat, as far away from the  as you can.)   Cover your mouth and nose with a mask, tissue or washcloth.  Go straight to the testing site. Don't make any stops on the way there or back.      3.Starting now: Stay home and away from others (self-isolate) until:   You've had no fever---and no medicine that reduces fever---for one full day (24 hours). And...   Your other symptoms have gotten better. For example, your cough or breathing has improved. And...   At least 10 days have passed since your symptoms started.       During this time, don't leave the house except for testing or medical care.   Stay in your own room, even for meals. Use your own bathroom if you can.   Stay away from others in your home. No hugging, kissing or shaking hands. No visitors.  Don't go to work, school or anywhere else.    Clean \"high touch\" surfaces often (doorknobs, counters, handles, etc.). Use a household cleaning spray or wipes. You'll find a full list of  on the EPA website: www.epa.gov/pesticide-registration/list-n-disinfectants-use-against-sars-cov-2.   Cover your mouth and nose with a mask, tissue or washcloth to avoid spreading germs.  Wash your hands and face often. Use soap and water.  Caregivers in these groups are at risk for severe illness due to COVID-19:  o People 65 years and older  o People who " live in a nursing home or long-term care facility  o People with chronic disease (lung, heart, cancer, diabetes, kidney, liver, immunologic)  o People who have a weakened immune system, including those who:   Are in cancer treatment  Take medicine that weakens the immune system, such as corticosteroids  Had a bone marrow or organ transplant  Have an immune deficiency  Have poorly controlled HIV or AIDS  Are obese (body mass index of 40 or higher)  Smoke regularly   o Caregivers should wear gloves while washing dishes, handling laundry and cleaning bedrooms and bathrooms.  o Use caution when washing and drying laundry: Don't shake dirty laundry, and use the warmest water setting that you can.  o For more tips, go to www.cdc.gov/coronavirus/2019-ncov/downloads/10Things.pdf.    How can I take care of myself?    Get lots of rest. Drink extra fluids (unless a doctor has told you not to).   Take Tylenol (acetaminophen) for fever or pain. If you have liver or kidney problems, ask your family doctor if it's okay to take Tylenol.   Adults can take either:    650 mg (two 325 mg pills) every 4 to 6 hours, or...   1,000 mg (two 500 mg pills) every 8 hours as needed.    Note: Don't take more than 3,000 mg in one day. Acetaminophen is found in many medicines (both prescribed and over-the-counter medicines). Read all labels to be sure you don't take too much.   For children, check the Tylenol bottle for the right dose. The dose is based on the child's age or weight.    If you have other health problems (like cancer, heart failure, an organ transplant or severe kidney disease): Call your specialty clinic if you don't feel better in the next 2 days.       Know when to call 911. Emergency warning signs include:    Trouble breathing or shortness of breath Pain or pressure in the chest that doesn't go away Feeling confused like you haven't felt before, or not being able to wake up Bluish-colored lips or face.  Where can I get more  information?   [facility] -- About COVID-19: [email or web address]   CDC -- What to Do If You're Sick: www.cdc.gov/coronavirus/2019-ncov/about/steps-when-sick.html   [facility] -- Ending Home Isolation: www.cdc.gov/coronavirus/2019-ncov/hcp/disposition-in-home-patients.html   [facility] -- Caring for Someone: www.cdc.gov/coronavirus/2019-ncov/if-you-are-sick/care-for-someone.html   [facility] -- Interim Guidance for Hospital Discharge to Home: www.[facility].[place]./diseases/coronavirus/hcp/hospdischarge.pdf   [facility] clinical trials (COVID-19 research studies): [email or web address]-clinical-trials    Below are the COVID-19 hotlines at the [place] Department of [facility] ([facility]). Interpreters are available.    For health questions: Call 317-621-7221 or 1-398.816.6271 (7 a.m. to 7 p.m.) For questions about schools and childcare: Call 720-106-1107 or 1-922.579.6265 (7 a.m. to 7 p.m.)    Diagnosis: Other malaise  Diagnosis ICD: R53.81

## 2020-10-13 ENCOUNTER — AMBULATORY - HEALTHEAST (OUTPATIENT)
Dept: INTERNAL MEDICINE | Facility: CLINIC | Age: 50
End: 2020-10-13

## 2020-10-13 DIAGNOSIS — Z20.822 SUSPECTED 2019 NOVEL CORONAVIRUS INFECTION: ICD-10-CM

## 2020-10-14 ENCOUNTER — AMBULATORY - HEALTHEAST (OUTPATIENT)
Dept: FAMILY MEDICINE | Facility: CLINIC | Age: 50
End: 2020-10-14

## 2020-10-14 DIAGNOSIS — Z20.822 SUSPECTED 2019 NOVEL CORONAVIRUS INFECTION: ICD-10-CM

## 2020-10-16 ENCOUNTER — COMMUNICATION - HEALTHEAST (OUTPATIENT)
Dept: SCHEDULING | Facility: CLINIC | Age: 50
End: 2020-10-16

## 2020-11-13 ENCOUNTER — OFFICE VISIT - HEALTHEAST (OUTPATIENT)
Dept: SURGERY | Facility: CLINIC | Age: 50
End: 2020-11-13

## 2020-11-13 ENCOUNTER — TRANSFERRED RECORDS (OUTPATIENT)
Dept: HEALTH INFORMATION MANAGEMENT | Facility: CLINIC | Age: 50
End: 2020-11-13

## 2020-11-13 DIAGNOSIS — K91.2 POSTOPERATIVE MALABSORPTION: ICD-10-CM

## 2020-11-13 DIAGNOSIS — Z71.3 NUTRITIONAL COUNSELING: ICD-10-CM

## 2020-11-13 DIAGNOSIS — Z98.84 S/P LAPAROSCOPIC SLEEVE GASTRECTOMY: ICD-10-CM

## 2020-12-23 DIAGNOSIS — I10 HYPERTENSION, UNSPECIFIED TYPE: ICD-10-CM

## 2020-12-24 RX ORDER — LISINOPRIL 10 MG/1
10 TABLET ORAL DAILY
Qty: 90 TABLET | Refills: 0 | Status: SHIPPED | OUTPATIENT
Start: 2020-12-24 | End: 2021-01-22

## 2020-12-24 NOTE — TELEPHONE ENCOUNTER
Prescription approved per [facility] Refill Protocol.    LORENA EnriquezN, RN  [facility]

## 2021-01-07 ENCOUNTER — PATIENT OUTREACH (OUTPATIENT)
Dept: FAMILY MEDICINE | Facility: CLINIC | Age: 51
End: 2021-01-07

## 2021-01-07 DIAGNOSIS — R87.612 PAPANICOLAOU SMEAR OF CERVIX WITH LOW GRADE SQUAMOUS INTRAEPITHELIAL LESION (LGSIL): ICD-10-CM

## 2021-01-11 ENCOUNTER — MYC MEDICAL ADVICE (OUTPATIENT)
Dept: FAMILY MEDICINE | Facility: CLINIC | Age: 51
End: 2021-01-11

## 2021-01-15 ENCOUNTER — HEALTH MAINTENANCE LETTER (OUTPATIENT)
Age: 51
End: 2021-01-15

## 2021-01-22 ENCOUNTER — OFFICE VISIT (OUTPATIENT)
Dept: INTERNAL MEDICINE | Facility: CLINIC | Age: 51
End: 2021-01-22
Payer: COMMERCIAL

## 2021-01-22 VITALS
DIASTOLIC BLOOD PRESSURE: 80 MMHG | HEART RATE: 84 BPM | RESPIRATION RATE: 22 BRPM | BODY MASS INDEX: 39.15 KG/M2 | TEMPERATURE: 96.3 F | OXYGEN SATURATION: 99 % | WEIGHT: 289.06 LBS | HEIGHT: 72 IN | SYSTOLIC BLOOD PRESSURE: 110 MMHG

## 2021-01-22 DIAGNOSIS — I73.00 RAYNAUD'S DISEASE WITHOUT GANGRENE: ICD-10-CM

## 2021-01-22 DIAGNOSIS — F40.240 CLAUSTROPHOBIA: ICD-10-CM

## 2021-01-22 DIAGNOSIS — M25.551 HIP PAIN, RIGHT: ICD-10-CM

## 2021-01-22 DIAGNOSIS — I10 BENIGN ESSENTIAL HYPERTENSION: Primary | ICD-10-CM

## 2021-01-22 DIAGNOSIS — G89.29 CHRONIC LEFT SHOULDER PAIN: ICD-10-CM

## 2021-01-22 DIAGNOSIS — M25.512 CHRONIC LEFT SHOULDER PAIN: ICD-10-CM

## 2021-01-22 PROCEDURE — 99214 OFFICE O/P EST MOD 30 MIN: CPT | Performed by: INTERNAL MEDICINE

## 2021-01-22 RX ORDER — AMLODIPINE BESYLATE 5 MG/1
5 TABLET ORAL DAILY
Qty: 30 TABLET | Refills: 0 | Status: SHIPPED | OUTPATIENT
Start: 2021-01-22 | End: 2021-02-11

## 2021-01-22 RX ORDER — LORAZEPAM 1 MG/1
TABLET ORAL
Qty: 2 TABLET | Refills: 0 | Status: SHIPPED | OUTPATIENT
Start: 2021-01-22 | End: 2021-08-27

## 2021-01-22 ASSESSMENT — ASTHMA QUESTIONNAIRES
QUESTION_4 LAST FOUR WEEKS HOW OFTEN HAVE YOU USED YOUR RESCUE INHALER OR NEBULIZER MEDICATION (SUCH AS ALBUTEROL): NOT AT ALL
QUESTION_5 LAST FOUR WEEKS HOW WOULD YOU RATE YOUR ASTHMA CONTROL: WELL CONTROLLED
QUESTION_1 LAST FOUR WEEKS HOW MUCH OF THE TIME DID YOUR ASTHMA KEEP YOU FROM GETTING AS MUCH DONE AT WORK, SCHOOL OR AT HOME: NONE OF THE TIME
ACT_TOTALSCORE: 23
QUESTION_2 LAST FOUR WEEKS HOW OFTEN HAVE YOU HAD SHORTNESS OF BREATH: ONCE OR TWICE A WEEK
QUESTION_3 LAST FOUR WEEKS HOW OFTEN DID YOUR ASTHMA SYMPTOMS (WHEEZING, COUGHING, SHORTNESS OF BREATH, CHEST TIGHTNESS OR PAIN) WAKE YOU UP AT NIGHT OR EARLIER THAN USUAL IN THE MORNING: NOT AT ALL

## 2021-01-22 ASSESSMENT — MIFFLIN-ST. JEOR: SCORE: 2046.12

## 2021-01-22 ASSESSMENT — PAIN SCALES - GENERAL: PAINLEVEL: MODERATE PAIN (4)

## 2021-01-22 NOTE — PROGRESS NOTES
Melchor Horan is a 50 year old who presents to clinic today for the following health issues     HPI       Musculoskeletal problem/pain  Onset/Duration: month in a half   Description  Location: hand - bilateral  Joint Swelling: no  Redness: YES  Pain: YES  Warmth: no  Intensity:  severe  Progression of Symptoms:  same  Accompanying signs and symptoms:   Fevers: no  Numbness/tingling/weakness: no, pain, cold hands and finger nails are blue.   History  Trauma to the area: no  Recent illness:  no  Previous similar problem: no  Previous evaluation:  no  Precipitating or alleviating factors:  Aggravating factors include: occurs in afternoon. Pulse is in low 50's to upper 60's.   Therapies tried and outcome: none    Concern: Left shoulder pain for 2 months and Right hip pain for two years.        EMR reviewed including:             Complaint, History of Chief Complaint, Corresponding Review of Systems, and Complaint Specific Physical Examination.    #1   Cold hands with red, white, blue fingers.  Worse in the wintertime.  Minimally problematic in the summertime.  Wears gloves but this does not help.  Has had no tissue loss or necrosis to the fingertips.  Has been going on for years but has worsened.  Exam: Fingers are intact, no signs of tissue loss in the fingers or toes noted.  Minimal swelling, cool to touch.    #2   Chronic left shoulder pain  Has had this for the last 2 months.  Denies any significant injury or trauma.  Decreased range of motion with extension and abduction.  No crepitance or grinding.  Denies erythema, inflammation.  Denies any history of inflammatory joint disease.  Exam: Decreased range of motion of the shoulder.  No crepitance noted.  Tenderness to the anterior ligaments.    #3   Chronic right hip pain.  Present for years.  No recent trauma or falls.  BMI 38.6.  Worse with extended ambulation.  Exam: Minimal crepitance noted.  Mild discomfort with percussion of the greater  "trochanter.      Vital Signs:   /80 (Patient Position: Chair, Cuff Size: Adult Regular)   Pulse 84   Temp 96.3  F (35.7  C) (Temporal)   Resp 22   Ht 1.842 m (6' 0.5\")   Wt 131.1 kg (289 lb 1 oz)   SpO2 99%   BMI 38.67 kg/m               Decision Making    Problem and Complexity     1. Raynaud's disease without gangrene  Recommend mittens, not gloves.  Recommend chemical hand warmers and foot warmers when outdoors.  Start calcium channel blocker.    - amLODIPine (NORVASC) 5 MG tablet; Take 1 tablet (5 mg) by mouth daily  Dispense: 30 tablet; Refill: 0    2. Benign essential hypertension  Discontinue calcium ACE inhibitor in favor of calcium channel blocker for increased peripheral dilation.  - amLODIPine (NORVASC) 5 MG tablet; Take 1 tablet (5 mg) by mouth daily  Dispense: 30 tablet; Refill: 0    3. Chronic left shoulder pain  Obtain x-ray and MRI of the shoulder, suspect rotator cuff injury.  We will set up with orthopedics  - XR Shoulder Left 2 Views  - MR Shoulder Left w/o Contrast; Future  - Orthopedic & Spine  Referral; Future    4. Hip pain, right  As long as she is going to orthopedics anyway    - Orthopedic & Spine  Referral; Future    5. Claustrophobia  Patient does have claustrophobia associated with the MRI.  Will give short course of lorazepam (very short course)  - LORazepam (ATIVAN) 1 MG tablet; Take 1 pill 30 min before procedure. May repeat.  Dispense: 2 tablet; Refill: 0          ------------------------------------------------------------------------------------------------------------------------------  Data  1  Specialty (external) notes reviewed:   None    Tests reviewed:   Previous lab work from prior provider reviewed     Tests ordered:   X-ray and MRI    Independent Historians Interview:   None    2  Review of outside Tests:   None    3   Verbal Discussion with Specialists:    " None      ----------------------------------------------------------------------------------------------------------------------------------  Risk   Prescription drug management:   Yes                                High risk for toxicity:     Decision regarding surgery:    None     Social determinants of health:   No     Decision to withhold therapy:   None                               FOLLOW UP   I have asked the patient to make an appointment for followup with me following orthopedic evaluation for follow-up of her Raynaud's and hypertension            I have carefully explained the diagnosis and treatment options to the patient.  The patient has displayed an understanding of the above, and all subsequent questions were answered.      DO KORI Schmitt    Portions of this note were produced using [employer]  Although every attempt at real-time proof reading has been made, occasional grammar/syntax errors may have been missed.

## 2021-01-23 ENCOUNTER — HEALTH MAINTENANCE LETTER (OUTPATIENT)
Age: 51
End: 2021-01-23

## 2021-01-23 ASSESSMENT — ASTHMA QUESTIONNAIRES: ACT_TOTALSCORE: 23

## 2021-01-27 ENCOUNTER — COMMUNICATION - HEALTHEAST (OUTPATIENT)
Dept: SURGERY | Facility: CLINIC | Age: 51
End: 2021-01-27

## 2021-01-27 ENCOUNTER — MEDICAL CORRESPONDENCE (OUTPATIENT)
Dept: HEALTH INFORMATION MANAGEMENT | Facility: CLINIC | Age: 51
End: 2021-01-27

## 2021-01-27 DIAGNOSIS — Z98.84 S/P LAPAROSCOPIC SLEEVE GASTRECTOMY: ICD-10-CM

## 2021-01-27 DIAGNOSIS — Z90.3 STATUS POST GASTRECTOMY: ICD-10-CM

## 2021-01-27 DIAGNOSIS — E66.01 MORBID OBESITY (H): ICD-10-CM

## 2021-01-27 DIAGNOSIS — K91.2 INTESTINAL MALABSORPTION FOLLOWING GASTRECTOMY: ICD-10-CM

## 2021-01-27 DIAGNOSIS — Z90.3 INTESTINAL MALABSORPTION FOLLOWING GASTRECTOMY: ICD-10-CM

## 2021-01-27 DIAGNOSIS — K90.2 POSTOPERATIVE BLIND LOOP SYNDROME: Primary | ICD-10-CM

## 2021-01-29 ENCOUNTER — HOSPITAL ENCOUNTER (OUTPATIENT)
Dept: MRI IMAGING | Facility: CLINIC | Age: 51
End: 2021-01-29
Attending: INTERNAL MEDICINE
Payer: COMMERCIAL

## 2021-01-29 ENCOUNTER — HOSPITAL ENCOUNTER (OUTPATIENT)
Dept: GENERAL RADIOLOGY | Facility: CLINIC | Age: 51
End: 2021-01-29
Attending: INTERNAL MEDICINE
Payer: COMMERCIAL

## 2021-01-29 DIAGNOSIS — G89.29 CHRONIC LEFT SHOULDER PAIN: ICD-10-CM

## 2021-01-29 DIAGNOSIS — M25.512 CHRONIC LEFT SHOULDER PAIN: ICD-10-CM

## 2021-01-29 PROCEDURE — 73030 X-RAY EXAM OF SHOULDER: CPT | Mod: LT

## 2021-01-29 PROCEDURE — 73221 MRI JOINT UPR EXTREM W/O DYE: CPT | Mod: LT

## 2021-01-29 PROCEDURE — 73221 MRI JOINT UPR EXTREM W/O DYE: CPT | Mod: 26 | Performed by: RADIOLOGY

## 2021-02-01 ENCOUNTER — MYC MEDICAL ADVICE (OUTPATIENT)
Dept: ORTHOPEDICS | Facility: OTHER | Age: 51
End: 2021-02-01

## 2021-02-01 NOTE — PROGRESS NOTES
"[facility] Visit    PCP: Malik Bearden    CC: Patient presents with:  Left Shoulder - Pain  Right Hip - Pain    HPI:  Paola Lobo is a 50 year old female who is seen in consultation at the request of  Malik Bearden MD.   She notes left shoulder pain that started 6 weeks ago when she was working with a . Weight felt too heavy with supine dumbbell work.  She notes pain in anterior shoulder in shoulder extension into flexion. Pain radiates down her biceps. She rates the pain at a 10/10 at its worst and a 6/10 currently.  Symptoms are relieved with rest. Symptoms are worsened by shoulder flexion, overhead motions and shoulder external rotation. She endorses popping. Has been doing ice and heat.  She denies grinding, catching, numbness, tingling and weakness. Difficulty carrying more than 5 lbs at her side due to weakness and pain.      She notes right hip onset of pain over a year ago. Initially had numbness/tingling. After gastric sleeve surgery 2/12/2020, no longer nerve pain but now feels like a muscular pain. Feels like it may \"snap.\" She notes pain from the superior glute region to the medial anterior knee. No radiating pain. Whole area is sore all the time. She rates the pain at a  7/10 at its worst and a 4/10 currently.  Symptoms are relieved with heat and percussion massager to glute and hamstring. Symptoms are worsened by crossing her legs. She endorses popping in her anterior hip. History of hip subluxing. Tingling in her leg if she lays on back too long. She denies grinding, catching, locking and instability.     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    History reviewed. No pertinent past surgical/medical/family/social history other than as mentioned in HPI.  Review of systems negative except per HPI.      Patient Active Problem List   Diagnosis     Morbid obesity (H)     Excessive or frequent menstruation     Intramural and " subserous leiomyoma of uterus     Impingement syndrome, shoulder, right     Papanicolaou smear of cervix with low grade squamous intraepithelial lesion (LGSIL)     Benign essential hypertension     Mild intermittent asthma without complication     Past Medical History:   Diagnosis Date     Arthritis      Asthma      History of blood transfusion      Hypertension      Obesity, morbid, BMI 50 or higher (H)      Papanicolaou smear of cervix with low grade squamous intraepithelial lesion (LGSIL) 3/23/2018     Sleep apnea      Past Surgical History:   Procedure Laterality Date     BIOPSY CERVICAL, LOCAL EXCISION, SINGLE/MULTIPLE N/A 8/29/2019    Procedure: endometrial biopsy;  Surgeon: Neil Cordova MD;  Location: [facility] OR     CHOLECYSTECTOMY  02/12/2020     COLONOSCOPY N/A 1/14/2020    Procedure: COLONOSCOPY;  Surgeon: Ricardo Davis DO;  Location: [facility] GI     CONIZATION LEEP N/A 8/29/2019    Procedure: LEEP  conization of cervix surgery;  Surgeon: Neil Cordova MD;  Location: [facility] OR     DAVINCI GASTRIC SLEEVE Bilateral 02/12/2020     ORTHOPEDIC SURGERY       Family History   Problem Relation Age of Onset     Uterine Cancer Mother      Uterine Cancer Maternal Aunt      Uterine Cancer Maternal Aunt      Uterine Cancer Maternal Aunt      Uterine Cancer Maternal Aunt      Uterine Cancer Maternal Aunt      Social History     Socioeconomic History     Marital status: Single     Spouse name: Not on file     Number of children: Not on file     Years of education: Not on file     Highest education level: Not on file   Occupational History     Not on file   Social Needs     Financial resource strain: Not on file     Food insecurity     Worry: Not on file     Inability: Not on file     Transportation needs     Medical: Not on file     Non-medical: Not on file   Tobacco Use     Smoking status: Never Smoker     Smokeless tobacco: Never Used   Substance and Sexual Activity     Alcohol use: Yes      Comment: social     Drug use: No     Sexual activity: Yes     Partners: Male     Birth control/protection: Pill, Injection   Lifestyle     Physical activity     Days per week: Not on file     Minutes per session: Not on file     Stress: Not on file   Relationships     Social connections     Talks on phone: Not on file     Gets together: Not on file     Attends Buddhist service: Not on file     Active member of club or organization: Not on file     Attends meetings of clubs or organizations: Not on file     Relationship status: Not on file     Intimate partner violence     Fear of current or ex partner: Not on file     Emotionally abused: Not on file     Physically abused: Not on file     Forced sexual activity: Not on file   Other Topics Concern     Parent/sibling w/ CABG, MI or angioplasty before 65F 55M? Not Asked   Social History Narrative     Not on file         Current Outpatient Medications   Medication     albuterol (PROAIR HFA/PROVENTIL HFA/VENTOLIN HFA) 108 (90 Base) MCG/ACT inhaler     amLODIPine (NORVASC) 5 MG tablet     Calcium 200 MG TABS     cetirizine HCl 10 MG CAPS     Cyanocobalamin (VITAMIN B 12 PO)     FOLIC ACID PO     LORazepam (ATIVAN) 1 MG tablet     magnesium oxide (MAG-OX) 400 (241.3 Mg) MG tablet     metroNIDAZOLE (METROCREAM) 0.75 % external cream     order for DME     pantoprazole (PROTONIX) 20 MG EC tablet     triamcinolone (KENALOG) 0.1 % external cream     triamcinolone (KENALOG) 0.5 % external ointment     triamcinolone (KENALOG) 0.5 % external ointment     VITAMIN E NATURAL PO     No current facility-administered medications for this visit.      Allergies   Allergen Reactions     Black [place] Flavor Anaphylaxis     Dust Mite Extract Other (See Comments) and Shortness Of Breath     Molds & Smuts Other (See Comments) and Shortness Of Breath     Walnuts [Nuts]      Unable to breathe     Shellfish Allergy      Nausea     Grass      SOB, asthma       Plasticized Base  [Bht-Mo-Polyethylene]      Can't wear Latex either or Skin peels     Prednisone Other (See Comments)     Severe headache     Wheat      Rash, Derm     Keflex [Cephalexin] Rash         Objective:  Wt 131.5 kg (290 lb)   BMI 38.79 kg/m      General: Alert and in no distress    Head: Normocephalic, atraumatic  Eyes: no scleral icterus or conjunctival erythema   Skin: no erythema, petechiae, or jaundice  Resp: normal respiratory effort without conversational dyspnea   Psych: normal mood and affect      Musculoskeletal:  -Left shoulder abduction ~80 degrees and painful.  -Left shoulder flexion ~150 degrees and painful.  -Left shoulder external rotation full.      -Right hip internal and external rotation are painful.  Left hip internal and external rotation are not painful.    Radiology:  Right hip x-rays ordered.  Independent visualization of images performed.  Reviewed hip x-rays with Aung.    Study Result    PELVIS AND HIP RIGHT ONE VIEW February 8, 2021 11:31 AM      HISTORY: Hip pain, right.     COMPARISON: X-rays dated 5/7/2018.     FINDINGS: Mild periosteal prominence along the posterior aspect of the  proximal femoral diaphysis likely represents origin site of a  tendinous structure. No acute fracture, malalignment, or significant  degenerative changes are identified. Hip and SI joints are normal in  appearance.                                                                       IMPRESSION: No definite etiology for patient's right hip pain is  identified on this study. No fracture, malalignment, or significant  degenerative changes of the right hip are seen.         EXAM: MR left shoulder without  contrast 1/29/2021 10:58 AM     TECHNIQUE: Multiplanar, multisequence imaging of the left shoulder  were obtained without administration of intravenous or intra-articular  gadolinium contrast using routine protocol.     History: Shoulder replacement, rotator cuff tear suspected; Chronic  left shoulder pain; Chronic  left shoulder pain      Additional Clinical information from EMR: Chronic left shoulder pain  for 2 months. No hx of trauma. Decreased range of motion with  extension and abduction. No hx of inflammatory joint disease.  Tenderness to anterior ligaments.     Comparison: Left shoulder radiograph same day. Chest radiograph  2/5/2020.     Findings:     ROTATOR CUFF and ASSOCIATED STRUCTURES  Rotator cuff: On a background of tendinosis, there is linear signal at  the anterior footprint of the supraspinatus on sagittal imaging  (series 9 image 27) with less conspicuous signal changes on coronal  series 6 image 14. The infraspinatus, teres minor and subscapularis  tendons are intact.      Bursa: Small amount of subacromial/subdeltoid fluid in the bursa.     Musculature: Muscle bulk of rotator cuff is preserved. Fatty atrophy  of the mid and posterior deltoid. Supraspinatus edema at the  myotendinous junction compatible with sprain.     Acromioclavicular joint  There are severe degenerative changes of the acromioclavicular joint.  Acromion is type 1 in sagittal morphology.  Coracoacromial ligament is  not thickened. Spurring at the coracoacromial attachment with  narrowing of the acromiohumeral interval measuring 6 mm.     OSSEOUS STRUCTURES  No fracture, marrow contusion or marrow infiltration.     LONG BICIPITAL TENDON  The long head of the biceps tendon is normally situated within the  bicipital groove. No complete or partial biceps tendon tear is  present.     GLENOHUMERAL JOINT  Joint fluid: Physiologic amount of joint fluid is  present.     Cartilage and subarticular bone:  No focal hyaline cartilage defects  are noted. No Hill-Sachs, reverse Hill-Sachs, or bony Bankart lesions  are seen.     Labrum: Limited assessment on this study with relative lack of joint  distention shows no labral tear. Incidentally noted sublabral foramen  from the 1:00 to 2:00 position adjacent to the middle  glenohumeral  ligament.     ANCILLARY FINDINGS:  None                                                             Impression:     1. Imaging findings suspicious for moderate grade tear of the anterior  supraspinatus at the footprint. Spurring at the coracoacromial  attachment with borderline narrowing of the acromiohumeral interval.     2. Severe degenerative changes of the acromioclavicular joint.     I have personally reviewed the examination and initial interpretation  and I agree with the findings.     MORGAN RODRIGUES MD (Joe)    LEFT SHOULDER THREE OR MORE VIEWS   1/29/2021 8:47 AM      HISTORY: Chronic left shoulder pain.      COMPARISON: Chest x-rays dated 2/5/2020.      FINDINGS:   There is no fracture.  The humeral head is well located  within the glenoid fossa. There is acromioclavicular joint space loss.  There are large inferior hypertrophic spurs at the acromioclavicular  joint. Coracoclavicular and glenohumeral spaces are well-maintained.   Visualized portions of the adjacent lung are clear. Chronic healed  posterior left third rib fracture is again noted.                                                                      IMPRESSION:  Severe degenerative changes of the acromioclavicular  joint. Otherwise negative left shoulder x-rays. If there is clinical  concern for rotator cuff pathology, further evaluation with MRI may be  helpful.     ESTHER COLMENARES MD    LUMBAR SPINE TWO - THREE VIEWS   5/22/2018 1:54 PM      HISTORY: Chronic low back pain.     COMPARISON: None.     FINDINGS:  There are five non-rib bearing lumbar type vertebrae. Mild  disc height loss is seen at L2-3 and L4-5. Mild anterior spondylolytic  spurring is seen throughout lumbar spine. Otherwise, the vertebral  bodies, pedicles, disc spaces, perivertebral soft tissues, alignment,  and sacroiliac joints are normal in appearance.  No evidence for  fracture                                                                       IMPRESSION:  Mild multilevel degenerative disc disease. Further  evaluation with MRI may be helpful as clinically indicated. No  fracture.     ESTHER COLMENARES MD    RIGHT HIP TWO TO THREE VIEWS  2018 10:46 AM      HISTORY:  Hip pain, right.     COMPARISON: None.                                                                      IMPRESSION: No evidence of fracture. Femoral head normally aligned in  the acetabulum. Moderate degenerative changes in the hip.     ROSALINO VALERIO MD    Large Joint Injection/Arthocentesis: L subacromial bursa    Date/Time: 2021 11:56 AM  Performed by: Malik Bearden DO  Authorized by: Malik Bearden DO     Indications:  Pain  Needle Size:  25 G  Guidance: landmark guided    Approach:  Posterolateral  Location:  Shoulder      Site:  L subacromial bursa  Medications:  40 mg triamcinolone 40 MG/ML  Medications comment:  0.5% Bupivacaine 4mL  NDC: 69741-714-28  CBU: [id number]  Exp: 2021  Consent Given by:  Patient        Assessment:  1. Hip pain, right    2. Chronic left shoulder pain        Plan:  Discussed the assessment with the patient and developed a plan together:  -Left shoulder steroid injection performed today.  Take it easy over the next few days. Keep in mind that the steroid may take up to 3 days to start working and up to 2 weeks to reach maximal effect.    -Ice for 15-20 minutes as needed for soreness and swelling.  -Patient's preferred over the counter medications as needed for soreness.  -Right hip steroid injection ordered with radiology.  Hopefully will get both diagnostic and therapeutic response.  [facility] [phone]. Cost estimates can be provided by [facility] at the same number.  -Physical therapy ordered.  Please do 5-6 days of exercises per week between formal sessions and the home exercises they provide.    -Follow up as needed if symptoms fail to improve or worsen.  Please call with questions  or concerns.        Nelly Tracey MD, CAQ Sports Medicine  [facility]

## 2021-02-01 NOTE — TELEPHONE ENCOUNTER
Spoke with pt. Discussed original appt scheduled in a 20 minute spot and we could either have her come early at 11 or only address one area in the 20 minute spot. Pt stated she think she can make the 11 appt work and will call us if she is running late. Expressed understanding that we may not be able to address both shoulder and hip depending on time. She reported shoulder is worse than hip and has seen Dr. Tracey in the past for the hip. No further questions.  Tripp France, ATC

## 2021-02-08 ENCOUNTER — ANCILLARY PROCEDURE (OUTPATIENT)
Dept: GENERAL RADIOLOGY | Facility: CLINIC | Age: 51
End: 2021-02-08
Attending: PHYSICAL MEDICINE & REHABILITATION
Payer: COMMERCIAL

## 2021-02-08 ENCOUNTER — OFFICE VISIT (OUTPATIENT)
Dept: ORTHOPEDICS | Facility: CLINIC | Age: 51
End: 2021-02-08
Attending: INTERNAL MEDICINE
Payer: COMMERCIAL

## 2021-02-08 VITALS — WEIGHT: 290 LBS | BODY MASS INDEX: 38.79 KG/M2

## 2021-02-08 DIAGNOSIS — Z90.3 STATUS POST GASTRECTOMY: ICD-10-CM

## 2021-02-08 DIAGNOSIS — M25.551 HIP PAIN, RIGHT: ICD-10-CM

## 2021-02-08 DIAGNOSIS — G89.29 CHRONIC LEFT SHOULDER PAIN: ICD-10-CM

## 2021-02-08 DIAGNOSIS — M25.512 CHRONIC LEFT SHOULDER PAIN: ICD-10-CM

## 2021-02-08 DIAGNOSIS — K90.2 POSTOPERATIVE BLIND LOOP SYNDROME: ICD-10-CM

## 2021-02-08 LAB
ALBUMIN SERPL-MCNC: 3.8 G/DL (ref 3.4–5)
ALP SERPL-CCNC: 89 U/L (ref 40–150)
ALT SERPL W P-5'-P-CCNC: 28 U/L (ref 0–50)
ANION GAP SERPL CALCULATED.3IONS-SCNC: 3 MMOL/L (ref 3–14)
AST SERPL W P-5'-P-CCNC: 16 U/L (ref 0–45)
BASOPHILS # BLD AUTO: 0.1 10E9/L (ref 0–0.2)
BASOPHILS NFR BLD AUTO: 0.7 %
BILIRUB SERPL-MCNC: 0.5 MG/DL (ref 0.2–1.3)
BUN SERPL-MCNC: 16 MG/DL (ref 7–30)
CALCIUM SERPL-MCNC: 9.6 MG/DL (ref 8.5–10.1)
CHLORIDE SERPL-SCNC: 109 MMOL/L (ref 94–109)
CHOLEST SERPL-MCNC: 227 MG/DL
CO2 SERPL-SCNC: 30 MMOL/L (ref 20–32)
CREAT SERPL-MCNC: 0.69 MG/DL (ref 0.52–1.04)
DEPRECATED CALCIDIOL+CALCIFEROL SERPL-MC: 72 UG/L (ref 20–75)
DIFFERENTIAL METHOD BLD: NORMAL
EOSINOPHIL NFR BLD AUTO: 5.4 %
ERYTHROCYTE [DISTWIDTH] IN BLOOD BY AUTOMATED COUNT: 12.7 % (ref 10–15)
FERRITIN SERPL-MCNC: 142 NG/ML (ref 8–252)
FOLATE SERPL-MCNC: 53.3 NG/ML
GFR SERPL CREATININE-BSD FRML MDRD: >90 ML/MIN/{1.73_M2}
GLUCOSE SERPL-MCNC: 88 MG/DL (ref 70–99)
HCT VFR BLD AUTO: 42.4 % (ref 35–47)
HDLC SERPL-MCNC: 62 MG/DL
HGB BLD-MCNC: 13.8 G/DL (ref 11.7–15.7)
IMM GRANULOCYTES # BLD: 0 10E9/L (ref 0–0.4)
IMM GRANULOCYTES NFR BLD: 0.3 %
LDLC SERPL CALC-MCNC: 138 MG/DL
LYMPHOCYTES # BLD AUTO: 1.9 10E9/L (ref 0.8–5.3)
LYMPHOCYTES NFR BLD AUTO: 28.2 %
MCH RBC QN AUTO: 30.3 PG (ref 26.5–33)
MCHC RBC AUTO-ENTMCNC: 32.5 G/DL (ref 31.5–36.5)
MCV RBC AUTO: 93 FL (ref 78–100)
MONOCYTES # BLD AUTO: 0.3 10E9/L (ref 0–1.3)
MONOCYTES NFR BLD AUTO: 4.5 %
NEUTROPHILS # BLD AUTO: 4.2 10E9/L (ref 1.6–8.3)
NEUTROPHILS NFR BLD AUTO: 60.9 %
NONHDLC SERPL-MCNC: 165 MG/DL
NRBC # BLD AUTO: 0 10*3/UL
NRBC BLD AUTO-RTO: 0 /100
PLATELET # BLD AUTO: 262 10E9/L (ref 150–450)
POTASSIUM SERPL-SCNC: 3.9 MMOL/L (ref 3.4–5.3)
PROT SERPL-MCNC: 8 G/DL (ref 6.8–8.8)
PTH-INTACT SERPL-MCNC: 33 PG/ML (ref 18–80)
RBC # BLD AUTO: 4.56 10E12/L (ref 3.8–5.2)
SODIUM SERPL-SCNC: 142 MMOL/L (ref 133–144)
TRIGL SERPL-MCNC: 134 MG/DL
VIT B12 SERPL-MCNC: 1705 PG/ML (ref 193–986)
WBC # BLD AUTO: 6.9 10E9/L (ref 4–11)

## 2021-02-08 PROCEDURE — 82728 ASSAY OF FERRITIN: CPT | Performed by: EMERGENCY MEDICINE

## 2021-02-08 PROCEDURE — 84590 ASSAY OF VITAMIN A: CPT | Mod: 90 | Performed by: EMERGENCY MEDICINE

## 2021-02-08 PROCEDURE — 82607 VITAMIN B-12: CPT | Performed by: EMERGENCY MEDICINE

## 2021-02-08 PROCEDURE — 73502 X-RAY EXAM HIP UNI 2-3 VIEWS: CPT | Mod: TC | Performed by: RADIOLOGY

## 2021-02-08 PROCEDURE — 80053 COMPREHEN METABOLIC PANEL: CPT | Performed by: EMERGENCY MEDICINE

## 2021-02-08 PROCEDURE — 36415 COLL VENOUS BLD VENIPUNCTURE: CPT | Performed by: EMERGENCY MEDICINE

## 2021-02-08 PROCEDURE — 84630 ASSAY OF ZINC: CPT | Performed by: EMERGENCY MEDICINE

## 2021-02-08 PROCEDURE — 85025 COMPLETE CBC W/AUTO DIFF WBC: CPT | Performed by: EMERGENCY MEDICINE

## 2021-02-08 PROCEDURE — 82746 ASSAY OF FOLIC ACID SERUM: CPT | Performed by: EMERGENCY MEDICINE

## 2021-02-08 PROCEDURE — 84425 ASSAY OF VITAMIN B-1: CPT | Mod: 90 | Performed by: EMERGENCY MEDICINE

## 2021-02-08 PROCEDURE — 83970 ASSAY OF PARATHORMONE: CPT | Performed by: EMERGENCY MEDICINE

## 2021-02-08 PROCEDURE — 99000 SPECIMEN HANDLING OFFICE-LAB: CPT | Performed by: EMERGENCY MEDICINE

## 2021-02-08 PROCEDURE — 82306 VITAMIN D 25 HYDROXY: CPT | Performed by: EMERGENCY MEDICINE

## 2021-02-08 PROCEDURE — 99244 OFF/OP CNSLTJ NEW/EST MOD 40: CPT | Mod: 25 | Performed by: PHYSICAL MEDICINE & REHABILITATION

## 2021-02-08 PROCEDURE — 80061 LIPID PANEL: CPT | Performed by: EMERGENCY MEDICINE

## 2021-02-08 PROCEDURE — 20610 DRAIN/INJ JOINT/BURSA W/O US: CPT | Mod: LT | Performed by: PHYSICAL MEDICINE & REHABILITATION

## 2021-02-08 RX ORDER — TRIAMCINOLONE ACETONIDE 40 MG/ML
40 INJECTION, SUSPENSION INTRA-ARTICULAR; INTRAMUSCULAR
Status: DISCONTINUED | OUTPATIENT
Start: 2021-02-08 | End: 2023-08-18

## 2021-02-08 RX ADMIN — TRIAMCINOLONE ACETONIDE 40 MG: 40 INJECTION, SUSPENSION INTRA-ARTICULAR; INTRAMUSCULAR at 11:56

## 2021-02-08 NOTE — PATIENT INSTRUCTIONS
-Left shoulder steroid injection performed today.  Take it easy over the next few days. Keep in mind that the steroid may take up to 3 days to start working and up to 2 weeks to reach maximal effect.    -Ice for 15-20 minutes as needed for soreness and swelling.  -Patient's preferred over the counter medications as needed for soreness.  -Right hip steroid injection ordered with radiology.  [facility] [phone]. Cost estimates can be provided by [facility] at the same number.  -Physical therapy ordered.  Please do 5-6 days of exercises per week between formal sessions and the home exercises they provide.    -Follow up as needed if symptoms fail to improve or worsen.  Please call with questions or concerns.

## 2021-02-08 NOTE — LETTER
"    2/8/2021         RE: Paola Lobo  [address] [place] [address]        Dear Colleague,    Thank you for referring your patient, Paola Lobo, to the [facility] [place]. Please see a copy of my visit note below.    [facility] Visit    PCP: Malik Bearden    CC: Patient presents with:  Left Shoulder - Pain  Right Hip - Pain    HPI:  Paola Lobo is a 50 year old female who is seen in consultation at the request of  Malik Bearden MD.   She notes left shoulder pain that started 6 weeks ago when she was working with a . Weight felt too heavy with supine dumbbell work.  She notes pain in anterior shoulder in shoulder extension into flexion. Pain radiates down her biceps. She rates the pain at a 10/10 at its worst and a 6/10 currently.  Symptoms are relieved with rest. Symptoms are worsened by shoulder flexion, overhead motions and shoulder external rotation. She endorses popping. Has been doing ice and heat.  She denies grinding, catching, numbness, tingling and weakness. Difficulty carrying more than 5 lbs at her side due to weakness and pain.      She notes right hip onset of pain over a year ago. Initially had numbness/tingling. After gastric sleeve surgery 2/12/2020, no longer nerve pain but now feels like a muscular pain. Feels like it may \"snap.\" She notes pain from the superior glute region to the medial anterior knee. No radiating pain. Whole area is sore all the time. She rates the pain at a  7/10 at its worst and a 4/10 currently.  Symptoms are relieved with heat and percussion massager to glute and hamstring. Symptoms are worsened by crossing her legs. She endorses popping in her anterior hip. History of hip subluxing. Tingling in her leg if she lays on back too long. She denies grinding, catching, locking and instability.     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    History " reviewed. No pertinent past surgical/medical/family/social history other than as mentioned in HPI.  Review of systems negative except per HPI.      Patient Active Problem List   Diagnosis     Morbid obesity (H)     Excessive or frequent menstruation     Intramural and subserous leiomyoma of uterus     Impingement syndrome, shoulder, right     Papanicolaou smear of cervix with low grade squamous intraepithelial lesion (LGSIL)     Benign essential hypertension     Mild intermittent asthma without complication     Past Medical History:   Diagnosis Date     Arthritis      Asthma      History of blood transfusion      Hypertension      Obesity, morbid, BMI 50 or higher (H)      Papanicolaou smear of cervix with low grade squamous intraepithelial lesion (LGSIL) 3/23/2018     Sleep apnea      Past Surgical History:   Procedure Laterality Date     BIOPSY CERVICAL, LOCAL EXCISION, SINGLE/MULTIPLE N/A 8/29/2019    Procedure: endometrial biopsy;  Surgeon: Neil Cordova MD;  Location: [facility]     CHOLECYSTECTOMY  02/12/2020     COLONOSCOPY N/A 1/14/2020    Procedure: COLONOSCOPY;  Surgeon: Ricardo Davis DO;  Location: [facility]     CONIZATION LEEP N/A 8/29/2019    Procedure: LEEP  conization of cervix surgery;  Surgeon: Neil Cordova MD;  Location: [facility]     DAVINCI GASTRIC SLEEVE Bilateral 02/12/2020     ORTHOPEDIC SURGERY       Family History   Problem Relation Age of Onset     Uterine Cancer Mother      Uterine Cancer Maternal Aunt      Uterine Cancer Maternal Aunt      Uterine Cancer Maternal Aunt      Uterine Cancer Maternal Aunt      Uterine Cancer Maternal Aunt      Social History     Socioeconomic History     Marital status: Single     Spouse name: Not on file     Number of children: Not on file     Years of education: Not on file     Highest education level: Not on file   Occupational History     Not on file   Social Needs     Financial resource strain: Not on file     Food insecurity      Worry: Not on file     Inability: Not on file     Transportation needs     Medical: Not on file     Non-medical: Not on file   Tobacco Use     Smoking status: Never Smoker     Smokeless tobacco: Never Used   Substance and Sexual Activity     Alcohol use: Yes     Comment: social     Drug use: No     Sexual activity: Yes     Partners: Male     Birth control/protection: Pill, Injection   Lifestyle     Physical activity     Days per week: Not on file     Minutes per session: Not on file     Stress: Not on file   Relationships     Social connections     Talks on phone: Not on file     Gets together: Not on file     Attends Mandaen service: Not on file     Active member of club or organization: Not on file     Attends meetings of clubs or organizations: Not on file     Relationship status: Not on file     Intimate partner violence     Fear of current or ex partner: Not on file     Emotionally abused: Not on file     Physically abused: Not on file     Forced sexual activity: Not on file   Other Topics Concern     Parent/sibling w/ CABG, MI or angioplasty before 65F 55M? Not Asked   Social History Narrative     Not on file         Current Outpatient Medications   Medication     albuterol (PROAIR HFA/PROVENTIL HFA/VENTOLIN HFA) 108 (90 Base) MCG/ACT inhaler     amLODIPine (NORVASC) 5 MG tablet     Calcium 200 MG TABS     cetirizine HCl 10 MG CAPS     Cyanocobalamin (VITAMIN B 12 PO)     FOLIC ACID PO     LORazepam (ATIVAN) 1 MG tablet     magnesium oxide (MAG-OX) 400 (241.3 Mg) MG tablet     metroNIDAZOLE (METROCREAM) 0.75 % external cream     order for DME     pantoprazole (PROTONIX) 20 MG EC tablet     triamcinolone (KENALOG) 0.1 % external cream     triamcinolone (KENALOG) 0.5 % external ointment     triamcinolone (KENALOG) 0.5 % external ointment     VITAMIN E NATURAL PO     No current facility-administered medications for this visit.      Allergies   Allergen Reactions     Black [place] Flavor Anaphylaxis     Dust Mite  Extract Other (See Comments) and Shortness Of Breath     Molds & Smuts Other (See Comments) and Shortness Of Breath     Walnuts [Nuts]      Unable to breathe     Shellfish Allergy      Nausea     Grass      SOB, asthma       Plasticized Base [Bht-Mo-Polyethylene]      Can't wear Latex either or Skin peels     Prednisone Other (See Comments)     Severe headache     Wheat      Rash, Derm     Keflex [Cephalexin] Rash         Objective:  Wt 131.5 kg (290 lb)   BMI 38.79 kg/m      General: Alert and in no distress    Head: Normocephalic, atraumatic  Eyes: no scleral icterus or conjunctival erythema   Skin: no erythema, petechiae, or jaundice  Resp: normal respiratory effort without conversational dyspnea   Psych: normal mood and affect      Musculoskeletal:  -Left shoulder abduction ~80 degrees and painful.  -Left shoulder flexion ~150 degrees and painful.  -Left shoulder external rotation full.      -Right hip internal and external rotation are painful.  Left hip internal and external rotation are not painful.    Radiology:  Right hip x-rays ordered.  Independent visualization of images performed.  Reviewed hip x-rays with Aung.    Study Result    PELVIS AND HIP RIGHT ONE VIEW February 8, 2021 11:31 AM      HISTORY: Hip pain, right.     COMPARISON: X-rays dated 5/7/2018.     FINDINGS: Mild periosteal prominence along the posterior aspect of the  proximal femoral diaphysis likely represents origin site of a  tendinous structure. No acute fracture, malalignment, or significant  degenerative changes are identified. Hip and SI joints are normal in  appearance.                                                                       IMPRESSION: No definite etiology for patient's right hip pain is  identified on this study. No fracture, malalignment, or significant  degenerative changes of the right hip are seen.         EXAM: MR left shoulder without  contrast 1/29/2021 10:58 AM     TECHNIQUE: Multiplanar, multisequence  imaging of the left shoulder  were obtained without administration of intravenous or intra-articular  gadolinium contrast using routine protocol.     History: Shoulder replacement, rotator cuff tear suspected; Chronic  left shoulder pain; Chronic left shoulder pain      Additional Clinical information from EMR: Chronic left shoulder pain  for 2 months. No hx of trauma. Decreased range of motion with  extension and abduction. No hx of inflammatory joint disease.  Tenderness to anterior ligaments.     Comparison: Left shoulder radiograph same day. Chest radiograph  2/5/2020.     Findings:     ROTATOR CUFF and ASSOCIATED STRUCTURES  Rotator cuff: On a background of tendinosis, there is linear signal at  the anterior footprint of the supraspinatus on sagittal imaging  (series 9 image 27) with less conspicuous signal changes on coronal  series 6 image 14. The infraspinatus, teres minor and subscapularis  tendons are intact.      Bursa: Small amount of subacromial/subdeltoid fluid in the bursa.     Musculature: Muscle bulk of rotator cuff is preserved. Fatty atrophy  of the mid and posterior deltoid. Supraspinatus edema at the  myotendinous junction compatible with sprain.     Acromioclavicular joint  There are severe degenerative changes of the acromioclavicular joint.  Acromion is type 1 in sagittal morphology.  Coracoacromial ligament is  not thickened. Spurring at the coracoacromial attachment with  narrowing of the acromiohumeral interval measuring 6 mm.     OSSEOUS STRUCTURES  No fracture, marrow contusion or marrow infiltration.     LONG BICIPITAL TENDON  The long head of the biceps tendon is normally situated within the  bicipital groove. No complete or partial biceps tendon tear is  present.     GLENOHUMERAL JOINT  Joint fluid: Physiologic amount of joint fluid is  present.     Cartilage and subarticular bone:  No focal hyaline cartilage defects  are noted. No Hill-Sachs, reverse Hill-Sachs, or bony Bankart  lesions  are seen.     Labrum: Limited assessment on this study with relative lack of joint  distention shows no labral tear. Incidentally noted sublabral foramen  from the 1:00 to 2:00 position adjacent to the middle glenohumeral  ligament.     ANCILLARY FINDINGS:  None                                                             Impression:     1. Imaging findings suspicious for moderate grade tear of the anterior  supraspinatus at the footprint. Spurring at the coracoacromial  attachment with borderline narrowing of the acromiohumeral interval.     2. Severe degenerative changes of the acromioclavicular joint.     I have personally reviewed the examination and initial interpretation  and I agree with the findings.     MORGAN RODRIGUES MD (Joe)    LEFT SHOULDER THREE OR MORE VIEWS   1/29/2021 8:47 AM      HISTORY: Chronic left shoulder pain.      COMPARISON: Chest x-rays dated 2/5/2020.      FINDINGS:   There is no fracture.  The humeral head is well located  within the glenoid fossa. There is acromioclavicular joint space loss.  There are large inferior hypertrophic spurs at the acromioclavicular  joint. Coracoclavicular and glenohumeral spaces are well-maintained.   Visualized portions of the adjacent lung are clear. Chronic healed  posterior left third rib fracture is again noted.                                                                      IMPRESSION:  Severe degenerative changes of the acromioclavicular  joint. Otherwise negative left shoulder x-rays. If there is clinical  concern for rotator cuff pathology, further evaluation with MRI may be  helpful.     ESTHER COLMENARES MD    LUMBAR SPINE TWO - THREE VIEWS   5/22/2018 1:54 PM      HISTORY: Chronic low back pain.     COMPARISON: None.     FINDINGS:  There are five non-rib bearing lumbar type vertebrae. Mild  disc height loss is seen at L2-3 and L4-5. Mild anterior spondylolytic  spurring is seen throughout lumbar spine. Otherwise, the  vertebral  bodies, pedicles, disc spaces, perivertebral soft tissues, alignment,  and sacroiliac joints are normal in appearance.  No evidence for  fracture                                                                      IMPRESSION:  Mild multilevel degenerative disc disease. Further  evaluation with MRI may be helpful as clinically indicated. No  fracture.     ESTHER COLMENARES MD    RIGHT HIP TWO TO THREE VIEWS  2018 10:46 AM      HISTORY:  Hip pain, right.     COMPARISON: None.                                                                      IMPRESSION: No evidence of fracture. Femoral head normally aligned in  the acetabulum. Moderate degenerative changes in the hip.     ROSALINO VALERIO MD    Large Joint Injection/Arthocentesis: L subacromial bursa    Date/Time: 2021 11:56 AM  Performed by: Malik Bearden DO  Authorized by: Malik Bearden DO     Indications:  Pain  Needle Size:  25 G  Guidance: landmark guided    Approach:  Posterolateral  Location:  Shoulder      Site:  L subacromial bursa  Medications:  40 mg triamcinolone 40 MG/ML  Medications comment:  0.5% Bupivacaine 4mL  NDC: 39465-204-61  CBU: [id number]  Exp: 2021  Consent Given by:  Patient        Assessment:  1. Hip pain, right    2. Chronic left shoulder pain        Plan:  Discussed the assessment with the patient and developed a plan together:  -Left shoulder steroid injection performed today.  Take it easy over the next few days. Keep in mind that the steroid may take up to 3 days to start working and up to 2 weeks to reach maximal effect.    -Ice for 15-20 minutes as needed for soreness and swelling.  -Patient's preferred over the counter medications as needed for soreness.  -Right hip steroid injection ordered with radiology.  Hopefully will get both diagnostic and therapeutic response.  [facility] [phone]. Cost estimates can be provided by [employer] at the same  number.  -Physical therapy ordered.  Please do 5-6 days of exercises per week between formal sessions and the home exercises they provide.    -Follow up as needed if symptoms fail to improve or worsen.  Please call with questions or concerns.        Nelly Tracey MD, [facility]  [facility]        Again, thank you for allowing me to participate in the care of your patient.        Sincerely,        Belle Tracey MD

## 2021-02-09 DIAGNOSIS — Z11.59 ENCOUNTER FOR SCREENING FOR OTHER VIRAL DISEASES: ICD-10-CM

## 2021-02-10 ENCOUNTER — OFFICE VISIT (OUTPATIENT)
Dept: FAMILY MEDICINE | Facility: CLINIC | Age: 51
End: 2021-02-10
Payer: COMMERCIAL

## 2021-02-10 VITALS
WEIGHT: 290 LBS | BODY MASS INDEX: 39.28 KG/M2 | HEART RATE: 74 BPM | RESPIRATION RATE: 12 BRPM | DIASTOLIC BLOOD PRESSURE: 95 MMHG | SYSTOLIC BLOOD PRESSURE: 160 MMHG | HEIGHT: 72 IN | TEMPERATURE: 96.7 F | OXYGEN SATURATION: 99 %

## 2021-02-10 DIAGNOSIS — Z12.4 SCREENING FOR MALIGNANT NEOPLASM OF CERVIX: Primary | ICD-10-CM

## 2021-02-10 DIAGNOSIS — Z01.419 ENCOUNTER FOR CERVICAL PAP SMEAR WITH PELVIC EXAM: ICD-10-CM

## 2021-02-10 LAB
ANNOTATION COMMENT IMP: NORMAL
RETINYL PALMITATE SERPL-MCNC: <0.02 MG/L (ref 0–0.1)
SPECIMEN SOURCE: ABNORMAL
VIT A SERPL-MCNC: 0.62 MG/L (ref 0.3–1.2)
WET PREP SPEC: ABNORMAL
ZINC SERPL-MCNC: 99.2 UG/DL (ref 60–120)

## 2021-02-10 PROCEDURE — 99213 OFFICE O/P EST LOW 20 MIN: CPT | Performed by: OBSTETRICS & GYNECOLOGY

## 2021-02-10 PROCEDURE — 87210 SMEAR WET MOUNT SALINE/INK: CPT | Performed by: OBSTETRICS & GYNECOLOGY

## 2021-02-10 PROCEDURE — 87624 HPV HI-RISK TYP POOLED RSLT: CPT | Performed by: OBSTETRICS & GYNECOLOGY

## 2021-02-10 ASSESSMENT — MIFFLIN-ST. JEOR: SCORE: 2042.43

## 2021-02-10 ASSESSMENT — PAIN SCALES - GENERAL: PAINLEVEL: NO PAIN (0)

## 2021-02-10 NOTE — PROGRESS NOTES
Subjective: She is here today requesting simply a Pap smear and a wet prep.  She occasionally notices a very subtle vaginal discharge but it only lasts a day or so and then she is fine for many weeks.  She declines a breast exam or mammogram.  She gets all of her usual health care with Dr. Bearden and she is accustomed to coming to see me only to have her Pap smear done.  She made it clear that she does not want anything else addressed today.  I offered her a flu shot and to schedule a mammogram but she declined both.  I also discussed breast exam and she declined that.      The past medical history, social history, past surgical history and family history as shown below have been reviewed by me today.    Past Medical History:   Diagnosis Date     Arthritis      Asthma      History of blood transfusion      Hypertension      Obesity, morbid, BMI 50 or higher (H)      Papanicolaou smear of cervix with low grade squamous intraepithelial lesion (LGSIL) 3/23/2018     Sleep apnea         Allergies   Allergen Reactions     Black [place] Flavor Anaphylaxis     Dust Mite Extract Other (See Comments) and Shortness Of Breath     Molds & Smuts Other (See Comments) and Shortness Of Breath     Walnuts [Nuts]      Unable to breathe     Shellfish Allergy      Nausea     Grass      SOB, asthma       Plasticized Base [Bht-Mo-Polyethylene]      Can't wear Latex either or Skin peels     Prednisone Other (See Comments)     Severe headache     Wheat      Rash, Derm     Keflex [Cephalexin] Rash     Current Outpatient Medications   Medication Sig Dispense Refill     albuterol (PROAIR HFA/PROVENTIL HFA/VENTOLIN HFA) 108 (90 Base) MCG/ACT inhaler Inhale 2 puffs into the lungs       amLODIPine (NORVASC) 5 MG tablet Take 1 tablet (5 mg) by mouth daily 30 tablet 0     Calcium 200 MG TABS        cetirizine HCl 10 MG CAPS        Cyanocobalamin (VITAMIN B 12 PO) Take 1,000 mcg by mouth       FOLIC ACID PO Take 1 mg by mouth daily       LORazepam  (ATIVAN) 1 MG tablet Take 1 pill 30 min before procedure. May repeat. 2 tablet 0     magnesium oxide (MAG-OX) 400 (241.3 Mg) MG tablet Take 400 mg by mouth       metroNIDAZOLE (METROCREAM) 0.75 % external cream Apply to face BID 45 g 11     order for DME Equipment ordered: RESMED Auto PAP Mask type: Nasal Settings: 8-15 CM H2O       pantoprazole (PROTONIX) 20 MG EC tablet Take 1 tablet (20 mg) by mouth daily 90 tablet 3     triamcinolone (KENALOG) 0.1 % external cream Apply topically 2 times daily As needed for itching 80 g 3     triamcinolone (KENALOG) 0.5 % external ointment Apply to AA on the lower legs BID x 1-2 weeks then PRN 15 g 5     triamcinolone (KENALOG) 0.5 % external ointment Use three times daily on rash until this resolves.  If this is not helping within 2 weeks, let me know 15 g 0     VITAMIN E NATURAL PO        Past Surgical History:   Procedure Laterality Date     BIOPSY CERVICAL, LOCAL EXCISION, SINGLE/MULTIPLE N/A 8/29/2019    Procedure: endometrial biopsy;  Surgeon: Neil Cordova MD;  [facility]     CHOLECYSTECTOMY  02/12/2020     COLONOSCOPY N/A 1/14/2020    Procedure: COLONOSCOPY;  Surgeon: Ricardo Davis DO;  Location:  GI     CONIZATION LEEP N/A 8/29/2019    Procedure: LEEP  conization of cervix surgery;  Surgeon: Neil Cordova MD;  [facility]     DAVINCI GASTRIC SLEEVE Bilateral 02/12/2020     ORTHOPEDIC SURGERY       Social History     Socioeconomic History     Marital status: Single     Spouse name: None     Number of children: None     Years of education: None     Highest education level: None   Occupational History     None   Social Needs     Financial resource strain: None     Food insecurity     Worry: None     Inability: None     Transportation needs     Medical: None     Non-medical: None   Tobacco Use     Smoking status: Never Smoker     Smokeless tobacco: Never Used   Substance and Sexual Activity     Alcohol use: Yes     Comment:  social     Drug use: No     Sexual activity: Yes     Partners: Male     Birth control/protection: Pill, Injection   Lifestyle     Physical activity     Days per week: None     Minutes per session: None     Stress: None   Relationships     Social connections     Talks on phone: None     Gets together: None     Attends Catholic service: None     Active member of club or organization: None     Attends meetings of clubs or organizations: None     Relationship status: None     Intimate partner violence     Fear of current or ex partner: None     Emotionally abused: None     Physically abused: None     Forced sexual activity: None   Other Topics Concern     Parent/sibling w/ CABG, MI or angioplasty before 65F 55M? Not Asked   Social History Narrative     None     Family History   Problem Relation Age of Onset     Uterine Cancer Mother      Uterine Cancer Maternal Aunt      Uterine Cancer Maternal Aunt      Uterine Cancer Maternal Aunt      Uterine Cancer Maternal Aunt      Uterine Cancer Maternal Aunt        ROS: A 12 point review of systems was done. Except for what is listed above in the HPI, the systems review is negative .      Objective: Vital signs: Blood pressure (!) 160/95, pulse 74, temperature 96.7  F (35.9  C), resp. rate 12, height 1.829 m (6'), weight 131.5 kg (290 lb), SpO2 99 %, not currently breastfeeding.    Pelvic exam:My nurse Lorraine was present to chaperone the exam.  The external genitalia appeared normal.    The vaginal vault was without bleeding  or   discharge   or odor.   The cervix was smooth   and shiny and normal in appearance.    A pap was obtained.   A wet prep was obtained.   No vaginal support defects were noted,    Bimanual exam revealed a 5 week  sized uterus. It does not   descend   well in the vaginal vault.    No adnexal masses were felt.    There was no  cervical motion tenderness.    Exam was MODERATELY   limited by the patient's body habitus.          Assessment/Plan:      1.   Appointment today for Pap smear and pelvic exam.  The exam is unremarkable other than the fact that she is morbidly obese.  However she has lost 100 pounds over the past year and I congratulated her on that.    2.  Occasional discharge- I did not see any today -but I will send a wet prep and notify her of results.    TIME: Total time spent with patient today and additional time spent charting was 25 minutes- all which occurred today.       The above information was dictating using Dragon voice software and as a result there may be some irregularities that were not detected in my review of this note.    MICAELA Cordova MD

## 2021-02-11 ENCOUNTER — MYC MEDICAL ADVICE (OUTPATIENT)
Dept: INTERNAL MEDICINE | Facility: CLINIC | Age: 51
End: 2021-02-11

## 2021-02-11 ENCOUNTER — HOSPITAL ENCOUNTER (OUTPATIENT)
Dept: PHYSICAL THERAPY | Facility: CLINIC | Age: 51
Setting detail: THERAPIES SERIES
End: 2021-02-11
Attending: PHYSICAL MEDICINE & REHABILITATION
Payer: COMMERCIAL

## 2021-02-11 DIAGNOSIS — I10 BENIGN ESSENTIAL HYPERTENSION: ICD-10-CM

## 2021-02-11 DIAGNOSIS — Z11.59 ENCOUNTER FOR SCREENING FOR OTHER VIRAL DISEASES: ICD-10-CM

## 2021-02-11 DIAGNOSIS — G89.29 CHRONIC LEFT SHOULDER PAIN: ICD-10-CM

## 2021-02-11 DIAGNOSIS — I73.00 RAYNAUD'S DISEASE WITHOUT GANGRENE: ICD-10-CM

## 2021-02-11 DIAGNOSIS — M25.512 CHRONIC LEFT SHOULDER PAIN: ICD-10-CM

## 2021-02-11 LAB
LABORATORY COMMENT REPORT: NORMAL
SARS-COV-2 RNA RESP QL NAA+PROBE: NEGATIVE
SARS-COV-2 RNA RESP QL NAA+PROBE: NORMAL
SPECIMEN SOURCE: NORMAL
SPECIMEN SOURCE: NORMAL
VIT B1 BLD-MCNC: 132 NMOL/L (ref 70–180)

## 2021-02-11 PROCEDURE — 87635 SARS-COV-2 COVID-19 AMP PRB: CPT | Performed by: PHYSICAL MEDICINE & REHABILITATION

## 2021-02-11 PROCEDURE — 97162 PT EVAL MOD COMPLEX 30 MIN: CPT | Mod: GP

## 2021-02-11 RX ORDER — AMLODIPINE BESYLATE 5 MG/1
10 TABLET ORAL DAILY
Qty: 30 TABLET | Refills: 0 | COMMUNITY
Start: 2021-02-11 | End: 2021-08-27

## 2021-02-11 ASSESSMENT — ASTHMA QUESTIONNAIRES: ACT_TOTALSCORE: 25

## 2021-02-11 NOTE — PROGRESS NOTES
[facility]          OUTPATIENT PHYSICAL THERAPY ORTHOPEDIC EVALUATION  PLAN OF TREATMENT FOR OUTPATIENT REHABILITATION  (COMPLETE FOR INITIAL CLAIMS ONLY)  Patient's Last Name, First Name, M.I.  YOB: 1970  Paola Lobo    Provider s Name:  [facility]   Medical Record No.  [id number]   Start of Care Date:  02/11/21   Onset Date:  02/08/21   Type:     _X__PT   ___OT   ___SLP Medical Diagnosis:  Chronic left shoulder pain M25.512, G89.29; Hip pain, right M25.551     PT Diagnosis:  R hip weakness, decreased tissue extensibility, decreased activity tolerance. L shoulder decreased ROM, decreased strength, and decreased activity tolerance   Visits from SOC:  1      _________________________________________________________________________________  Plan of Treatment/Functional Goals:  gait training, joint mobilization, manual therapy, neuromuscular re-education, ROM, strengthening, stretching  as needed  Cryotherapy, TENS, Ultrasound  as needed  Goals  Goal Identifier: HEP  Goal Description: Pt will demonstrate accurate compliance to her HEP >4 days per wk to independently address impairements and progress function  Target Date: 08/09/21    Goal Identifier: SPADI  Goal Description: Pt will score at least 15.4% improvement on the SPADI to show increased tolerance for daily activities  Target Date: 08/09/21    Goal Identifier: Reaching  Goal Description: Pt will be able to reach overhead with >1lb weight and no increased pain to be able to access dishes in her upper cabinets  Target Date: 05/22/21    Goal Identifier: R hip  Goal Description: Pt will report >5 days with hip/lateral thigh pain <2/10 to show improved tolerance for daily activities  Target Date: 05/11/21    Therapy Frequency:  1 time/week  Predicted Duration of Therapy Intervention:  6 months    Roxy Tucker, PT                 I CERTIFY THE NEED FOR THESE SERVICES FURNISHED  UNDER        THIS PLAN OF TREATMENT AND WHILE UNDER MY CARE     (Physician co-signature of this document indicates review and certification of the therapy plan).                       Certification Date From:  02/11/21   Certification Date To:  05/11/21    Referring Provider:  Belle Tracey MD    Initial Assessment        See Epic Evaluation Start of Care Date: 02/11/21

## 2021-02-11 NOTE — PROGRESS NOTES
02/11/21 0926   General Information   Type of Visit Initial OP Ortho PT Evaluation   Start of Care Date 02/11/21   Referring Physician Belle Tracey MD   Orders Evaluate and Treat   Date of Order 02/08/21   Certification Required? Yes   Medical Diagnosis Chronic left shoulder pain M25.512, G89.29; Hip pain, right M25.551   Surgical/Medical history reviewed Yes   Precautions/Limitations no known precautions/limitations   Weight-Bearing Status - LUE full weight-bearing   Weight-Bearing Status - RUE full weight-bearing   Weight-Bearing Status - LLE full weight-bearing   Weight-Bearing Status - RLE full weight-bearing   General Information Comments PMH: asthma, obesity, sleep apnea, arthritis, history of blood transfusion, hypertension. PSH: orthopedic surgery, colonoscopy, cholecystectomy, davinci gastric sleeve.        Present No   Body Part(s)   Body Part(s) Hip;Shoulder   Presentation and Etiology   Pertinent history of current problem (include personal factors and/or comorbidities that impact the POC) Pt reports hip pain started about 2 years ago. Underwent PT due to leg and nerve pain, leg was going numb and being weak. Started working with  and now has pain going down laterl R thigh. Feels like it is about to snap. From middle of L buttocks down side and across top on knee. Has been working on stretching it with some progress but still feels very tight. L shoulder:  was working with some weights on the floor, and pt reported they were too heavy. MRI shows a small tear and AC joint arthritis. Difficulty with rotations and flexion.    Impairments A. Pain;D. Decreased ROM;E. Decreased flexibility;F. Decreased strength and endurance;H. Impaired gait   Functional Limitations perform activities of daily living;perform desired leisure / sports activities;perform required work activities   Symptom Location Lateral R thigh and L ant shoulder   How/Where  did it occur While lifting;From insidious onset   Onset date of current episode/exacerbation 02/08/21   Chronicity Chronic   Pain rating (0-10 point scale) Worst (/10)  (currently: hip 2-3/10, shoulder 4-5/10)   Worst (/10) hip: 6-7/10, shoulder: 8-9/10   Pain quality C. Aching;A. Sharp;F. Stabbing   Frequency of pain/symptoms C. With activity   Pain/symptoms exacerbated by C. Lifting;D. Carrying;H. Overhead reach   Pain/symptoms eased by C. Rest;H. Cold  (stretches leg when that hurts)   Progression of symptoms since onset: Worsened   Current / Previous Interventions   Diagnostic Tests: X-ray;MRI   X-ray Results Results   X-ray results No definite etiology for patient's right hip pain is identified on this study. No fracture, malalignment, or significant degenerative changes of the right hip are seen.   MRI Results Results   MRI results Imaging findings suspicious for moderate grade tear of the anterior supraspinatus at the footprint. Spurring at the coracoacromial attachment with borderline narrowing of the acromiohumeral interval. 2. Severe degenerative changes of the acromioclavicular joint.   Prior Level of Function   Prior Level of Function-Mobility ind   Prior Level of Function-ADLs ind   Current Level of Function   Patient role/employment history A. 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 Yes   Have you fallen and had an injury in the past year? No   Is patient a fall risk? No   Abuse Screen (yes response referral indicated)   Feels Unsafe at Home or Work/School no   Feels Threatened by Someone no   Does Anyone Try to Keep You From Having Contact with Others or Doing Things Outside Your Home? no   Physical Signs of Abuse Present no   Functional Scales    Functional Scales Other   Other Scales  SPADI 61.54   Hip Objective Findings   Side (if bilateral, select both right and left) Right   Observation [facility], [facility], chiro 10-20 times a year to help   Integumentary  no concerns   Posture increased thoracic kyphosis, forward head posture, slight knee valgus in standing   Gait/Locomotion holds LUE close to side due to pain. symmetrical gait pattern,    Balance/Proprioception (Single Leg Stance) >20 sec B, more wiggle on RLE   Hip ROM Comments Pt reports pain mostly at ant hip and groin while in flexed position.   Straight Leg Raise Test neg   Scour Test neg   TAO Test neg   FADIR Test positive, pain at groin   Hip Special Tests Comments clicking in hip ant hip when lowering leg   Palpation tenderness along IT band   Accessory Motion/Joint Mobility guarded motion at hip joint   Right Hip Flexion PROM 95 deg limited by soft tissue approximation   Right Hip Abduction PROM 30 deg   Right Hip ER PROM 35 deg, pain at groin   Right Hip IR PROM 30 deg, pain at groin   Right Hip Flexion Strength 3+/5, pain at ant hip joint   Right Hip Abduction Strength 3/5   Right Hip Extension Strength 4-/5   Right Hip IR Strength 4/5   Right Hip ER Strength 4/5   Right Knee Flexion Strength 4/5   Right Knee Extension Strength 4/5   Eric Flexibility Test reduced flexibility   Obers/ITB Flexibility tight IT band   Right Hamstring Flexibility somewhat limited   Right Piriformis Flexibility limited   Right Prone Quad Flexibility fair, feels stretch on lateral thigh first   Shoulder Objective Findings   Side (if bilateral, select both right and left) Left   Observation holds shoulder close to side due to pain. Pain in ant shoulder, sharper pain slightly down biceps   Integumentary  no concerns   Cervical Screen (ROM, quadrant) neg, does not do well with cervical ext, gets vertigo symptoms   Thoracic Mobility Screen slightly limted flex/ext, rotation WNL   Thoracic  Outlet Syndrom (Romana, Raysa, Swetha, [place]) neg   Neer's Test positive   Han-Kelvin Test positive   [place]'s Test positive   Crossover Test positive   Palpation pain with supraspinatus tendon palpation.    Left Shoulder Flexion AROM 93 deg, pain at about 90   Left Shoulder Flexion PROM 120 deg   Left Shoulder Abduction AROM 75 deg, pain starts at about 40 deg   Left Shoulder Abduction PROM 90 deg   Left Shoulder ER AROM 75 deg, pain at end range   Left Shoulder ER PROM 75 deg   Left Shoulder IR AROM IR/Ext to L1   Left Shoulder Flexion Strength 4/5, pain down ant shoulder and ant humerus   Left Shoulder Abduction Strength 4/5, pain in same area   Left Shoulder ER Strength 4/5   Left Shoulder IR Strength 4/5   Left Mid Trapezius Strength decreased   Left Lower Trapezius Strength decreased   Planned Therapy Interventions   Planned Therapy Interventions gait training;joint mobilization;manual therapy;neuromuscular re-education;ROM;strengthening;stretching   Planned Therapy Interventions Comment as needed   Planned Modality Interventions   Planned Modality Interventions Cryotherapy;TENS;Ultrasound   Planned Modality Interventions Comments as needed   Clinical Impression   Criteria for Skilled Therapeutic Interventions Met yes, treatment indicated   PT Diagnosis R hip weakness, decreased tissue extensibility, decreased activity tolerance. L shoulder decreased ROM, decreased strength, and decreased activity tolerance   Influenced by the following impairments R hip weakness, decreased tissue extensibility, decreased activity tolerance. L shoulder decreased ROM, decreased strength, and decreased activity tolerance   Functional limitations due to impairments reaching over head, lifting, holding coffee cup, jogging   Clinical Presentation Stable/Uncomplicated   Clinical Presentation Rationale based on history, eval, and clinical reasoning   Clinical Decision Making (Complexity) Moderate complexity   Therapy Frequency 1  time/week   Predicted Duration of Therapy Intervention (days/wks) 6 months   Risk & Benefits of therapy have been explained Yes   Patient, Family & other staff in agreement with plan of care Yes   Clinical Impression Comments Aung is a 50 year old female that presents to OP PT for evaluation of her R hip and L shoulder. She demonstrates R IT band irritation with hip flexor and glute tightness as well as decreased glute and core strength. She has a small tear in her L supraspinatus muscle leading to weakness, decreased AROM, and decreased activity tolerance. She will benefit from skilled PT intervention to address these impairments and return her to her previous level of function.   Education Assessment   Preferred Learning Style Listening;Reading;Demonstration;Pictures/video   Barriers to Learning No barriers   ORTHO GOALS   PT Ortho Eval Goals 1;2;3;4   Ortho Goal 1   Goal Identifier HEP   Goal Description Pt will demonstrate accurate compliance to her HEP >4 days per wk to independently address impairements and progress function   Target Date 08/09/21   Ortho Goal 2   Goal Identifier SPADI   Goal Description Pt will score at least 15.4% improvement on the SPADI to show increased tolerance for daily activities   Target Date 08/09/21   Ortho Goal 3   Goal Identifier Reaching   Goal Description Pt will be able to reach overhead with >1lb weight and no increased pain to be able to access dishes in her upper cabinets   Target Date 05/22/21   Ortho Goal 4   Goal Identifier R hip   Goal Description Pt will report >5 days with hip/lateral thigh pain <2/10 to show improved tolerance for daily activities   Target Date 05/11/21   Total Evaluation Time   PT Eval, Moderate Complexity Minutes (60037) 75   Therapy Certification   Certification date from 02/11/21   Certification date to 05/11/21   Medical Diagnosis Chronic left shoulder pain M25.512, G89.29; Hip pain, right M25.551     Roxy Tucker, PT,  DPT  [phone]  [facility]  Thank you for this referral

## 2021-02-12 ENCOUNTER — OFFICE VISIT - HEALTHEAST (OUTPATIENT)
Dept: SURGERY | Facility: CLINIC | Age: 51
End: 2021-02-12

## 2021-02-12 ENCOUNTER — TRANSFERRED RECORDS (OUTPATIENT)
Dept: HEALTH INFORMATION MANAGEMENT | Facility: CLINIC | Age: 51
End: 2021-02-12

## 2021-02-12 DIAGNOSIS — K91.2 POSTOPERATIVE MALABSORPTION: ICD-10-CM

## 2021-02-12 DIAGNOSIS — R53.83 FATIGUE, UNSPECIFIED TYPE: ICD-10-CM

## 2021-02-12 ASSESSMENT — MIFFLIN-ST. JEOR: SCORE: 2042.43

## 2021-02-13 ENCOUNTER — MYC MEDICAL ADVICE (OUTPATIENT)
Dept: INTERNAL MEDICINE | Facility: CLINIC | Age: 51
End: 2021-02-13

## 2021-02-13 DIAGNOSIS — I10 BENIGN ESSENTIAL HYPERTENSION: Primary | ICD-10-CM

## 2021-02-15 LAB
COPATH REPORT: NORMAL
PAP: NORMAL

## 2021-02-15 RX ORDER — AMLODIPINE BESYLATE 10 MG/1
10 TABLET ORAL DAILY
Qty: 90 TABLET | Refills: 0 | Status: SHIPPED | OUTPATIENT
Start: 2021-02-15 | End: 2021-05-19

## 2021-02-16 DIAGNOSIS — Z11.59 ENCOUNTER FOR SCREENING FOR OTHER VIRAL DISEASES: ICD-10-CM

## 2021-02-17 ENCOUNTER — HOSPITAL ENCOUNTER (OUTPATIENT)
Dept: PHYSICAL THERAPY | Facility: CLINIC | Age: 51
Setting detail: THERAPIES SERIES
End: 2021-02-17
Attending: PHYSICAL MEDICINE & REHABILITATION
Payer: COMMERCIAL

## 2021-02-17 DIAGNOSIS — M25.551 HIP PAIN, RIGHT: ICD-10-CM

## 2021-02-17 LAB
FINAL DIAGNOSIS: NORMAL
HPV HR 12 DNA CVX QL NAA+PROBE: NEGATIVE
HPV16 DNA SPEC QL NAA+PROBE: NEGATIVE
HPV18 DNA SPEC QL NAA+PROBE: NEGATIVE
SPECIMEN DESCRIPTION: NORMAL
SPECIMEN SOURCE CVX/VAG CYTO: NORMAL

## 2021-02-17 PROCEDURE — 97110 THERAPEUTIC EXERCISES: CPT | Mod: GP | Performed by: PHYSICAL THERAPIST

## 2021-02-19 DIAGNOSIS — Z11.59 ENCOUNTER FOR SCREENING FOR OTHER VIRAL DISEASES: ICD-10-CM

## 2021-02-19 LAB
SARS-COV-2 RNA RESP QL NAA+PROBE: NORMAL
SPECIMEN SOURCE: NORMAL

## 2021-02-19 PROCEDURE — U0003 INFECTIOUS AGENT DETECTION BY NUCLEIC ACID (DNA OR RNA); SEVERE ACUTE RESPIRATORY SYNDROME CORONAVIRUS 2 (SARS-COV-2) (CORONAVIRUS DISEASE [COVID-19]), AMPLIFIED PROBE TECHNIQUE, MAKING USE OF HIGH THROUGHPUT TECHNOLOGIES AS DESCRIBED BY CMS-2020-01-R: HCPCS | Performed by: PHYSICAL MEDICINE & REHABILITATION

## 2021-02-19 PROCEDURE — U0005 INFEC AGEN DETEC AMPLI PROBE: HCPCS | Performed by: PHYSICAL MEDICINE & REHABILITATION

## 2021-02-20 LAB
LABORATORY COMMENT REPORT: NORMAL
SARS-COV-2 RNA RESP QL NAA+PROBE: NEGATIVE
SPECIMEN SOURCE: NORMAL

## 2021-02-22 ENCOUNTER — PATIENT OUTREACH (OUTPATIENT)
Dept: INTERNAL MEDICINE | Facility: CLINIC | Age: 51
End: 2021-02-22

## 2021-02-23 ENCOUNTER — HOSPITAL ENCOUNTER (OUTPATIENT)
Dept: GENERAL RADIOLOGY | Facility: CLINIC | Age: 51
Discharge: HOME | End: 2021-02-23
Attending: PHYSICAL MEDICINE & REHABILITATION | Admitting: PHYSICAL MEDICINE & REHABILITATION
Payer: COMMERCIAL

## 2021-02-23 DIAGNOSIS — M25.551 HIP PAIN, RIGHT: ICD-10-CM

## 2021-02-23 PROCEDURE — 20610 DRAIN/INJ JOINT/BURSA W/O US: CPT | Mod: RT

## 2021-02-23 PROCEDURE — 250N000009 HC RX 250: Performed by: PHYSICAL MEDICINE & REHABILITATION

## 2021-02-23 PROCEDURE — 250N000011 HC RX IP 250 OP 636: Performed by: PHYSICAL MEDICINE & REHABILITATION

## 2021-02-23 PROCEDURE — 255N000002 HC RX 255 OP 636: Performed by: PHYSICAL MEDICINE & REHABILITATION

## 2021-02-23 RX ORDER — LIDOCAINE HYDROCHLORIDE 10 MG/ML
10 INJECTION, SOLUTION EPIDURAL; INFILTRATION; INTRACAUDAL; PERINEURAL ONCE
Status: COMPLETED | OUTPATIENT
Start: 2021-02-23 | End: 2021-02-23

## 2021-02-23 RX ORDER — TRIAMCINOLONE ACETONIDE 40 MG/ML
40 INJECTION, SUSPENSION INTRA-ARTICULAR; INTRAMUSCULAR ONCE
Status: COMPLETED | OUTPATIENT
Start: 2021-02-23 | End: 2021-02-23

## 2021-02-23 RX ORDER — BUPIVACAINE HYDROCHLORIDE 2.5 MG/ML
10 INJECTION, SOLUTION EPIDURAL; INFILTRATION; INTRACAUDAL ONCE
Status: COMPLETED | OUTPATIENT
Start: 2021-02-23 | End: 2021-02-23

## 2021-02-23 RX ORDER — IOPAMIDOL 510 MG/ML
100 INJECTION, SOLUTION INTRAVASCULAR ONCE
Status: COMPLETED | OUTPATIENT
Start: 2021-02-23 | End: 2021-02-23

## 2021-02-23 RX ADMIN — IOPAMIDOL 3 ML: 510 INJECTION, SOLUTION INTRAVASCULAR at 11:00

## 2021-02-23 RX ADMIN — LIDOCAINE HYDROCHLORIDE 0.5 ML: 10 INJECTION, SOLUTION INFILTRATION; PERINEURAL at 10:59

## 2021-02-23 RX ADMIN — BUPIVACAINE HYDROCHLORIDE 4 ML: 2.5 INJECTION, SOLUTION EPIDURAL; INFILTRATION; INTRACAUDAL at 11:00

## 2021-02-23 RX ADMIN — TRIAMCINOLONE ACETONIDE 40 MG: 40 INJECTION, SUSPENSION INTRA-ARTICULAR; INTRAMUSCULAR at 11:00

## 2021-02-26 ENCOUNTER — HOSPITAL ENCOUNTER (OUTPATIENT)
Dept: PHYSICAL THERAPY | Facility: CLINIC | Age: 51
Setting detail: THERAPIES SERIES
End: 2021-02-26
Attending: PHYSICAL MEDICINE & REHABILITATION
Payer: COMMERCIAL

## 2021-02-26 PROCEDURE — 97110 THERAPEUTIC EXERCISES: CPT | Mod: GP,95,GT

## 2021-02-26 NOTE — PROGRESS NOTES
Paola Lobo is a 50 year old female who is being seen via a billable video visit.      Patient has given verbal consent for Video visit? Yes    Video Start Time: 11:29    Telehealth Visit Details    Type of Service:  Telehealth    Video End Time (time video stopped): 12:08    Originating Location (pt. location): Home    Additional Participants in Telehealth Visit: n/a    Distant Location (provider location):  [facility]     Mode of Communication (Audio Visual or Audio Only):  audio and visual    Roxy Tucker, PT  February 26, 2021

## 2021-03-03 ENCOUNTER — HOSPITAL ENCOUNTER (OUTPATIENT)
Dept: PHYSICAL THERAPY | Facility: CLINIC | Age: 51
Setting detail: THERAPIES SERIES
End: 2021-03-03
Attending: PHYSICAL MEDICINE & REHABILITATION
Payer: COMMERCIAL

## 2021-03-03 PROCEDURE — 97110 THERAPEUTIC EXERCISES: CPT | Mod: GP

## 2021-03-03 PROCEDURE — 97140 MANUAL THERAPY 1/> REGIONS: CPT | Mod: GP

## 2021-03-10 ENCOUNTER — HOSPITAL ENCOUNTER (OUTPATIENT)
Dept: PHYSICAL THERAPY | Facility: CLINIC | Age: 51
Setting detail: THERAPIES SERIES
End: 2021-03-10
Attending: PHYSICAL MEDICINE & REHABILITATION
Payer: COMMERCIAL

## 2021-03-10 PROCEDURE — 97110 THERAPEUTIC EXERCISES: CPT | Mod: GP

## 2021-03-17 ENCOUNTER — HOSPITAL ENCOUNTER (OUTPATIENT)
Dept: PHYSICAL THERAPY | Facility: CLINIC | Age: 51
Setting detail: THERAPIES SERIES
End: 2021-03-17
Attending: PHYSICAL MEDICINE & REHABILITATION
Payer: COMMERCIAL

## 2021-03-17 PROCEDURE — 97140 MANUAL THERAPY 1/> REGIONS: CPT | Mod: GP

## 2021-03-17 PROCEDURE — 97110 THERAPEUTIC EXERCISES: CPT | Mod: GP

## 2021-03-24 ENCOUNTER — HOSPITAL ENCOUNTER (OUTPATIENT)
Dept: PHYSICAL THERAPY | Facility: CLINIC | Age: 51
Setting detail: THERAPIES SERIES
End: 2021-03-24
Attending: PHYSICAL MEDICINE & REHABILITATION
Payer: COMMERCIAL

## 2021-03-24 PROCEDURE — 97140 MANUAL THERAPY 1/> REGIONS: CPT | Mod: GP

## 2021-03-24 PROCEDURE — 97110 THERAPEUTIC EXERCISES: CPT | Mod: GP

## 2021-04-08 ENCOUNTER — HOSPITAL ENCOUNTER (OUTPATIENT)
Dept: PHYSICAL THERAPY | Facility: CLINIC | Age: 51
Setting detail: THERAPIES SERIES
End: 2021-04-08
Attending: PHYSICAL MEDICINE & REHABILITATION
Payer: COMMERCIAL

## 2021-04-08 PROCEDURE — 97110 THERAPEUTIC EXERCISES: CPT | Mod: GP

## 2021-04-22 ENCOUNTER — HOSPITAL ENCOUNTER (OUTPATIENT)
Dept: PHYSICAL THERAPY | Facility: CLINIC | Age: 51
Setting detail: THERAPIES SERIES
End: 2021-04-22
Attending: PHYSICAL MEDICINE & REHABILITATION
Payer: COMMERCIAL

## 2021-04-22 PROCEDURE — 97140 MANUAL THERAPY 1/> REGIONS: CPT | Mod: GP

## 2021-04-22 PROCEDURE — 97110 THERAPEUTIC EXERCISES: CPT | Mod: GP

## 2021-05-06 ENCOUNTER — HOSPITAL ENCOUNTER (OUTPATIENT)
Dept: PHYSICAL THERAPY | Facility: CLINIC | Age: 51
Setting detail: THERAPIES SERIES
End: 2021-05-06
Attending: PHYSICAL MEDICINE & REHABILITATION
Payer: COMMERCIAL

## 2021-05-06 PROCEDURE — 97110 THERAPEUTIC EXERCISES: CPT | Mod: GP

## 2021-05-06 PROCEDURE — 97140 MANUAL THERAPY 1/> REGIONS: CPT | Mod: GP

## 2021-05-18 DIAGNOSIS — I10 BENIGN ESSENTIAL HYPERTENSION: ICD-10-CM

## 2021-05-19 RX ORDER — AMLODIPINE BESYLATE 10 MG/1
10 TABLET ORAL DAILY
Qty: 90 TABLET | Refills: 0 | Status: SHIPPED | OUTPATIENT
Start: 2021-05-19 | End: 2021-08-11

## 2021-05-20 ENCOUNTER — HOSPITAL ENCOUNTER (OUTPATIENT)
Dept: PHYSICAL THERAPY | Facility: CLINIC | Age: 51
Setting detail: THERAPIES SERIES
End: 2021-05-20
Attending: PHYSICAL MEDICINE & REHABILITATION
Payer: COMMERCIAL

## 2021-05-20 PROCEDURE — 97110 THERAPEUTIC EXERCISES: CPT | Mod: GP

## 2021-05-20 NOTE — PROGRESS NOTES
Outpatient Physical Therapy Discharge Note     Patient: Paola Lobo  : 1970    Beginning/End Dates of Reporting Period:  2021 to 2021    Referring Provider: Belle Tracey MD    Therapy Diagnosis: R hip weakness, decreased tissue extensibility, decreased activity tolerance. L shoulder decreased ROM, decreased strength, and decreased activity tolerance     Client Self Report: Pt reports that things have been getting better. Hip has had some numbness on front of thigh and hip flexors have been tight. Reports she is able to do more w/L shoulder - joint pain is gone but some muscle pain present    Objective Measurements:  Objective Measure: SPADI (61.54)  Details: 15.54  Objective Measure: Pain  Details: 0/10 pain on hip, 3/10 on NuStep when pulling back, felt on back of arm     Goals:  Goal Identifier HEP   Goal Description Pt will demonstrate accurate compliance to her HEP >4 days per wk to independently address impairements and progress function   Target Date 21   Date Met  21   Progress:     Goal Identifier SPADI   Goal Description Pt will score at least 15.4% improvement on the SPADI to show increased tolerance for daily activities   Target Date 21   Date Met  21   Progress:     Goal Identifier Reaching   Goal Description Pt will be able to reach overhead with >1lb weight and no increased pain to be able to access dishes in her upper cabinets   Target Date 21   Date Met  21   Progress:     Goal Identifier R hip   Goal Description Pt will report >5 days with hip/lateral thigh pain <2/10 to show improved tolerance for daily activities   Target Date 21   Date Met  21   Progress:       Progress Toward Goals:   Progress this reporting period: Aung made great improvements during this reporting period. She did have a set back of increased joint soreness over a month ago but that has since improved. She met all of her goals and  demonstrates good understanding of how to continue with her HEP and progress from here. Pt and PT in agreement to discharge on this date.    Plan:  Discharge from therapy.    Discharge:    Reason for Discharge: Patient has met all goals.    Equipment Issued: HEP    Discharge Plan: Patient to continue home program.

## 2021-05-20 NOTE — PROGRESS NOTES
Rehabilitation Services      OUTPATIENT PHYSICAL THERAPY  PLAN OF TREATMENT FOR OUTPATIENT REHABILITATION    Patient's Last Name, First Name, M.I.                YOB: 1970  Paola Lobo                        Provider's Name  Roxy Tucker, PT Medical Record No.  [id number]                               Onset Date: 2/8/2021   Start of Care Date: 2/11/2021   Type:     _X_PT   ___OT   ___SLP Medical Diagnosis: Chronic left shoulder pain M25.512, G89.29; Hip pain, right M25.551                       PT Diagnosis: R hip weakness, decreased tissue extensibility, decreased activity tolerance. L shoulder decreased ROM, decreased strength, and decreased activity tolerance      _________________________________________________________________________________  Plan of Treatment: gait training, joint mobilization, manual therapy, neuromuscular re-education, ROM, strengthening, stretching  as needed  Cryotherapy, TENS, Ultrasound  as needed    Frequency/Duration: EOW for a few more sessions to finalize HEP     Goals:  Goal Identifier HEP   Goal Description Pt will demonstrate accurate compliance to her HEP >4 days per wk to independently address impairements and progress function   Target Date 08/09/21   Date Met     Progress: demonstrates good compliance     Goal Identifier SPADI   Goal Description Pt will score at least 15.4% improvement on the SPADI to show increased tolerance for daily activities   Target Date 08/09/21   Date Met     Progress: no tested at time of re-cert     Goal Identifier Reaching   Goal Description Pt will be able to reach overhead with >1lb weight and no increased pain to be able to access dishes in her upper cabinets   Target Date 05/22/21   Date Met  05/20/21   Progress:     Goal Identifier R hip   Goal Description Pt will report >5 days with hip/lateral thigh pain <2/10 to show improved  tolerance for daily activities   Target Date 05/11/21   Date Met     Progress: Making good progress, hip has been less bothersome       Progress Toward Goals:   Progress this reporting period: Aung has made good progress during this reporting period. She has resolved most of her hip pain and is progressing with shoulder strength and function. Plan for 1-2 more visits to finalize HEP.      Certification date from 5/11/2021 to 6/1/2021.    Roxy Tucker, PT          I CERTIFY THE NEED FOR THESE SERVICES FURNISHED UNDER        THIS PLAN OF TREATMENT AND WHILE UNDER MY CARE     (Physician co-signature of this document indicates review and certification of the therapy plan).                Referring Provider: Belle Tracey MD

## 2021-06-02 NOTE — PATIENT INSTRUCTIONS - HE
Plan:  1. Welcome to the bariatric surgery program, read through your manual a couple times monthly.  2. Intake labs can be done today.  3. RUQ ultrasound to evaluate your discomfort/gallbladder and liver.  4. Aiming for a weight under 390 lbs prior to meeting with the surgeon. Less is better.  5. Update mammogram and colonoscopy this fall/winter.  6. Anticipate actigall for 6 months unless clear gallbladder disease present to reduce the risk of gallstones.  7. STop Copaiba oils by mouth the month prior to surgery.  8. No alcohol the month prior to surgery and for 6 months minimum after.  9. Bring CPAP to your procedure.  10. Attend one support group meeting.  11. Follow up with PCP or GYN to discuss non Depo Provera treatment of menorrhagia/heavy menses due to obesogenic nature of Depo Provera.  IUD/Ablation/pill is preferable but OCP pills would need to be held one month prior to surgery and restarted one month after to reduce the risk of blood clots/DVT.        Before being submitted for insurance approval, you will need the following:    -Clearance by the Psychologist  -Clearance by the dietitian  -Attend Support Group ([address] [place] [address] at [facility]) 2nd Tuesday of the month 6:30-8pm. Make sure to sign in.  -Routine Health Care Maintenance must be up to date (mammograms yearly after age 40, paps as recommended by your primary provider,  colonoscopy after age 50, earlier if high risk.  -If you are on estrogen, estrogen will be discontinued one month prior to surgery. It may be resumed one month after surgery unless otherwise advised to limit blood clotting risks.  -Pre Operative Lab work-ordered today.    -Structured weight loss visits IF mandated by your insurance carrier or bariatrician.  -Surgeon consult as we get nearer to potential surgery or sooner if you have special considerations that may make your surgery more complex.  -If you have sleep apnea you will need to be using CPAP for at least one month  before surgery to reduce your risk of breathing problems after surgery. Make sure to bring your CPAP or BiPAP to the hospital at the time of surgery.    -You will need to be tobacco free for 2 months before surgery and remain a non-smoker thereafter. If you are currently smoking or have recently quit, your urine will be evaluated for tobacco metabolites pre-operatively.  Smoking is a major risk factor for developing ulceration of your surgical sites and potential severe complications.    -If you are on insulin, you might be referred to an endocrinologist who will manage your insulin during the liquid diet and around the time of surgery. This endocrinologist does not replace your primary provider or your endocrinologist.   -You will need an exercise plan which includes MOVE, ie., walking and MUSCLE, ie.,calisthenics, bands, weight, machines, etc...Maintenance of long term weight loss and quality of life is much improved with regular exercise as the weight comes off.  ______________________________________________________________________    Remember that after your bariatric surgery, vitamin supplementation is a lifelong need.    You will take:    B-12 300-500mcg or higher sublingual (under the tongue) daily or by 1000mcg injection 1-2X/month  D3:  3000- 5000 IUs daily   Multivitamin containing 18mg of iron twice a day  Calcium citrate 1 or 2 daily    To keep your weight off and your vitamin levels up, follow-up is important.    Your labs will be monitored every 6 months for the first two years (every 3 months if you had a duodenal switch) and yearly thereafter.    To avoid ulcers in your stomach avoid tobacco forever, alcohol in excess, caffeine in excess and anti-inflammatories (NSAIDS)  (Aspirin, Ibuprofen, Naproxen and similar medications). Tylenol is fine at usual doses on the box.    If you are told by your physician take Aspirin to protect your heart or for another reason, make sure to take omeprazole or  similar medication (protonix, nexium, prevacid) to protect your stomach.    Remember that alcohol affects you differently after bariatric surgery. If you have even ONE drink DO NOT DRIVE due to rapid absorption and fast spiking of blood alcohol levels within minutes of consumption.     Slowly Increasing your exercise capacity such that 3-6 months after your surgery you're tolerating 150-250 minutes of exercise weekly will help optimize your metabolism and improve the chance of good weight loss maintenance.
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
## 2021-06-02 NOTE — TELEPHONE ENCOUNTER
Updated patient on results of u/s showing gallstones, reviewed warning signs of choledocholithiasis and cholecystitis, no active sx. Reviewed fatty liver disease risks and working on weight reduction in the bariatric surgery program should improve risks. Preop liquid diet should help some of fatty infiltration as would preoperative weight reduction.  Bruno Brito MD

## 2021-06-03 VITALS
WEIGHT: 293 LBS | HEART RATE: 95 BPM | DIASTOLIC BLOOD PRESSURE: 92 MMHG | BODY MASS INDEX: 39.68 KG/M2 | SYSTOLIC BLOOD PRESSURE: 160 MMHG | HEIGHT: 72 IN | RESPIRATION RATE: 16 BRPM | OXYGEN SATURATION: 97 %

## 2021-06-03 VITALS — WEIGHT: 293 LBS | BODY MASS INDEX: 53.6 KG/M2
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
## 2021-06-03 NOTE — PROGRESS NOTES
I have spoken with Paola.  She has [facility] for her insurance.  It took a bit of investigating but I was able to speak with a review nurse, Marii Schmitz SC and they have no specific requirements for [facility].  She will need to be cleared by psych and nutrition and complete the needs list provided to her at her visit.  She has NO Plan exclusions.

## 2021-06-03 NOTE — PROGRESS NOTES
Initial Structured Weight Loss Supervised Diet Evaluation     Assessment:  Pt. is being seen today for initial RD nutritional evaluation. Pt. has been unsuccessful with non-surgical weight loss methods and is interested in bariatric surgery. Today we reviewed current eating habits and level of physical activity, and instructed on the changes that are required for successful bariatric outcomes.    Surgery of interest per pt: LSG.    Workflow review:  Support Group: Not completed.  Psychology:Not completed. Needs to be scheduled   Lab work:Completed.  SWL:Yes 1st RD Visit       Weight goal: 390 lbs or Less .    Patient Active Problem List:  Patient Active Problem List   Diagnosis     Papanicolaou smear of cervix with low grade squamous intraepithelial lesion (LGSIL)     Morbid obesity (H)     Intramural and subserous leiomyoma of uterus     Impingement syndrome, shoulder, right     Excessive and frequent menstruation     Benign essential hypertension       Pt's Initial Weight: 394 lbs  Weight: (!) 395 lb 3.2 oz (179.3 kg)  Weight loss from initial: -1.2  % Weight loss: -0.3 %    BMI: Body mass index is 53.6 kg/m .    Estimated RMR (P[place]-St Jeor equation): 2534 calories    Food allergies, intolerances, and [place] customs: Wheat Allergy-Hives, Walnuts-Throat Closure     Diabetic: No  HbA1c:    Hemoglobin A1c   Date/Time Value Ref Range Status   10/03/2019 10:56 AM 5.7 3.5 - 6.0 % Final     Vitamins currently taking: Vitamin D3, Mg, Folic Acid, KCl, Fish Oil    Biggest struggle with weight loss:meal timing     Diet/Weight History  Method or Diet: Calorie Controlled, Portion Control, [place] Weight Loss System   # loss(-) or gain(+):80 lbs     Socioeconomic Status:  Who does the grocery shopping for your household? Mom   Who prepares your meals at home? Mom     Diet Recall/Time:   Breakfast: Toast with Butter, 2 cups of Coffee (1/2 and 1/2) or yogurt with granola (non wheat)   Am Snack: none   Lunch: sandwich with a  pop (drive thru) or sub sandwich or salad with chicken/beef   Pm snack:none or protein bar (think thin)   Dinner: salad, meat, vegetable, occasional rice or potato   HS Snack: none     Overnight eating: No     Per Diet recall estimated protein: 30-50 grams    Meals per week away from home: 3-4 times/week     Beverages (Type/Oz. per day)  Water: 1 gallon/day   Coffee: 2 cups/day   Soda: usual 20 oz/day (trying to reduce-lately down to 8 oz/day)   Alcohol: rarely    Exercise  Type: walking   Frequency (days/week): 5 times/week (shooting for daily)   Duration (minutes/day): 15 minutes     PES statement:   1. (NI-1.3)Excessive energy intake related to Food and nutrition related knowledge deficit concerning excessive energy/oral intake as evidenced by Intake of high caloric density foods/beverages (juice, soda, alcohol) at meals and/or snacks; large portions; Estimated intake that exceeds estimated daily energy intake; Frequent excessive fast food or restaurant intake; and BMI of 53.60 kg/m2.      Intervention    Nutrition Education:   1. Provided general overview of diet and lifestyle modifications needed to be a deemed a safe candidate for bariatric surgery.   2. Educated pt on how to read a food label: choosing foods with than 10 grams fat and 10 grams sugar per serving to avoid dumping syndrome.    Food/Nutrient Delivery:  3. Bariatric Plate: Pt and I discussed the importance of including a lean protein source (20-30 grams/meal), vegetables (included at lunch and dinner), one serving (15g) of carbohydrate, and limited added fat (1 tb/day) at each meal.   4. Discussed importance of adequate hydration after surgery, with goal of at least 64 oz of fluids/day.  5. Addressed avoiding all carbonated, caffeinated and sweetened drinks to prepare for bariatric surgery.     Nutrition Counselin. Mindful eating techniques: Encouraged slow meal pace, chewing foods to applesauce consistency for 20-30 minutes/meal.    7. Discussed  fluids 30 minutes before, during, and after meal to prevent dumping syndrome and discomfort post bariatric surgery.       Instructions/Goals:     1. Focus on protein as the main entree, consuming 60-80 grams/day.  2. Continue to work on keeping fluid separate from meals (30 minutes before and after).    Handouts Provided:   Pt. [facility] Bariatric Care Patient Handbook    Monitor/Evaluation:  Pt. s target weight: no gain from initial visit, pt. verbalized understanding.     Plan for next visit:   Bariatric plate. Give food journal homework.  Educate on dumping syndrome and reading food labels.  (Final Supervised Diet visit with RD) pre/post-op  diet progression, give review of surgery process.  Review [place] to Bariatric Success    Visit length: 30 minutes.     ABN signed: Yes

## 2021-06-03 NOTE — PROGRESS NOTES
Follow Up Surgical Weight Loss Supervised Diet Evaluation    Assessment:  Pt. is being seen today for a follow up RD nutritional evaluation. Pt. has been unsuccessful with non-surgical weight loss methods and is interested in bariatric surgery. Today we reviewed current eating habits and level of physical activity, and instructed on the changes that are required for successful bariatric outcomes.    Surgery of interest per pt: LSG.    Workflow review:  Support Group: Not completed.  Psychology:In progress.  Lab work:Completed.  SWL:Yes 2nd RD Visit     Weight goal: 390 lbs or less. .    Patient Active Problem List:  Patient Active Problem List   Diagnosis     Papanicolaou smear of cervix with low grade squamous intraepithelial lesion (LGSIL)     Morbid obesity (H)     Intramural and subserous leiomyoma of uterus     Impingement syndrome, shoulder, right     Excessive and frequent menstruation     Benign essential hypertension       Pt's Initial Weight: 394 lbs  Weight: (!) 393 lb 4.8 oz (178.4 kg)  Weight loss from initial: 0.7  % Weight loss: 0.18 %    BMI: Body mass index is 53.34 kg/m .    Estimated RMR (Rodney-St Jeor equation): 2525 calories    Progress over past month: Eliminated coffee and pop from her usual routine.  Eating slowly and taking small bites.  Reduction of high fat foods.  Working on keeping separate from meals, noting breakfast is typically the hardest.      Diabetic: No  HbA1c:    Hemoglobin A1c   Date/Time Value Ref Range Status   10/03/2019 10:56 AM 5.7 3.5 - 6.0 % Final     Diet Recall/Time:   Breakfast: greek yogurt   Am Snack: protein bar   Lunch: diced turkey, kale chips   Pm snack:apple with PB or protein bar   Dinner: meat, veggies, and starch   HS Snack: none     Overnight eating: No     Per Diet recall estimated protein: 60+ grams    Meals per week away from home: 0-1 time/week     Meal duration: 20-30 minutes.     Beverages (Type/Oz. per day)  Water: 90 oz/day   Alcohol: rarely    Other: Kory JAIN      fluids by 30 minutes before, during meal, and waiting 30 minutes after meal before drinking fluids: Yes-still working hard on    Exercise  Type: walking   Frequency (days/week): 7 days/week   Duration (minutes/day): 30 minutes/day   Hip and Back Exercises as well     PES statement:   1. Overweight/Obesity (NC 3.3) related to overeating and poor lifestyle habits as evidenced by patient's subjective statements (such as h/o of issues with portion control, excessive energy intake) and BMI of 53.34 kg/m2.     Intervention    Nutrition Education:   1. Provided general overview of diet and lifestyle modifications needed to be a deemed a safe candidate for bariatric surgery.   2. Educated pt on how to read a food label: choosing foods with than 10 grams fat and 10 grams sugar per serving to avoid dumping syndrome.  3. Dumping Syndrome: Described the mechanisms of syndrome, symptoms, and prevention tools from a dietary perspective.   4. Vitamins: Educated on post-op vitamin regimen including MVI+ 18 mg Fe two times a day, calcium citrate 400-600 mg two times a day, 3083-9654 mcg sublingual B12 daily, 5000 IU vitamin D3 daily.     Food/Nutrient Delivery:  5. Bariatric Plate: Pt and I discussed the importance of including a lean protein source (20-30 grams/meal), vegetables (included at lunch and dinner), one serving (15g) of carbohydrate, and limited added fat (1 tb/day) at each meal.   6. Educated pt on how to complete a food journal and benefits of meal planning.   7. Educated pt on using a protein powder drink as a meal replacement and/or supplement after bariatric surgery.   8. Discussed importance of adequate hydration after surgery, with goal of at least 64 oz of fluids/day.  9. Addressed avoiding all carbonated, caffeinated and sweetened drinks to prepare for bariatric surgery.     Nutrition Counseling:  10. Mindful eating techniques: Encouraged slow meal pace, chewing foods to  applesauce consistency for 20-30 minutes/meal.   11. Discussed  fluids 30 minutes before, during, and after meal to prevent dumping syndrome and discomfort post bariatric surgery.   12. Discussed pre/post operative diet progression, post op vitamin regimen, gave review of surgery process.       Instructions/Goals:     1. Fill out food journal and return at follow up.    Handouts Provided:   Dumping Syndrome  Plant Based Protein Supplements  Vitamin/Mineral Handout       Monitor/Evaluation:  Pt. s target weight: no gain from initial visit, pt. verbalized understanding.     Pt has completed all nutrition requirements and is well-informed of the dietary and physical activity requirements that are necessary for successful bariatric outcomes. This pt is an appropriate candidate for surgery from a nutrition standpoint at this time. The patient understands that surgery is a tool, not a cure, and post operative follow up is essential. However patient is to have 1 more weight check to see if she is able to achieve goal weight of 390 lbs (which is scheduled on 12/23/19).      Plan for next visit:   Bariatric plate. Give food journal homework.  Educate on dumping syndrome and reading food labels.  (Final Supervised Diet visit with RD) pre/post-op  diet progression, give review of surgery process.  Review [place] to Bariatric Success    Visit length: 30 minutes.     ABN signed: Yes

## 2021-06-04 VITALS — HEIGHT: 72 IN | WEIGHT: 293 LBS | BODY MASS INDEX: 39.68 KG/M2

## 2021-06-04 VITALS
DIASTOLIC BLOOD PRESSURE: 68 MMHG | WEIGHT: 293 LBS | OXYGEN SATURATION: 100 % | BODY MASS INDEX: 39.68 KG/M2 | HEART RATE: 68 BPM | SYSTOLIC BLOOD PRESSURE: 122 MMHG | HEIGHT: 72 IN

## 2021-06-04 VITALS — WEIGHT: 293 LBS | HEIGHT: 72 IN | BODY MASS INDEX: 39.68 KG/M2

## 2021-06-04 VITALS — WEIGHT: 293 LBS | BODY MASS INDEX: 39.68 KG/M2 | HEIGHT: 72 IN

## 2021-06-04 VITALS — BODY MASS INDEX: 39.68 KG/M2 | HEIGHT: 72 IN | WEIGHT: 293 LBS

## 2021-06-04 VITALS — BODY MASS INDEX: 39.68 KG/M2 | WEIGHT: 293 LBS | HEIGHT: 72 IN

## 2021-06-04 VITALS
BODY MASS INDEX: 39.68 KG/M2 | WEIGHT: 293 LBS | SYSTOLIC BLOOD PRESSURE: 130 MMHG | HEIGHT: 72 IN | DIASTOLIC BLOOD PRESSURE: 72 MMHG

## 2021-06-04 VITALS — HEIGHT: 72 IN | BODY MASS INDEX: 39.68 KG/M2 | WEIGHT: 293 LBS

## 2021-06-04 NOTE — PROGRESS NOTES
Follow Up Surgical Weight Loss Supervised Diet Evaluation    Assessment:  Pt. is being seen today for a follow up RD nutritional evaluation. Pt. has been unsuccessful with non-surgical weight loss methods and is interested in bariatric surgery. Today we reviewed current eating habits and level of physical activity, and instructed on the changes that are required for successful bariatric outcomes.    Surgery of interest per pt: LSG.    Workflow review:  Support Group: Completed.  Psychology:In progress.  Lab work:Completed.  SWL:Yes 3rd RD Visit       Weight goal: 390 lbs or less .    Patient Active Problem List:  Patient Active Problem List   Diagnosis     Papanicolaou smear of cervix with low grade squamous intraepithelial lesion (LGSIL)     Morbid obesity (H)     Intramural and subserous leiomyoma of uterus     Impingement syndrome, shoulder, right     Excessive and frequent menstruation     Benign essential hypertension       Pt's Initial Weight: 394 lbs  Weight: (!) 393 lb 4.8 oz (178.4 kg)  Weight loss from initial: 0.7  % Weight loss: 0.18 %    BMI: Body mass index is 53.34 kg/m .    Estimated RMR (Boone-St Jeor equation): 2525 calories    Progress over past month: tracking intake via food journal and is averaging 3936-6335 calories/day.  More mindful regarding portion sizes/control.      Diabetic: No  HbA1c:    Hemoglobin A1c   Date/Time Value Ref Range Status   10/03/2019 10:56 AM 5.7 3.5 - 6.0 % Final     Diet Recall/Time:   Breakfast: toast (plans to start a Protein Shake once [place] is over) or yogurt or scrambled eggs or Premier Protein Shake   Am Snack: think thin protein bar on occasion   Lunch: sandwich (turkey, lettuce, tomato, banana peppers) or burger (no sides)   Pm snack:none   Dinner: meat, starch, vegetables   HS Snack: occasional ice cream (1/2 cup/day)     Overnight eating: No     Per Diet recall estimated protein: 60+ grams    Meals per week away from home: 1 times/week     Meal  duration: 20-30 minutes.     Beverages (Type/Oz. per day)  Water: 64 oz +/day   Alcohol: Rarely  Other: Protein Water (Premier Protein), SF Hot Chocolate      fluids by 30 minutes before, during meal, and waiting 30 minutes after meal before drinking fluids: Yes-continues to work on, being more consistent (continues to work on).     Exercise  Type: walking   Frequency (days/week): daily   Duration (minutes/day): 5600 steps/day  Due to not feeling well, hasn't been getting in as much exercise lately.    Continues to work on back exercises daily for 20 minutes (2 x).     PES statement:   Overweight/Obesity (NC 3.3) related to overeating and poor lifestyle habits as evidenced by patient's subjective statements (h/o of portion control, excessive energy intake) and BMI of 53.34 kg/m2.      Intervention    Nutrition Education:   1. Provided general overview of diet and lifestyle modifications needed to be a deemed a safe candidate for bariatric surgery.   2. Vitamins: Educated on post-op vitamin regimen including MVI+ 18 mg Fe two times a day, calcium citrate 400-600 mg two times a day, 8106-7658 mcg sublingual B12 daily, 5000 IU vitamin D3 daily.     Food/Nutrient Delivery:  3. Educated pt on eating three meals, with cutting out snacking.  4. Bariatric Plate: Pt and I discussed the importance of including a lean protein source (20-30 grams/meal), vegetables (included at lunch and dinner), one serving (15g) of carbohydrate, and limited added fat (1 tb/day) at each meal.   5. Educated pt on how to complete a food journal and benefits of meal planning.   6. Educated pt on using a protein powder drink as a meal replacement and/or supplement after bariatric surgery.   7. Discussed importance of adequate hydration after surgery, with goal of at least 64 oz of fluids/day.  8. Addressed avoiding all carbonated, caffeinated and sweetened drinks to prepare for bariatric surgery.     Nutrition Counselin. Mindful  eating techniques: Encouraged slow meal pace, chewing foods to applesauce consistency for 20-30 minutes/meal.   10. Discussed  fluids 30 minutes before, during, and after meal to prevent dumping syndrome and discomfort post bariatric surgery.   11. Discussed pre/post operative diet progression, post op vitamin regimen, gave review of surgery process.       Instructions/Goals:     1. Continue to track intake via food journal.   2. Start Protein Shake 1-2 times/day to help with pre-op weight loss.     Handouts Provided:   Pt. [facility]ariat[facility] Patient Handbook    Monitor/Evaluation:  Pt. s target weight: no gain from initial visit, pt. verbalized understanding.     Plan for next visit:   Review food journal, weight check.     Visit length: 30 minutes.     ABN signed: Yes

## 2021-06-04 NOTE — PROGRESS NOTES
Attended Support Group. Workflow updated.    Kelly Nazario CMA  [facility]   P: [phone]  F: [phone]
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
## 2021-06-05 VITALS — BODY MASS INDEX: 39.28 KG/M2 | HEIGHT: 72 IN | WEIGHT: 290 LBS

## 2021-06-05 NOTE — PROGRESS NOTES
Patient attended group class to review pre-op instructions and dietary plan for upcoming surgery.    Pt will begin her 2 week liquid diet on 1/29/2020 and will do clear liquids the day before surgery.  Pt is scheduled for Gastric Sleeve and Cholecystectomy on 2/12/2020 and will follow a 2 week post-op liquid diet.  Pt will f/u with an RD 1 week post-op for further diet advancement.    Educated pt on 2 week pre-op and 1-2 week post-op liquid diets.  Discussed appropriate liquids and demonstrated portions for each of the food groupings during each diet phase.  Reviewed appropriate calories/protein/fluid goals during the 2 week pre-op liquid diet. Educated on correct vitamins/minerals to take after surgery in correct dosage and frequency.  Provided grocery lists and sample menu plans for each diet stage.    Discussed admission process and hospital course.  Pharmacy information packet given and explained. Patient was given exercises to work on post-op for maintaining muscle mass and strengthening that was created by [employer].  Bariatric quiz given to pt for review on their own. Discharge instructions, information card and follow up appointments given and reviewed with pt at this time and patient verbalized understanding. She will have her H&P at [facility] in [place] on 2/5/2020 and do her pre-op testing at that visit.      Monica Carbajal RN, CBN  [facility]  P [phone]  F [phone]
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
## 2021-06-05 NOTE — PROGRESS NOTES
Orders placed for pre-op testing which pt plans to have done at her PCP clinic when she is there for her pre-op H&P.  Orders will be given to the patient at class on 1/29 so that she can take them to her PCP.    Monica Carbajal RN, CBN  [facility]  P [phone]  F [phone]

## 2021-06-05 NOTE — PATIENT INSTRUCTIONS - HE
Your surgery is scheduled for 2/12/2020 at 7:30 am.  Please arrive at 5:30 am.    Medication Instructions:    Acetaminophen (Brand Name: Tylenol or MPAP)       You will likely be sent home on Tylenol to go with your other pain medications after surgery.  It is ok to cut/crush regular or extra strength tablets.  Make sure tablet is not long acting or extended release.  Do not take more than 3000mg in 24 hours.  If you decide to use liquid Tylenol at home, we recommend you use Adult strength because you will have to take less liquid to get the same effect but it can be harder to find in the stores and might have an easier time ordering it online prior to your surgery.  Dilute the medication 1:1 with water before taking the liquid version to prevent dumping or stomach upset.    Albuterol Inhaler or Nebulizer (Brand Name: Pro-Air Inhaler/Ventolin)   Ok to use.  You are encouraged to bring to the hospital on the day of surgery.    Cetirizine (Brand Name: Zyrtec)  Ok to cut/crush. Do not take the morning of surgery.    Fish Oil or Omega 3 or Krill Oil   Stop taking 2 weeks before surgery (can increase bleeding risk).  Consider waiting to restart capsules until 6 weeks after surgery when you may swallow capsules whole again.  Check with primary doctor for directions.    Folic Acid   Small enough to swallow whole.    Lisinopril (Brand Name: Prinivil)   Ok to cut/crush.  Ask primary doctor how often to monitor your blood pressure after surgery, and if you need to change your dosage.  Do not take the morning of surgery.    Magnesium Oxide   May use if ok with bariatric dietitian. Ok to cut/crush.    Multivitamin with Iron   If not already taking, start chewable MVI with at least 18mg of iron and 10-15 mg of zinc twice a day at least 2 weeks prior to surgery and resume the day after surgery for life. Do not take the morning of surgery.    Omeprazole (Brand Name: Prilosec)   If not already taking, you will get a prescription to  start when you get home from the hospital.  Tablets cannot be cut or crushed.  If a capsule, entire capsule contents may be sprinkled on a spoonful of applesauce and taken immediately without chewing.  If only on a full liquid diet, dump capsule contents into a spoonful of liquid and take without chewing.  May swallow whole again starting 6 weeks after surgery but can try swallowing sooner if having issues with opening into food.  Continue after surgery for at least 3 months.    Ondansetron (Brand Name: Zofran)   Ok to cut/crush tablet. If disintegrating tablet or film, place on tongue to dissolve then swallow.    Potassium chloride (Brand Name: K-Dur or Klor-Con)   If you take this medication with a diuretic/water pill, you may not need to continue this medication until you restart your diuretic medication.  Some brands of tablets can be broken or crushed and mixed with water (check your prescription bottle or ask your pharmacist where you get your prescription filled).      Triamcinolone cream   Ok to use. You are encouraged to bring to the hospital on the day of surgery if needed.    Ursodiol (Brand name: Actigall)  Start two weeks after surgery.  Swallow whole with warm water, do not cut, crush, or open capsule up.  This is a medication that will help to prevent gallstones from forming during the first 6 months post-op of rapid weight loss.  Prescription was sent to your pharmacy.    Vitamin B12   If not already taking, start daily sublingual (under the tongue) vitamin B12 1,000 mcg, Nascobal (nasal) vitamin B12 500 mcg every 7 days or monthly vitamin B12 injections at least 2 weeks prior to surgery and resume the day after surgery for life. Do not take the morning of surgery.    Vitamin D3  Ok to cut or crush tablet; if a softgel will likely need to swallow whole if small enough.  If capsule is too large, may need to take tablet form until able to swallow larger pills again.  Do not take the morning of surgery  and don t restart again until instructed by the dietitian.    Vitamin E   Stop taking at least 2 weeks before surgery.  Can increase risk of bleeding.  Ask bariatric dietitican if you may restart after surgery.    After Bariatric Surgery Discharge Instructions  [facility]     Note: Ask your nurse to order your medications from the pharmacy. Be sure you have your medications with you when you leave.    What should my diet be for the first 2 weeks after surgery?    Follow the bariatric diet the dietitian discussed with you.    How much fluid should I drink?    Strive for 48-64 ounces daily.    Carry a water bottle with you without a straw or sports top. Drink from it often.    Keep track of how much fluid you drink in a day.    Remember:  -Do not use straws, chew gum or suck on hard candies. They may cause painful gas.    -Sip, don't slurp when you drink.    -Practice small sips using a medicine cup for the first week postop.   -No ice or cold drinks. This could cause gas or spasms.   -No coffee, soda pop or drinks with caffeine. These may cause stomach pain.   -No alcohol. It is bad for your liver and will cause stomach pain.    How often should I do my deep breathing and coughing?    Use your incentive spirometer (small plastic breathing device) every hour while awake after you get home. Using the incentive spirometer helps you deep breath. Continuing to cough and deep breath will help prevent fevers and pneumonia.     If you do not have a fever after one week, you can stop using the incentive spirometer and discard it.       You can continue to take deep breaths without the incentive spirometer every one to two hours while awake for the month after surgery.    What kinds of activity can I do?    Get plenty of rest the first few days after surgery and try to balance rest and activity. You will need some time to recover - you may be more tired than you realize at first. You'll  feel better and heal faster if you take good care of yourself.    For 4 weeks after surgery (Please review restrictions at your one week visit, they could change based on how well you are doing):   -Don't lift more than 20 pounds.   -Take 4-5 short walks every day.  -Don't jog, run, or do belly exercises.    Don't swim, bathe or use a hot tub until your cuts are healed (scabs are gone).   You may shower.    Don't plan to fly or take a road trip within the first 1 to 3 months after surgery.  You could get a blood clot in your legs. If you must travel, get up and move around every hour for at least 5 minutes before continuing on your journey.    Your care team can help you decide when it's safe for you to travel.     What can I do for pain control?  You had major belly surgery that involves all layers of your belly muscles. Pain is expected, even for some as far out as 6-8 weeks after surgery. Moving, sneezing, coughing, and breathing will cause discomfort because these activities use your belly muscles.     Please see your after visit summary medication review for what pain medication will be continued, discontinued and newly started for you.      You can take opioid pain medicine if prescribed and if needed. Try to wean off from it as soon as you feel comfortable.     Do not drive while you are taking opioid pain medicine. This is dangerous.    You can take acetaminophen (Tylenol) between your prescribed pain medicine on a scheduled basis OR take it scheduled every 6 hours (check with your care team for specifics).  -Acetaminophen formulation options:    -Liquid   -Caplet (Cut caplet in half before taking)   -Do not take more than 3000 mg Tylenol in a 24 hour period.  -You may also take Tylenol for pain in place of the opioid pain medicine (check with your care team for specifics).     You can apply ice or heat to the affected area(s). Just remember to wrap the ice in something and limit icing sessions to 20  minutes. Excessive icing can irritate the skin or cause skin damage.    You can apply heat with a hot, wet towel or heating pad. Just like cold therapy, limit heat application to 20 minutes. Never sleep with a heating pad on. It could cause severe burns to your skin.    Wear your binder to support your belly muscles if you have one.  Take this off a little more each day and try to be off completely by 2 weeks after surgery. If you don't need your binder for comfort or support, you don't need to wear it.     You may not be able to sleep in a comfortable position for a few weeks after surgery. This is normal. You may be more comfortable sleeping in a recliner or propped up with 3 pillows for the first couple of weeks after surgery.    Do not take NSAID's (Non-Steroidal Anti-Inflammatory Drugs) (examples: ibuprofen, Motrin, Advil, Aleve, and Naproxen), aspirin, or use pain patches with NSAID's. They will increase your risk of bleeding or getting an ulcer.    Please call the clinic for any of the following pain concerns, we would like to talk to you:  -pain that does not improve with rest  -pain that gets worse and worse  -pain that is not controlled by your pain medicine  -a sudden severe increase in pain    What medications will I need to take after surgery?    You may be discharged with the following types of medications: antacids, pain medications, anti-nausea medications, etc.  Please see your after visit summary medication review for what will be continued, discontinued and newly started.    It is important to reduce the amount of acid in your new stomach for a couple of months after surgery while it is still healing. We will prescribe an acid reducer or antacid. Take it as directed. This will help prevent ulcers, heartburn and acid reflux.    If you took an acid reducer before you had surgery, your care team will let you know what acid reducer you will take after surgery.     It is okay to swallow any medications  smaller than   inch in size.   -If it is larger than   inch, it may need to be cut, crushed, or in a liquid form. Check with your care team about which way is most appropriate for you and your medications.    What should I know about my incisions (cuts)?  Your incisions are covered with white steri strips or butterfly tape and have band aids or gauze over the top. If you have gauze or band aids, they can come off in the hospital.    Leave your steri strips on until they fall off on their own. If the steri strips don't fall off after 1 to 2 weeks, you can take them off. If they fall off earlier, replace them with clean band aids trying to avoid touching the incision itself.     If you have gauze covered by a clear dressing, remove 2-3 days after surgery or as directed by your care team.    You may shower in the hospital after surgery and can get your incision coverings wet.    Do not submerge in water (e.g. No baths, swimming pools, hot tubs) until your care team tells you it is okay and your incisions are completely healed.    Call the clinic if you have any of these signs or symptoms of infection:  -Redness around the site.  -Drainage that smells bad.  -Drainage that is thick yellow or green.  -An increase in pain around the incision site  -An increase in swelling around the incision site  -Heat or warmth around incision site   -Fever of 101.5  F (38.3  C) or higher when taken under the tongue.    -Chills    Will my urine or bowel movements change?   Your first bowel movements (stools) will likely be liquid. You may also notice old blood or a darker color (black or maroon color) in your bowel movements.  This is not unusual and usually goes away after the first week, if not sooner. You may not have a bowel movement for a week.     If you have not had a bowel movement for at least three days after your surgery date and are passing gas, you can use over the counter stool softeners.  Please stop taking the stool  "softeners and laxatives if your stools are loose.    Increasing fluids and activity as well as getting off narcotics will help prevent constipation.    Call the clinic if:   -You have stomach pain.   -You continue to have constipation.  -You have excessive bloating after walking and passing gas.    How can I prevent dehydration if I feel nauseated (sick to my stomach) and vomit (throw up)?     Vomiting is not normal after surgery. If you continue to have nausea and vomiting, call the clinic.     Nausea can be a sign of dehydration. That is why it is very important to track your fluids.    Do not nap more than one hour during the day. Set a timer to wake yourself up, if needed. Too much sleep will keep you from drinking enough fluid during the day and lead to dehydration.    No outside activity in hot, humid weather until you can drink 48 to 64 ounces of fluid in 24 hours. If you sweat a lot, your body may lose too much water.    Try to take a 1 ounce sip of water (one medicine cup) every 15 minutes.  Set a timer to remind yourself.    Call the clinic if you have any of these signs or symptoms of dehydration:  -Dark colored urine  -Urinating (pass water) less than 2-3 times per day  -Lack of energy  -Nausea  -Dizziness  -Headache    Call the clinic ANY TIME at [phone] if:  -Your pain medicine is not working.  -You have a fever ? 101.5 F.  -You have belly or left shoulder pain that gets worse and worse.  -You have a swollen leg with redness, warmth, or pain behind the knee or calf.  -You have chest pain   -You feel very short of breath.  -You have a sudden severe increase in heart rate.  -You have vomiting that gets worse and worse.  -You have constant nausea (feeling sick to your stomach) that does not go away with medication.  -You have trouble swallowing.  -You have an increasing feeling that \"something is not right\".  -You have hiccups that do not stop.  -You have any questions or concerns.    If you have to " go to the Emergency Room, we prefer you go to the hospital that did your surgery. Please let them know that you had bariatric surgery and to notify your surgeon.    When should I go back to the clinic?    Follow up with your care team in 1-2 weeks.     If this appointment was not already made, please call: [phone]    DIRECT NURSE LINE: [phone]

## 2021-06-05 NOTE — PROGRESS NOTES
Follow Up Surgical Weight Loss Supervised Diet Evaluation    Assessment:  Pt. is being seen today for a follow up RD nutritional evaluation. Pt. has been unsuccessful with non-surgical weight loss methods and is interested in bariatric surgery. Today we reviewed current eating habits and level of physical activity, and instructed on the changes that are required for successful bariatric outcomes.    Surgery of interest per pt: LSG.    Workflow review:  Support Group: Completed.  Psychology:Completed.  Lab work:Completed.  SWL:Yes 5th RD Visit       Weight goal: 390 lbs or less. .    Patient Active Problem List:  Patient Active Problem List   Diagnosis     Papanicolaou smear of cervix with low grade squamous intraepithelial lesion (LGSIL)     Morbid obesity (H)     Intramural and subserous leiomyoma of uterus     Impingement syndrome, shoulder, right     Excessive and frequent menstruation     Benign essential hypertension       Pt's Initial Weight: 394 lbs  Weight: (!) 390 lb 14.4 oz (177.3 kg)  Weight loss from initial: 3.1  % Weight loss: 0.79 %    BMI: Body mass index is 53.02 kg/m .    Estimated RMR (Billings-St Jeor equation): 2509 calories    Progress over past month: being more mindful regarding portion sizes and food choices.     Diabetic: No  HbA1c:    Hemoglobin A1c   Date/Time Value Ref Range Status   10/03/2019 10:56 AM 5.7 3.5 - 6.0 % Final     Diet Recall/Time:   Breakfast: egg, piece of toast or Protein Shake   Am Snack: none   Lunch: meat, salad   Pm snack:depending upon the timing of lunch-apple   Dinner: meat, veggies, starch   HS Snack: none     Meals per week away from home: 3 times/week     Meal duration: continues to be mindful towards     Beverages (Type/Oz. per day)  Water: including flavored water >64 oz/day   Coffee: 0-1 cup/day      fluids by 30 minutes before, during meal, and waiting 30 minutes after meal before drinking fluids: Yes    Exercise  Type: walking   Frequency  (days/week): daily   Duration (minutes/day): 9000 steps/day     PES statement:   1. Overweight/Obesity (NC 3.3) related to overeating and poor lifestyle habits as evidenced by patient's subjective statements (h/o struggling with portion control) and BMI of 53.02 kg/m2.   Intervention    Nutrition Education:   1. Provided general overview of diet and lifestyle modifications needed to be a deemed a safe candidate for bariatric surgery.     Food/Nutrient Delivery:  2. Educated pt on eating three meals, with cutting out snacking.  3. Bariatric Plate: Pt and I discussed the importance of including a lean protein source (20-30 grams/meal), vegetables (included at lunch and dinner), one serving (15g) of carbohydrate, and limited added fat (1 tb/day) at each meal.   4. Discussed importance of adequate hydration after surgery, with goal of at least 64 oz of fluids/day.  5. Addressed avoiding all carbonated, caffeinated and sweetened drinks to prepare for bariatric surgery.     Nutrition Counselin. Mindful eating techniques: Encouraged slow meal pace, chewing foods to applesauce consistency for 20-30 minutes/meal.   7. Discussed  fluids 30 minutes before, during, and after meal to prevent dumping syndrome and discomfort post bariatric surgery.       Instructions/Goals:     1. Continue to work on eating slowly, chewing foods well.   2. Continue to focus on protein as the main entree at each meal.     Handouts Provided:   Pt. [facility] Bariatric Care Patient Handbook    Monitor/Evaluation:  Pt. s target weight: no gain from initial visit, pt. verbalized understanding.     Pt has completed all nutrition requirements and is well-informed of the dietary and physical activity requirements that are necessary for successful bariatric outcomes. This pt is an appropriate candidate for surgery from a nutrition standpoint at this time. The patient understands that surgery is a tool, not a cure, and post operative  follow up is essential.     Plan for next visit:   1 week post-op class    Visit length: 15 minutes.     ABN signed: Yes

## 2021-06-06 NOTE — ANESTHESIA CARE TRANSFER NOTE
Last vitals:   Vitals:    02/12/20 0941   BP: 173/74   Pulse: 94   Resp: 16   Temp: 36.9  C (98.4  F)   SpO2: 97%     Patient's level of consciousness is awake and drowsy  Spontaneous respirations: yes  Maintains airway independently: yes  Dentition unchanged: yes  Oropharynx: oropharynx clear of all foreign objects    QCDR Measures:  ASA# 20 - Surgical Safety Checklist: WHO surgical safety checklist completed prior to induction    PQRS# 430 - Adult PONV Prevention: 4558F - Pt received => 2 anti-emetic agents (different classes) preop & intraop  ASA# 8 - Peds PONV Prevention: 4558F - Pt received => 2 anti-emetic agents (different classes) preop & intraop  PQRS# 424 - Alysia-op Temp Management: 4559F - At least one body temp DOCUMENTED => 35.5C or 95.9F within required timeframe  PQRS# 426 - PACU Transfer Protocol: - Transfer of care checklist used  ASA# 14 - Acute Post-op Pain: ASA14A - Patient experienced pain >= 7 out of 10

## 2021-06-06 NOTE — ANESTHESIA PREPROCEDURE EVALUATION
Anesthesia Evaluation      Patient summary reviewed   No history of anesthetic complications     Airway   Mallampati: II   Pulmonary - normal exam   (+) asthma  sleep apnea,                          Cardiovascular - normal exam  Exercise tolerance: > or = 4 METS  (+) hypertension, ,     Rhythm: regular  Rate: normal,         Neuro/Psych    (+) depression,     Endo/Other    (+) obesity,      GI/Hepatic/Renal    (+) GERD,        Other findings: Hgb=14.2  Wps=661  K=  Results for BRIANA LUCINDA MONA (MRN [id number]) as of 2/12/2020 06:36    2/12/2020 05:40  Pregnancy Test, Urine: Negative        Dental - normal exam                        Anesthesia Plan  Planned anesthetic: general endotracheal  ITN pre op  Gaett  Ketamine 50mg IV after induction  Antiemetics  Scopolamine  Soft bite block    ASA 3   Induction: intravenous   Anesthetic plan and risks discussed with: patient    Post-op plan: routine recovery

## 2021-06-06 NOTE — TELEPHONE ENCOUNTER
Post-Op Phone Call  [facility]    Surgeon: Sukh Celis M.D.  Date of Surgery: 2/12/2020  Discharge Date: 2/13/2020    Date/Time Called:   Date: 2/14/2020 Time: 4:01 PM   Attempt: First    Patient unavailable, message left to call [facility] with questions/problems? No    Pain Control:  Intensity: Severe (6 - 10)  Duration/Location/Explain: Right side of her abdomen.  What makes it better/worse? Standing up from sitting, doesn't last long. Pain goes from 6 down to 4 which is tolerable.    Medications:  Narcotic Use - No  Drug type: Gabapentin every 8 hours.  Frequency:     Non-prescription pain control: Tylenol every 6 hours.    Other medications currently taking: see med list.    Complete Multivitamin + Iron BID? Yes    Vitamin B12? sublingual daily    Incisions:  Drainage? clean and dry  Comment: Will take bandaids off today.    Intake/Output:  Fluid Intake(oz/day)? 25-30 oz so far today.  Fluid type? Water, soups, protein, jello    Heartburn? No  Counseling: Omeprazole daily.  Nausea? No  Vomiting? No  Explain:     Voiding Frequency? 4 or more/day     Voiding-Color/Amount? Good.    Flatus? Yes    Bowel Movement?No     Are you using your incentive spirometer? If yes, how often? 3+/day    Any fever type symptoms? No  Explain:     Walking activity?   Frequency/Type: Walking around the house.     In Preparation for Surgery:  On a scale of 1-5, with 5 being the highest, how well did the pre-op class prepare you for what actually happened in the hospital? 4  If you were unable to give us a 5, what could we have done to earn a 5? Understanding the gas pain part.    Is there anything that you wish you would have known prior to surgery that you did not know? If yes, what? No.    On a scale of 1-5, with 5 being the highest, how was the service while you were in the hospital? 5  If you were unable to give us a 5, what could we have done to earn a 5?     Is/was there anyone in particular; nurse, aide,  hospital staff, that did a great job and you would like us to recognize? If yes, whom. They all did.  What did they do?     Would you recommend [facility] to others? Yes  If no, can you explain why?     Would you recommend [facility] to others?Yes  If no, can you explain why?      Thank you for your time. Please do not hesitate to call us with any questions or concerns.    Call completed by:   Monica Carbajal RN, CBN  [facility]  P [phone]  F [phone]

## 2021-06-06 NOTE — PROGRESS NOTES
HPI: Pt is here for follow up of a laparoscopic sleeve gastrectomy and cholecystectomy. She is doing well. Taking po well, estimating she is getting in 48-72 oz of fluid. No vomiting.  No pain with drinking or eating purees. No fevers or chills. Ambulating without problems.     Current Outpatient Medications   Medication Sig Dispense Refill     acetaminophen (TYLENOL) 500 MG tablet Take 1,000 mg by mouth every 6 (six) hours as needed for pain.       albuterol (PROAIR HFA;PROVENTIL HFA;VENTOLIN HFA) 90 mcg/actuation inhaler Inhale 2 puffs every 6 (six) hours as needed for wheezing. 1 each 0     cyanocobalamin, vitamin B-12, 1,000 mcg Subl Place 1,000 mcg under the tongue daily.       folic acid (FOLVITE) 400 MCG tablet Take 400 mcg by mouth daily.       lisinopril (PRINIVIL,ZESTRIL) 10 MG tablet Take 10 mg by mouth daily.        omeprazole (PRILOSEC) 20 MG capsule Take 20 mg by mouth daily before breakfast.        pediatric multivitamin (FLINTSTONES) Chew chewable tablet Chew 1 tablet 2 (two) times a day.       triamcinolone (KENALOG) 0.1 % cream Apply 1 application topically 3 (three) times a day as needed (rash).        triamcinolone (KENALOG) 0.5 % ointment Apply 1 application topically 3 (three) times a day. To rash until resolves. Call MD if not helping within 2 weeks       cetirizine 10 mg cap Take 10 mg by mouth daily.        cholecalciferol, vitamin D3, (D3-2000 ORAL) Take 1 capsule by mouth daily.       magnesium oxide (MAG-OX) 400 mg (241.3 mg magnesium) tablet Take 400 mg by mouth daily.       omega-3/dha/epa/fish oil (FISH OIL-OMEGA-3 FATTY ACIDS) 300-1,000 mg capsule Take 2 g by mouth daily.       potassium gluconate 600 mg (99 mg) Tab Take 1 tablet by mouth daily.       vitamin E 400 UNIT capsule Take 400 Units by mouth daily.       No current facility-administered medications for this visit.          /68 (Patient Site: Right Arm, Patient Position: Sitting, Cuff Size: Adult Large)   Pulse 68    Ht 6' (1.829 m)   Wt (!) 358 lb 1.6 oz (162.4 kg)   SpO2 100%   BMI 48.57 kg/m    Wt Readings from Last 3 Encounters:   02/27/20 (!) 358 lb 1.6 oz (162.4 kg)   02/12/20 (!) 371 lb 1.6 oz (168.3 kg)   01/29/20 (!) 387 lb (175.5 kg)     Body mass index is 48.57 kg/m .    EXAM:  GENERAL:Appears well  ABDOMEN: Incisions healing well      Assessment/Plan: Pt s/p laparoscopic sleeve gastrectomy with cholecystectomy. Doing well. Diet and activity discussed. She will f[facility] with [facility] in 1 month to meet with our dietician, and again at 3 months for additional follow up.    Sukh Celis MD  [facility] Department of Surgery

## 2021-06-06 NOTE — PROGRESS NOTES
Time in: 10:00a   /Time out: 10:30a      Pt presents for 1 week post op dietitian follow up. Reports tolerating full liquids well. Denies n/v, constipation/diarrhea, or significant pain. Taking MVI and SL B12 appropriately. Taking  oz fluid/day and 1 protein shake. Educated pt on pureed and soft to bariatric regular diets. Provided grocery list and sample meal plan for each diet stage.  Pt will begin pureed diet on 2-26-20 and advance as tolerated to softs/bariatric regular on 2-18-20. Instructed pt to begin 500 mg calcium citrate BID and 5000IU Vitamin D3 on 2-18-20. Pt to follow up with RD at 3 months post op.

## 2021-06-06 NOTE — PROGRESS NOTES
Time in: 4:00 pm Time out: 4:30 pm  .     Pt presents for 1 month post op dietitian follow up class . Reports tolerating soft food diet well. Denies n/v, constipation/diarrhea, or significant pain. Taking MVI and Vitamin B12 appropriately.Instructed pt to begin taking 500 mg calcium citrate BID and 5000 IU Vitamin D3. Educated pt on soft to bariatric regular diets, medications, developing an exercise regimen, and other dietary points of care. Provided  Education handout on items discussed . Pt to follow up with RD at 3 months post op.

## 2021-06-06 NOTE — ANESTHESIA PROCEDURE NOTES
Spinal Block    Patient location during procedure: pre-op  Start time: 2/12/2020 7:04 AM  End time: 2/12/2020 7:10 AM  Reason for block: primary anesthetic    Staffing:  Performing  Anesthesiologist: Homero Silva MD    Preanesthetic Checklist  Completed: patient identified, risks, benefits, and alternatives discussed, timeout performed, consent obtained, airway assessed, oxygen available, suction available, emergency drugs available and hand hygiene performed  Spinal Block  Patient position: sitting  Prep: ChloraPrep  Patient monitoring: heart rate, cardiac monitor, continuous pulse ox and blood pressure  Approach: midline  Location: L3-4  Injection technique: single-shot  Needle type: pencil-tip   Needle gauge: 24 G

## 2021-06-06 NOTE — ANESTHESIA POSTPROCEDURE EVALUATION
Patient: Paola Lobo  Procedure(s):  LAPAROSCOPIC SLEEVE GASTRECTOMY  LAPAROSOCPIC CHOLECYSTECTOMY  Anesthesia type: general    Patient location: PACU  Last vitals:   Vitals Value Taken Time   /86 2/12/2020  2:15 PM   Temp 36.3  C (97.4  F) 2/12/2020 11:15 AM   Pulse 76 2/12/2020  2:15 PM   Resp 20 2/12/2020  2:15 PM   SpO2 95 % 2/12/2020  2:15 PM     Post vital signs: stable  Level of consciousness: awake, alert, oriented and responds to simple questions  Post-anesthesia pain: pain controlled  Post-anesthesia nausea and vomiting: no  Pulmonary: unassisted, return to baseline  Cardiovascular: stable and blood pressure at baseline  Hydration: adequate  Anesthetic events: no    QCDR Measures:  ASA# 11 - Alysia-op Cardiac Arrest: ASA11B - Patient did NOT experience unanticipated cardiac arrest  ASA# 12 - Alysia-op Mortality Rate: ASA12B - Patient did NOT die  ASA# 13 - PACU Re-Intubation Rate: ASA13B - Patient did NOT require a new airway mgmt  ASA# 10 - Composite Anes Safety: ASA10A - No serious adverse event    Additional Notes:

## 2021-06-08 NOTE — PROGRESS NOTES
"Paola Lobo is a 49 y.o. female who is being evaluated via a billable telephone visit.      The patient has been notified of following:     \"This telephone visit will be conducted via a call between you and your physician/provider. We have found that certain health care needs can be provided without the need for a physical exam.  This service lets us provide the care you need with a short phone conversation.  If a prescription is necessary we can send it directly to your pharmacy.  If lab work is needed we can place an order for that and you can then stop by our lab to have the test done at a later time.    If during the course of the call the physician/provider feels a telephone visit is not appropriate, you will not be charged for this service.\"     Paola Lobo complains of  No chief complaint on file.      I have reviewed and updated the patient's Past Medical History, Social History, Family History and Medication List.    ALLERGIES  Black walnut; Nuts - unspecified; Shellfish derived; Grass; House dust; Latex; Mold extracts; Wheat dextrin; Cephalexin; Pollen extracts; Prednisone; and Shellfish containing products    Additional provider notes:     Post-op Surgical Weight Loss Diet Evaluation     Assessment:  Pt presents for 3 month post-op RD visit, s/p LSG on 2/12/2020 with Dr. Celis. Today we reviewed current eating habits and level of physical activity, and instructed on the changes that are required for successful bariatric outcomes.    Patient Progress: good, a few concerns    Pt's No data recorded  Current Weight (per pt's report): 326.8 lbs   There is no height or weight on file to calculate BMI.     Concerns: Consuming enough water throughout, plain water makes her feel ill.   Getting enough protein in her diet on certain days.     Vitamins   Multi Vit with Iron: yes  Calcium Citrate: yes  B12: yes  D3: yes    Do you experience hunger? Yes   Do you have \"dumping\" syndrome?No      Do " "you experience any reflux or discomfort with eating? No   -Are you still taking omeprazole? Yes   Nausea: no  Vomiting: no  Diarrhea: no  Constipation: no  Hair loss:yes    Diet Recall/Time:   Breakfast: 1/2 Protein Shake   Am Snack: cottage cheese, occasionally added blueberries (5-6)   Lunch: 2 slices of deli meat (turkey or roast beef), cheese, pickle or refried beans and cheese   Pm snack:Protein Bar (1/2-1 whole bar)   Dinner: meat and starch or salad       Proteins/Veg/Fruits/CHO (NOT well tolerated): vegetables     Estimated protein intake: 60 grams    Estimated portion size per meals:up to 1/2 cup/meal (depending upon the consistency of the food item)      Meal Duration:20 minutes    Fluid-meal separation:  Fluids are  30min before and 30 minutes after meals.    Fluid Intake  Water: (including Propel) 55-70 oz/day   Caffeine: 1 cup of coffee in the AM       PES statement:      (NC-1.4) Altered GI Function related to Alteration in gastrointestinal tract structure and/or function/ Decreased functional length of the GI tract as evidenced by Weight loss of 17% initial body weight; sleeve gastrectomy    Intervention    Discussion  1. Discussed 3 month Post-Op Nutritional Guidelines for LSG  2. Recommended to consume 15-20gm protein at 3 meals daily, along with protein supplement/\"planned protein containing snack\" of 15-30gm protein, to reach goal of 60-80 gm protein daily.  3. Educated on post-op vitamin regimen: Multi Vit + iron 2x/day, calcium citrate 400-600 mg 2x/day, 3607-2071 mcg of Sublingual B-12 daily, and 5000 IU Vitamin D3 daily (MVI and calcium can be taken at the same time BID)  4. Reviewed lean protein sources  5. Bariatric Plate Method-  including lean/low fat protein at each meal, including a vegetable/fruit, and limiting carbohydrate intake to less than 25% of plate volume. Approved perimeters of post op meal portion sizes according to 3/6/9/12 months post op " "guidelines.  Instructions  1. Include 15-20gm protein at each meal, along with protein supplement/\"planned protein containing snack\" of 15-30gm protein, to reach goal of 60-80 gm protein daily.  2. Increase fluid intake to 64oz daily: choose plain or calorie/carbonation-free beverages.  3. Incorporate daily structured activity, 30-60 minutes most days of the week  4. Recommended pt to start taking: Multi Vit + iron 2x/day, calcium citrate 400-600 mg 2x/day, 7200-0430 mcg of Sublingual B-12 daily, and 5000 IU Vitamin D3 daily. (MVI and calcium can be taken at the same time)  5. Read food labels more consistently: keeping total fat grams <10, total sugar grams <10, fiber >3gm per serving.  6. Increase vegetable/fruit intake, by having a vegetable or fruit with each meal daily.  7. Practice plate method: 1/2 plate lean/low fat protein source, vegetable/fruit, <25% of plate complex carbohydrates.  8. Separate fluids 30 minutes before/after meal times.  9. Practice eating off of smaller plates/bowls, chewing to applesauce consistency, taking 20-30 minutes to eat in a calm/relaxed environment without distractions of tv/email/cell phone.    Personal Goals:  1. Continue to be consistent with consuming protein first, aiming for 60-80 grams of protein/day.   2. Continue to consume adequate fluid throughout the day, aiming for 64 oz/day.   3. Limit portion sizes to 1/2 cup/meal.     Handouts provided:  3 month Post-Op Nutritional Guidelines for LSG    Assessment/Plan:  1. Bariatric surgery status      2. Hypertension, unspecified type      3. Morbid obesity (H)      4. Nutritional counseling      5. Postoperative visit        Pt to follow up for 6 months  post-op visit with bariatrician-patient to call in and schedule vs scheduling today per her request.  Labs in house Yes (for 3 and 9 month visit only)    Phone call duration: 30 minutes    Dona Brown RD    "
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
## 2021-06-10 NOTE — PATIENT INSTRUCTIONS - HE
Plan:  1. Great work, continue excellent methods and exercise/strengthening.  2. Twice daily multivitamin can start now and reduce calcium supplementation to twice weekly to alleviate some constipation. Continue good D3 use of 4000-5000IUs daily and B12 should average 300-500mcg daily.    3. With the strength work 70-80g of protein daily is a good goal.  4. Anticipate check in bone density and upper endoscopy around the 2 year josh post op.  5. Finish off actigall if any is left and no need to refill at this point. Therapy is complete.  6. Continue PPI therapy with omeprazole to reduce heart burn risk. Pepcid would be a fine alternative if desired.    [facility]  Nutritional Guidelines  Gastric Sleeve 6 Months Post Op    General Guidelines and Helpful Hints:    Eat 3 meals per day + protein supplement(s). No snacks between meals.  o Do not skip meals.  This can cause overeating at the next meal and will prevent adequate protein and nutritional intake.    Aim for 60-80 grams of protein per day.   o Always eat your protein first. This assists with optimal nutrition and helps you stay full longer.  o To achieve daily protein goals, it is recommended to drink approved protein supplement between meals.    Follow appropriate portion size: Use measuring cups to be accurate.    Months Post Op Portion Size per Meal   6 months 1/2 cup   7-8 months 1/2 - 2/3 cup   8 -9 months 2/3 - 3/4 cup   10-12 months 3/4 - 1 cup   12 months and beyond 1 cup maximum       Continue to use saucer/salad plates, infant/toddler silverware to keep portion sizes small and take small bites.    Eat S-L-O-W-L-Y to make each meal last 20-30 minutes. Always stop eating when satisfied.    Continue to use caution with foods containing skins, peels or membranes. Chew well!    Aim for 64 oz. of calorie-free fluids daily.  o Continue to avoid caffeine, carbonation and alcohol.  o Remember to avoid drinking during meals, 15-30 minutes before  and 30 minutes after.    Aim for 30-60 minutes of physical activity most days of the week.    If having trouble tolerating meat, try using a crock-pot, tinfoil tent, steamer or other moist cooking method to create tender meats. Add broth or low-fat gravy to help meat stay moist.     Avoid high sugar and high fat foods to prevent high calorie intake. This will reduce your rate of weight loss.  o Check nutrition labels for less than 10 grams of sugar and less than 10 grams of fat per serving.    Continue Taking Vitamins/Minerals:  o 7078-6969 mcg of Sublingual B-12 daily  o 1 Multivitamin with Iron twice daily (chewable or swallow tabs)  o 500-600 mg Calcium Citrate twice daily (chewable or swallow tabs)  o 5000 IU Vitamin D3 daily    Sample Grocery List    Protein:    Fat free Greek or light yogurt (less than 10 grams sugar)    Fat free or low-fat cottage cheese    String cheese or reduced fat cheese slices    Tuna, salmon, crab, egg, or chicken salad made with light or fat free mayonnaise    Egg or Egg Substitute    Lean/extra lean turkey, beef, bison, venison (ground, sirloin, round, flank)    Pork loin or tenderloin (grilled, baked, broiled)    Fish such as salmon, tuna, trout, tilapia, etc. (grilled, baked, broiled)    Tender cuts of lean (skinless) turkey or chicken    Lean deli meats: turkey, lean ham, chicken, lean roast beef    Beans such as kidney, garbanzo, black, clemons, or low-fat/fat free refried beans    Peanut butter (natural preferred). Limit to 1 Tbsp. per day.    Low-fat meatloaf (made with lean ground beef or turkey)    Sloppy Joes made with low-sugar ketchup and lean ground beef or turkey    Soy or vegetable protein (i.e. vegan crumbles, soy/veggie burger, tofu)    Hummus    Vegetables:    Fresh: cooked or raw (as tolerated)    Frozen vegetables    Canned vegetables (low sodium or no salt added, rinse before cooking/eating)    (Ok to have skins/peels/membranes/seeds - just chew  well)    Fruits:    Fresh fruit    Frozen fruit (no sugar added)    Canned fruit (packed in its own juice, NOT syrup)    (Ok to have skins/peels/membranes/seeds - just chew well)    Starch:    Unsweetened whole-grain hot cereal (or high fiber cold cereal, dry)    Toasted whole wheat bread or [place] Thins    Whole grain crackers    Baked/boiled/mashed potato/sweet potato    Cooked whole grain pasta, brown rice, or other cooked whole grains    Starchy vegetables: corn, peas, winter squash    Protein Supplement:     Ready to drink protein shake with:  o 15-30 grams protein per serving  o Less than 10 grams total carbohydrate per serving     Protein powder mixed with:  o  Skim or 1% milk  o Low fat or fat free Lactaid milk, plain or no sugar added soymilk  o Water     Fats: (use in moderation)    1 teaspoon of soft tub margarine    1 teaspoon olive oil, canola oil, or peanut oil    1 tablespoon of low-fat lewis or salad dressing     Sample Menu for 6 months after Gastric Sleeve    You do NOT need to eat/drink the full portion sizes listed below  Always stop when you are satisfied  Breakfast 6 Tbsp. 1% cottage cheese   2 Tbsp. peaches    Lunch 2 oz. lean hamburger or veggie burger  1-2 tsp. salsa   Supplement Approved Protein Shake   (Have between meals throughout the day)   Dinner 6 Tbsp. chicken breast  1-2 Tbsp. green beans     Breakfast 2 Eggs or   cup scrambled egg substitute product   Lunch 7 Tbsp. low-fat Sloppy [place] mixture   2 high fiber crackers   Supplement Approved Protein Shake   (Have between meals throughout the day)   Dinner 6 Tbsp. grilled, broiled, or baked lemon pepper salmon  2 Tbsp. asparagus     Breakfast 6 Tbsp. light yogurt with 2 Tbsp. Grape Nuts or high fiber cereal    Lunch   cup of chili made with extra lean ground beef and kidney beans   Dinner 5 Tbsp. pork loin made in a crock pot  2 Tbsp. cooked broccoli  1 Tbsp. baked potato with 2 sprays of spray margarine    Supplement Approved Protein  Shake   (Have between meals throughout the day)     Breakfast 6 Tbsp. lean ham  2 Tbsp. seedless melon   Lunch 7 Tbsp. diced turkey with 1 teaspoon low-fat gravy  1 Tbsp. green beans   Dinner 6 Tbsp. extra lean ground beef mixed with low sugar spaghetti sauce  2 Tbsp. whole wheat pasta   Supplement Approved Protein Shake   (Have between meals throughout the day)     Breakfast 2 oz turkey or soy based sausage bashir    Lunch 6 Tbsp. low-fat cottage cheese  2 Tbsp. pineapple   Supplement Approved Protein Shake   (Have between meals throughout the day)   Dinner 7 Tbsp. beef tenderloin  1 Tbsp. asparagus     Breakfast 1/2 of a whole wheat English Muffin (toasted)  1 Tbsp. creamy natural peanut butter   Lunch   cup of black bean soup with 1 Tbsp. low fat shredded cheese   Dinner 7 Tbsp. low-fat turkey meat loaf   1 Tbsp. cooked carrots   Supplement Approved Protein Shake   (Have between meals throughout the day)     Breakfast 6 Tbsp. scrambled egg or scrambled egg substitute  1 Tbsp. finely chopped bell pepper  1 Tbsp. low fat shredded cheese   Lunch 7 Tbsp. lean diced ham  1 Tbsp. mandarin oranges   Supplement Approved Protein Shake   (Have between meals throughout the day)   Dinner 6 Tbsp. flaked fish   2 Tbsp. mashed sweet potato       LEAN PROTEIN SOURCES  Getting 20-30 grams of protein, 3 meals daily, is appropriate for most people, some need more but more than about 40 grams per meal is not useful.  General rule is drinking one ounce of water per gram of protein eaten over the course of the day:  70 grams of protein each day, drink 70 oz of water.  Protein Source Portion Calories Grams of Protein                           Nonfat, plain Greek yogurt    (10 grams sugar or less) 3/4 cup (6 oz)  12-17   Light Yogurt (10 grams sugar or less) 3/4 cup (6 oz)  6-8   Protein Shake 1 shake 110-180 15-30   Skim/1% Milk or lactose-free milk 1 cup ( 8 oz)  8   Plain or light, flavored soymilk 1 cup  7-8    Plain or light, hemp milk 1 cup 110 6   Fat Free or 1% Cottage Cheese 1/2 cup 90 15   Part skim ricotta cheese 1/2 cup 100 14   Part skim or reduced fat cheese slices 1 ounce 65-80 8     Mozzarella String Cheese 1 80 8   Canned tuna, chicken, crab or salmon  (canned in water)  1/2 cup 100 15-20   White fish (broiled, grilled, baked) 3 ounces 100 21   [place]/Tuna (broiled, grilled, baked) 3 ounces 150-180 21   Shrimp, Scallops, Lobster, Crab 3 ounces 100 21   Pork loin, Pork Tenderloin 3 ounces 150 21   Boneless, skinless chicken /turkey breast                          (broiled, grilled, baked) 3 ounces 120 21   Parsons, [place], [place], and Venison 3 ounces 120 21   Lean cuts of red meat and pork (sirloin,   round, tenderloin, flank, ground 93%-96%) 3 ounces 170 21   Lean or Extra Lean Ground Turkey 1/2 cup 150 20   90-95% Lean [place] Burger 1 bashir 140-180 21   Low-fat casserole with lean meat 3/4 cup 200 17   Luncheon Meats                                                        (turkey, lean ham, roast beef, chicken) 3 ounces 100 21   Egg (boiled, poached, scrambled) 1 Egg 60 7   Egg Substitute 1/2 cup 70 10   Nuts (limit to 1 serving per day)  3 Tbsp. 150 7   Nut [place] (peanut, almond)  Limit to 1 serving or less daily 1 Tbsp. 90 4   Soy Burger (varies) 1  15   Garbanzo, Black, [place] Beans 1/2 cup 110 7   Refried Beans 1/2 cup 100 7   Kidney and [place]ima beans 1/2 cup 110 7   Tempeh 3 oz 175 18   Vegan crumbles 1/2 cup 100 14   Tofu 1/2 cup 110 14   Chili (beans and extra lean beef or turkey) 1 cup 200 23   Lentil Stew/Soup 1 cup 150 12   Black Bean Soup 1 cup 175 12

## 2021-06-10 NOTE — TELEPHONE ENCOUNTER
Patient completed 6 month labs and they are available in Care Everywhere for review.  She is coming in on 8/17/2020 for a follow-up visit with Dr. Brito.  Lisa Sorto RN

## 2021-06-10 NOTE — PROGRESS NOTES
6 months post op lab orders placed for patient and sent to patient via mail in preparation for appointment with  in mid August.    Monica Carbajal RN, CBN  [facility]  P [phone]  F [phone]

## 2021-06-13 NOTE — PROGRESS NOTES
"Paola Lobo is a 49 y.o. female who is being evaluated via a billable telephone visit.      The patient has been notified of following:     \"This telephone visit will be conducted via a call between you and your physician/provider. We have found that certain health care needs can be provided without the need for a physical exam.  This service lets us provide the care you need with a short phone conversation.  If a prescription is necessary we can send it directly to your pharmacy.  If lab work is needed we can place an order for that and you can then stop by our lab to have the test done at a later time.    Telephone visits are billed at different rates depending on your insurance coverage. During this emergency period, for some insurers they may be billed the same as an in-person visit.  Please reach out to your insurance provider with any questions.    If during the course of the call the physician/provider feels a telephone visit is not appropriate, you will not be charged for this service.\"    Patient has given verbal consent to a Telephone visit? Yes    What phone number would you like to be contacted at? [phone]    Patient would like to receive their AVS by AVS Preference: Edwin.    Additional provider notes: insert own note template here None     Phone call duration: 30 minutes    Dona Brown, KATHRINE        Post-op Surgical Weight Loss Diet Evaluation     Assessment:  Pt presents for 9 month post-op RD visit, s/p LSG on 2/12/2020 with Dr. Celis. Today we reviewed current eating habits and level of physical activity, and instructed on the changes that are required for successful bariatric outcomes.    Patient Progress: going well for the most part, a slight weight gain recently of 5 lbs (was ill for a few weeks)     Pt's No data recorded   Initial Weight: 394 lbs   Current Weight: 295 lbs   There is no height or weight on file to calculate BMI.    Patient Active Problem List   Diagnosis     " "Papanicolaou smear of cervix with low grade squamous intraepithelial lesion (LGSIL)     Morbid obesity (H)     Intramural and subserous leiomyoma of uterus     Impingement syndrome, shoulder, right     Excessive and frequent menstruation     Benign essential hypertension     Acute cholecystitis     Biliary colic     Morbid (severe) obesity due to excess calories (H)     Mild intermittent asthma without complication       Vitamins   Multi Vit with Iron: yes  Calcium Citrate: yes  B12: yes  D3: yes    Do you experience hunger? When she is more bored, she notices it  Do you have \"dumping\" syndrome? No    Do you experience any reflux or discomfort with eating? Continues to take Omeprazole on a regular basis, otherwise does experience heartburn when not taking anything     Diet Recall/Time:   Breakfast: 1/2 cup cottage cheese   Lunch: meat stick, cheese stick or meat and a salad  Dinner: meat, salad     Typical Snacks: 1/2 Protein Shake with Collagen added (25 g), occasional Protein Bar      Proteins/Veg/Fruits/CHO (NOT well tolerated): beef-depending upon how it is prepared, milk-is hit or miss     Estimated protein intake: 60+ grams    Estimated portion size per meals: 3/4-1 cup/meal      Meal Duration:20 minutes-30 minutes     Fluid-meal separation:  Fluids are  30min before and 30 minutes after meals.    Fluid Intake  Water: including Powerade Zero and SF Flavoring  oz/day   Caffeine: Coffee (12 oz)     Exercise: re-started recently after being ill  Running 3 days/week for 30 minutes  Also has been working with a  2 days/week for 45 minutes      PES statement:      (NC-1.4) Altered GI Function related to Alteration in gastrointestinal tract structure and/or function/ Decreased functional length of the GI tract as evidenced by Weight loss of 25% initial body weight; Gastric bypass surgery; sleeve gastrectomy    Intervention    Discussion  1. Discussed 9 month Post-Op Nutritional " "Guidelines for LSG.  2. Recommended to consume 15-20gm protein at 3 meals daily, along with protein supplement/\"planned protein containing snack\" of 15-30gm protein, to reach goal of 60-80 gm protein daily.  3. Educated on post-op vitamin regimen: Multi Vit + iron 2x/day, calcium citrate 400-600 mg 2x/day, 0662-4637 mcg of Sublingual B-12 daily, and 5000 IU Vitamin D3 daily (MVI and calcium can be taken at the same time BID)  4. Reviewed lean protein sources  5. Bariatric Plate Method-  including lean/low fat protein at each meal, including a vegetable/fruit, and limiting carbohydrate intake to less than 25% of plate volume.   Instructions  1. Include 15-20gm protein at each meal, along with protein supplement/\"planned protein containing snack\" of 15-30gm protein, to reach goal of 60-80 gm protein daily.  2. Increase fluid intake to 64oz daily: choose plain or calorie/carbonation-free beverages.  3. Incorporate daily structured activity, 30-60 minutes most days of the week  4. Recommended pt to start taking: Multi Vit + iron 2x/day, calcium citrate 400-600 mg 2x/day, 3697-7037 mcg of Sublingual B-12 daily, and 5000 IU Vitamin D3 daily. (MVI and calcium can be taken at the same time)  5. Read food labels more consistently: keeping total fat grams <10, total sugar grams <10, fiber >3gm per serving.  6. Increase vegetable/fruit intake, by having a vegetable or fruit with each meal daily.  7. Practice plate method: 1/2 plate lean/low fat protein source, vegetable/fruit, <25% of plate complex carbohydrates.  8. Separate fluids 30 minutes before/after meal times.  9. Practice eating off of smaller plates/bowls, chewing to applesauce consistency, taking 20-30 minutes to eat in a calm/relaxed environment without distractions of tv/email/cell phone.    Handouts provided:  9 month Post-Op Nutritional Guidelines for LSG    Assessment/Plan:    Pt to follow up for 1 year  post-op visit with bariatrician     Dona Brown, " RD

## 2021-06-14 NOTE — TELEPHONE ENCOUNTER
1 year post op lab orders placed for patient and sent to  in [place] in preparation for appointment with  in February. Fax number for the [place] lab is [phone].    Monica Carbajal RN, CBN  [facility]  P [phone]  F [phone]

## 2021-06-14 NOTE — TELEPHONE ENCOUNTER
----- Message from Kelly Nazario CMA sent at 8/17/2020  8:11 AM CDT -----  Regarding: EXTERNAL LABS  Annual 1 year post-op LSG. Appt on 02/13/21 with Dr. Brito. [place] and would like to go to [facility].    Kelly Nazario CMA  [facility]  P: [phone] I F: [phone]
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
## 2021-06-19 NOTE — LETTER
Letter by Bruno Brito MD at      Author: Bruno Brito MD Service: -- Author Type: --    Filed:  Encounter Date: 9/6/2019 Status: (Other)       9/6/2019    Paola Lobo  [address] [place] [address]    Dear Beverly Guevaracome and thank you for your interest in the bariatric program at [facility]!     Your appointment is scheduled at our [place] Office on Thursday October 3, 2019 at 9:30 AM with Dr. Bruno Brito.  We ask that you arrive 45 minutes prior to your appointment time to complete registration and visit prep with our nursing team.   We strive to avoid clinic delays for our patients, so patients arriving late will need to reschedule.    Your first appointment will take about two hours.     In preparation for this appointment you will need to bring the following:      Your insurance card and photo identification    Complete your assigned health history form in [employer]. If you do not have internet access, a paper form is enclosed. Please makes sure that you complete the form and bring with (if paper) because there will not be time to complete this in the office and we will need to reschedule your appointment if you forget to do this.     All your medications in their original containers, including over-the-counter medications.    Any lab results that you have had done within the last 6 months.     If you are interested in our surgical program; call your insurance company prior to this visit, to verify that your specific insurance plan covers Weight-Loss Surgery and what your out-of-pocket costs may be.     Your appointment is scheduled at our [place] Office- [address] [place] [address], [address], [place] [address]. [phone].    If you find yourself unable to keep this appointment, please call us to reschedule so we can accommodate other patients. We actively call patients on our Waitlist, your communication helps us reduce appointment wait times.     We are excited that you  have chosen our program and look forward to serving you!    Sincerely,  [facility] Bariatric Team
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
## 2021-07-18 ENCOUNTER — MYC MEDICAL ADVICE (OUTPATIENT)
Dept: INTERNAL MEDICINE | Facility: CLINIC | Age: 51
End: 2021-07-18

## 2021-07-18 DIAGNOSIS — B02.9 HERPES ZOSTER WITHOUT COMPLICATION: Primary | ICD-10-CM

## 2021-07-19 RX ORDER — VALACYCLOVIR HYDROCHLORIDE 1 G/1
1000 TABLET, FILM COATED ORAL 3 TIMES DAILY
Qty: 21 TABLET | Refills: 0 | Status: SHIPPED | OUTPATIENT
Start: 2021-07-19 | End: 2021-08-27

## 2021-08-09 ENCOUNTER — VIRTUAL VISIT (OUTPATIENT)
Dept: SURGERY | Facility: CLINIC | Age: 51
End: 2021-08-09
Payer: COMMERCIAL

## 2021-08-09 DIAGNOSIS — Z98.84 S/P LAPAROSCOPIC SLEEVE GASTRECTOMY: ICD-10-CM

## 2021-08-09 DIAGNOSIS — K91.2 POSTOPERATIVE MALABSORPTION: Primary | ICD-10-CM

## 2021-08-09 DIAGNOSIS — Z71.3 NUTRITIONAL COUNSELING: ICD-10-CM

## 2021-08-09 PROCEDURE — 97803 MED NUTRITION INDIV SUBSEQ: CPT | Mod: 95 | Performed by: DIETITIAN, REGISTERED
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
## 2021-08-09 NOTE — PROGRESS NOTES
Paola Lobo is a 50 year old who is being evaluated via a billable telephone/video visit.      What phone number would you like to be contacted at? [phone]  How would you like to obtain your AVS? Edwin      Post-op Surgical Weight Loss Diet Evaluation     Assessment:  Pt presents for 18 months  post-op RD visit, s/p LSG on 2/12/2020 with Dr. Celis. Today we reviewed current eating habits and level of physical activity, and instructed on the changes that are required for successful bariatric outcomes.    Patient Progress: trying to get on track, has sugar cravings due to Menopause.  Also had a hip injury, therefore exercise was limited.      Initial Weight: 394 lbs  Current Weight: 312 lbs   There is no height or weight on file to calculate BMI.    Patient Active Problem List   Diagnosis     Morbid obesity (H)     Excessive or frequent menstruation     Intramural and subserous leiomyoma of uterus     Impingement syndrome, shoulder, right     Papanicolaou smear of cervix with low grade squamous intraepithelial lesion (LGSIL)     Benign essential hypertension     Mild intermittent asthma without complication       Vitamins   Multi Vit with Iron: yes  Calcium Citrate: yes  B12: yes  D3: yes      Diet Recall/Time:   Breakfast: Coffee with Collagen (10 grams of protein), 1/2 Atkins Shake with medication or egg with sausage or espitia, 1/2 slice toast   Lunch: meat and cheese or salad with meat   Dinner: meat, salad, potato (on occasion) or casserole with extra meat on the side     Typical Snacks: Greek Yogurt with 1/4 cup of Granola, Protein Puffs, 1/2 cup of ice cream (on occasion 2-3 times/month)      Estimated protein intake: 60+ grams    Estimated portion size per meals:1 cup/meal    Meal Duration:sometimes, will eat too fast (which may result in vomiting) -trying to correct     Fluid-meal separation:  Fluids are  30min before and 30 minutes after meals.    Fluid Intake  Water: including  "flavored water 60-80 oz/day, hot days up to 100 oz/day  Caffeine: Coffee (1 cup/day)   Alcohol: 1 drink/week (average)     Exercise: 3 days/week for 45 minutes, cardio.  Also working with a  1 day/week in terms of core exercises.       PES statement:      (NC-1.4) Altered GI Function related to Alteration in gastrointestinal tract structure and/or function/ Decreased functional length of the GI tract as evidenced by Weight loss of 21% initial body weight; sleeve gastrectomy      Intervention    Discussion  1. Discussed 18 Months Post-Op Nutritional Guidelines for LSG  2. Recommended to consume 15-20gm protein at 3 meals daily, along with protein supplement/\"planned protein containing snack\" of 15-30gm protein, to reach goal of 60-80 gm protein daily.  3. Educated on post-op vitamin regimen: Multi Vit + iron 2x/day, calcium citrate 400-600 mg 2x/day, 3147-5647 mcg of Sublingual B-12 daily, and 5000 IU Vitamin D3 daily (MVI and calcium can be taken at the same time BID)  4. Reviewed lean protein sources  5. Bariatric Plate Method-  including lean/low fat protein at each meal, including a vegetable/fruit, and limiting carbohydrate intake to less than 25% of plate volume.     Instructions  1. Include 15-20gm protein at each meal, along with protein supplement/\"planned protein containing snack\" of 15-30gm protein, to reach goal of 60-80 gm protein daily.  2. Increase fluid intake to 64oz daily: choose plain or calorie/carbonation-free beverages.  3. Incorporate daily structured activity, 30-60 minutes most days of the week  4. Recommended pt to start taking: Multi Vit + iron 2x/day, calcium citrate 400-600 mg 2x/day, 9562-5202 mcg of Sublingual B-12 daily, and 5000 IU Vitamin D3 daily. (MVI and calcium can be taken at the same time)  5. Read food labels more consistently: keeping total fat grams <10, total sugar grams <10, fiber >3gm per serving.  6. Increase vegetable/fruit intake, by having a vegetable " or fruit with each meal daily.  7. Practice plate method: 1/2 plate lean/low fat protein source, vegetable/fruit, <25% of plate complex carbohydrates.  8. Separate fluids 30 minutes before/after meal times.  9. Practice eating off of smaller plates/bowls, chewing to applesauce consistency, taking 20-30 minutes to eat in a calm/relaxed environment without distractions of tv/email/cell phone.    Handouts provided:  18 Months and Beyond  Post-Op Nutritional Guidelines for LSG    Assessment/Plan:    Pt to follow up for 2 year post-op visit with bariatrician       Phone call duration: 30 minutes      Dona Brown RD

## 2021-08-27 ENCOUNTER — OFFICE VISIT (OUTPATIENT)
Dept: INTERNAL MEDICINE | Facility: CLINIC | Age: 51
End: 2021-08-27
Payer: COMMERCIAL

## 2021-08-27 ENCOUNTER — HOSPITAL ENCOUNTER (OUTPATIENT)
Dept: GENERAL RADIOLOGY | Facility: CLINIC | Age: 51
Discharge: HOME | End: 2021-08-27
Attending: INTERNAL MEDICINE | Admitting: INTERNAL MEDICINE
Payer: COMMERCIAL

## 2021-08-27 VITALS
BODY MASS INDEX: 42.53 KG/M2 | HEART RATE: 78 BPM | TEMPERATURE: 97.3 F | SYSTOLIC BLOOD PRESSURE: 154 MMHG | OXYGEN SATURATION: 97 % | RESPIRATION RATE: 18 BRPM | WEIGHT: 293 LBS | DIASTOLIC BLOOD PRESSURE: 88 MMHG

## 2021-08-27 DIAGNOSIS — M79.672 LEFT FOOT PAIN: ICD-10-CM

## 2021-08-27 DIAGNOSIS — I10 BENIGN ESSENTIAL HYPERTENSION: Primary | ICD-10-CM

## 2021-08-27 DIAGNOSIS — N95.1 SYMPTOMATIC MENOPAUSAL OR FEMALE CLIMACTERIC STATES: ICD-10-CM

## 2021-08-27 PROCEDURE — 73630 X-RAY EXAM OF FOOT: CPT | Mod: LT

## 2021-08-27 PROCEDURE — 99214 OFFICE O/P EST MOD 30 MIN: CPT | Performed by: INTERNAL MEDICINE

## 2021-08-27 RX ORDER — PAROXETINE 10 MG/1
10 TABLET, FILM COATED ORAL EVERY MORNING
Qty: 30 TABLET | Refills: 0 | Status: SHIPPED | OUTPATIENT
Start: 2021-08-27 | End: 2021-09-29

## 2021-08-27 RX ORDER — METOPROLOL SUCCINATE 50 MG/1
50 TABLET, EXTENDED RELEASE ORAL DAILY
Qty: 30 TABLET | Refills: 0 | Status: SHIPPED | OUTPATIENT
Start: 2021-08-27 | End: 2021-09-29

## 2021-08-27 ASSESSMENT — PAIN SCALES - GENERAL: PAINLEVEL: MILD PAIN (3)

## 2021-08-27 NOTE — PROGRESS NOTES
"        Subjective   Aung is a 50 year old who presents for the following health issues     HPI   Chief Complaint   Patient presents with     Musculoskeletal Problem     left foot pain twisted foot in May, sharmp pains when bending or moving foot. Wear shoe inserts      Hypertension       Hypertension Follow-up      Do you check your blood pressure regularly outside of the clinic? Yes     Are you following a low salt diet? Yes    Are your blood pressures ever more than 140 on the top number (systolic) OR more   than 90 on the bottom number (diastolic), for example 140/90? Yes     EMR reviewed including:             Complaint, History of Chief Complaint, Corresponding Review of Systems, and Complaint Specific Physical Examination.    #1   Follow-up hypertension  Taking Norvasc but blood pressure still somewhat elevated.  Had to stop taking \"a black cohosh\" as she had a hypotensive spell.   Records blood pressure 157/90 during that episode.   No chest pain or headache at that time.  Current blood pressure 154/88.       Exam:   LUNGS: clear bilaterally, airflow is brisk, no intercostal retraction or stridor is noted. No coughing is noted during visit.   HEART:  regular without rubs, clicks, gallops, or murmurs. PMI is nondisplaced. Upstrokes are brisk. S1,S2 are heard.      #2   Flushing and post menopausal symptoms  Several times throughout the day.  Natural treatments have not been helpful.  Gets sweaty as well.       Exam:  As above   NEURO: Pt is alert and appropriate. No neurologic lateralization is noted. Cranial nerves 2-12 are intact. Peripheral sensory and motor function are grossly normal.    PSYCH: The patient appears grossly appropriate. Maintains good eye contact, does not have any jittery or atypical motion. Displays appropriate affect.      #3   Left foot pain.  Lateral side of foot.  Twisted several months ago while walking.  Pain is located at the distal fifth metatarsal.  Wears orthotics.  Is active " mariaa.       Exam:   MS: Minimal crepitance is noted in the extremities. No deformity is present. Muscle strength is appropriate and equal bilaterally. No acute joint erythema or swelling is present.  Some tenderness of the lateral aspect of the foot is noted.  No significant swelling or information present.          Vital Signs:   BP (!) 154/88 (BP Location: Right arm, Patient Position: Sitting, Cuff Size: Adult Large)   Pulse 78   Temp 97.3  F (36.3  C) (Temporal)   Resp 18   Wt 142.2 kg (313 lb 9.6 oz)   SpO2 97%   BMI 42.53 kg/m               Decision Making    Problem and Complexity     1. Benign essential hypertension  Would like to start beta-blocker to help with her flushing etc.    - metoprolol succinate ER (TOPROL-XL) 50 MG 24 hr tablet; Take 1 tablet (50 mg) by mouth daily  Dispense: 30 tablet; Refill: 0    2. Symptomatic menopausal or female climacteric states  Start SSRI in addition to low-dose beta-blocker to help with symptomatic menopausal situation  - PARoxetine (PAXIL) 10 MG tablet; Take 1 tablet (10 mg) by mouth every morning  Dispense: 30 tablet; Refill: 0    3. Left foot pain  X-ray to rule out occult fracture and orthopedic consultation with podiatry.  - XR Foot Left G/E 3 Views; Future  - Orthopedic  Referral; Future          ------------------------------------------------------------------------------------------------------------------------------  Data    4=1/3 5=2/3    1  >3  Specialty (external) notes reviewed:   Surgical notes appreciated  Individual tests ordered (by provider) reviewed:   No  Individual tests ordered (by provider):   No  Independent Historians Interview:   No  2  Review of outside (other providers) Tests:   No  3   Verbal Discussion with Specialists:    None    ----------------------------------------------------------------------------------------------------------------------------------  Risk   Prescription drug management:   Yes                                 High risk for toxicity:   Decision regarding surgery:    None   Social determinants of health:   No   Decision regarding hospitalization:     None   Decision to withhold therapy:   None                                 FOLLOW UP   I have asked the patient to make an appointment for followup with me in 3 to 4 weeks for blood pressure check and lab work            I have carefully explained the diagnosis and treatment options to the patient.  The patient has displayed an understanding of the above, and all subsequent questions were answered.      DO KORI Schmitt    Portions of this note were produced using [employer]  Although every attempt at real-time proof reading has been made, occasional grammar/syntax errors may have been missed.

## 2021-08-28 ASSESSMENT — ASTHMA QUESTIONNAIRES: ACT_TOTALSCORE: 20

## 2021-09-07 ENCOUNTER — OFFICE VISIT (OUTPATIENT)
Dept: PODIATRY | Facility: CLINIC | Age: 51
End: 2021-09-07
Attending: INTERNAL MEDICINE
Payer: COMMERCIAL

## 2021-09-07 VITALS
WEIGHT: 293 LBS | HEIGHT: 72 IN | BODY MASS INDEX: 39.68 KG/M2 | DIASTOLIC BLOOD PRESSURE: 82 MMHG | SYSTOLIC BLOOD PRESSURE: 130 MMHG

## 2021-09-07 DIAGNOSIS — Q66.6 PES VALGUS OF LEFT FOOT: Primary | ICD-10-CM

## 2021-09-07 DIAGNOSIS — M72.2 PLANTAR FASCIITIS OF LEFT FOOT: ICD-10-CM

## 2021-09-07 DIAGNOSIS — M65.979 SYNOVITIS OF ANKLE: ICD-10-CM

## 2021-09-07 PROCEDURE — 99203 OFFICE O/P NEW LOW 30 MIN: CPT | Performed by: PODIATRIST

## 2021-09-07 ASSESSMENT — MIFFLIN-ST. JEOR: SCORE: 2155.83

## 2021-09-07 ASSESSMENT — PAIN SCALES - GENERAL: PAINLEVEL: MODERATE PAIN (5)

## 2021-09-07 NOTE — PATIENT INSTRUCTIONS
Reliable shoe stores: To maximize your experience and provide the best possible fit.  Be sure to show them your foot concerns and tell them Dr. Groves sent you.      Stores listed in bold have only athletic shoes, and stores that are not bold are mostly casual or variety of shoes    [employer]  [address] [place] [address]  [phone]    [email or web address] [place]  [address] [place] [address]  [phone]     [employer]  [address] [place] [address]  [phone]    [employer]  [address] [facility]  [facility] [address]  [phone]    [employer]  [address] [place], [place] [address] [phone]    [employer]  [address] [place] [address]  [phone]                         [employer] Locations:     [address] [place] [address]   [phone]     [address] [place] [address] [address] [place] [address]   [phone]     [address] [place] [address]   [phone]     [address] [facility] [place] [address]   [phone]     [address] [place] [address]   [phone]     [address] [place] [address]   [phone]     [address] [facility] [address]   [place] [address]   [phone]     [address] [facility]   [place] [address]   [phone]             How to find reasonable shoes          The correct width    Correct Fitting    Correct Length      Foot Distortion    Posture Distortion                          Torsional Rigidity      Grasp behind the heel and underneath the foot and twist      Bad    Excessive torsion/twist in midfoot     Less torsion/twist in midfoot is better                   Heel Counter Rigidity      Grasp just above   midsole and squeeze      Bad    Soft heel counter      Good    Rigid Heel Counter      Flexion Rigidity      Grasp shoe and bend from forefoot to rearfoot
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
## 2021-09-16 DIAGNOSIS — R10.13 EPIGASTRIC PAIN: Primary | ICD-10-CM

## 2021-09-17 RX ORDER — PANTOPRAZOLE SODIUM 20 MG/1
20 TABLET, DELAYED RELEASE ORAL DAILY
Qty: 90 TABLET | Refills: 3 | Status: SHIPPED | OUTPATIENT
Start: 2021-09-17 | End: 2022-10-13

## 2021-09-17 NOTE — TELEPHONE ENCOUNTER
Prescription approved per [facility] Refill Protocol.        Geni Burrows RN  [facility]

## 2021-09-25 DIAGNOSIS — N95.1 SYMPTOMATIC MENOPAUSAL OR FEMALE CLIMACTERIC STATES: ICD-10-CM

## 2021-09-25 DIAGNOSIS — I10 BENIGN ESSENTIAL HYPERTENSION: ICD-10-CM

## 2021-09-29 RX ORDER — PAROXETINE 10 MG/1
10 TABLET, FILM COATED ORAL EVERY MORNING
Qty: 30 TABLET | Refills: 5 | Status: SHIPPED | OUTPATIENT
Start: 2021-09-29 | End: 2022-03-04

## 2021-09-29 RX ORDER — METOPROLOL SUCCINATE 50 MG/1
50 TABLET, EXTENDED RELEASE ORAL DAILY
Qty: 30 TABLET | Refills: 8 | Status: SHIPPED | OUTPATIENT
Start: 2021-09-29 | End: 2022-07-12

## 2021-09-29 NOTE — TELEPHONE ENCOUNTER
"  Requested Prescriptions   Pending Prescriptions Disp Refills     metoprolol succinate ER (TOPROL-XL) 50 MG 24 hr tablet [Pharmacy Med Name: METOPROLOL SUCCINATE ER 50MG TB24] 30 tablet 0     Sig: TAKE 1 TABLET (50 MG) BY MOUTH DAILY   8/27/2021    Beta-Blockers Protocol Passed - 9/25/2021  8:14 AM        Passed - Blood pressure under 140/90 in past 12 months     BP Readings from Last 3 Encounters:   09/07/21 130/82   08/27/21 (!) 154/88   02/10/21 (!) 160/95                 Passed - Patient is age 6 or older        Passed - Recent (12 mo) or future (30 days) visit within the authorizing provider's specialty     Patient has had an office visit with the authorizing provider or a provider within the authorizing providers department within the previous 12 mos or has a future within next 30 days. See \"Patient Info\" tab in inbasket, or \"Choose Columns\" in Meds & Orders section of the refill encounter.              Passed - Medication is active on med list           PARoxetine (PAXIL) 10 MG tablet [Pharmacy Med Name: PAROXETINE HCL 10MG TABS] 30 tablet 0     Sig: TAKE 1 TABLET (10 MG) BY MOUTH EVERY MORNING       SSRIs Protocol Passed - 9/25/2021  8:14 AM        Passed - Recent (12 mo) or future (30 days) visit within the authorizing provider's specialty     Patient has had an office visit with the authorizing provider or a provider within the authorizing providers department within the previous 12 mos or has a future within next 30 days. See \"Patient Info\" tab in inbasket, or \"Choose Columns\" in Meds & Orders section of the refill encounter.              Passed - Medication is active on med list        Passed - Patient is age 18 or older        Passed - No active pregnancy on record        Passed - No positive pregnancy test in last 12 months           Prescriptions approved per [facility] Refill Protocol.  Daina Mahoney RN    "

## 2021-10-24 ENCOUNTER — HEALTH MAINTENANCE LETTER (OUTPATIENT)
Age: 51
End: 2021-10-24

## 2021-11-19 DIAGNOSIS — I10 BENIGN ESSENTIAL HYPERTENSION: ICD-10-CM

## 2021-11-19 RX ORDER — AMLODIPINE BESYLATE 10 MG/1
TABLET ORAL
Qty: 90 TABLET | Refills: 0 | Status: SHIPPED | OUTPATIENT
Start: 2021-11-19 | End: 2022-03-04

## 2021-12-15 NOTE — PROGRESS NOTES
Does Paola have a CPAP/Bipap?  Yes     Type of mask: nasal pillows     [facility]: [place] [phone]    [email or web address]     [place] Sleep Scale: 8    Obstructive Sleep Apnea - PAP Follow-Up Visit:    Chief Complaint   Patient presents with     CPAP Follow Up       Paloa Lobo comes in today for follow-up of their moderately severe sleep apnea, managed with CPAP.     No specialty comments available.    Overall, she rates the experience with PAP as 10 (0 poor, 10 great). The mask is comfortable.    The mask is not leaking .  She is not snoring with the mask on. She is not having gasp arousals.  She is not having significant oral/nasal dryness. The pressure is comfortable.   Her PAP interface is Nasal Mask.     The patient is usually getting 8 hours of sleep per night.    She does feel rested in the morning.    [place] Sleepiness Scale: 8/24      No data recorded    [employer]Med       Auto-PAP 8.0 - 12.0 cmH2O 30 day usage data:    83% of days with > 4 hours of use. 2/30 days with no use.   Average use 360 minutes per day.   95%ile Leak 12.66 L/min.   CPAP 95% pressure 11 cm.   AHI 3.55 events per hour.         Past medical/surgical history, family history, social history, medications and allergies were reviewed.      Problem List:  Patient Active Problem List    Diagnosis Date Noted     Mild intermittent asthma without complication 02/05/2020     Priority: Medium     Benign essential hypertension 06/13/2018     Priority: Medium     Impingement syndrome, shoulder, right 03/26/2018     Priority: Medium     Papanicolaou smear of cervix with low grade squamous intraepithelial lesion (LGSIL) 03/23/2018     Priority: Medium     3/23/18 LSIL pap, + HR HPV + 16 and other. Plan: colp bef 6/23/18  5/3/18 [place] bx @ 9 o'clock: UMAIR 1 and koilocytosis, with condyloma-like features. ECC: negative. Plan: cotest in 1 yr.  5/8/19 ASCUS pap, + HR HPV (not 16 or 18). Plan:  colp  7/12/19 [place] bx: worrisome for HSIL. Plan: Consult visit with Dr. Cordova for possible CKC.  7/31/19 Consult visit. Plan: Pelvic US, then LEEP.   US showed two fibroids.   8/29/19 LEEP bx: LSIL. Plan: pap due in 4 mo  1/22/20 Pap: NIL, + HR HPV (not 16 or 18). ECC: negative. Plan: cotest in 1 yr.  1/7/21 Reminder MyChart  2/10/21 NIL Pap, Neg HR HPV. Plan: cotest in 1 yr.        Excessive or frequent menstruation 08/09/2017     Priority: Medium     Intramural and subserous leiomyoma of uterus 08/09/2017     Priority: Medium     Morbid obesity (H) 07/27/2017     Priority: Medium        There were no vitals taken for this visit.    Impression/Plan:  Patient with history of moderate to severe sleep apnea diagnosed in 2003.  She is currently optimally titrated on auto CPAP.  She feels very comfortable with current pressure range and mask set up.  She feels very refreshed during the day.  Moderate to severe sleep apnea.  Tolerating PAP well. Daytime symptoms are improved.       Paola Lobo will follow up in about 1 year(s).  Patient will good candidate for replacement device since her machine is over 10 years old.    20 minutes spent with patient, all of which were spent face-to-face counseling, consulting, coordinating plan of care.      CC:  Malik Bearden Oleg Froymovich, MD

## 2021-12-16 ENCOUNTER — OFFICE VISIT (OUTPATIENT)
Dept: SLEEP MEDICINE | Facility: CLINIC | Age: 51
End: 2021-12-16
Payer: COMMERCIAL

## 2021-12-16 VITALS
WEIGHT: 293 LBS | BODY MASS INDEX: 39.68 KG/M2 | SYSTOLIC BLOOD PRESSURE: 114 MMHG | DIASTOLIC BLOOD PRESSURE: 68 MMHG | HEART RATE: 57 BPM | OXYGEN SATURATION: 97 % | HEIGHT: 72 IN

## 2021-12-16 DIAGNOSIS — G47.33 OSA (OBSTRUCTIVE SLEEP APNEA): Primary | ICD-10-CM

## 2021-12-16 PROCEDURE — 99213 OFFICE O/P EST LOW 20 MIN: CPT | Performed by: OTOLARYNGOLOGY

## 2021-12-16 ASSESSMENT — MIFFLIN-ST. JEOR: SCORE: 2183.05

## 2022-01-03 ENCOUNTER — TELEPHONE (OUTPATIENT)
Dept: SLEEP MEDICINE | Facility: CLINIC | Age: 52
End: 2022-01-03
Payer: COMMERCIAL

## 2022-01-03 DIAGNOSIS — K91.2 POSTOPERATIVE MALABSORPTION: Primary | ICD-10-CM

## 2022-01-03 DIAGNOSIS — Z90.3 HISTORY OF SLEEVE GASTRECTOMY: ICD-10-CM

## 2022-01-03 NOTE — TELEPHONE ENCOUNTER
RETURNED PT CALL AND LVM  INFORMING THE PT THAT CURRENTLY SHE  IS NOT ELIGIBLE TO RECEIVE A CPAP DEVICE AND THAT THE GUIDLINES ARE EVERY FIVE YEARS.  PT RECEIVED THE CURRENT DEVICE FROM [facility] 04/24/2018. ALSO INFORMED THE PT THAT WE CAN TROUBLESHOOT THE DEVICE IF IT IS NEEDED AND TO CALL US BACK IF FURTHER ASSISTANCE IS NEEDED [phone].

## 2022-01-05 ENCOUNTER — LAB (OUTPATIENT)
Dept: LAB | Facility: CLINIC | Age: 52
End: 2022-01-05
Payer: COMMERCIAL

## 2022-01-05 DIAGNOSIS — K91.2 POSTOPERATIVE MALABSORPTION: ICD-10-CM

## 2022-01-05 DIAGNOSIS — Z90.3 HISTORY OF SLEEVE GASTRECTOMY: ICD-10-CM

## 2022-01-05 LAB
ALBUMIN SERPL-MCNC: 3.4 G/DL (ref 3.4–5)
ALP SERPL-CCNC: 73 U/L (ref 40–150)
ALT SERPL W P-5'-P-CCNC: 30 U/L (ref 0–50)
ANION GAP SERPL CALCULATED.3IONS-SCNC: 4 MMOL/L (ref 3–14)
AST SERPL W P-5'-P-CCNC: 17 U/L (ref 0–45)
BILIRUB SERPL-MCNC: 0.4 MG/DL (ref 0.2–1.3)
BUN SERPL-MCNC: 20 MG/DL (ref 7–30)
CALCIUM SERPL-MCNC: 9.3 MG/DL (ref 8.5–10.1)
CHLORIDE BLD-SCNC: 112 MMOL/L (ref 94–109)
CO2 SERPL-SCNC: 27 MMOL/L (ref 20–32)
CREAT SERPL-MCNC: 0.66 MG/DL (ref 0.52–1.04)
DEPRECATED CALCIDIOL+CALCIFEROL SERPL-MC: 50 UG/L (ref 20–75)
ERYTHROCYTE [DISTWIDTH] IN BLOOD BY AUTOMATED COUNT: 13.2 % (ref 10–15)
FERRITIN SERPL-MCNC: 135 NG/ML (ref 8–252)
FOLATE SERPL-MCNC: 20.1 NG/ML
GFR SERPL CREATININE-BSD FRML MDRD: >90 ML/MIN/1.73M2
GLUCOSE BLD-MCNC: 86 MG/DL (ref 70–99)
HBA1C MFR BLD: 5.5 % (ref 0–5.6)
HCT VFR BLD AUTO: 41.3 % (ref 35–47)
HGB BLD-MCNC: 13.5 G/DL (ref 11.7–15.7)
MCH RBC QN AUTO: 30.6 PG (ref 26.5–33)
MCHC RBC AUTO-ENTMCNC: 32.7 G/DL (ref 31.5–36.5)
MCV RBC AUTO: 94 FL (ref 78–100)
PLATELET # BLD AUTO: 253 10E3/UL (ref 150–450)
POTASSIUM BLD-SCNC: 3.9 MMOL/L (ref 3.4–5.3)
PROT SERPL-MCNC: 7.4 G/DL (ref 6.8–8.8)
PTH-INTACT SERPL-MCNC: 21 PG/ML (ref 18–80)
RBC # BLD AUTO: 4.41 10E6/UL (ref 3.8–5.2)
SODIUM SERPL-SCNC: 143 MMOL/L (ref 133–144)
TSH SERPL DL<=0.005 MIU/L-ACNC: 4.95 MU/L (ref 0.4–4)
VIT B12 SERPL-MCNC: 714 PG/ML (ref 193–986)
WBC # BLD AUTO: 8 10E3/UL (ref 4–11)

## 2022-01-05 PROCEDURE — 80053 COMPREHEN METABOLIC PANEL: CPT

## 2022-01-05 PROCEDURE — 84443 ASSAY THYROID STIM HORMONE: CPT

## 2022-01-05 PROCEDURE — 82306 VITAMIN D 25 HYDROXY: CPT

## 2022-01-05 PROCEDURE — 84590 ASSAY OF VITAMIN A: CPT | Mod: 90

## 2022-01-05 PROCEDURE — 99000 SPECIMEN HANDLING OFFICE-LAB: CPT

## 2022-01-05 PROCEDURE — 82728 ASSAY OF FERRITIN: CPT

## 2022-01-05 PROCEDURE — 83036 HEMOGLOBIN GLYCOSYLATED A1C: CPT

## 2022-01-05 PROCEDURE — 82746 ASSAY OF FOLIC ACID SERUM: CPT

## 2022-01-05 PROCEDURE — 84425 ASSAY OF VITAMIN B-1: CPT | Mod: 90

## 2022-01-05 PROCEDURE — 36415 COLL VENOUS BLD VENIPUNCTURE: CPT

## 2022-01-05 PROCEDURE — 83970 ASSAY OF PARATHORMONE: CPT

## 2022-01-05 PROCEDURE — 85027 COMPLETE CBC AUTOMATED: CPT

## 2022-01-05 PROCEDURE — 82607 VITAMIN B-12: CPT

## 2022-01-05 PROCEDURE — 84630 ASSAY OF ZINC: CPT | Mod: 90

## 2022-01-07 LAB — ZINC SERPL-MCNC: 79.8 UG/DL

## 2022-01-08 LAB
ANNOTATION COMMENT IMP: NORMAL
RETINYL PALMITATE SERPL-MCNC: <0.02 MG/L
VIT A SERPL-MCNC: 0.79 MG/L
VIT B1 PYROPHOSHATE BLD-SCNC: 130 NMOL/L

## 2022-01-10 ENCOUNTER — DOCUMENTATION ONLY (OUTPATIENT)
Dept: OTHER | Facility: CLINIC | Age: 52
End: 2022-01-10
Payer: COMMERCIAL

## 2022-01-26 ENCOUNTER — PATIENT OUTREACH (OUTPATIENT)
Dept: INTERNAL MEDICINE | Facility: CLINIC | Age: 52
End: 2022-01-26
Payer: COMMERCIAL

## 2022-01-26 DIAGNOSIS — R87.612 PAPANICOLAOU SMEAR OF CERVIX WITH LOW GRADE SQUAMOUS INTRAEPITHELIAL LESION (LGSIL): ICD-10-CM

## 2022-02-13 ENCOUNTER — HEALTH MAINTENANCE LETTER (OUTPATIENT)
Age: 52
End: 2022-02-13

## 2022-02-16 ENCOUNTER — VIRTUAL VISIT (OUTPATIENT)
Dept: SURGERY | Facility: CLINIC | Age: 52
End: 2022-02-16
Payer: COMMERCIAL

## 2022-02-16 VITALS — BODY MASS INDEX: 39.68 KG/M2 | WEIGHT: 293 LBS | HEIGHT: 72 IN

## 2022-02-16 DIAGNOSIS — Z90.3 HISTORY OF SLEEVE GASTRECTOMY: ICD-10-CM

## 2022-02-16 DIAGNOSIS — K59.00 CONSTIPATION, UNSPECIFIED CONSTIPATION TYPE: ICD-10-CM

## 2022-02-16 DIAGNOSIS — R79.89 ELEVATED TSH: Primary | ICD-10-CM

## 2022-02-16 PROCEDURE — 99215 OFFICE O/P EST HI 40 MIN: CPT | Mod: 95 | Performed by: EMERGENCY MEDICINE

## 2022-02-16 RX ORDER — FLUTICASONE PROPIONATE 50 MCG
SPRAY, SUSPENSION (ML) NASAL
COMMUNITY
Start: 2022-02-10 | End: 2022-12-23

## 2022-02-16 RX ORDER — DOCUSATE SODIUM 100 MG/1
100 CAPSULE, LIQUID FILLED ORAL 2 TIMES DAILY PRN
Qty: 90 CAPSULE | Refills: 1 | Status: SHIPPED | OUTPATIENT
Start: 2022-02-16 | End: 2022-05-17

## 2022-02-16 RX ORDER — BISACODYL 10 MG
10 SUPPOSITORY, RECTAL RECTAL DAILY PRN
Qty: 3 SUPPOSITORY | Refills: 0 | Status: SHIPPED | OUTPATIENT
Start: 2022-02-16 | End: 2022-02-19
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
## 2022-02-16 NOTE — PATIENT INSTRUCTIONS
Plan:  1. Continue 3 meals daily with 15g of lean protein per meal and goal of 60-75g/day for your satiety. Increase hydration to 64 oz/day of plain water to help satiety/constipation and feeling well.     2. Follow up with dietician as you may be over restricting currently for your RMR. Working on 1000-1200kcal/day should be doable more long term than the 800kcal goal you set for yourself.     3. Mag citrate and dulcolax to empty out your constipation that causes your intermittent Left lower/periumbilical pains. Colace once or twice daily to soften. Hydrate  In between meals with water.    4. Continue good vitamin use.     5. Bone density was low-mod risk today on questions. Repeat questions next year otherwise at age 54 get bone density test.    6. Continue omeprazole or pepcid products for heart burn. Anticipate EGD next year to check health of pouch/esophagus.    [facility]  Nutritional Guidelines  Gastric Sleeve 18 Months Post Op and Beyond    General Guidelines and Helpful Hints:    Eat 3 meals per day + protein supplement(s). No snacks between meals.  o Do not skip meals.  This can cause overeating at the next meal and will prevent adequate protein and nutritional intake.    Aim for 60-80 grams of protein per day.  o Always eat your protein first. This assists with optimal nutrition and helps you stay full longer.  o Depending on your portion size, you may need to drink approved protein supplement between meals to achieve protein goals. Follow recommendations of your Dietitian.     Eat your protein first, and then follow with fiber.   o It is not necessary to count your fiber, but 15-20 grams per day is recommended.    o Add fiber by including fruits, vegetables, whole grains, and beans.     Portions should remain about 1 cup per meal. Use measuring cups to be accurate.    Continue to use saucer/salad plates, infant/toddler silverware to keep portion sizes small and take small bites.    Eat  S-L-O-W-L-Y to make each meal last 20-30 minutes. Always stop eating when satisfied.    Continue to use caution with foods containing skins, peels or membranes. Chew well!    Aim for 64 oz. of calorie-free fluids daily.  o Continue to avoid caffeine and carbonation. If you choose to drink alcohol, do so in moderation.   o Remember to avoid drinking during meals, 15-30 minutes before and 30 minutes after.    Exercise is arteaga for continued weight loss and weight maintenance. Aim for 30-60 minutes of physical activity most days of the week. Include cardiovascular and strength training.    If having trouble tolerating meat, try using a crock-pot, tinfoil tent, steamer or other moist cooking method to create tender meats. Add broth or low-fat gravy to help meat stay moist.     Avoid high sugar and high fat foods to prevent high calorie intakes and a reduced rate of weight loss, or weight regain.  o Check nutrition labels for less than 10 grams of sugar and less than 10 grams of fat per serving.    Continue Taking Vitamins/Minerals:  o 2579-6463 mcg of Sublingual B-12 daily  o 1 Multivitamin with Iron twice daily (chewable or swallow tabs)  o 500-600 mg Calcium Citrate twice daily (chewable or swallow tabs)  o 5000 IU Vitamin D3 daily    Sample Grocery List    Protein:    Fat free Greek or light yogurt (less than 10 grams sugar)    Fat free or low-fat cottage cheese    String cheese or reduced fat cheese slices    Tuna, salmon, crab, egg, or chicken salad made with light or fat free mayonnaise    Egg or Egg Substitute    Lean/extra lean turkey, beef, bison, venison (ground, sirloin, round, flank)    Pork loin or tenderloin (grilled, baked, broiled)    Fish such as salmon, tuna, trout, tilapia, etc. (grilled, baked, broiled)    Tender cuts of lean (skinless) turkey or chicken    Lean deli meats: turkey, lean ham, chicken, lean roast beef    Beans such as kidney, garbanzo, black, clemons, or low-fat/fat free refried  beans    Peanut butter (natural preferred). Limit to 1 Tbsp. per day.    Low-fat meatloaf (made with lean ground beef or turkey)    Sloppy Joes made with low-sugar ketchup and lean ground beef or turkey    Soy or vegetable protein (i.e. vegan crumbles, soy/veggie burger, tofu)    Hummus    Vegetables:    Fresh: cooked or raw (as tolerated)    Frozen vegetables    Canned vegetables (low sodium or no salt added, rinse before cooking/eating)    (Ok to have skins/peels/membranes/seeds - just chew well)    Fruits:    Fresh fruit    Frozen fruit (no sugar added)    Canned fruit (packed in its own juice, NOT syrup)    (Ok to have skins/peels/membranes/seeds - just chew well)    Starch:    Unsweetened whole-grain hot cereal (or high fiber cold cereal, dry)    Toasted whole wheat bread or Gray Thins    Whole grain crackers    Baked /boiled/mashed potato/sweet potato    Cooked whole grain pasta, brown rice, or other cooked whole grains    Starchy vegetables: corn, peas, winter squash    Protein Supplement:     Ready to drink protein shake with:  o 15-30 grams protein per serving  o Less than 10 grams total carbohydrate per serving     Protein powder mixed with:  o  Skim or 1% milk  o Low fat or fat free Lactaid milk, plain or no sugar added soymilk  o Water     Fats: (use in moderation)    1 teaspoon of soft tub margarine    1 teaspoon olive oil, canola oil, or peanut oil    1 tablespoon of low-fat lewis or salad dressing     Sample Menu for 18+ months after Gastric Sleeve    You do NOT need to eat/drink the full portion sizes listed below  Always stop when you are satisfied    Breakfast   cup 1% cottage cheese     cup mixed berries   Lunch 2 oz lean roast beef on   Gray Thin with 1 tsp. light lewis    small tomato, chopped, mixed with 1 tsp. light vinaigrette dressing   Supplement Approved protein supplement (if needed between meals)   Dinner 2 oz grilled salmon    cup salad greens with 1 tsp. light salad dressing and  1 tsp. ground flax seed    cup quinoa or brown rice     Breakfast   cup egg substitute with   cup sautéed chopped vegetables  2 light [place] Krisp crackers   Lunch Tuna Melt:   cup tuna mixed with 1 tsp. light lewis over   [place] Thin. Top with 2-3 slices cucumber and 1 oz slice of low fat cheese   Supplement 1 cup skim milk (if needed between meals)   Dinner 3 oz  grilled, broiled, or baked seasoned skinless chicken breast    cup asparagus     Breakfast   cup plain oatmeal made with skim or 1% milk with 1 Tbsp. flavored/unflavored protein powder added  1 mozzarella string cheese   Lunch 2 oz deli turkey breast  1/3 cup salad with 1 tsp. light salad dressing, 1/8 of a whole avocado and 1 Tbsp. sunflower seeds   Dinner 3 oz. pork loin made in a crock pot, seasoned with a spice rub    cup cooked carrots   Supplement Approved protein supplement (if needed between meals)     Breakfast 1 cup breakfast casserole made with egg substitute, turkey sausage,  and steamed, chopped bell peppers   Supplement  1 cup light Greek yogurt (if needed between meals)   Lunch 2 oz. teriyaki turkey    cup mashed sweet potato with 1-2 spritzes of spray butter (like [place])    cup fresh pineapple   Dinner 3 oz low fat meatloaf    cup roasted garlic zucchini     Breakfast   cup leftover breakfast casserole    cup no sugar added applesauce with 1 Tbsp. unflavored protein powder and a sprinkle of cinnamon    Lunch 3 oz shrimp with 1-2 Tbsp. low-sugar cocktail sauce for dipping    c. whole wheat pasta drizzled with   tsp. olive oil   Supplement 1 cup skim/1% milk with scoop of protein powder (if needed between meals)   Dinner Grilled, seasoned kebob with 2 oz lean beef and   cup vegetables     Breakfast Breakfast pizza:   [place] Thin spread with 1 Tbsp. low sugar spaghetti sauce,   cup shredded low fat cheese, melted and 1 slice of [place] espitia     cup fresh fruit mixed with chopped almonds   Lunch   cup black bean soup  4-5 whole grain  crackers   Dinner 3 oz  tilapia with lemon pepper seasoning    cup stewed tomatoes   Supplement 1 string cheese (if needed between meals)     Breakfast 2 hard boiled eggs (discard 1 egg yolk)    whole wheat English Muffin with 1 tsp. low sugar jelly   Lunch   cup leftover black bean soup topped with 1-2 Tbsp. low fat cheese  2-3 light [place] Krisp crackers   Supplement Approved protein supplement (if needed between meals)   Dinner 3 oz sirloin steak    cup steamed broccoli       LEAN PROTEIN SOURCES  Getting 20-30 grams of protein, 3 meals daily, is appropriate for most people, some need more but more than about 40 grams per meal is not useful.  General rule is drinking one ounce of water per gram of protein eaten over the course of the day:  70 grams of protein each day, drink 70 oz of water.  Protein Source Portion Calories Grams of Protein                           Nonfat, plain Greek yogurt    (10 grams sugar or less) 3/4 cup (6 oz)  12-17   Light Yogurt (10 grams sugar or less) 3/4 cup (6 oz)  6-8   Protein Shake 1 shake 110-180 15-30   Skim/1% Milk or lactose-free milk 1 cup ( 8 oz)  8   Plain or light, flavored soymilk 1 cup  7-8   Plain or light, hemp milk 1 cup 110 6   Fat Free or 1% Cottage Cheese 1/2 cup 90 15   Part skim ricotta cheese 1/2 cup 100 14   Part skim or reduced fat cheese slices 1 ounce 65-80 8     Mozzarella String Cheese 1 80 8   Canned tuna, chicken, crab or salmon  (canned in water)  1/2 cup 100 15-20   White fish (broiled, grilled, baked) 3 ounces 100 21   Cowpens/Tuna (broiled, grilled, baked) 3 ounces 150-180 21   Shrimp, Scallops, Lobster, Crab 3 ounces 100 21   Pork loin, Pork Tenderloin 3 ounces 150 21   Boneless, skinless chicken /turkey breast                          (broiled, grilled, baked) 3 ounces 120 21   [place], [place], [place], and Venison 3 ounces 120 21   Lean cuts of red meat and pork (sirloin,   round, tenderloin, flank, ground 93%-96%) 3 ounces 170 21    Lean or Extra Lean Ground Turkey 1/2 cup 150 20   90-95% Lean [place] Burger 1 bashir 140-180 21   Low-fat casserole with lean meat 3/4 cup 200 17   Luncheon Meats                                                        (turkey, lean ham, roast beef, chicken) 3 ounces 100 21   Egg (boiled, poached, scrambled) 1 Egg 60 7   Egg Substitute 1/2 cup 70 10   Nuts (limit to 1 serving per day)  3 Tbsp. 150 7   Nut [place] (peanut, almond)  Limit to 1 serving or less daily 1 Tbsp. 90 4   Soy Burger (varies) 1  15   Garbanzo, Black, [place] Beans 1/2 cup 110 7   Refried Beans 1/2 cup 100 7   Kidney and Lima beans 1/2 cup 110 7   Tempeh 3 oz 175 18   Vegan crumbles 1/2 cup 100 14   Tofu 1/2 cup 110 14   Chili (beans and extra lean beef or turkey) 1 cup 200 23   Lentil Stew/Soup 1 cup 150 12   Black Bean Soup 1 cup 175 12

## 2022-02-23 ENCOUNTER — VIRTUAL VISIT (OUTPATIENT)
Dept: SURGERY | Facility: CLINIC | Age: 52
End: 2022-02-23
Payer: COMMERCIAL

## 2022-02-23 DIAGNOSIS — K91.2 POSTOPERATIVE MALABSORPTION: ICD-10-CM

## 2022-02-23 DIAGNOSIS — Z71.3 NUTRITIONAL COUNSELING: ICD-10-CM

## 2022-02-23 DIAGNOSIS — Z90.3 HISTORY OF SLEEVE GASTRECTOMY: Primary | ICD-10-CM

## 2022-02-23 PROCEDURE — 97803 MED NUTRITION INDIV SUBSEQ: CPT | Mod: 95 | Performed by: DIETITIAN, REGISTERED

## 2022-02-23 NOTE — PROGRESS NOTES
Paola Lobo is a 51 year old who is being evaluated via a billable telephone visit.      What phone number would you like to be contacted at? [phone]  How would you like to obtain your AVS? Edwin        Post-op Surgical Weight Loss Diet Evaluation     Assessment:  Pt presents for 2 years post-op RD visit, s/p LSG on 2/12/2020 with Dr. Celis. Today we reviewed current eating habits and level of physical activity, and instructed on the changes that are required for successful bariatric outcomes.    Patient Progress: did have a couple of slips towards the end of the year, not quite where she wants to be and is trying to make adjustments more recently.     Initial weight: 394 lbs  Current weight: 320 lbs (down 8 lbs since re-set)  Weight change: 74 lbs     There is no height or weight on file to calculate BMI.    Patient Active Problem List   Diagnosis     Morbid obesity (H)     Excessive or frequent menstruation     Intramural and subserous leiomyoma of uterus     Impingement syndrome, shoulder, right     Papanicolaou smear of cervix with low grade squamous intraepithelial lesion (LGSIL)     Benign essential hypertension     Mild intermittent asthma without complication       Diabetes: No     Vitamins   Multi Vit with Iron: yes  Calcium Citrate: no-due to constipation issues  B12: yes 1-2 times/week  D3: yes  Fiber Supplements      Diet Recall/Time:   Breakfast: 1/2 cup of Plain Greek Yogurt, low sugar Jam, 1/8 cup of granola or 1 egg with GF tortilla with cheese or Protein Smoothie (1/2 cup of Spinach, Blueberries, Protein Powder, Unsweetened [place] Milk)  Lunch: 1/2 sandwich with turkey (GF Bread)  Dinner: meat and salad     Typical Snacks: Protein Chips    Proteins/Veg/Fruits/CHO (NOT well tolerated): Sugar Substitutes, has been able to add back in [place] more recently    Estimated protein intake: 70-75 grams      Meal Duration: 20-30 minutes    Fluid-meal separation:  Fluids are  30min  "before and 30 minutes after meals.    Fluid Intake  Water: struggles at times to get enough in, trying to push it      Exercise: Tore up her hip as well as joint issues (rotater cuff)-noting exercise is limited- is doing PT exercises at home as able/tolerated      PES statement:      (NC-1.4) Altered GI Function related to Alteration in gastrointestinal tract structure and/or function/ Decreased functional length of the GI tract as evidenced by Weight loss of 19% initial body weight; sleeve gastrectomy    Intervention    Discussion  1. Recommended to consume 15-20gm protein at 3 meals daily, along with protein supplement/\"planned protein containing snack\" of 15-30gm protein, to reach goal of 60-80 gm protein daily.  2. Educated on post-op vitamin regimen: Multi Vit + iron 2x/day, calcium citrate 400-600 mg 2x/day, 7859-2486 mcg of Sublingual B-12 daily, and 5000 IU Vitamin D3 daily (MVI and calcium can be taken at the same time BID)  3. Reviewed lean protein sources  4. Bariatric Plate Method-  including lean/low fat protein at each meal, including a vegetable/fruit, and limiting carbohydrate intake to less than 25% of plate volume.     Instructions  1. Include 15-20gm protein at each meal, along with protein supplement/\"planned protein containing snack\" of 15-30gm protein, to reach goal of 60-80 gm protein daily.  2. Increase fluid intake to 64oz daily: choose plain or calorie/carbonation-free beverages.  3. Incorporate daily structured activity, 30-60 minutes most days of the week  4. Recommended pt to start taking: Multi Vit + iron 2x/day, calcium citrate 400-600 mg 2x/day, 8712-3425 mcg of Sublingual B-12 daily, and 5000 IU Vitamin D3 daily. (MVI and calcium can be taken at the same time)  5. Read food labels more consistently: keeping total fat grams <10, total sugar grams <10, fiber >3gm per serving.  6. Increase vegetable/fruit intake, by having a vegetable or fruit with each meal daily.  7. Practice plate " method: 1/2 plate lean/low fat protein source, vegetable/fruit, <25% of plate complex carbohydrates.  8. Separate fluids 30 minutes before/after meal times.  9. Practice eating off of smaller plates/bowls, chewing to applesauce consistency, taking 20-30 minutes to eat in a calm/relaxed environment without distractions of tv/email/cell phone.    Handouts provided:  None-provided at MD appt    Assessment/Plan:    Pt to follow up in 6 months  with bariatrician       Phone call duration: 30 minutes      Dona Brown RD

## 2022-02-23 NOTE — LETTER
2/23/2022         RE: Paola Lobo  [address]   [place] [address]        Dear Colleague,    Thank you for referring your patient, Paola Lobo, to the [facility] AND [facility] [place]. Please see a copy of my visit note below.    Paola Lobo is a 51 year old who is being evaluated via a billable telephone visit.      What phone number would you like to be contacted at? [phone]  How would you like to obtain your AVS? MyChart        Post-op Surgical Weight Loss Diet Evaluation     Assessment:  Pt presents for 2 years post-op RD visit, s/p LSG on 2/12/2020 with Dr. Celis. Today we reviewed current eating habits and level of physical activity, and instructed on the changes that are required for successful bariatric outcomes.    Patient Progress: did have a couple of slips towards the end of the year, not quite where she wants to be and is trying to make adjustments more recently.     Initial weight: 394 lbs  Current weight: 320 lbs (down 8 lbs since re-set)  Weight change: 74 lbs     There is no height or weight on file to calculate BMI.    Patient Active Problem List   Diagnosis     Morbid obesity (H)     Excessive or frequent menstruation     Intramural and subserous leiomyoma of uterus     Impingement syndrome, shoulder, right     Papanicolaou smear of cervix with low grade squamous intraepithelial lesion (LGSIL)     Benign essential hypertension     Mild intermittent asthma without complication       Diabetes: No     Vitamins   Multi Vit with Iron: yes  Calcium Citrate: no-due to constipation issues  B12: yes 1-2 times/week  D3: yes  Fiber Supplements      Diet Recall/Time:   Breakfast: 1/2 cup of Plain Greek Yogurt, low sugar Jam, 1/8 cup of granola or 1 egg with GF tortilla with cheese or Protein Smoothie (1/2 cup of Spinach, Blueberries, Protein Powder, Unsweetened [place] Milk)  Lunch: 1/2 sandwich with turkey (GF Bread)  Dinner: meat and salad  "    Typical Snacks: Protein Chips    Proteins/Veg/Fruits/CHO (NOT well tolerated): Sugar Substitutes, has been able to add back in [facility] more recently    Estimated protein intake: 70-75 grams      Meal Duration: 20-30 minutes    Fluid-meal separation:  Fluids are  30min before and 30 minutes after meals.    Fluid Intake  Water: struggles at times to get enough in, trying to push it      Exercise: Tore up her hip as well as joint issues (rotater cuff)-noting exercise is limited- is doing PT exercises at home as able/tolerated      PES statement:      (NC-1.4) Altered GI Function related to Alteration in gastrointestinal tract structure and/or function/ Decreased functional length of the GI tract as evidenced by Weight loss of 19% initial body weight; sleeve gastrectomy    Intervention    Discussion  1. Recommended to consume 15-20gm protein at 3 meals daily, along with protein supplement/\"planned protein containing snack\" of 15-30gm protein, to reach goal of 60-80 gm protein daily.  2. Educated on post-op vitamin regimen: Multi Vit + iron 2x/day, calcium citrate 400-600 mg 2x/day, 9430-8271 mcg of Sublingual B-12 daily, and 5000 IU Vitamin D3 daily (MVI and calcium can be taken at the same time BID)  3. Reviewed lean protein sources  4. Bariatric Plate Method-  including lean/low fat protein at each meal, including a vegetable/fruit, and limiting carbohydrate intake to less than 25% of plate volume.     Instructions  1. Include 15-20gm protein at each meal, along with protein supplement/\"planned protein containing snack\" of 15-30gm protein, to reach goal of 60-80 gm protein daily.  2. Increase fluid intake to 64oz daily: choose plain or calorie/carbonation-free beverages.  3. Incorporate daily structured activity, 30-60 minutes most days of the week  4. Recommended pt to start taking: Multi Vit + iron 2x/day, calcium citrate 400-600 mg 2x/day, 4431-5029 mcg of Sublingual B-12 daily, and 5000 IU Vitamin D3 " daily. (MVI and calcium can be taken at the same time)  5. Read food labels more consistently: keeping total fat grams <10, total sugar grams <10, fiber >3gm per serving.  6. Increase vegetable/fruit intake, by having a vegetable or fruit with each meal daily.  7. Practice plate method: 1/2 plate lean/low fat protein source, vegetable/fruit, <25% of plate complex carbohydrates.  8. Separate fluids 30 minutes before/after meal times.  9. Practice eating off of smaller plates/bowls, chewing to applesauce consistency, taking 20-30 minutes to eat in a calm/relaxed environment without distractions of tv/email/cell phone.    Handouts provided:  None-provided at MD appt    Assessment/Plan:    Pt to follow up in 6 months  with bariatrician       Phone call duration: 30 minutes      Dona Brown RD          Again, thank you for allowing me to participate in the care of your patient.        Sincerely,        Dona Brown RD

## 2022-03-04 ENCOUNTER — OFFICE VISIT (OUTPATIENT)
Dept: INTERNAL MEDICINE | Facility: CLINIC | Age: 52
End: 2022-03-04
Payer: COMMERCIAL

## 2022-03-04 VITALS
SYSTOLIC BLOOD PRESSURE: 108 MMHG | WEIGHT: 293 LBS | OXYGEN SATURATION: 100 % | DIASTOLIC BLOOD PRESSURE: 68 MMHG | RESPIRATION RATE: 10 BRPM | BODY MASS INDEX: 44.21 KG/M2 | HEART RATE: 70 BPM | TEMPERATURE: 97.1 F

## 2022-03-04 DIAGNOSIS — M25.512 CHRONIC LEFT SHOULDER PAIN: ICD-10-CM

## 2022-03-04 DIAGNOSIS — Z12.31 ENCOUNTER FOR SCREENING MAMMOGRAM FOR BREAST CANCER: ICD-10-CM

## 2022-03-04 DIAGNOSIS — G89.29 CHRONIC LEFT SHOULDER PAIN: ICD-10-CM

## 2022-03-04 DIAGNOSIS — F33.1 MODERATE EPISODE OF RECURRENT MAJOR DEPRESSIVE DISORDER (H): Primary | ICD-10-CM

## 2022-03-04 DIAGNOSIS — E66.01 MORBID OBESITY (H): ICD-10-CM

## 2022-03-04 DIAGNOSIS — M75.41 IMPINGEMENT SYNDROME, SHOULDER, RIGHT: ICD-10-CM

## 2022-03-04 DIAGNOSIS — I10 BENIGN ESSENTIAL HYPERTENSION: ICD-10-CM

## 2022-03-04 PROCEDURE — 99214 OFFICE O/P EST MOD 30 MIN: CPT | Performed by: INTERNAL MEDICINE

## 2022-03-04 RX ORDER — AMLODIPINE BESYLATE 10 MG/1
10 TABLET ORAL DAILY
Qty: 90 TABLET | Refills: 3 | Status: SHIPPED | OUTPATIENT
Start: 2022-03-04 | End: 2023-03-03

## 2022-03-04 RX ORDER — CITALOPRAM HYDROBROMIDE 20 MG/1
20 TABLET ORAL DAILY
Qty: 90 TABLET | Refills: 3 | Status: SHIPPED | OUTPATIENT
Start: 2022-03-04 | End: 2022-03-10

## 2022-03-04 ASSESSMENT — ANXIETY QUESTIONNAIRES
2. NOT BEING ABLE TO STOP OR CONTROL WORRYING: NOT AT ALL
6. BECOMING EASILY ANNOYED OR IRRITABLE: NOT AT ALL
IF YOU CHECKED OFF ANY PROBLEMS ON THIS QUESTIONNAIRE, HOW DIFFICULT HAVE THESE PROBLEMS MADE IT FOR YOU TO DO YOUR WORK, TAKE CARE OF THINGS AT HOME, OR GET ALONG WITH OTHER PEOPLE: NOT DIFFICULT AT ALL
GAD7 TOTAL SCORE: 0
3. WORRYING TOO MUCH ABOUT DIFFERENT THINGS: NOT AT ALL
7. FEELING AFRAID AS IF SOMETHING AWFUL MIGHT HAPPEN: NOT AT ALL
1. FEELING NERVOUS, ANXIOUS, OR ON EDGE: NOT AT ALL
5. BEING SO RESTLESS THAT IT IS HARD TO SIT STILL: NOT AT ALL

## 2022-03-04 ASSESSMENT — ASTHMA QUESTIONNAIRES: ACT_TOTALSCORE: 23

## 2022-03-04 ASSESSMENT — PATIENT HEALTH QUESTIONNAIRE - PHQ9
SUM OF ALL RESPONSES TO PHQ QUESTIONS 1-9: 16
5. POOR APPETITE OR OVEREATING: NOT AT ALL

## 2022-03-04 NOTE — PROGRESS NOTES
Melchor Horan is a 51 year old who presents for the following health issues     History of Present Illness     Reason for visit:  Med review    She eats 2-3 servings of fruits and vegetables daily.She consumes 1 sweetened beverage(s) daily.She exercises with enough effort to increase her heart rate 30 to 60 minutes per day.  She exercises with enough effort to increase her heart rate 4 days per week.   She is taking medications regularly.         Social History     Tobacco Use     Smoking status: Never Smoker     Smokeless tobacco: Never Used   Substance Use Topics     Alcohol use: Yes     Comment: one - two per week     Drug use: No     PHQ 9/25/2017 3/4/2022   PHQ-9 Total Score 6 16   Q9: Thoughts of better off dead/self-harm past 2 weeks Not at all Not at all     SYDNI-7 SCORE 9/25/2017 3/4/2022   Total Score 0 0        EMR reviewed including:             Complaint, History of Chief Complaint, Corresponding Review of Systems, and Complaint Specific Physical Examination.    #1   Ongoing depression  Currently taking Paxil   Bariatric surgeon suggested citalopram  Notes that she has been mildly depressed but not suicidal  Notes that it is somewhat seasonal and associated with extraneous factors  He is eating and sleeping adequately        Exam:   PSYCH: The patient appears grossly appropriate. Maintains good eye contact, does not have any jittery or atypical motion. Displays appropriate affect.      #2   Right shoulder pain with impingement syndrome  Her sports medicine specialist has left  Wants referral to orthopedics  Persistent pain and decreased range of motion  Responded previously to injection therapy.        Exam:   MS: Minimal crepitance is noted in the extremities. No deformity is present. Muscle strength is appropriate and equal bilaterally. No acute joint erythema or swelling is present.  Decreased range of motion of the right shoulder is noted.      #3   Hypertension  Responding well to  beta-blocker and Norvasc.  Blood pressure 108/68.  Denies hypotension.        Exam:   HEART:  regular without rubs, clicks, gallops, or murmurs. PMI is nondisplaced. Upstrokes are brisk. S1,S2 are heard.   LUNGS: clear bilaterally, airflow is brisk, no intercostal retraction or stridor is noted. No coughing is noted during visit.        Patient has been interviewed, applicable history and applied review of systems have been performed.    Vital Signs:   /68   Pulse 70   Temp 97.1  F (36.2  C)   Resp 10   Wt 147.9 kg (326 lb)   SpO2 100%   BMI 44.21 kg/m        Decision Making    Problem and Complexity     1. Moderate episode of recurrent major depressive disorder (H)  We will change his citalopram per request.  - citalopram (CELEXA) 20 MG tablet; Take 1 tablet (20 mg) by mouth daily  Dispense: 90 tablet; Refill: 3    2. Benign essential hypertension  Continue current medication.  Recent lab work reviewed.  - amLODIPine (NORVASC) 10 MG tablet; Take 1 tablet (10 mg) by mouth daily  Dispense: 90 tablet; Refill: 3    3. Chronic left shoulder pain  We will set up with orthopedics  - Orthopedic  Referral; Future    4. Impingement syndrome, shoulder, right  As above    5. Morbid obesity (H)  Follow-up with bariatric surgeon    6. Encounter for screening mammogram for breast cancer  Mammogram scheduled again.  Instructed patient to set up appointment  - *MA Screening Digital Bilateral; Future                                FOLLOW UP   I have asked the patient to make an appointment for followup with me in 6 months or as needed        I have carefully explained the diagnosis and treatment options to the patient.  The patient has displayed an understanding of the above, and all subsequent questions were answered.      DO KORI Schmitt    Portions of this note were produced using [employer]  Although every attempt at real-time proof reading has been made, occasional grammar/syntax errors  may have been missed.

## 2022-03-05 ASSESSMENT — ANXIETY QUESTIONNAIRES: GAD7 TOTAL SCORE: 0

## 2022-03-09 ENCOUNTER — HOSPITAL ENCOUNTER (OUTPATIENT)
Dept: MAMMOGRAPHY | Facility: CLINIC | Age: 52
Discharge: HOME | End: 2022-03-09
Attending: INTERNAL MEDICINE | Admitting: INTERNAL MEDICINE
Payer: COMMERCIAL

## 2022-03-09 DIAGNOSIS — Z12.31 ENCOUNTER FOR SCREENING MAMMOGRAM FOR BREAST CANCER: ICD-10-CM

## 2022-03-09 PROCEDURE — 77067 SCR MAMMO BI INCL CAD: CPT
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
## 2022-03-10 ENCOUNTER — MYC MEDICAL ADVICE (OUTPATIENT)
Dept: INTERNAL MEDICINE | Facility: CLINIC | Age: 52
End: 2022-03-10
Payer: COMMERCIAL

## 2022-03-10 DIAGNOSIS — F33.1 MODERATE EPISODE OF RECURRENT MAJOR DEPRESSIVE DISORDER (H): Primary | ICD-10-CM

## 2022-03-10 RX ORDER — BUPROPION HYDROCHLORIDE 150 MG/1
150 TABLET ORAL EVERY MORNING
Qty: 14 TABLET | Refills: 0 | Status: SHIPPED | OUTPATIENT
Start: 2022-03-10 | End: 2022-03-29

## 2022-03-16 ENCOUNTER — OFFICE VISIT (OUTPATIENT)
Dept: ORTHOPEDICS | Facility: CLINIC | Age: 52
End: 2022-03-16
Attending: INTERNAL MEDICINE
Payer: COMMERCIAL

## 2022-03-16 VITALS
HEIGHT: 72 IN | SYSTOLIC BLOOD PRESSURE: 132 MMHG | DIASTOLIC BLOOD PRESSURE: 79 MMHG | BODY MASS INDEX: 39.68 KG/M2 | WEIGHT: 293 LBS

## 2022-03-16 DIAGNOSIS — M25.512 CHRONIC LEFT SHOULDER PAIN: ICD-10-CM

## 2022-03-16 DIAGNOSIS — G89.29 CHRONIC LEFT SHOULDER PAIN: ICD-10-CM

## 2022-03-16 DIAGNOSIS — M75.02 ADHESIVE CAPSULITIS OF LEFT SHOULDER: Primary | ICD-10-CM

## 2022-03-16 PROCEDURE — 20611 DRAIN/INJ JOINT/BURSA W/US: CPT | Mod: LT | Performed by: FAMILY MEDICINE

## 2022-03-16 PROCEDURE — 99213 OFFICE O/P EST LOW 20 MIN: CPT | Mod: 25 | Performed by: FAMILY MEDICINE

## 2022-03-16 RX ORDER — TRIAMCINOLONE ACETONIDE 40 MG/ML
40 INJECTION, SUSPENSION INTRA-ARTICULAR; INTRAMUSCULAR
Status: DISCONTINUED | OUTPATIENT
Start: 2022-03-16 | End: 2023-08-18

## 2022-03-16 RX ORDER — ROPIVACAINE HYDROCHLORIDE 5 MG/ML
3 INJECTION, SOLUTION EPIDURAL; INFILTRATION; PERINEURAL
Status: DISCONTINUED | OUTPATIENT
Start: 2022-03-16 | End: 2023-08-18

## 2022-03-16 RX ADMIN — ROPIVACAINE HYDROCHLORIDE 3 ML: 5 INJECTION, SOLUTION EPIDURAL; INFILTRATION; PERINEURAL at 09:44

## 2022-03-16 RX ADMIN — TRIAMCINOLONE ACETONIDE 40 MG: 40 INJECTION, SUSPENSION INTRA-ARTICULAR; INTRAMUSCULAR at 09:44
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
## 2022-03-22 NOTE — TELEPHONE ENCOUNTER
FYI to provider - Patient is lost to pap tracking follow-up. Attempts to contact pt have been made per reminder process and there has been no reply and/or no appt scheduled.       3/23/18 LSIL pap, + HR HPV + 16 and other. Plan: colp bef 6/23/18  5/3/18 [facility] bx @ 9 o'clock: UMAIR 1 and koilocytosis, with condyloma-like features. ECC: negative. Plan: cotest in 1 yr.  5/8/19 ASC[facility] pap, + HR HPV (not 16 or 18). Plan: colp  7/12/19 [facility] bx: worrisome for HSIL. Plan: Consult visit with Dr. Cordova for possible CKC.  7/31/19 Consult visit. Plan: Pelvic US, then LEEP.   US showed two fibroids.   8/29/19 LEEP bx: LSIL. Plan: pap due in 4 mo  1/22/20 Pap: NIL, + HR HPV (not 16 or 18). ECC: negative. Plan: cotest in 1 yr.  2/10/21 NIL Pap, Neg HR HPV. Plan: cotest in 1 yr.   1/26/22 Reminder mychart  2/22/22 Reminder call. Left msg  3/22/22 Lost to follow up

## 2022-03-28 DIAGNOSIS — F33.1 MODERATE EPISODE OF RECURRENT MAJOR DEPRESSIVE DISORDER (H): ICD-10-CM

## 2022-03-29 RX ORDER — BUPROPION HYDROCHLORIDE 150 MG/1
150 TABLET ORAL EVERY MORNING
Qty: 14 TABLET | Refills: 0 | Status: SHIPPED | OUTPATIENT
Start: 2022-03-29 | End: 2022-07-25

## 2022-03-29 NOTE — TELEPHONE ENCOUNTER
Pending Prescriptions:                       Disp   Refills    buPROPion (WELLBUTRIN XL) 150 MG 24 hr tab*14 tab*0        Sig: Take 1 tablet (150 mg) by mouth every morning      Routing refill request to provider for review/approval because:  Labs out of range:    PHQ 9/25/2017 3/4/2022   PHQ-9 Total Score 6 16   Q9: Thoughts of better off dead/self-harm past 2 weeks Not at all Not at all         Екатерина Collins RN

## 2022-05-21 NOTE — Clinical Note
JoseAung needs to discontinue her Depo Provera per request of her Bariatric Surgeon. She is looking for metrorrhagia management. I believe Mirena IUD would be good option. I would like her to see you again to discuss and plan. Electronically signed by Lele Mancuso MD  
Patient

## 2022-06-06 DIAGNOSIS — F33.1 MODERATE EPISODE OF RECURRENT MAJOR DEPRESSIVE DISORDER (H): Primary | ICD-10-CM

## 2022-06-07 NOTE — TELEPHONE ENCOUNTER
Routing refill request to provider for review/approval because:  Drug not active on patient's medication list    BERNARD Enriquez, RN  [facility]

## 2022-06-08 RX ORDER — PAROXETINE 10 MG/1
TABLET, FILM COATED ORAL
Qty: 30 TABLET | Refills: 5 | Status: SHIPPED | OUTPATIENT
Start: 2022-06-08 | End: 2022-08-18 | Stop reason: ALTCHOICE

## 2022-06-15 ENCOUNTER — VIRTUAL VISIT (OUTPATIENT)
Dept: SURGERY | Facility: CLINIC | Age: 52
End: 2022-06-15
Payer: COMMERCIAL

## 2022-06-15 ENCOUNTER — OFFICE VISIT (OUTPATIENT)
Dept: FAMILY MEDICINE | Facility: CLINIC | Age: 52
End: 2022-06-15
Payer: COMMERCIAL

## 2022-06-15 ENCOUNTER — ANCILLARY PROCEDURE (OUTPATIENT)
Dept: GENERAL RADIOLOGY | Facility: CLINIC | Age: 52
End: 2022-06-15
Attending: PHYSICIAN ASSISTANT
Payer: COMMERCIAL

## 2022-06-15 VITALS
DIASTOLIC BLOOD PRESSURE: 70 MMHG | WEIGHT: 293 LBS | BODY MASS INDEX: 39.68 KG/M2 | HEIGHT: 72 IN | TEMPERATURE: 97.3 F | SYSTOLIC BLOOD PRESSURE: 118 MMHG | OXYGEN SATURATION: 98 % | RESPIRATION RATE: 16 BRPM | HEART RATE: 62 BPM

## 2022-06-15 DIAGNOSIS — Z90.3 HISTORY OF SLEEVE GASTRECTOMY: Primary | ICD-10-CM

## 2022-06-15 DIAGNOSIS — M25.562 LEFT KNEE PAIN, UNSPECIFIED CHRONICITY: Primary | ICD-10-CM

## 2022-06-15 DIAGNOSIS — E66.01 MORBID OBESITY (H): ICD-10-CM

## 2022-06-15 DIAGNOSIS — K91.2 POSTOPERATIVE MALABSORPTION: ICD-10-CM

## 2022-06-15 DIAGNOSIS — Z71.3 NUTRITIONAL COUNSELING: ICD-10-CM

## 2022-06-15 DIAGNOSIS — M25.562 LEFT KNEE PAIN, UNSPECIFIED CHRONICITY: ICD-10-CM

## 2022-06-15 PROCEDURE — 99214 OFFICE O/P EST MOD 30 MIN: CPT | Performed by: PHYSICIAN ASSISTANT

## 2022-06-15 PROCEDURE — 73562 X-RAY EXAM OF KNEE 3: CPT | Mod: TC | Performed by: RADIOLOGY

## 2022-06-15 PROCEDURE — 97803 MED NUTRITION INDIV SUBSEQ: CPT | Mod: 95 | Performed by: DIETITIAN, REGISTERED

## 2022-06-15 ASSESSMENT — PATIENT HEALTH QUESTIONNAIRE - PHQ9
SUM OF ALL RESPONSES TO PHQ QUESTIONS 1-9: 4
10. IF YOU CHECKED OFF ANY PROBLEMS, HOW DIFFICULT HAVE THESE PROBLEMS MADE IT FOR YOU TO DO YOUR WORK, TAKE CARE OF THINGS AT HOME, OR GET ALONG WITH OTHER PEOPLE: NOT DIFFICULT AT ALL
SUM OF ALL RESPONSES TO PHQ QUESTIONS 1-9: 4

## 2022-06-15 ASSESSMENT — PAIN SCALES - GENERAL: PAINLEVEL: MODERATE PAIN (4)

## 2022-06-15 NOTE — PROGRESS NOTES
"  Assessment & Plan     Left knee pain, unspecified chronicity  Xray showing minimal arthritis changes  Swelling and instability concerns - plan for MRI   Ice, rest  S/p bariatric surgery- no NSAIDs. Can use acetaminophen as needed for pain  After MRI ref to ortho vs sports med/PT as needed   - XR Knee Left 3 Views; Future  - MR Knee Left w/o Contrast; Future  }     BMI:   Estimated body mass index is 45.19 kg/m  as calculated from the following:    Height as of this encounter: 1.829 m (6' 0.01\").    Weight as of this encounter: 151.2 kg (333 lb 4.8 oz).       Follow Up: The patient was instructed to contact clinic for worsening symptoms, non-improvement in time frame as expected/discussed, and for questions regarding medications or treatment plan. For virtual visits, the patient was advised to be seen for in person evaluation if symptoms or condition are worsening or non-improvement as expected.       Return in about 1 week (around 6/22/2022).    Jessica Stout PA-C  [facility] IVA Horan is a 51 year old accompanied by her self., presenting for the following health issues:  Musculoskeletal Problem      History of Present Illness       Reason for visit:  Knee pakn and swelling  Symptom onset:  More than a month  Symptoms include:  Knee will swell then very painful.  When lifting leg feels like the lower portion of the leg is only connected by skin.  Knee will buckle when in high swelling.  Symptom intensity:  Moderate  Symptom progression:  Improving  Had these symptoms before:  No  What makes it worse:  Movements  What makes it better:  Rest    She eats 2-3 servings of fruits and vegetables daily.She consumes 1 sweetened beverage(s) daily.She exercises with enough effort to increase her heart rate 20 to 29 minutes per day.  She exercises with enough effort to increase her heart rate 6 days per week. She is missing 1 dose(s) of medications per week.  She is not taking prescribed " "medications regularly due to remembering to take.    Today's PHQ-9         PHQ-9 Total Score: 4    PHQ-9 Q9 Thoughts of better off dead/self-harm past 2 weeks :   Not at all    How difficult have these problems made it for you to do your work, take care of things at home, or get along with other people: Not difficult at all     LEFT knee pain and swelling   Beginning of April (2 months ago) was kneeling and painting- while bending to get baseboards felt and heard a loud pop. Other people in the room heard it. Immediately had a lot of swelling and that lasted 2 weeks.  Has been \"finicky\" since then. Pain, swelling at times and sense of instability.   Pain is anterior.   Having giving out sensation with walking on flat surfaces. Been able to catch herself. Not fallen.  Last week while climbing steps felt like \"bottom of lower leg not attached to upper leg\".  Highest pain is when it is swollen.    Has not seen her PCP for this, no prior imaging.   No hx of knee injections, surgeries, etc.   Not red , no warmth.       Review of Systems   Constitutional, HEENT, cardiovascular, pulmonary, gi and gu systems are negative, except as otherwise noted.      Objective    /70   Pulse 62   Temp 97.3  F (36.3  C) (Temporal)   Resp 16   Ht 1.829 m (6' 0.01\")   Wt (!) 151.2 kg (333 lb 4.8 oz)   SpO2 98%   BMI 45.19 kg/m    Body mass index is 45.19 kg/m .  Physical Exam   GENERAL: healthy, alert and no distress  MS: Knee: LEFT: no skin changes, bruising. Symmetric. No obvious effusion or swelling. Tender to palpation at joint line. Patella nontender to exam. No popliteal fullness or tenderness. ROM full extension, flexion past 90 degrees. No laxity with ligament testing. Meniscal testing negative. No calf pain or tenderness. Posterior tibial pulses normal, no edema, sensation intact. Gait normal.   SKIN: no suspicious lesions or rashes    XR-  XR Knee Left 3 Views    Result Date: 6/15/2022  XR KNEE LEFT 3 VIEWS   " 6/15/2022 3:57 PM HISTORY:  knee pain and swelling x 2 months; Left knee pain, unspecified chronicity Comparison: None.     IMPRESSION: Mild medial compartment narrowing. Minimal patellofemoral osteoarthrosis. No fracture, effusion or calcified intra-articular body. CHIQUITA MACDONALD MD   SYSTEM ID:  [id number]                    .  ..

## 2022-06-15 NOTE — PROGRESS NOTES
Paola Lobo is a 51 year old who is being evaluated via a billable telephone visit.      What phone number would you like to be contacted at? [phone]  How would you like to obtain your AVS? Edwin      Post-op Surgical Weight Loss Diet Evaluation     Assessment:  Pt presents for 2.5 years post-op RD visit, s/p LSG on 2/12/2020 with Dr. Celis. Today we reviewed current eating habits and level of physical activity, and instructed on the changes that are required for successful bariatric outcomes.    Patient Progress: has been dealing with some huddles, struggling with emotional eating, trying to replace with an activity (walking, talking to someone over the phone, etc...)     Initial weight: 394 lbs   Current weight: 325 lbs   Weight change: 74 lbs    There is no height or weight on file to calculate BMI.    Patient Active Problem List   Diagnosis     Morbid obesity (H)     Excessive or frequent menstruation     Intramural and subserous leiomyoma of uterus     Impingement syndrome, shoulder, right     Papanicolaou smear of cervix with low grade squamous intraepithelial lesion (LGSIL)     Benign essential hypertension     Mild intermittent asthma without complication       Diabetes: No    Vitamins   Multi Vit with Iron: yes  Calcium Citrate: no-due to constipation issues  B12: yes  D3: yes    Diet Recall/Time:   Breakfast: 1 egg and cheese burrito (low carb tortilla) or yogurt with protein granola   Lunch: salad or 1/2 sandwich (GF Bread, fortified with Protein Powder)  Dinner: meat, potato, vegetable or salad     Typical Snacks: Corn Chips     Proteins/Veg/Fruits/CHO (NOT well tolerated): Sugar Substitutes     Estimated protein intake: 60-80 grams    Meal Duration:tries to be consistent with, noting some days are better than others, setting a timer these days    Fluid-meal separation:  Fluids are  30min before and 30 minutes after meals.    Fluid Intake  Water: still struggles at times with  "adequate, especially post COVID, using reminders (Bariatric Pal)       Exercise: Knee issues more recently, otherwise had been walking 1/2 mile/day      PES statement:      Overweight/Obesity (NC 3.3) related to overeating and poor lifestyle habits as evidenced by patient's subjective statements (struggles with emotional eating) and BMI of 44.2 kg/m2.     Intervention    Discussion  1. Recommended to consume 15-20gm protein at 3 meals daily, along with protein supplement/\"planned protein containing snack\" of 15-30gm protein, to reach goal of 60-80 gm protein daily.  2. Educated on post-op vitamin regimen: Multi Vit + iron 2x/day, calcium citrate 400-600 mg 2x/day, 7365-4926 mcg of Sublingual B-12 daily, and 5000 IU Vitamin D3 daily (MVI and calcium can be taken at the same time BID)  3. Reviewed lean protein sources  4. Bariatric Plate Method-  including lean/low fat protein at each meal, including a vegetable/fruit, and limiting carbohydrate intake to less than 25% of plate volume.     Instructions  1. Include 15-20gm protein at each meal, along with protein supplement/\"planned protein containing snack\" of 15-30gm protein, to reach goal of 60-80 gm protein daily.  2. Increase fluid intake to 64oz daily: choose plain or calorie/carbonation-free beverages.  3. Incorporate daily structured activity, 30-60 minutes most days of the week  4. Recommended pt to start taking: Multi Vit + iron 2x/day, calcium citrate 400-600 mg 2x/day, 1442-9120 mcg of Sublingual B-12 daily, and 5000 IU Vitamin D3 daily. (MVI and calcium can be taken at the same time)  5. Read food labels more consistently: keeping total fat grams <10, total sugar grams <10, fiber >3gm per serving.  6. Increase vegetable/fruit intake, by having a vegetable or fruit with each meal daily.  7. Practice plate method: 1/2 plate lean/low fat protein source, vegetable/fruit, <25% of plate complex carbohydrates.  8. Separate fluids 30 minutes before/after meal " times.  9. Practice eating off of smaller plates/bowls, chewing to applesauce consistency, taking 20-30 minutes to eat in a calm/relaxed environment without distractions of tv/email/cell phone.    Handouts provided:  None     Assessment/Plan:    Pt to follow up for 2.5 years post-op visit with bariatrician       Phone call duration: 30 minutes      Dona Brown, KATHRINE

## 2022-06-15 NOTE — LETTER
6/15/2022         RE: Paola Lobo  [address] [address] [facility] [address]        Dear Colleague,    Thank you for referring your patient, Paola Lobo, to the [facility] AND [facility]RICS [facility] [place]. Please see a copy of my visit note below.    Paola Lobo is a 51 year old who is being evaluated via a billable telephone visit.      What phone number would you like to be contacted at? [phone]  How would you like to obtain your AVS? MyChart      Post-op Surgical Weight Loss Diet Evaluation     Assessment:  Pt presents for 2.5 years post-op RD visit, s/p LSG on 2/12/2020 with Dr. Celis. Today we reviewed current eating habits and level of physical activity, and instructed on the changes that are required for successful bariatric outcomes.    Patient Progress: has been dealing with some huddles, struggling with emotional eating, trying to replace with an activity (walking, talking to someone over the phone, etc...)     Initial weight: 394 lbs   Current weight: 325 lbs   Weight change: 74 lbs    There is no height or weight on file to calculate BMI.    Patient Active Problem List   Diagnosis     Morbid obesity (H)     Excessive or frequent menstruation     Intramural and subserous leiomyoma of uterus     Impingement syndrome, shoulder, right     Papanicolaou smear of cervix with low grade squamous intraepithelial lesion (LGSIL)     Benign essential hypertension     Mild intermittent asthma without complication       Diabetes: No    Vitamins   Multi Vit with Iron: yes  Calcium Citrate: no-due to constipation issues  B12: yes  D3: yes    Diet Recall/Time:   Breakfast: 1 egg and cheese burrito (low carb tortilla) or yogurt with protein granola   Lunch: salad or 1/2 sandwich (GF Bread, fortified with Protein Powder)  Dinner: meat, potato, vegetable or salad     Typical Snacks: Corn Chips     Proteins/Veg/Fruits/CHO (NOT well tolerated): Sugar Substitutes     Estimated  "protein intake: 60-80 grams    Meal Duration:tries to be consistent with, noting some days are better than others, setting a timer these days    Fluid-meal separation:  Fluids are  30min before and 30 minutes after meals.    Fluid Intake  Water: still struggles at times with adequate, especially post COVID, using reminders (Bariatric Pal)       Exercise: Knee issues more recently, otherwise had been walking 1/2 mile/day      PES statement:      Overweight/Obesity (NC 3.3) related to overeating and poor lifestyle habits as evidenced by patient's subjective statements (struggles with emotional eating) and BMI of 44.2 kg/m2.     Intervention    Discussion  1. Recommended to consume 15-20gm protein at 3 meals daily, along with protein supplement/\"planned protein containing snack\" of 15-30gm protein, to reach goal of 60-80 gm protein daily.  2. Educated on post-op vitamin regimen: Multi Vit + iron 2x/day, calcium citrate 400-600 mg 2x/day, 2068-5644 mcg of Sublingual B-12 daily, and 5000 IU Vitamin D3 daily (MVI and calcium can be taken at the same time BID)  3. Reviewed lean protein sources  4. Bariatric Plate Method-  including lean/low fat protein at each meal, including a vegetable/fruit, and limiting carbohydrate intake to less than 25% of plate volume.     Instructions  1. Include 15-20gm protein at each meal, along with protein supplement/\"planned protein containing snack\" of 15-30gm protein, to reach goal of 60-80 gm protein daily.  2. Increase fluid intake to 64oz daily: choose plain or calorie/carbonation-free beverages.  3. Incorporate daily structured activity, 30-60 minutes most days of the week  4. Recommended pt to start taking: Multi Vit + iron 2x/day, calcium citrate 400-600 mg 2x/day, 8779-4148 mcg of Sublingual B-12 daily, and 5000 IU Vitamin D3 daily. (MVI and calcium can be taken at the same time)  5. Read food labels more consistently: keeping total fat grams <10, total sugar grams <10, " fiber >3gm per serving.  6. Increase vegetable/fruit intake, by having a vegetable or fruit with each meal daily.  7. Practice plate method: 1/2 plate lean/low fat protein source, vegetable/fruit, <25% of plate complex carbohydrates.  8. Separate fluids 30 minutes before/after meal times.  9. Practice eating off of smaller plates/bowls, chewing to applesauce consistency, taking 20-30 minutes to eat in a calm/relaxed environment without distractions of tv/email/cell phone.    Handouts provided:  None     Assessment/Plan:    Pt to follow up for 2.5 years post-op visit with bariatrician       Phone call duration: 30 minutes      Dona Brown RD          Again, thank you for allowing me to participate in the care of your patient.        Sincerely,        Dona Brown RD

## 2022-06-15 NOTE — PATIENT INSTRUCTIONS
To schedule your Imaging Test or Specialty Referral please call: Knee MRI     [facility]  [address] [place] [address]  [phone]  Specialty consultation [phone]  Colonoscopy [phone]

## 2022-06-23 ENCOUNTER — HOSPITAL ENCOUNTER (OUTPATIENT)
Dept: MRI IMAGING | Facility: CLINIC | Age: 52
Discharge: HOME | End: 2022-06-23
Attending: PHYSICIAN ASSISTANT | Admitting: PHYSICIAN ASSISTANT
Payer: COMMERCIAL

## 2022-06-23 DIAGNOSIS — M25.562 LEFT KNEE PAIN, UNSPECIFIED CHRONICITY: ICD-10-CM

## 2022-06-23 PROCEDURE — 73721 MRI JNT OF LWR EXTRE W/O DYE: CPT | Mod: 26 | Performed by: RADIOLOGY

## 2022-06-23 PROCEDURE — 73721 MRI JNT OF LWR EXTRE W/O DYE: CPT | Mod: LT

## 2022-07-23 ASSESSMENT — KOOS JR
GOING UP OR DOWN STAIRS: MODERATE
HOW SEVERE IS YOUR KNEE STIFFNESS AFTER FIRST WAKING IN MORNING: MODERATE
TWISING OR PIVOTING ON KNEE: SEVERE
STANDING UPRIGHT: SEVERE
KOOS JR SCORING: 42.28
RISING FROM SITTING: MODERATE
STRAIGHTENING KNEE FULLY: SEVERE
BENDING TO THE FLOOR TO PICK UP OBJECT: SEVERE

## 2022-07-25 ENCOUNTER — OFFICE VISIT (OUTPATIENT)
Dept: ORTHOPEDICS | Facility: CLINIC | Age: 52
End: 2022-07-25
Payer: COMMERCIAL

## 2022-07-25 VITALS
SYSTOLIC BLOOD PRESSURE: 118 MMHG | WEIGHT: 293 LBS | DIASTOLIC BLOOD PRESSURE: 82 MMHG | HEIGHT: 72 IN | BODY MASS INDEX: 39.68 KG/M2

## 2022-07-25 DIAGNOSIS — S83.522A RUPTURE OF POSTERIOR CRUCIATE LIGAMENT OF LEFT KNEE, INITIAL ENCOUNTER: ICD-10-CM

## 2022-07-25 DIAGNOSIS — S83.222A PERIPHERAL TEAR OF MEDIAL MENISCUS OF LEFT KNEE AS CURRENT INJURY, INITIAL ENCOUNTER: Primary | ICD-10-CM

## 2022-07-25 DIAGNOSIS — M17.12 PRIMARY OSTEOARTHRITIS OF LEFT KNEE: ICD-10-CM

## 2022-07-25 PROCEDURE — 20610 DRAIN/INJ JOINT/BURSA W/O US: CPT | Mod: LT | Performed by: PHYSICIAN ASSISTANT

## 2022-07-25 PROCEDURE — 99203 OFFICE O/P NEW LOW 30 MIN: CPT | Mod: 25 | Performed by: PHYSICIAN ASSISTANT

## 2022-07-25 RX ORDER — BUPIVACAINE HYDROCHLORIDE 5 MG/ML
3 INJECTION, SOLUTION PERINEURAL
Status: DISCONTINUED | OUTPATIENT
Start: 2022-07-25 | End: 2023-08-18

## 2022-07-25 RX ORDER — TRIAMCINOLONE ACETONIDE 40 MG/ML
80 INJECTION, SUSPENSION INTRA-ARTICULAR; INTRAMUSCULAR
Status: DISCONTINUED | OUTPATIENT
Start: 2022-07-25 | End: 2023-08-18

## 2022-07-25 RX ADMIN — TRIAMCINOLONE ACETONIDE 80 MG: 40 INJECTION, SUSPENSION INTRA-ARTICULAR; INTRAMUSCULAR at 08:07

## 2022-07-25 RX ADMIN — BUPIVACAINE HYDROCHLORIDE 3 ML: 5 INJECTION, SOLUTION PERINEURAL at 08:07

## 2022-07-25 ASSESSMENT — PAIN SCALES - GENERAL: PAINLEVEL: MODERATE PAIN (5)
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
## 2022-08-08 ENCOUNTER — HOSPITAL ENCOUNTER (OUTPATIENT)
Dept: PHYSICAL THERAPY | Facility: CLINIC | Age: 52
Setting detail: THERAPIES SERIES
Discharge: HOME | End: 2022-08-08
Attending: PHYSICIAN ASSISTANT
Payer: COMMERCIAL

## 2022-08-08 DIAGNOSIS — S83.522A RUPTURE OF POSTERIOR CRUCIATE LIGAMENT OF LEFT KNEE, INITIAL ENCOUNTER: ICD-10-CM

## 2022-08-08 DIAGNOSIS — S83.222A PERIPHERAL TEAR OF MEDIAL MENISCUS OF LEFT KNEE AS CURRENT INJURY, INITIAL ENCOUNTER: ICD-10-CM

## 2022-08-08 DIAGNOSIS — M17.12 PRIMARY OSTEOARTHRITIS OF LEFT KNEE: ICD-10-CM

## 2022-08-08 PROCEDURE — 97110 THERAPEUTIC EXERCISES: CPT | Mod: GP | Performed by: PHYSICAL THERAPIST

## 2022-08-08 PROCEDURE — 97161 PT EVAL LOW COMPLEX 20 MIN: CPT | Mod: GP | Performed by: PHYSICAL THERAPIST

## 2022-08-08 NOTE — PROGRESS NOTES
08/08/22 0700   General Information   Type of Visit Initial OP Ortho PT Evaluation   Start of Care Date 08/08/22   Referring Physician PAN Hernandez   Patient/Family Goals Statement decrease pain, be able to knee again   Orders Evaluate and Treat   Orders Comment see comments: Patient educated to give therapy at least 6 weeks to work as well as the steroid injection.  If after 6 weeks she is still getting mechanical locking I would talk to Dr. Christie about possibly doing a medial meniscectomy.  We are hesitant to do this right away because she does have some arthritis and chondromalacia.  Hopefully the less invasive treatments work.  She can call in 6 weeks to discuss her progress if she is still getting mechanical symptoms I will reach out to Dr. Christie at that time.  Follow-up on an as-needed basis.   Date of Order 07/25/22   Certification Required? Yes   Medical Diagnosis Tear of left medial meniscus, L knee OA, partial rupt. L PCL   Surgical/Medical history reviewed Yes   Precautions/Limitations no known precautions/limitations   General Information Comments L knee MRI and x rays: Impression: 1.  Left knee medial meniscus tear.  2.  Left knee partial PCL tear.  3.  Left knee chondromalacia, medial and patellofemoral  4.  Left knee osteoarthritis, moderate medial and mild patellofemoral   Body Part(s)   Body Part(s) Knee   Presentation and Etiology   Pertinent history of current problem (include personal factors and/or comorbidities that impact the POC) Patient with injury in the beginning of April 2022 where she heard an audible and felt a pop in her left knee.  Patient has had mechanical locking since.  Patient also feels that the knee is unstable and has caused give way episodes and 6 falls.  She has tried her own home exercise program and ice but has not had any injections or formal therapy.  Her MRI shows partial PCL tear and medial meniscus tear as well as chondromalacia.  We do see osteoarthritis on  x-rays . Pt has been doing some L HS stretch ex, clam shells, heel slides, ankle pumps, SLR. Pt did get L knee injection on 7/25/22 which did help with less pain.   Impairments A. Pain;B. Decreased WB tolerance;C. Swelling;D. Decreased ROM;E. Decreased flexibility;F. Decreased strength and endurance;G. Impaired balance;H. Impaired gait   Functional Limitations perform activities of daily living;perform required work activities;perform desired leisure / sports activities   Symptom Location L medial knee pain and infrapatellar is the greatest pain, also posterior L knee pain, some lateral knee pain if tired   How/Where did it occur At home   Onset date of current episode/exacerbation 04/09/22  (approximately)   Chronicity Chronic   Pain rating (0-10 point scale) Best (/10);Worst (/10)   Best (/10) average L knee pain 4/10   Worst (/10) 6-7/10   Pain quality A. Sharp;B. Dull;C. Aching;E. Shooting   Frequency of pain/symptoms A. Constant   Pain/symptoms are: Other   Pain symptoms comment worst at the end of the day   Pain/symptoms exacerbated by B. Walking;G. Certain positions;I. Bending;J. ADL;K. Home tasks;L. Work tasks;M. Other   Pain exacerbation comment standing, see LEFS   Pain/symptoms eased by C. Rest;E. Changing positions;H. Cold;I. OTC medication(s)   Progression of symptoms since onset: Improved  (less pain since injection)   Prior Level of Function   Prior Level of Function-Mobility ind   Prior Level of Function-ADLs ind   Functional Level Prior Comment pt was able to tolerated gardening better, pt was walking 2-3 miles per day, now 1/2 mile at the most   Current Level of Function   Current Community Support Family/friend caregiver   Patient role/employment history A. 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 Yes   Have you fallen and had an injury in the past year? Yes   Is patient a fall risk? No   Fall screen comments due to L knee give way   Abuse Screen (yes response referral indicated)   Feels Unsafe at Home or Work/School no   Feels Threatened by Someone no   Does Anyone Try to Keep You From Having Contact with Others or Doing Things Outside Your Home? no   Physical Signs of Abuse Present no   Functional Scales   Functional Scales Other   Other Scales  LEFS 42/80, reported functional level compared to before April is 50%, wakes 3-4x per night due to pain   Knee Objective Findings   Gait/Locomotion no assistive device   Observation Labored transfers. In sitting L knee pain 3/10, sit to stand 5/10.   Integumentary  L knee swellen greatest medially, L knee warmer to touch than R, L quad atrophy compared to R   Posture kyphotic lumbar spine sitting posture, lower legs internally rotated in sitting   Side (if bilateral, select both right and left) Right;Left   Right Knee Extension AROM 5 degrees of hyperextension   Left Knee Extension AROM 5 degrees of hyperextension   Right Knee Flexion AROM 125 degrees   Left Knee Flexion AROM 110 degrees   Right Quad Set Strength good   Left Quad Set Strength fair   R VMO Strength good   L VMO Strength poor   Planned Therapy Interventions   Planned Therapy Interventions neuromuscular re-education;ROM;strengthening;stretching;balance training;gait training   Planned Modality Interventions   Planned Modality Interventions Electrical stimulation   Planned Modality Interventions Comments if needed   Clinical Impression   Criteria for Skilled Therapeutic Interventions Met yes, treatment indicated   PT Diagnosis L knee pain due to tear of left medial meniscus, L knee osteoarthritis, partial rupt. L PCL   Influenced by the following impairments pain, ROM, swelling, weakness, atrophy, balance   Functional limitations due to  impairments adl's, kneeling, gardening, squatting, see LEFS, walking   Clinical Presentation Stable/Uncomplicated   Clinical Presentation Rationale clinical judgement   Clinical Decision Making (Complexity) Low complexity   Therapy Frequency 1 time/week   Predicted Duration of Therapy Intervention (days/wks) 12 weeks, decrease as able   Risk & Benefits of therapy have been explained Yes   Patient, Family & other staff in agreement with plan of care Yes   Education Assessment   Barriers to Learning No barriers   ORTHO GOALS   PT Ortho Eval Goals 1;2   Ortho Goal 1   Goal Identifier HEP   Goal Description establish and progress HEP and pt to have good understanding of HEP to improve R knee ROM and R LE strength to carry over to better mobility with adl's   Target Date 09/12/22   Ortho Goal 2   Goal Identifier Function   Goal Description pt to improve L knee ROM and strength and decrease pain and carry over to improved functional level as measured by LEFS score increase from 42/80 to 50/80 or better   Target Date 09/12/22   Total Evaluation Time   PT Eval, Low Complexity Minutes ([id number]) 25   Therapy Certification   Certification date from 08/08/22   Certification date to 11/06/22   Medical Diagnosis Tear of left medial meniscus, L knee OA, partial rupt. L PCL

## 2022-08-08 NOTE — PROGRESS NOTES
[facility]    OUTPATIENT PHYSICAL THERAPY ORTHOPEDIC EVALUATION  PLAN OF TREATMENT FOR OUTPATIENT REHABILITATION  (COMPLETE FOR INITIAL CLAIMS ONLY)  Patient's Last Name, First Name, M.I.  YOB: 1970  Paola Lobo    Provider s Name:  [facility]   Medical Record No.  [id number]   Start of Care Date:  08/08/22   Onset Date:  04/09/22 (approximately)   Type:     _X__PT   ___OT   ___SLP Medical Diagnosis:  Tear of left medial meniscus, L knee OA, partial rupt. L PCL     PT Diagnosis:  L knee pain due to tear of left medial meniscus, L knee osteoarthritis, partial rupt. L PCL   Visits from SOC:  1      _________________________________________________________________________________  Plan of Treatment/Functional Goals:  neuromuscular re-education, ROM, strengthening, stretching, balance training, gait training     Electrical stimulation  if needed  Goals  Goal Identifier: HEP  Goal Description: establish and progress HEP and pt to have good understanding of HEP to improve R knee ROM and R LE strength to carry over to better mobility with adl's  Target Date: 09/12/22    Goal Identifier: Function  Goal Description: pt to improve L knee ROM and strength and decrease pain and carry over to improved functional level as measured by LEFS score increase from 42/80 to 50/80 or better  Target Date: 09/12/22       Therapy Frequency:  1 time/week  Predicted Duration of Therapy Intervention:  12 weeks, decrease as able    Raymond Mccormick, PT                 I CERTIFY THE NEED FOR THESE SERVICES FURNISHED UNDER        THIS PLAN OF TREATMENT AND WHILE UNDER MY CARE     (Physician co-signature of this document indicates review and certification of the therapy plan).                       Certification Date From:  08/08/22   Certification Date To:  11/06/22    Referring Provider:   Abran Thronton, PAC    Initial Assessment        See Epic Evaluation Start of Care Date: 08/08/22
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
## 2022-08-18 ENCOUNTER — VIRTUAL VISIT (OUTPATIENT)
Dept: SURGERY | Facility: CLINIC | Age: 52
End: 2022-08-18
Payer: COMMERCIAL

## 2022-08-18 VITALS — HEIGHT: 72 IN | BODY MASS INDEX: 39.68 KG/M2 | WEIGHT: 293 LBS

## 2022-08-18 DIAGNOSIS — E66.1 CLASS 3 DRUG-INDUCED OBESITY WITH SERIOUS COMORBIDITY AND BODY MASS INDEX (BMI) OF 40.0 TO 44.9 IN ADULT (H): Primary | ICD-10-CM

## 2022-08-18 DIAGNOSIS — Z90.3 HISTORY OF SLEEVE GASTRECTOMY: ICD-10-CM

## 2022-08-18 DIAGNOSIS — K91.2 POSTOPERATIVE MALABSORPTION: ICD-10-CM

## 2022-08-18 DIAGNOSIS — E66.813 CLASS 3 DRUG-INDUCED OBESITY WITH SERIOUS COMORBIDITY AND BODY MASS INDEX (BMI) OF 40.0 TO 44.9 IN ADULT (H): Primary | ICD-10-CM

## 2022-08-18 PROCEDURE — 99214 OFFICE O/P EST MOD 30 MIN: CPT | Mod: 95 | Performed by: EMERGENCY MEDICINE

## 2022-08-18 NOTE — PATIENT INSTRUCTIONS
Plan:  1. Continue emphasis on good bariatric method with getting a meal every 4-5 hours with 15-20grams of lean protein at each meal. Separate beverages from meals by 20 minutes and hydrate well through the day with a goal of 64-75oz of water daily.      2. Naltrexone to reduce cravings:  Start half a tablet 20 minutes after supper for 10 days then increase to twice daily dosing if tolerated (20 minutes after breakfast and 20 minutes after supper). Stop naltrexone if any need for opiate pain medications. We'll consider trial of Saxenda or Wegovy in the future if needed and covered well by insurance. Lower dose bupropion may be helpful as well if needed but given your previous insomnia issues when used for your depression, we'll avoid bupropion and stimulant appetite suppressants as well.    3. Recheck in 8 weeks, message sooner if issues arise/changes desired.    4. Plan to recheck bariatric labs next February at your annual check up.    5. Gentle strength work encouraged 3 days weekly for now. 15-25 minutes per session would be ideal.         [facility]  Nutritional Guidelines  Gastric Sleeve 18 Months Post Op and Beyond    eneral Guidelines and Helpful Hints:  Eat 3 meals per day + protein supplement(s). No snacks between meals.  Do not skip meals.  This can cause overeating at the next meal and will prevent adequate protein and nutritional intake.  Aim for 60-80 grams of protein per day.  Always eat your protein first. This assists with optimal nutrition and helps you stay full longer.  Depending on your portion size, you may need to drink approved protein supplement between meals to achieve protein goals. Follow recommendations of your Dietitian.   Eat your protein first, and then follow with fiber.   It is not necessary to count your fiber, but 15-25 grams per day is recommended.    Add fiber by including fruits, vegetables, whole grains, and beans.   Portions should remain about 1 cup per  "meal. Use measuring cups to be accurate.  Continue to use saucer/salad plates, infant/toddler silverware to keep portion sizes small and take small bites.  Eat [id number] to make each meal last 20-30 minutes. Always stop eating when satisfied.  Continue to use caution with foods containing skins, peels or membranes. Chew well!  Aim for 64 oz. of calorie-free fluids daily.  Continue to avoid caffeine and carbonation. If you choose to drink alcohol, do so in moderation.   Remember to avoid drinking during meals, 15-30 minutes before and 30 minutes after.  Exercise is arteaga for continued weight loss and weight maintenance. 150 minutes weekly of moderate aerobic activity or 75 minutes of vigorous with 2 days or more a week of strength training. Try to get 20% or more of your steps each day at a brisk pace, as though hurrying to a bus stop. Look to get stronger this year.  If having trouble tolerating meat, try using a crock-pot, tinfoil tent, steamer or other moist cooking method to create tender meats. Add broth or low-fat gravy to help meat stay moist.   Avoid high sugar and high fat foods to prevent dumping syndrome.  Check nutrition labels for less than 10 grams of sugar and less than 10 grams of fat per serving.  Continue Taking Vitamins/Minerals:  1000 mcg of Sublingual B-12 at least 3 days weekly to average 350-500mcg/day. If using 2500mcg lozenges, 2 weekly.  If 5000 mcg, once weekly dosing works.  1 Complete Multivitamin with 18mg Iron twice daily (chewable or swallow tabs). Often sold as \"women's one a day\" if tablet but take twice daily.  500-600 mg Calcium Citrate twice daily (chewable or swallow tabs).  5000 IU Vitamin D3 daily.  If menstruating, you may need closer to 60mg of iron daily to prevent iron deficiency. An occasional \"boost\" of extra iron supplement during/after is reasonable if heavy flow.    Sample Grocery List    Protein:  Fat free Greek or light yogurt (less than 10 grams sugar)  Fat free or " low-fat cottage cheese  String cheese or reduced fat cheese slices  Tuna, salmon, crab, egg, or chicken salad made with light or fat free mayonnaise  Egg or Egg Substitute  Lean/extra lean turkey, beef, bison, venison (ground, sirloin, round, flank)  Pork loin or tenderloin (grilled, baked, broiled)  Fish such as salmon, tuna, trout, tilapia, etc. (grilled, baked, broiled)  Tender cuts of lean (skinless) turkey or chicken  Lean deli meats: turkey, lean ham, chicken, lean roast beef  Beans such as kidney, garbanzo, black, clemons, or low-fat/fat free refried beans  Peanut butter (natural preferred). Limit to 1 Tbsp. per day.  Low-fat meatloaf (made with lean ground beef or turkey)  Sloppy Joes made with low-sugar ketchup and lean ground beef or turkey  Soy or vegetable protein (i.e. vegan crumbles, soy/veggie burger, tofu)  Hummus    Vegetables:  Fresh: cooked or raw (as tolerated)  Frozen vegetables  Canned vegetables (low sodium or no salt added, rinse before cooking/eating)  (Ok to have skins/peels/membranes/seeds - just chew well)    Fruits:  Fresh fruit  Frozen fruit (no sugar added)  Canned fruit (packed in its own juice, NOT syrup)  (Ok to have skins/peels/membranes/seeds - just chew well)    Starch:  Unsweetened whole-grain hot cereal (or high fiber cold cereal, dry)  Toasted whole wheat bread or [place] Thins  Whole grain crackers  Baked /boiled/mashed potato/sweet potato  Cooked whole grain pasta, brown rice, or other cooked whole grains  Starchy vegetables: corn, peas, winter squash    Protein Supplement:   Ready to drink protein shake with:  15-30 grams protein per serving  Less than 10 grams total carbohydrate per serving   Protein powder mixed with:   Skim or 1% milk  Low fat or fat free Lactaid milk, plain or no sugar added soymilk  Water     Fats: (use in moderation)  1 teaspoon of soft tub margarine  1 teaspoon olive oil, canola oil, or peanut oil  1 tablespoon of low-fat lewis or salad dressing      Sample Menu for 18+ months after Gastric Sleeve    You do NOT need to eat/drink the full portion sizes listed below  Always stop when you are satisfied    Breakfast   cup 1% cottage cheese     cup mixed berries   Lunch 2 oz lean roast beef on   [place] Thin with 1 tsp. light lewis    small tomato, chopped, mixed with 1 tsp. light vinaigrette dressing   Supplement Approved protein supplement (if needed between meals)   Dinner 2 oz grilled salmon    cup salad greens with 1 tsp. light salad dressing and 1 tsp. ground flax seed    cup quinoa or brown rice     Breakfast   cup egg substitute with   cup sautéed chopped vegetables  2 light [place] Krisp crackers   Lunch Tuna Melt:   cup tuna mixed with 1 tsp. light lewis over   [place] Thin. Top with 2-3 slices cucumber and 1 oz slice of low fat cheese   Supplement 1 cup skim milk (if needed between meals)   Dinner 3 oz  grilled, broiled, or baked seasoned skinless chicken breast    cup asparagus     Breakfast   cup plain oatmeal made with skim or 1% milk with 1 Tbsp. flavored/unflavored protein powder added  1 mozzarella string cheese   Lunch 2 oz deli turkey breast  1/3 cup salad with 1 tsp. light salad dressing, 1/8 of a whole avocado and 1 Tbsp. sunflower seeds   Dinner 3 oz. pork loin made in a crock pot, seasoned with a spice rub    cup cooked carrots   Supplement Approved protein supplement (if needed between meals)     Breakfast 1 cup breakfast casserole made with egg substitute, turkey sausage,  and steamed, chopped bell peppers   Supplement  1 cup light Greek yogurt (if needed between meals)   Lunch 2 oz. teriyaki turkey    cup mashed sweet potato with 1-2 spritzes of spray butter (like [place])    cup fresh pineapple   Dinner 3 oz low fat meatloaf    cup roasted garlic zucchini     Breakfast   cup leftover breakfast casserole    cup no sugar added applesauce with 1 Tbsp. unflavored protein powder and a sprinkle of cinnamon    Lunch 3 oz shrimp with 1-2 Tbsp.  low-sugar cocktail sauce for dipping    c. whole wheat pasta drizzled with   tsp. olive oil   Supplement 1 cup skim/1% milk with scoop of protein powder (if needed between meals)   Dinner Grilled, seasoned kebob with 2 oz lean beef and   cup vegetables     Breakfast Breakfast pizza:   [place] Thin spread with 1 Tbsp. low sugar spaghetti sauce,   cup shredded low fat cheese, melted and 1 slice of [place] espitia     cup fresh fruit mixed with chopped almonds   Lunch   cup black bean soup  4-5 whole grain crackers   Dinner 3 oz  tilapia with lemon pepper seasoning    cup stewed tomatoes   Supplement 1 string cheese (if needed between meals)     Breakfast 2 hard boiled eggs (discard 1 egg yolk)    whole wheat English Muffin with 1 tsp. low sugar jelly   Lunch   cup leftover black bean soup topped with 1-2 Tbsp. low fat cheese  2-3 light [place] Krisp crackers   Supplement Approved protein supplement (if needed between meals)   Dinner 3 oz sirloin steak    cup steamed broccoli          LEAN PROTEIN SOURCES  Getting 20-30 grams of protein, 3 meals daily, is appropriate for most people, some need more but more than about 40 grams per meal is not useful.  General rule is drinking one ounce of water per gram of protein eaten over the course of the day:  70 grams of protein each day, drink 70 oz of water.  Protein Source Portion Calories Grams of Protein                           Nonfat, plain Greek yogurt    (10 grams sugar or less) 3/4 cup (6 oz)  12-17   Light Yogurt (10 grams sugar or less) 3/4 cup (6 oz)  6-8   Protein Shake 1 shake 110-180 15-30   Skim/1% Milk or lactose-free milk 1 cup ( 8 oz)  8   Plain or light, flavored soymilk 1 cup  7-8   Plain or light, hemp milk 1 cup 110 6   Fat Free or 1% Cottage Cheese 1/2 cup 90 15   Part skim ricotta cheese 1/2 cup 100 14   Part skim or reduced fat cheese slices 1 ounce 65-80 8     Mozzarella String Cheese 1 80 8   Canned tuna, chicken, crab or  salmon  (canned in water)  1/2 cup 100 15-20   White fish (broiled, grilled, baked) 3 ounces 100 21   [place]/Tuna (broiled, grilled, baked) 3 ounces 150-180 21   Shrimp, Scallops, Lobster, Crab 3 ounces 100 21   Pork loin, Pork Tenderloin 3 ounces 150 21   Boneless, skinless chicken /turkey breast                          (broiled, grilled, baked) 3 ounces 120 21   [place], [place], [place], and Venison 3 ounces 120 21   Lean cuts of red meat and pork (sirloin,   round, tenderloin, flank, ground 93%-96%) 3 ounces 170 21   Lean or Extra Lean Ground Turkey 1/2 cup 150 20   90-95% Lean [place] Burger 1 bashir 140-180 21   Low-fat casserole with lean meat 3/4 cup 200 17   Luncheon Meats                                                        (turkey, lean ham, roast beef, chicken) 3 ounces 100 21   Egg (boiled, poached, scrambled) 1 Egg 60 7   Egg Substitute 1/2 cup 70 10   Nuts (limit to 1 serving per day)  3 Tbsp. 150 7   Nut [place] (peanut, almond)  Limit to 1 serving or less daily 1 Tbsp. 90 4   Soy Burger (varies) 1  15   Garbanzo, Black, [place] Beans 1/2 cup 110 7   Refried Beans 1/2 cup 100 7   Kidney and [place] beans 1/2 cup 110 7   Tempeh 3 oz 175 18   Vegan crumbles 1/2 cup 100 14   Tofu 1/2 cup 110 14   Chili (beans and extra lean beef or turkey) 1 cup 200 23   Lentil Stew/Soup 1 cup 150 12   Black Bean Soup 1 cup 175 12           MEDICATIONS FOR WEIGHT LOSS  There are several medications available to assist us in weight loss.  By themselves, without compliance to a change in diet and increase in movement/activity these medications are disappointing in their results. However, combined with a closely monitored program of diet change and exercise they can be very effective in controlling appetite and boosting initial weight loss.  All weight loss medications need continual re-evaluation for efficacy as their side effects and health benefits fail to be worthwhile if a person is not continuing to lose weight or in  maintaining their healthy weight.  Some weight loss medications are scheduled drugs, meaning there is at least a theoretical possibility for developing addiction to them but in practice this is rare.  We do anticipate coming off meds in the future after stabilization of weight loss is assurred.  Finally, a tolerance can develop and people s perceived efficacy of medication can diminish.  In communication with your physician, it may be appropriate to intermittently take a break from these medications and then restart again (few weeks off then restart again) if a plateau is reached that cannot be broken through.  Each person can respond to a medication differently and to be a good option for you, it will need to be affordable, effective and well tolerated with minimal side effects.    In most cases, weight loss progress after one month and three months will be obtained and if a patient is not reaching the satisfactory progress towards weight loss, the medications may be discontinued.  The thought is that if a person is taking a weight loss medication and not receiving the potential health benefit of that drug, the side effects are not worthwhile and use should be discontinued.  On the flip side, there are many people on some weight loss medications for years because it continues to be an effective tool in their weight management and they are tolerating the medication without any long-term side effects.  Each person's response and purpose will be evaluated.      PHENTERMINE (Adipex): approved in 1959 for appetite suppression.  It has stimulant effects and cannot be used with Ritalin, Concerta, or other stimulants.  Although it is not highly addictive, it's chemically related to amphetamines which are addictive and is classified as a Controlled Substance by the [place].  Occasional dependence can develop, but rarely. The most common side effects are dry mouth, increased energy and concentration, increased pulse, and  constipation.  You should not take phentermine if you have glaucoma, hyperthyroidism, or uncontrolled/untreated hypertension or overly anxious. You should stop if dramatic mood swings, severe insomnia, palpations, chest pains, visual changes or if your Blood Pressure is consistently elevated or any time it's over 160/90.   It's ok to go off the med for a few weeks and restart if efficacy is wearing off.  $24-$30 for 90 tablets at [facility]. Females are required to have reliable birth control to reduce the risk birth defects/miscarriage.      TOPIRAMATE (Topamax): Anti-seizure medication, also used to prevent migraines and sometimes for mood stabilization.  Side effects include paresthesia, glaucoma, altered concentration, attention difficulties, memory and speech problems, metabolic acidosis, depression, increase in body temperature and decrease sweating, risk of kidney stones.  Do not take Topamax while taking Depakote as this can cause high ammonia levels.  You must have reliable birth control as Topamax can cause birth defects.  If prolonged use has occurred it should be tapered off slowly to avoid withdrawal issues.  Insurance usually covers Topiramate.  At higher doses, there may be some confusion/forgetfulness associated with this so we try to limit dose to under 75mg twice daily to reduce this risk. Often covered by insurance as it's used for many reasons.  Topamax will cause carbonated beverages to taste bad. A recheck of your kidney/electrolytes may occur within a few months of starting.    QSYMIA (Phentermine + Topamax):  See above information about phentermine and Topamax.  Most common side effects are paresthesia, dizziness, distortion of taste, insomnia, constipation, and dry mouth.  See above descriptions for the two individual agents.Females are required to have reliable birth control to reduce the risk birth defects/miscarriage.  $150-$220 per month      GLP1 Agonists:  Liraglutide  "(Victoza/Saxenda), Semaglutide (Ozempic/Wegovy):   Part of the family of Glucagon Like Peptide Agonists, these medications directly suppresses appetite and are often used by diabetic patients due to improvements in glucose/insulin balance.  They also slow how quickly the stomach empties, increasing fullness. They may be hard to get covered for non diabetics and some plans have exclusions for weight loss purposes.  Currently, these are  injectable medications delivered via autoinjector pen. It can be very costly without insurance coverage (over $500/month).  Small risks for pancreatitis exists and dose should be held if increased mid abdominal pain/burning. It is not to be used if previous Multiple Endocrine Neoplasia. In rodents, may increase risk of thyroid tumors and not indicated for anyone with a history of medullary thyroid cancer as a result.  If changes in voice/swallowing should be discontinued. Reliable birth control required in women. [email or web address], [email or web address] has more information on these medications.    Contrave (Bupropion/Naltrexone).    Synergistic combination of a mild appetite suppressing anti-depressant (Bupropion) whose effects are increased due to interaction with Naltrexone.  Naltrexone may have some effects on craving and is often used in addiction medicine to help previous opiate addicts be less prone to relapse as it blocks the action of opiates. Should be stopped if any need for opiate pain medication, surgery or planned procedures where you'll be given sedation/anesthesia. If prolonged use, recommend stepping down bupropion over 2-3 weeks to limit any risk of withdrawal issues. Side effects may include dry mouth, increased heart rate, mild elevation in Blood pressure;  dizziness, ringing in the ears, anxiety (typically due to bupropion), nausea, constipation, and some get fatigued with naltrexone.  About $210 on [facility] for 120 tabs of \"Contrave\", the brand name without insurance coverage. " "Generic Bupropion 75mg: $25 for 120 tabs, Naltrexone: $55 for 90 tabs without insurance coverage on [facility]. Cannot be used if pregnant/trying to conceive or breast feeding.      Plenity:   Recently available October 2020, by mail order pharmacy only, but expensive. $98/month.  2-3 capsules taken 20 minutes before 2 meals daily provides crystals that expand into a gel that provides a mechanical fullness. Gel mixes with your next meal and increases satisfaction by bulking the meal up to feel bigger than it truly is. Plenity is not absorbed and gets passed through the digestive tract and excreted in stools. May cause some bloating/gas/full feeling as it behaves like a fiber in many ways. Cost not available yet. FDA cleared in March of 2019 but not available in stores as of March of 2020. Clearance safety and efficacy data done under \"Gelesis\" name. Not appropriate for people with stomach or bowel motility issues as requires you to pass it through digestive tract. [email or web address] has more information.        "

## 2022-08-18 NOTE — PROGRESS NOTES
"  The patient has been notified of following:     \"This telephone visit will be conducted via a call between you and your physician/provider. We have found that certain health care needs can be provided without the need for a physical exam.  This service lets us provide the care you need with a short phone conversation.  If a prescription is necessary we can send it directly to your pharmacy.  If lab work is needed we can place an order for that and you can then stop by our lab to have the test done at a later time.    Telephone visits are billed at different rates depending on your insurance coverage. During this emergency period, for some insurers they may be billed the same as an in-person visit.  Please reach out to your insurance provider with any questions.    If during the course of the call the physician/provider feels a telephone visit is not appropriate, you will not be charged for this service.\"    Patient has given verbal consent to a Telephone visit? Yes    What phone number would you like to be contacted at? [phone]    Patient would like to receive their AVS by Edwin    Additional provider notes: see below    Phone call duration: 30  minutes  "
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
## 2022-08-26 ENCOUNTER — HOSPITAL ENCOUNTER (OUTPATIENT)
Dept: PHYSICAL THERAPY | Facility: CLINIC | Age: 52
Setting detail: THERAPIES SERIES
Discharge: HOME | End: 2022-08-26
Attending: PHYSICIAN ASSISTANT
Payer: COMMERCIAL

## 2022-08-26 PROCEDURE — 97110 THERAPEUTIC EXERCISES: CPT | Mod: GP | Performed by: PHYSICAL THERAPIST

## 2022-08-26 PROCEDURE — 97014 ELECTRIC STIMULATION THERAPY: CPT | Mod: GP | Performed by: PHYSICAL THERAPIST

## 2022-08-30 ENCOUNTER — VIRTUAL VISIT (OUTPATIENT)
Dept: SURGERY | Facility: CLINIC | Age: 52
End: 2022-08-30
Payer: COMMERCIAL

## 2022-08-30 DIAGNOSIS — Z90.3 HISTORY OF SLEEVE GASTRECTOMY: Primary | ICD-10-CM

## 2022-08-30 DIAGNOSIS — Z71.3 NUTRITIONAL COUNSELING: ICD-10-CM

## 2022-08-30 DIAGNOSIS — K91.2 POSTOPERATIVE MALABSORPTION: ICD-10-CM

## 2022-08-30 PROCEDURE — 97803 MED NUTRITION INDIV SUBSEQ: CPT | Mod: 95 | Performed by: DIETITIAN, REGISTERED

## 2022-08-30 NOTE — PROGRESS NOTES
Paola Lobo is a 51 year old who is being evaluated via a billable telephone visit.      What phone number would you like to be contacted at? [phone]  How would you like to obtain your AVS? Edwin        Post-op Surgical Weight Loss Diet Evaluation     Assessment:  Pt presents for 2.5 years post-op RD visit, s/p LSG on 2/12/2020 with Dr. Celis. Today we reviewed current eating habits and level of physical activity, and instructed on the changes that are required for successful bariatric outcomes.    Patient Progress: has been working with counselor, feels she is doing alright and has been focusing in on head hunger. Needs assistance in regards to getting back on track with diet and has been working on portion sizes.  Still struggles with chip consumption, noting that she misses the crunch.     Initial weight: 394 lbs  Current weight: 329 lbs     There is no height or weight on file to calculate BMI.    Patient Active Problem List   Diagnosis     Morbid obesity (H)     Excessive or frequent menstruation     Intramural and subserous leiomyoma of uterus     Impingement syndrome, shoulder, right     Papanicolaou smear of cervix with low grade squamous intraepithelial lesion (LGSIL)     Benign essential hypertension     Mild intermittent asthma without complication       Diabetes: No     Vitamins   Multi Vit with Iron: yes  Calcium Citrate: no  B12: yes  D3: yes    Diet Recall/Time:   Breakfast: greek yogurt with granola or scrambled egg, cheese, low carb tortilla, taco seasoning  Lunch: 1/2 sandwich (2 oz turkey), celery   Dinner: meat and vegetable, occasional 1/4 cup potato    Typical Snacks: looking for something crunchy mid afternoon-chips    Estimated protein intake: 60-80 grams    Estimated portion size per meals:1 cup/meal    Meal Duration:varies-depending upon the day, ebs and flows    Fluid-meal separation:  Fluids are  30min before and 30 minutes after meals.    Fluid Intake  Water:  "continues to work on    Exercise: walking-decreased recently due to knee issues, 2 miles/day (average)      PES statement:      Overweight/Obesity (NC 3.3) related to overeating and poor lifestyle habits as evidenced by patient's subjective statements (stuggles with emotional eating) and BMI of       Intervention    Discussion  1. Recommended to consume 15-20gm protein at 3 meals daily, along with protein supplement/\"planned protein containing snack\" of 15-30gm protein, to reach goal of 60-80 gm protein daily.  2. Educated on post-op vitamin regimen: Multi Vit + iron 2x/day, calcium citrate 400-600 mg 2x/day, 3850-9660 mcg of Sublingual B-12 daily, and 5000 IU Vitamin D3 daily (MVI and calcium can be taken at the same time BID)  3. Reviewed lean protein sources  4. Bariatric Plate Method-  including lean/low fat protein at each meal, including a vegetable/fruit, and limiting carbohydrate intake to less than 25% of plate volume.     Instructions  1. Include 15-20gm protein at each meal, along with protein supplement/\"planned protein containing snack\" of 15-30gm protein, to reach goal of 60-80 gm protein daily.  2. Increase fluid intake to 64oz daily: choose plain or calorie/carbonation-free beverages.  3. Incorporate daily structured activity, 30-60 minutes most days of the week  4. Recommended pt to start taking: Multi Vit + iron 2x/day, calcium citrate 400-600 mg 2x/day, 3140-7975 mcg of Sublingual B-12 daily, and 5000 IU Vitamin D3 daily. (MVI and calcium can be taken at the same time)  5. Read food labels more consistently: keeping total fat grams <10, total sugar grams <10, fiber >3gm per serving.  6. Increase vegetable/fruit intake, by having a vegetable or fruit with each meal daily.  7. Practice plate method: 1/2 plate lean/low fat protein source, vegetable/fruit, <25% of plate complex carbohydrates.  8. Separate fluids 30 minutes before/after meal times.  9. Practice eating off of smaller plates/bowls, " chewing to applesauce consistency, taking 20-30 minutes to eat in a calm/relaxed environment without distractions of tv/email/cell phone.    Handouts provided:  None     Assessment/Plan:    Pt to follow up in 1-2 months with the dietitian and with the bariatrician       Phone call duration: 22 minutes    Dona Brown RD

## 2022-08-30 NOTE — LETTER
8/30/2022         RE: Paola Lobo  [address]   [place] [address]        Dear Colleague,    Thank you for referring your patient, Paola Lobo, to the [facility] CL[facility] BAR[facility]ATRICS [facility] [place]. Please see a copy of my visit note below.    Paola Lobo is a 51 year old who is being evaluated via a billable telephone visit.      What phone number would you like to be contacted at? [phone]  How would you like to obtain your AVS? MyChart        Post-op Surgical Weight Loss Diet Evaluation     Assessment:  Pt presents for 2.5 years post-op RD visit, s/p LSG on 2/12/2020 with Dr. Celis. Today we reviewed current eating habits and level of physical activity, and instructed on the changes that are required for successful bariatric outcomes.    Patient Progress: has been working with counselor, feels she is doing alright and has been focusing in on head hunger. Needs assistance in regards to getting back on track with diet and has been working on portion sizes.  Still struggles with chip consumption, noting that she misses the crunch.     Initial weight: 394 lbs  Current weight: 329 lbs     There is no height or weight on file to calculate BMI.    Patient Active Problem List   Diagnosis     Morbid obesity (H)     Excessive or frequent menstruation     Intramural and subserous leiomyoma of uterus     Impingement syndrome, shoulder, right     Papanicolaou smear of cervix with low grade squamous intraepithelial lesion (LGSIL)     Benign essential hypertension     Mild intermittent asthma without complication       Diabetes: No     Vitamins   Multi Vit with Iron: yes  Calcium Citrate: no  B12: yes  D3: yes    Diet Recall/Time:   Breakfast: greek yogurt with granola or scrambled egg, cheese, low carb tortilla, taco seasoning  Lunch: 1/2 sandwich (2 oz turkey), celery   Dinner: meat and vegetable, occasional 1/4 cup potato    Typical Snacks: looking for something crunchy  "mid afternoon-chips    Estimated protein intake: 60-80 grams    Estimated portion size per meals:1 cup/meal    Meal Duration:varies-depending upon the day, ebs and flows    Fluid-meal separation:  Fluids are  30min before and 30 minutes after meals.    Fluid Intake  Water: continues to work on    Exercise: walking-decreased recently due to knee issues, 2 miles/day (average)      PES statement:      Overweight/Obesity (NC 3.3) related to overeating and poor lifestyle habits as evidenced by patient's subjective statements (stuggles with emotional eating) and BMI of       Intervention    Discussion  1. Recommended to consume 15-20gm protein at 3 meals daily, along with protein supplement/\"planned protein containing snack\" of 15-30gm protein, to reach goal of 60-80 gm protein daily.  2. Educated on post-op vitamin regimen: Multi Vit + iron 2x/day, calcium citrate 400-600 mg 2x/day, 6440-5772 mcg of Sublingual B-12 daily, and 5000 IU Vitamin D3 daily (MVI and calcium can be taken at the same time BID)  3. Reviewed lean protein sources  4. Bariatric Plate Method-  including lean/low fat protein at each meal, including a vegetable/fruit, and limiting carbohydrate intake to less than 25% of plate volume.     Instructions  1. Include 15-20gm protein at each meal, along with protein supplement/\"planned protein containing snack\" of 15-30gm protein, to reach goal of 60-80 gm protein daily.  2. Increase fluid intake to 64oz daily: choose plain or calorie/carbonation-free beverages.  3. Incorporate daily structured activity, 30-60 minutes most days of the week  4. Recommended pt to start taking: Multi Vit + iron 2x/day, calcium citrate 400-600 mg 2x/day, 0494-2611 mcg of Sublingual B-12 daily, and 5000 IU Vitamin D3 daily. (MVI and calcium can be taken at the same time)  5. Read food labels more consistently: keeping total fat grams <10, total sugar grams <10, fiber >3gm per serving.  6. Increase vegetable/fruit intake, " by having a vegetable or fruit with each meal daily.  7. Practice plate method: 1/2 plate lean/low fat protein source, vegetable/fruit, <25% of plate complex carbohydrates.  8. Separate fluids 30 minutes before/after meal times.  9. Practice eating off of smaller plates/bowls, chewing to applesauce consistency, taking 20-30 minutes to eat in a calm/relaxed environment without distractions of tv/email/cell phone.    Handouts provided:  None     Assessment/Plan:    Pt to follow up in 1-2 months with the dietitian and with the bariatrician       Phone call duration: 22 minutes    Dona Brown RD          Again, thank you for allowing me to participate in the care of your patient.        Sincerely,        Dona Brown RD

## 2022-08-31 DIAGNOSIS — E66.813 CLASS 3 DRUG-INDUCED OBESITY WITH SERIOUS COMORBIDITY AND BODY MASS INDEX (BMI) OF 40.0 TO 44.9 IN ADULT (H): Primary | ICD-10-CM

## 2022-08-31 DIAGNOSIS — E66.1 CLASS 3 DRUG-INDUCED OBESITY WITH SERIOUS COMORBIDITY AND BODY MASS INDEX (BMI) OF 40.0 TO 44.9 IN ADULT (H): Primary | ICD-10-CM

## 2022-08-31 RX ORDER — NALTREXONE HYDROCHLORIDE 50 MG/1
TABLET, FILM COATED ORAL
Qty: 90 TABLET | Refills: 0 | Status: SHIPPED | OUTPATIENT
Start: 2022-08-31 | End: 2022-12-23

## 2022-09-02 ENCOUNTER — LAB (OUTPATIENT)
Dept: LAB | Facility: CLINIC | Age: 52
End: 2022-09-02
Payer: COMMERCIAL

## 2022-09-02 ENCOUNTER — HOSPITAL ENCOUNTER (OUTPATIENT)
Dept: PHYSICAL THERAPY | Facility: CLINIC | Age: 52
Setting detail: THERAPIES SERIES
Discharge: HOME | End: 2022-09-02
Attending: PHYSICIAN ASSISTANT
Payer: COMMERCIAL

## 2022-09-02 DIAGNOSIS — R79.89 ELEVATED TSH: ICD-10-CM

## 2022-09-02 LAB — T4 FREE SERPL-MCNC: 0.84 NG/DL (ref 0.76–1.46)

## 2022-09-02 PROCEDURE — 86376 MICROSOMAL ANTIBODY EACH: CPT

## 2022-09-02 PROCEDURE — 97110 THERAPEUTIC EXERCISES: CPT | Mod: GP | Performed by: PHYSICAL THERAPIST

## 2022-09-02 PROCEDURE — 84439 ASSAY OF FREE THYROXINE: CPT

## 2022-09-02 PROCEDURE — 97530 THERAPEUTIC ACTIVITIES: CPT | Mod: GP | Performed by: PHYSICAL THERAPIST

## 2022-09-02 PROCEDURE — 97112 NEUROMUSCULAR REEDUCATION: CPT | Mod: GP | Performed by: PHYSICAL THERAPIST

## 2022-09-02 PROCEDURE — 36415 COLL VENOUS BLD VENIPUNCTURE: CPT

## 2022-09-06 LAB — THYROPEROXIDASE AB SERPL-ACNC: 319 IU/ML

## 2022-09-09 ENCOUNTER — HOSPITAL ENCOUNTER (OUTPATIENT)
Dept: PHYSICAL THERAPY | Facility: CLINIC | Age: 52
Setting detail: THERAPIES SERIES
Discharge: HOME | End: 2022-09-09
Attending: PHYSICIAN ASSISTANT
Payer: COMMERCIAL

## 2022-09-09 PROCEDURE — 97530 THERAPEUTIC ACTIVITIES: CPT | Mod: GP | Performed by: PHYSICAL THERAPIST

## 2022-09-09 PROCEDURE — 97112 NEUROMUSCULAR REEDUCATION: CPT | Mod: GP | Performed by: PHYSICAL THERAPIST

## 2022-09-09 PROCEDURE — 97110 THERAPEUTIC EXERCISES: CPT | Mod: GP | Performed by: PHYSICAL THERAPIST

## 2022-09-11 ENCOUNTER — MYC MEDICAL ADVICE (OUTPATIENT)
Dept: INTERNAL MEDICINE | Facility: CLINIC | Age: 52
End: 2022-09-11

## 2022-10-12 DIAGNOSIS — R10.13 EPIGASTRIC PAIN: ICD-10-CM

## 2022-10-13 RX ORDER — PANTOPRAZOLE SODIUM 20 MG/1
TABLET, DELAYED RELEASE ORAL
Qty: 90 TABLET | Refills: 1 | Status: SHIPPED | OUTPATIENT
Start: 2022-10-13 | End: 2024-01-10

## 2022-10-16 ENCOUNTER — HEALTH MAINTENANCE LETTER (OUTPATIENT)
Age: 52
End: 2022-10-16

## 2022-11-02 NOTE — PROGRESS NOTES
[facility]    Outpatient Physical Therapy Progress Note and Discharge Note  Patient: Paola Lobo  : 1970    Beginning/End Dates of Reporting Period:  22 to 22 (pt seen for 4 PT visits from  to 22 with 1 cancel)    Referring Provider: Abran Thortnon PAC    Therapy Diagnosis: L knee pain due to tear of left medial meniscus, L knee osteoarthritis, partial rupture L PCL     Client Self Report: Overall the L knee is doing better. Tape and exercises have helped. Pt will try things on own but keep chart open in case another visit is needed. Only had 1 episode of sharp L knee pain the last week and it came from twisting.    Objective Measurements:22  Objective Measure: Average L knee pain (at eval on 22/10)  Details: less, no pain at rest today  Objective Measure: Frequency of pain (was constant at eval on 22)  Details: no longer constant  Objective Measure: L knee AROM extension to flexion (-5 degrees hyperextension to 110 degrees flexion)     Objective Measure: LEFS (42/80 at eval on 22)  Details: 51/80 on 22          Goals:  Goal Identifier HEP   Goal Description establish and progress HEP and pt to have good understanding of HEP to improve R knee ROM and R LE strength to carry over to better mobility with adl's   Target Date 22   Date Met  22   Progress (detail required for progress note): pt has good understanding of HEP     Goal Identifier Function   Goal Description pt to improve L knee ROM and strength and decrease pain and carry over to improved functional level as measured by LEFS score increase from 42/80 to 50/80 or better   Target Date 22   Date Met  22   Progress (detail required for progress note): 51/80 on 22     Plan:  Discharge from therapy.    Discharge:    Reason for Discharge: Patient has met all goals. PT has not  been notified of any problems or questions since last visit.    Equipment Issued: [email or web address]    Discharge Plan: Patient to continue home program.

## 2022-12-22 ASSESSMENT — ASTHMA QUESTIONNAIRES
QUESTION_2 LAST FOUR WEEKS HOW OFTEN HAVE YOU HAD SHORTNESS OF BREATH: NOT AT ALL
ACT_TOTALSCORE: 25
ACT_TOTALSCORE: 25
QUESTION_5 LAST FOUR WEEKS HOW WOULD YOU RATE YOUR ASTHMA CONTROL: COMPLETELY CONTROLLED
QUESTION_3 LAST FOUR WEEKS HOW OFTEN DID YOUR ASTHMA SYMPTOMS (WHEEZING, COUGHING, SHORTNESS OF BREATH, CHEST TIGHTNESS OR PAIN) WAKE YOU UP AT NIGHT OR EARLIER THAN USUAL IN THE MORNING: NOT AT ALL
QUESTION_4 LAST FOUR WEEKS HOW OFTEN HAVE YOU USED YOUR RESCUE INHALER OR NEBULIZER MEDICATION (SUCH AS ALBUTEROL): NOT AT ALL
QUESTION_1 LAST FOUR WEEKS HOW MUCH OF THE TIME DID YOUR ASTHMA KEEP YOU FROM GETTING AS MUCH DONE AT WORK, SCHOOL OR AT HOME: NONE OF THE TIME

## 2022-12-23 ENCOUNTER — TELEPHONE (OUTPATIENT)
Dept: SLEEP MEDICINE | Facility: CLINIC | Age: 52
End: 2022-12-23

## 2022-12-23 ENCOUNTER — OFFICE VISIT (OUTPATIENT)
Dept: FAMILY MEDICINE | Facility: CLINIC | Age: 52
End: 2022-12-23
Payer: COMMERCIAL

## 2022-12-23 VITALS
DIASTOLIC BLOOD PRESSURE: 80 MMHG | WEIGHT: 293 LBS | TEMPERATURE: 98 F | RESPIRATION RATE: 20 BRPM | HEIGHT: 72 IN | BODY MASS INDEX: 39.68 KG/M2 | OXYGEN SATURATION: 98 % | SYSTOLIC BLOOD PRESSURE: 120 MMHG | HEART RATE: 69 BPM

## 2022-12-23 DIAGNOSIS — H92.01 OTALGIA, RIGHT: Primary | ICD-10-CM

## 2022-12-23 PROCEDURE — 99213 OFFICE O/P EST LOW 20 MIN: CPT | Performed by: FAMILY MEDICINE

## 2022-12-23 ASSESSMENT — PATIENT HEALTH QUESTIONNAIRE - PHQ9
SUM OF ALL RESPONSES TO PHQ QUESTIONS 1-9: 0
SUM OF ALL RESPONSES TO PHQ QUESTIONS 1-9: 0

## 2022-12-23 ASSESSMENT — PAIN SCALES - GENERAL: PAINLEVEL: MODERATE PAIN (4)

## 2022-12-23 NOTE — TELEPHONE ENCOUNTER
This encounter is being sent to inform the clinic that this patient has a referral from Rubio Contreras MD for the diagnoses of Otalgia and has requested that this patient be seen within 3-5 days Based on the availability of our provider(s), we are unable to accommodate this request.      Were all sites offered this patient?  Only wanted to be seen at        Does scheduling algorithm request to schedule next available?  Patient has been scheduled for the first available opening with Dr Carrera  on 03/20/23.  We have informed the patient that the clinic will review their referral and reach out if a sooner appointment is medically necessary.

## 2022-12-23 NOTE — PROGRESS NOTES
Assessment & Plan     Otalgia, right  Differential discussed in detail including but not limited to serous otitis media, TMJ syndrome, eustachian tube dysfunction, referred dental pain and mass lesion.  Physical examination unremarkable.  Suggested over-the-counter analgesia, well hydration, warm fluids and ENT referral placed for further review and recommendations.  All questions answered.  - Adult ENT  Referral; Future      Rubio Contreras MD  [facility]    Melchor Horan is a 51 year old, presenting for the following health issues:  Ear Problem      History of Present Illness       Reason for visit:  Ear pain without cause  Symptom onset:  3-4 weeks ago  Symptoms include:  Ear pain radiates into jaw.  Symptom intensity:  Moderate  Symptom progression:  Staying the same  Had these symptoms before:  No  What makes it worse:  Cold exposure  What makes it better:  Decongestant and warm compresses provide short term relief.    She eats 0-1 servings of fruits and vegetables daily.She consumes 1 sweetened beverage(s) daily.She exercises with enough effort to increase her heart rate 10 to 19 minutes per day.  She exercises with enough effort to increase her heart rate 4 days per week. She is missing 1 dose(s) of medications per week.  She is not taking prescribed medications regularly due to remembering to take.    Today's PHQ-9         PHQ-9 Total Score: 0    PHQ-9 Q9 Thoughts of better off dead/self-harm past 2 weeks :   Not at all        Review of Systems   Constitutional, HEENT, cardiovascular, pulmonary, gi and gu systems are negative, except as otherwise noted.      Objective    /80 (Cuff Size: Adult Large)   Pulse 69   Temp 98  F (36.7  C) (Tympanic)   Resp 20   Ht 1.829 m (6')   Wt (!) 151.5 kg (334 lb)   LMP  (LMP Unknown)   SpO2 98%   BMI 45.30 kg/m    Body mass index is 45.3 kg/m .  Physical Exam   GENERAL: alert and no distress  EYES: Eyes grossly  normal to inspection, PERRL and conjunctivae and sclerae normal  HENT: normal cephalic/atraumatic, ear canals and TM's normal, nose and mouth without ulcers or lesions, oropharynx clear, oral mucous membranes moist, no TMJ click, tenderness or swelling noted, hill test without lateralization   NECK: no adenopathy, no asymmetry, masses, or scars and thyroid normal to palpation  RESP: lungs clear to auscultation - no rales, rhonchi or wheezes  MS: no gross musculoskeletal defects noted, no edema  NEURO: Normal strength and tone, mentation intact and speech normal  PSYCH: mentation appears normal, affect normal/bright

## 2022-12-23 NOTE — TELEPHONE ENCOUNTER
I reviewed patient chart and do not see need for urgent work-in for this patient. LM indicating that chart was reviewed and advised she call us back if develops hearing loss or other concerning symptoms.    Monica Nicholas RN on 12/23/2022 at 2:18 PM

## 2022-12-23 NOTE — NURSING NOTE
Chief Complaint   Patient presents with     Ear Problem     /80 (Cuff Size: Adult Large)   Pulse 69   Temp 98  F (36.7  C) (Tympanic)   Resp 20   Ht 1.829 m (6')   Wt (!) 151.5 kg (334 lb)   LMP  (LMP Unknown)   SpO2 98%   BMI 45.30 kg/m   Estimated body mass index is 45.3 kg/m  as calculated from the following:    Height as of this encounter: 1.829 m (6').    Weight as of this encounter: 151.5 kg (334 lb).  Patient presents to the clinic using No DME      Health Maintenance that is potentially due pending provider review:    Health Maintenance Due   Topic Date Due     ANNUAL REVIEW OF HM ORDERS  Never done     ASTHMA ACTION PLAN  Never done     DEPRESSION ACTION PLAN  Never done     HEPATITIS B IMMUNIZATION (1 of 3 - 3-dose series) Never done     HEPATITIS C SCREENING  Never done     ZOSTER IMMUNIZATION (1 of 2) Never done     YEARLY PREVENTIVE VISIT  05/08/2020     PAP FOLLOW-UP  02/10/2022     HPV FOLLOW-UP  02/10/2022

## 2023-01-15 ENCOUNTER — TELEPHONE (OUTPATIENT)
Dept: SLEEP MEDICINE | Facility: CLINIC | Age: 53
End: 2023-01-15
Payer: COMMERCIAL

## 2023-01-15 DIAGNOSIS — G47.33 OSA (OBSTRUCTIVE SLEEP APNEA): Primary | ICD-10-CM

## 2023-01-15 NOTE — TELEPHONE ENCOUNTER
Left patient message to let her know her CPAP Rx  and I sent a renewal to her provider. Once signed I will ship supplies.

## 2023-02-04 NOTE — TELEPHONE ENCOUNTER
CPAP Rx has not been signed. Left patient message to let her know she will need a follow up to renew CPAP Rx.

## 2023-02-06 ENCOUNTER — MYC MEDICAL ADVICE (OUTPATIENT)
Dept: SLEEP MEDICINE | Facility: CLINIC | Age: 53
End: 2023-02-06
Payer: COMMERCIAL

## 2023-03-02 ASSESSMENT — ENCOUNTER SYMPTOMS
HEMATURIA: 0
PALPITATIONS: 0
HEADACHES: 1
PARESTHESIAS: 0
HEARTBURN: 1
BREAST MASS: 0
SORE THROAT: 0
EYE PAIN: 0
DIZZINESS: 0
ABDOMINAL PAIN: 0
MYALGIAS: 0
FEVER: 0
CHILLS: 0
SHORTNESS OF BREATH: 0
CONSTIPATION: 1
WEAKNESS: 0
NAUSEA: 0
FREQUENCY: 0
DIARRHEA: 0
HEMATOCHEZIA: 0
ARTHRALGIAS: 1
JOINT SWELLING: 0
DYSURIA: 0
NERVOUS/ANXIOUS: 0
COUGH: 0

## 2023-03-03 ENCOUNTER — OFFICE VISIT (OUTPATIENT)
Dept: FAMILY MEDICINE | Facility: CLINIC | Age: 53
End: 2023-03-03
Payer: COMMERCIAL

## 2023-03-03 VITALS
DIASTOLIC BLOOD PRESSURE: 76 MMHG | TEMPERATURE: 98.6 F | RESPIRATION RATE: 20 BRPM | WEIGHT: 293 LBS | OXYGEN SATURATION: 97 % | BODY MASS INDEX: 39.68 KG/M2 | SYSTOLIC BLOOD PRESSURE: 120 MMHG | HEART RATE: 80 BPM | HEIGHT: 72 IN

## 2023-03-03 DIAGNOSIS — Z12.31 VISIT FOR SCREENING MAMMOGRAM: ICD-10-CM

## 2023-03-03 DIAGNOSIS — I10 BENIGN ESSENTIAL HYPERTENSION: ICD-10-CM

## 2023-03-03 DIAGNOSIS — Z00.00 ROUTINE GENERAL MEDICAL EXAMINATION AT A HEALTH CARE FACILITY: Primary | ICD-10-CM

## 2023-03-03 DIAGNOSIS — E06.3 HASHIMOTO'S THYROIDITIS: ICD-10-CM

## 2023-03-03 DIAGNOSIS — Z12.31 ENCOUNTER FOR SCREENING MAMMOGRAM FOR BREAST CANCER: ICD-10-CM

## 2023-03-03 DIAGNOSIS — Z12.4 CERVICAL CANCER SCREENING: ICD-10-CM

## 2023-03-03 DIAGNOSIS — E66.01 MORBID OBESITY (H): ICD-10-CM

## 2023-03-03 DIAGNOSIS — J45.20 MILD INTERMITTENT ASTHMA WITHOUT COMPLICATION: ICD-10-CM

## 2023-03-03 DIAGNOSIS — Z11.59 NEED FOR HEPATITIS C SCREENING TEST: ICD-10-CM

## 2023-03-03 DIAGNOSIS — L30.0 NUMMULAR DERMATITIS: ICD-10-CM

## 2023-03-03 LAB
T4 FREE SERPL-MCNC: 0.82 NG/DL (ref 0.9–1.7)
TSH SERPL DL<=0.005 MIU/L-ACNC: 8.64 UIU/ML (ref 0.3–4.2)

## 2023-03-03 PROCEDURE — 36415 COLL VENOUS BLD VENIPUNCTURE: CPT | Performed by: STUDENT IN AN ORGANIZED HEALTH CARE EDUCATION/TRAINING PROGRAM

## 2023-03-03 PROCEDURE — 84439 ASSAY OF FREE THYROXINE: CPT | Performed by: STUDENT IN AN ORGANIZED HEALTH CARE EDUCATION/TRAINING PROGRAM

## 2023-03-03 PROCEDURE — 84443 ASSAY THYROID STIM HORMONE: CPT | Performed by: STUDENT IN AN ORGANIZED HEALTH CARE EDUCATION/TRAINING PROGRAM

## 2023-03-03 PROCEDURE — 99396 PREV VISIT EST AGE 40-64: CPT | Performed by: STUDENT IN AN ORGANIZED HEALTH CARE EDUCATION/TRAINING PROGRAM

## 2023-03-03 RX ORDER — TRIAMCINOLONE ACETONIDE 5 MG/G
OINTMENT TOPICAL
Qty: 15 G | Refills: 5 | Status: SHIPPED | OUTPATIENT
Start: 2023-03-03

## 2023-03-03 RX ORDER — AMLODIPINE BESYLATE 10 MG/1
10 TABLET ORAL DAILY
Qty: 90 TABLET | Refills: 3 | Status: SHIPPED | OUTPATIENT
Start: 2023-03-03 | End: 2024-01-09

## 2023-03-03 RX ORDER — TRIAMCINOLONE ACETONIDE 1 MG/G
CREAM TOPICAL 2 TIMES DAILY
Qty: 80 G | Refills: 3 | Status: SHIPPED | OUTPATIENT
Start: 2023-03-03

## 2023-03-03 ASSESSMENT — ENCOUNTER SYMPTOMS
ABDOMINAL PAIN: 0
SORE THROAT: 0
JOINT SWELLING: 0
HEADACHES: 1
DYSURIA: 0
WEAKNESS: 0
FEVER: 0
FREQUENCY: 0
ARTHRALGIAS: 1
CONSTIPATION: 1
DIZZINESS: 0
BREAST MASS: 0
HEMATOCHEZIA: 0
PARESTHESIAS: 0
EYE PAIN: 0
DIARRHEA: 0
MYALGIAS: 0
HEARTBURN: 1
CHILLS: 0
NAUSEA: 0
SHORTNESS OF BREATH: 0
COUGH: 0
NERVOUS/ANXIOUS: 0
HEMATURIA: 0
PALPITATIONS: 0

## 2023-03-03 ASSESSMENT — ASTHMA QUESTIONNAIRES: ACT_TOTALSCORE: 24
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
## 2023-03-10 RX ORDER — LEVOTHYROXINE SODIUM 25 UG/1
25 TABLET ORAL DAILY
Qty: 90 TABLET | Refills: 1 | Status: SHIPPED | OUTPATIENT
Start: 2023-03-10 | End: 2023-08-18 | Stop reason: SINTOL

## 2023-03-20 ENCOUNTER — VIRTUAL VISIT (OUTPATIENT)
Dept: FAMILY MEDICINE | Facility: CLINIC | Age: 53
End: 2023-03-20
Payer: COMMERCIAL

## 2023-03-20 DIAGNOSIS — R50.9 FEVER, UNSPECIFIED FEVER CAUSE: Primary | ICD-10-CM

## 2023-03-20 PROCEDURE — 99213 OFFICE O/P EST LOW 20 MIN: CPT | Mod: VID | Performed by: FAMILY MEDICINE

## 2023-03-20 RX ORDER — AZITHROMYCIN 250 MG/1
TABLET, FILM COATED ORAL
Qty: 6 TABLET | Refills: 0 | Status: SHIPPED | OUTPATIENT
Start: 2023-03-20 | End: 2023-03-25

## 2023-03-20 ASSESSMENT — ENCOUNTER SYMPTOMS
DIAPHORESIS: 1
CHILLS: 1
FEVER: 1
SHORTNESS OF BREATH: 0
SORE THROAT: 1
COUGH: 1

## 2023-04-30 ENCOUNTER — MYC MEDICAL ADVICE (OUTPATIENT)
Dept: FAMILY MEDICINE | Facility: CLINIC | Age: 53
End: 2023-04-30
Payer: COMMERCIAL

## 2023-05-01 ENCOUNTER — NURSE TRIAGE (OUTPATIENT)
Dept: INTERNAL MEDICINE | Facility: CLINIC | Age: 53
End: 2023-05-01
Payer: COMMERCIAL

## 2023-05-01 NOTE — TELEPHONE ENCOUNTER
"Patient sent in a mychart:    Patient has had pain for 1 week.  The pain started as a \"flutter\" in her right lower side.  The pain has increased to the point that she has to catch her breath.  Pain comes and goes.  No fever.  She did a laxative clean out last Thursday to see if this would resolve the pain- this did not help.  Pain right now is 2 out of 10.  She has some blood in her urine.  She had itching with no discharge.    Per protocol patient advised to go to the urgent care. Patient agrees with the plan.    Iliana Carrasquillo RN on 5/1/2023 at 3:58 PM      Reason for Disposition    Blood in urine (red, pink, or tea-colored)    Additional Information    Negative: Passed out (i.e., fainted, collapsed and was not responding)    Negative: Shock suspected (e.g., cold/pale/clammy skin, too weak to stand, low BP, rapid pulse)    Negative: Sounds like a life-threatening emergency to the triager    Negative: Chest pain    Negative: Pain is mainly in upper abdomen (if needed ask: 'is it mainly above the belly button?')    Negative: Abdominal pain and pregnant < 20 weeks    Negative: Abdominal pain and pregnant 20 or more weeks    Negative: SEVERE abdominal pain (e.g., excruciating)    Negative: Vomiting red blood or black (coffee ground) material    Negative: Bloody, black, or tarry bowel movements  (Exception: Chronic-unchanged black-grey bowel movements and is taking iron pills or Pepto-Bismol.)    Negative: Constant abdominal pain lasting > 2 hours    Negative: Vomiting bile (green color)    Negative: Patient sounds very sick or weak to the triager    Negative: White of the eyes have turned yellow (i.e., jaundice)    Negative: Vomiting and abdomen looks much more swollen than usual    Protocols used: ABDOMINAL PAIN - FEMALE-A-OH      "

## 2023-05-24 NOTE — PROGRESS NOTES
Does Paola have a CPAP/Bipap?  Yes   Type of mask: nasal     [facility]: [facility] [phone]    [email or web address]     [place] Sleep Scale: 9    Aung is a 52 year old who is being evaluated via a billable video visit.      How would you like to obtain your AVS? MyChart  If the video visit is dropped, the invitation should be resent by: Text to cell phone: [phone]  Will anyone else be joining your video visit? No              Subjective   Aung is a 52 year old, presenting for the following health issues:  CPAP Follow Up          Objective           Vitals:  No vitals were obtained today due to virtual visit.    Physical Exam   GENERAL: Healthy, alert and no distress  EYES: Eyes grossly normal to inspection.  No discharge or erythema, or obvious scleral/conjunctival abnormalities.  RESP: No audible wheeze, cough, or visible cyanosis.  No visible retractions or increased work of breathing.    SKIN: Visible skin clear. No significant rash, abnormal pigmentation or lesions.  NEURO: Cranial nerves grossly intact.  Mentation and speech appropriate for age.  PSYCH: Mentation appears normal, affect normal/bright, judgement and insight intact, normal speech and appearance well-groomed.                Video-Visit Details    Type of service:  Video Visit   Video Start Time: 0910  Video End Time:9:31 AM    Originating Location (pt. Location): Home    Distant Location (provider location):  On-site  Platform used for Video Visit: [facility]    Sleep Apnea - Follow-up Visit:    Impression/Plan:     Moderate to severe Sleep apnea. She is Tolerating PAP well. Daytime symptoms are stable.   Supplies re ordered.     Paola Lobo will follow up in about 1 year(s).     Fifteen minutes spent with patient, all of which were spent face-to-face counseling, consulting, coordinating plan of care.      CC:  Malik Bearden,         History of Present Illness:  Chief  Complaint   Patient presents with     CPAP Follow Up       Paola Lobo presents for follow-up of their moderate sleep apnea, managed with CPAP.     No specialty comments available.    Do you use a CPAP Machine at home: Yes  Overall, on a scale of 0-10 how would you rate your CPAP (0 poor, 10 great): 8    What type of mask do you use: Nasal Pillow  Is your mask comfortable: Yes  If not, why:      Is your mask leaking: No  If yes, where do you feel it:    How many night per week does the mask leak (0-7):      Do you notice snoring with mask on: No  Do you notice gasping arousals with mask on: No  Are you having significant oral or nasal dryness: No  Is the pressure setting comfortable: Yes  If not, why:      What is your typical bedtime: 9 pm  How long does it take you to go to sleep on PAP therapy: 15 minutes  What time do you typically get out of bed for the day: Some where between 3 and 7 am  How many hours on average per night are you using PAP therapy: 8-9  How many hours are you sleeping per night: 7-9  Do you feel well rested in the morning: Yes      ResMed   CPAP cmH2O 30 day usage data:  96% of days with > 4 hours of use. 0/30 days with no use.   Average use 396 minutes per day.   95%ile Leak 9.25 L/min.   AHI 2.31 events per hour.     Auto-PAP 8.0 - 12.0 cmH2O 30 day usage data:    96% of days with > 4 hours of use. 0/30 days with no use.   Average use 396 minutes per day.   95%ile Leak 9.25 L/min.   CPAP 95% pressure 11.8 cm.   AHI 2.31 events per hour.        EPWORTH SLEEPINESS SCALE         5/30/2023     8:22 PM    Peak Sleepiness Scale ( MARIBEL Poon  [id number]<br>ESS - [place]/English - Final version - 21 Nov 07 - [facility] [place].)   Sitting and reading Moderate chance of dozing   Watching TV Slight chance of dozing   Sitting, inactive in a public place (e.g. a theatre or a meeting) Would never doze   As a passenger in a car for an hour without a break High chance of dozing   Lying down  to rest in the afternoon when circumstances permit Moderate chance of dozing   Sitting and talking to someone Would never doze   Sitting quietly after a lunch without alcohol Slight chance of dozing   In a car, while stopped for a few minutes in traffic Would never doze   [place] Score (MC) 9   [place] Score (Sleep) 9       INSOMNIA SEVERITY INDEX (DELPHINE)          5/30/2023     8:18 PM   Insomnia Severity Index (DELPHINE)   Difficulty falling asleep 0   Difficulty staying asleep 1   Problems waking up too early 1   How SATISFIED/DISSATISFIED are you with your CURRENT sleep pattern? 2   How NOTICEABLE to others do you think your sleep problem is in terms of impairing the quality of your life? 1   How WORRIED/DISTRESSED are you about your current sleep problem? 1   To what extent do you consider your sleep problem to INTERFERE with your daily functioning (e.g. daytime fatigue, mood, ability to function at work/daily chores, concentration, memory, mood, etc.) CURRENTLY? 1   DELPHINE Total Score 7       Guidelines for Scoring/Interpretation:  Total score categories:  0-7 = No clinically significant insomnia   8-14 = Subthreshold insomnia   15-21 = Clinical insomnia (moderate severity)  22-28 = Clinical insomnia (severe)  Used via courtesy of www.[employer].[place].gov with permission from Fei Benjamin PhD., [facility]      Past medical/surgical history, family history, social history, medications and allergies were reviewed.        Problem List:  Patient Active Problem List    Diagnosis Date Noted     Mild intermittent asthma without complication 02/05/2020     Priority: Medium     Benign essential hypertension 06/13/2018     Priority: Medium     Impingement syndrome, shoulder, right 03/26/2018     Priority: Medium     Papanicolaou smear of cervix with low grade squamous intraepithelial lesion (LGSIL) 03/23/2018     Priority: Medium     3/23/18 LSIL pap, + HR HPV + 16 and other. Plan: colp bef 6/23/18  5/3/18 [place] bx @ 9  o'clock: UMAIR 1 and koilocytosis, with condyloma-like features. ECC: negative. Plan: cotest in 1 yr.  5/8/19 ASCUS pap, + HR HPV (not 16 or 18). Plan: colp  7/12/19 Media bx: worrisome for HSIL. Plan: Consult visit with Dr. Cordova for possible CKC.  7/31/19 Consult visit. Plan: Pelvic US, then LEEP.   US showed two fibroids.   8/29/19 LEEP bx: LSIL. Plan: pap due in 4 mo  1/22/20 Pap: NIL, + HR HPV (not 16 or 18). ECC: negative. Plan: cotest in 1 yr.  2/10/21 NIL Pap, Neg HR HPV. Plan: cotest in 1 yr.   1/26/22 Reminder mychart  2/22/22 Reminder call. Left msg  3/22/22 Lost to follow up       Excessive or frequent menstruation 08/09/2017     Priority: Medium     Intramural and subserous leiomyoma of uterus 08/09/2017     Priority: Medium     Morbid obesity (H) 07/27/2017     Priority: Medium        There were no vitals taken for this visit.

## 2023-05-30 ASSESSMENT — SLEEP AND FATIGUE QUESTIONNAIRES
HOW LIKELY ARE YOU TO NOD OFF OR FALL ASLEEP WHILE LYING DOWN TO REST IN THE AFTERNOON WHEN CIRCUMSTANCES PERMIT: MODERATE CHANCE OF DOZING
HOW LIKELY ARE YOU TO NOD OFF OR FALL ASLEEP WHILE SITTING INACTIVE IN A PUBLIC PLACE: WOULD NEVER DOZE
HOW LIKELY ARE YOU TO NOD OFF OR FALL ASLEEP WHILE SITTING QUIETLY AFTER LUNCH WITHOUT ALCOHOL: SLIGHT CHANCE OF DOZING
HOW LIKELY ARE YOU TO NOD OFF OR FALL ASLEEP IN A CAR, WHILE STOPPED FOR A FEW MINUTES IN TRAFFIC: WOULD NEVER DOZE
HOW LIKELY ARE YOU TO NOD OFF OR FALL ASLEEP WHILE SITTING AND READING: MODERATE CHANCE OF DOZING
HOW LIKELY ARE YOU TO NOD OFF OR FALL ASLEEP WHILE SITTING AND TALKING TO SOMEONE: WOULD NEVER DOZE
HOW LIKELY ARE YOU TO NOD OFF OR FALL ASLEEP WHILE WATCHING TV: SLIGHT CHANCE OF DOZING
HOW LIKELY ARE YOU TO NOD OFF OR FALL ASLEEP WHEN YOU ARE A PASSENGER IN A CAR FOR AN HOUR WITHOUT A BREAK: HIGH CHANCE OF DOZING

## 2023-05-31 ENCOUNTER — VIRTUAL VISIT (OUTPATIENT)
Dept: SLEEP MEDICINE | Facility: CLINIC | Age: 53
End: 2023-05-31
Payer: COMMERCIAL

## 2023-05-31 DIAGNOSIS — G47.33 OSA (OBSTRUCTIVE SLEEP APNEA): Primary | ICD-10-CM

## 2023-05-31 PROCEDURE — 99213 OFFICE O/P EST LOW 20 MIN: CPT | Mod: VID | Performed by: PHYSICIAN ASSISTANT

## 2023-06-01 ENCOUNTER — HEALTH MAINTENANCE LETTER (OUTPATIENT)
Age: 53
End: 2023-06-01

## 2023-08-08 NOTE — PROGRESS NOTES
SUBJECTIVE:   CC: Paola Lobo is an 48 year old woman who presents for preventive health visit.     Healthy Habits:     Getting at least 3 servings of Calcium per day:  Yes    Bi-annual eye exam:  NO    Dental care twice a year:  NO    Sleep apnea or symptoms of sleep apnea:  Sleep apnea    Diet:  Gluten-free/reduced and Other    Duration of exercise:  30-45 minutes    Taking medications regularly:  No    Barriers to taking medications:  Side effects and Other    Medication side effects:  Significant flushing and Other    PHQ-2 Total Score: 0    Additional concerns today:  Yes    Needs a referral to Dermatology for chronic rashes and facial flushing.        Today's PHQ-2 Score:   PHQ-2 ( 1999 [employer]) 5/7/2019   Q1: Little interest or pleasure in doing things 0   Q2: Feeling down, depressed or hopeless 0   PHQ-2 Score 0   Q1: Little interest or pleasure in doing things Not at all   Q2: Feeling down, depressed or hopeless Not at all   PHQ-2 Score 0       Abuse: Current or Past(Physical, Sexual or Emotional)- No  Do you feel safe in your environment? Yes    Social History     Tobacco Use     Smoking status: Never Smoker     Smokeless tobacco: Never Used   Substance Use Topics     Alcohol use: Yes     Comment: social         Alcohol Use 5/7/2019   Prescreen: >3 drinks/day or >7 drinks/week? No   Prescreen: >3 drinks/day or >7 drinks/week? -   No flowsheet data found.    Reviewed orders with patient.  Reviewed health maintenance and updated orders accordingly - Yes  Labs reviewed in EPIC  BP Readings from Last 3 Encounters:   05/08/19 138/88   01/03/19 130/84   12/14/18 148/76    Wt Readings from Last 3 Encounters:   05/08/19 (!) 175.5 kg (387 lb)   01/10/19 (!) 176 kg (388 lb)   01/03/19 (!) 176 kg (388 lb)                  Patient Active Problem List   Diagnosis     Morbid obesity (H)     Excessive or frequent menstruation     Intramural and subserous leiomyoma of uterus     Impingement syndrome, shoulder,  right     Papanicolaou smear of cervix with low grade squamous intraepithelial lesion (LGSIL)     Benign essential hypertension     Past Surgical History:   Procedure Laterality Date     ORTHOPEDIC SURGERY         Social History     Tobacco Use     Smoking status: Never Smoker     Smokeless tobacco: Never Used   Substance Use Topics     Alcohol use: Yes     Comment: social     Family History   Problem Relation Age of Onset     Uterine Cancer Mother      Uterine Cancer Maternal Aunt      Uterine Cancer Maternal Aunt      Uterine Cancer Maternal Aunt      Uterine Cancer Maternal Aunt      Uterine Cancer Maternal Aunt          Current Outpatient Medications   Medication Sig Dispense Refill     cetirizine HCl 10 MG CAPS        Cyanocobalamin (VITAMIN B 12 PO) Take 1,000 mcg by mouth       FOLIC ACID PO Take 1 mg by mouth daily       medroxyPROGESTERone (DEPO-PROVERA) 150 MG/ML IM injection Inject 1 mL (150 mg) into the muscle every 3 months 1 mL 3     order for DME Equipment ordered: RESMED Auto PAP Mask type: Nasal Settings: 8-15 CM H2O       VALERIAN ROOT PO Take by mouth as needed       VITAMIN E NATURAL PO        hydrochlorothiazide (HYDRODIURIL) 50 MG tablet Take 1 tablet (50 mg) by mouth daily 90 tablet 1     metroNIDAZOLE (METROGEL) 1 % gel Apply topically daily 60 g 3     Allergies   Allergen Reactions     Walnuts [Nuts]      Unable to breathe     Shellfish Allergy      Nausea     Grass      SOB, asthma       Prednisone Other (See Comments)     Severe headache     Wheat      Rash, Derm       Mammogram Screening: Patient under age 50, mutual decision reflected in health maintenance.      Pertinent mammograms are reviewed under the imaging tab.  History of abnormal Pap smear:   Last 3 Pap Results:   PAP (no units)   Date Value   03/23/2018 LSIL (A)   02/15/2017 OTHER-NIL, See Result     PAP / HPV Latest Ref Rng & Units 3/23/2018 2/15/2017   PAP - LSIL(A) OTHER-NIL, See Result   HPV 16 DNA NEG:Negative Positive(A)  "Negative   HPV 18 DNA NEG:Negative Negative Negative   OTHER HR HPV NEG:Negative Positive(A) Negative     Reviewed and updated as needed this visit by clinical staff  Tobacco  Allergies  Meds         Reviewed and updated as needed this visit by Provider        Past Medical History:   Diagnosis Date     Asthma      Obesity, morbid, BMI 50 or higher (H)      Papanicolaou smear of cervix with low grade squamous intraepithelial lesion (LGSIL) 3/23/2018      Past Surgical History:   Procedure Laterality Date     ORTHOPEDIC SURGERY         Review of Systems   Constitutional: Negative for chills and fever.   HENT: Negative for congestion, ear pain, hearing loss and sore throat.    Eyes: Positive for visual disturbance. Negative for pain.   Respiratory: Negative for cough and shortness of breath.    Cardiovascular: Negative for chest pain, palpitations and peripheral edema.   Gastrointestinal: Negative for abdominal pain, constipation, diarrhea, heartburn, hematochezia and nausea.   Breasts:  Negative for tenderness, breast mass and discharge.   Genitourinary: Negative for dysuria, frequency, genital sores, hematuria, pelvic pain, urgency and vaginal discharge.   Musculoskeletal: Positive for myalgias. Negative for arthralgias and joint swelling.   Skin: Negative for rash.   Neurological: Positive for headaches. Negative for dizziness, weakness and paresthesias.   Psychiatric/Behavioral: Negative for mood changes. The patient is not nervous/anxious.           OBJECTIVE:   /90   Pulse 102   Temp 98.3  F (36.8  C) (Temporal)   Resp 20   Ht 1.816 m (5' 11.5\")   Wt (!) 175.5 kg (387 lb)   LMP  (LMP Unknown)   SpO2 99%   Breastfeeding? No   BMI 53.22 kg/m    Physical Exam  GENERAL APPEARANCE: alert, no distress and morbidly obese  EYES: Eyes grossly normal to inspection, PERRL and conjunctivae and sclerae normal  HENT: ear canals and TM's normal, nose and mouth without ulcers or lesions, oropharynx clear and " [Appropriately responsive] : appropriately responsive "oral mucous membranes moist  NECK: no adenopathy, no asymmetry, masses, or scars and thyroid normal to palpation  RESP: lungs clear to auscultation - no rales, rhonchi or wheezes  BREAST: normal without masses, tenderness or nipple discharge and no palpable axillary masses or adenopathy  CV: regular rate and rhythm, normal S1 S2, no S3 or S4, no murmur, click or rub  ABDOMEN: soft, nontender, no hepatosplenomegaly, no masses and bowel sounds normal   (female): normal female external genitalia, normal urethral meatus, vaginal mucosal atrophy noted, normal cervix without abnormal discharge  MS: no musculoskeletal defects are noted and gait is age appropriate without ataxia  SKIN: no suspicious lesions, has facial erythema   NEURO: Normal strength and tone, sensory exam grossly normal, mentation intact and speech normal  PSYCH: mentation appears normal and affect normal/bright    Diagnostic Test Results:  Pending     ASSESSMENT/PLAN:   1. Routine history and physical examination of adult    2. Screening for cervical cancer  - Pap imaged thin layer diagnostic with HPV (select HPV order below)  - HPV High Risk Types DNA Cervical    3. Rash and nonspecific skin eruption  - DERMATOLOGY REFERRAL    4. Encounter for screening mammogram for breast cancer  - *MA Screening Digital Bilateral; Future    5. Screen for STD (sexually transmitted disease)  - HIV Antigen Antibody Combo    COUNSELING:  Reviewed preventive health counseling, as reflected in patient instructions    BP Readings from Last 1 Encounters:   05/08/19 140/90     Estimated body mass index is 53.22 kg/m  as calculated from the following:    Height as of this encounter: 1.816 m (5' 11.5\").    Weight as of this encounter: 175.5 kg (387 lb).      Weight management plan: Discussed healthy diet and exercise guidelines     reports that she has never smoked. She has never used smokeless tobacco.      Counseling Resources:  ATP IV Guidelines  Pooled Cohorts Equation " [Alert] : alert Calculator  Breast Cancer Risk Calculator  FRAX Risk Assessment  ICSI Preventive Guidelines  Dietary Guidelines for Americans, 2010  USDA's MyPlate  ASA Prophylaxis  Lung CA Screening    Roshni Luevano, SHILA TAMAYO  [facility]   [No Acute Distress] : no acute distress [No Lymphadenopathy] : no lymphadenopathy [Regular Rate Rhythm] : regular rate rhythm [No Murmurs] : no murmurs [Clear to Auscultation B/L] : clear to auscultation bilaterally [Soft] : soft [Non-tender] : non-tender [Non-distended] : non-distended [No HSM] : No HSM [No Lesions] : no lesions [No Mass] : no mass [Oriented x3] : oriented x3 [Examination Of The Breasts] : a normal appearance [No Masses] : no breast masses were palpable [Labia Majora] : normal [Labia Minora] : normal [Normal] : normal [Uterine Adnexae] : normal

## 2023-08-09 ENCOUNTER — OFFICE VISIT (OUTPATIENT)
Dept: FAMILY MEDICINE | Facility: CLINIC | Age: 53
End: 2023-08-09
Payer: COMMERCIAL

## 2023-08-09 VITALS
SYSTOLIC BLOOD PRESSURE: 180 MMHG | TEMPERATURE: 97 F | WEIGHT: 293 LBS | OXYGEN SATURATION: 100 % | RESPIRATION RATE: 18 BRPM | BODY MASS INDEX: 47.45 KG/M2 | HEART RATE: 81 BPM | DIASTOLIC BLOOD PRESSURE: 95 MMHG

## 2023-08-09 DIAGNOSIS — R60.0 EDEMA OF FOOT: Primary | ICD-10-CM

## 2023-08-09 DIAGNOSIS — M79.674 PAIN OF RIGHT GREAT TOE: ICD-10-CM

## 2023-08-09 LAB
BASOPHILS # BLD AUTO: 0 10E3/UL (ref 0–0.2)
BASOPHILS NFR BLD AUTO: 0 %
D DIMER PPP FEU-MCNC: 0.43 UG/ML FEU (ref 0–0.5)
EOSINOPHIL # BLD AUTO: 0.3 10E3/UL (ref 0–0.7)
EOSINOPHIL NFR BLD AUTO: 3 %
ERYTHROCYTE [DISTWIDTH] IN BLOOD BY AUTOMATED COUNT: 12.7 % (ref 10–15)
HCT VFR BLD AUTO: 41.5 % (ref 35–47)
HGB BLD-MCNC: 13.7 G/DL (ref 11.7–15.7)
IMM GRANULOCYTES # BLD: 0 10E3/UL
IMM GRANULOCYTES NFR BLD: 0 %
LYMPHOCYTES # BLD AUTO: 2.3 10E3/UL (ref 0.8–5.3)
LYMPHOCYTES NFR BLD AUTO: 24 %
MCH RBC QN AUTO: 30.1 PG (ref 26.5–33)
MCHC RBC AUTO-ENTMCNC: 33 G/DL (ref 31.5–36.5)
MCV RBC AUTO: 91 FL (ref 78–100)
MONOCYTES # BLD AUTO: 0.7 10E3/UL (ref 0–1.3)
MONOCYTES NFR BLD AUTO: 7 %
NEUTROPHILS # BLD AUTO: 6.5 10E3/UL (ref 1.6–8.3)
NEUTROPHILS NFR BLD AUTO: 66 %
PLATELET # BLD AUTO: 312 10E3/UL (ref 150–450)
RBC # BLD AUTO: 4.55 10E6/UL (ref 3.8–5.2)
URATE SERPL-MCNC: 4.2 MG/DL (ref 2.4–5.7)
WBC # BLD AUTO: 9.8 10E3/UL (ref 4–11)

## 2023-08-09 PROCEDURE — 84550 ASSAY OF BLOOD/URIC ACID: CPT | Performed by: PHYSICIAN ASSISTANT

## 2023-08-09 PROCEDURE — 85379 FIBRIN DEGRADATION QUANT: CPT | Performed by: PHYSICIAN ASSISTANT

## 2023-08-09 PROCEDURE — 36415 COLL VENOUS BLD VENIPUNCTURE: CPT | Performed by: PHYSICIAN ASSISTANT

## 2023-08-09 PROCEDURE — 85025 COMPLETE CBC W/AUTO DIFF WBC: CPT | Performed by: PHYSICIAN ASSISTANT

## 2023-08-09 PROCEDURE — 99214 OFFICE O/P EST MOD 30 MIN: CPT | Performed by: PHYSICIAN ASSISTANT

## 2023-08-09 RX ORDER — INDOMETHACIN 50 MG/1
50 CAPSULE ORAL
Qty: 15 CAPSULE | Refills: 0 | Status: SHIPPED | OUTPATIENT
Start: 2023-08-09 | End: 2023-08-09 | Stop reason: ALTCHOICE

## 2023-08-09 RX ORDER — COLCHICINE 0.6 MG/1
1.2 TABLET ORAL ONCE
Qty: 2 TABLET | Refills: 0 | Status: SHIPPED | OUTPATIENT
Start: 2023-08-09 | End: 2023-08-09

## 2023-08-09 NOTE — PATIENT INSTRUCTIONS
Patient was educated on the natural course of injury.  CBC is normal. Uric acid and d dimer are pending. Low risk for DVT as she has no known risk factors. Conservative measures discussed including rest, ice, compression, elevation, and over-the-counter analgesics (Tylenol or Ibuprofen) as needed. See your primary care provider if symptoms worsen or do not improve in 7 days. Seek emergency care if you develop severe pain/swelling, inability to move extremity, skin paleness, or weakness.
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
## 2023-08-18 ENCOUNTER — OFFICE VISIT (OUTPATIENT)
Dept: FAMILY MEDICINE | Facility: CLINIC | Age: 53
End: 2023-08-18
Payer: COMMERCIAL

## 2023-08-18 VITALS
TEMPERATURE: 97.2 F | DIASTOLIC BLOOD PRESSURE: 86 MMHG | HEIGHT: 72 IN | OXYGEN SATURATION: 96 % | RESPIRATION RATE: 20 BRPM | HEART RATE: 69 BPM | WEIGHT: 293 LBS | SYSTOLIC BLOOD PRESSURE: 138 MMHG | BODY MASS INDEX: 39.68 KG/M2

## 2023-08-18 DIAGNOSIS — F33.1 MODERATE EPISODE OF RECURRENT MAJOR DEPRESSIVE DISORDER (H): ICD-10-CM

## 2023-08-18 DIAGNOSIS — G43.909 MIGRAINE WITHOUT STATUS MIGRAINOSUS, NOT INTRACTABLE, UNSPECIFIED MIGRAINE TYPE: ICD-10-CM

## 2023-08-18 DIAGNOSIS — I10 BENIGN ESSENTIAL HYPERTENSION: Primary | ICD-10-CM

## 2023-08-18 DIAGNOSIS — R41.89 BRAIN FOG: ICD-10-CM

## 2023-08-18 DIAGNOSIS — G47.33 OSA ON CPAP: ICD-10-CM

## 2023-08-18 DIAGNOSIS — R87.612 PAPANICOLAOU SMEAR OF CERVIX WITH LOW GRADE SQUAMOUS INTRAEPITHELIAL LESION (LGSIL): ICD-10-CM

## 2023-08-18 DIAGNOSIS — Z98.84 STATUS POST BARIATRIC SURGERY: ICD-10-CM

## 2023-08-18 DIAGNOSIS — E66.01 MORBID OBESITY (H): ICD-10-CM

## 2023-08-18 DIAGNOSIS — Z12.4 CERVICAL CANCER SCREENING: ICD-10-CM

## 2023-08-18 DIAGNOSIS — J45.20 MILD INTERMITTENT ASTHMA WITHOUT COMPLICATION: ICD-10-CM

## 2023-08-18 PROCEDURE — 99214 OFFICE O/P EST MOD 30 MIN: CPT | Performed by: FAMILY MEDICINE

## 2023-08-18 RX ORDER — ALBUTEROL SULFATE 90 UG/1
2 AEROSOL, METERED RESPIRATORY (INHALATION) EVERY 4 HOURS PRN
Qty: 18 G | Refills: 6 | Status: SHIPPED | OUTPATIENT
Start: 2023-08-18

## 2023-08-18 RX ORDER — THYROID 30 MG/1
30 TABLET ORAL DAILY
Qty: 90 TABLET | Refills: 1 | Status: SHIPPED | OUTPATIENT
Start: 2023-08-18 | End: 2023-10-02

## 2023-08-18 ASSESSMENT — PATIENT HEALTH QUESTIONNAIRE - PHQ9
SUM OF ALL RESPONSES TO PHQ QUESTIONS 1-9: 3
10. IF YOU CHECKED OFF ANY PROBLEMS, HOW DIFFICULT HAVE THESE PROBLEMS MADE IT FOR YOU TO DO YOUR WORK, TAKE CARE OF THINGS AT HOME, OR GET ALONG WITH OTHER PEOPLE: NOT DIFFICULT AT ALL
SUM OF ALL RESPONSES TO PHQ QUESTIONS 1-9: 3

## 2023-08-18 NOTE — PATIENT INSTRUCTIONS
-Thank you for choosing the [facility].  -It was a pleasure to see you today.  -Please take a look at the information below for more specific details regarding the treatment plan and recommendations.  -In this after visit summary is a list of your medications and specific instructions.  Please review this carefully as there may be changes made to your medication list.  -If there are any particular questions or concerns, please feel free to reach out to Dr. Muniz.  -If any labs have been completed, we will reach out to you about results.  If the results are normal or not concerning, a letter or [employer] message will be sent to you.  If any follow-up is needed, either Dr. Muniz or the nurse will give you a call.  If you have not heard regarding results after 2 weeks, please reach out to the clinic.    Patient Instructions:    -You are doing well overall.  -Continue to take care of yourself as you have been.    -Of note, the levothyroxine medication has been changed to a different brand to see if you are better able to tolerate the medication.  The dose of the new thyroid medication is slightly higher, though comparable.  Please discontinue the Synthroid medication.  -Continue the medications as prescribed.  -Continue using the CPAP.  -If there are any issues or side effects of the medications, please connect with Dr. Muniz.    -Please schedule an appointment to follow-up with your bariatric doctor.  If a referral is needed, you may reach out to Dr. Muniz.    Please seek immediate medical attention (go to the emergency room or urgent care) for the following reasons: worsening symptoms, or any concerning changes.    Please return to clinic in 6-8 weeks for follow-up of thyroid medication, or sooner as needed.  In person appointment would be preferred so labs can be completed.    --------------------------------------------------------------------------------------------------------------------    -We  are always looking for ways to improve.  You may be selected to receive a survey regarding your visit today.  We encourage you to complete the survey and provide specific, constructive feedback to help us improve our processes.  Thank you for your time!  -Please review the contact information listed on the after visit summary and in the electronic chart.  Below is the phone number that we have on file.  If there are any changes that are needed to be made, please reach out to the clinic.  [phone] (home)
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
## 2023-08-18 NOTE — PROGRESS NOTES
Injectable Influenza Immunization Documentation    1.  Is the person to be vaccinated sick today?   No    2. Does the person to be vaccinated have an allergy to a component   of the vaccine?   No  Egg Allergy Algorithm Link    3. Has the person to be vaccinated ever had a serious reaction   to influenza vaccine in the past?   No    4. Has the person to be vaccinated ever had Guillain-Barré syndrome?   No    Prior to injection, verified patient identity using patient's name and date of birth.  Due to injection administration, patient instructed to remain in clinic for 15 minutes  afterwards, and to report any adverse reaction to me immediately.    BP: 148/76    LAST PAP/EXAM:   Lab Results   Component Value Date    PAP LSIL 03/23/2018     URINE HCG:not indicated    NEXT INJECTION DUE: 3/1/19 - 3/15/19       Drug Amount Wasted:  None.  Vial/Syringe: Single dose vial      Form completed by ................Nehemiah Lang LPN,   December 14, 2018,      9:22 AM,   [facility]           Her/She

## 2023-09-22 ENCOUNTER — TELEPHONE (OUTPATIENT)
Dept: FAMILY MEDICINE | Facility: CLINIC | Age: 53
End: 2023-09-22
Payer: COMMERCIAL

## 2023-09-22 NOTE — TELEPHONE ENCOUNTER
Prior Authorization Retail Medication Request    Medication/Dose: Thyroid   ICD code (if different than what is on RX):    Previously Tried and Failed:    Rationale:          Pharmacy Information (if different than what is on RX)  Name:  [facility]  Phone:  [phone]

## 2023-09-25 NOTE — TELEPHONE ENCOUNTER
Message sent via email to pharmacy.  It is unclear if this is an update or additional information is needed.    LORENA PenningtonN, RN  [facility]

## 2023-09-28 ASSESSMENT — ASTHMA QUESTIONNAIRES: ACT_TOTALSCORE: 25

## 2023-09-29 ENCOUNTER — OFFICE VISIT (OUTPATIENT)
Dept: FAMILY MEDICINE | Facility: CLINIC | Age: 53
End: 2023-09-29
Payer: COMMERCIAL

## 2023-09-29 VITALS
DIASTOLIC BLOOD PRESSURE: 92 MMHG | SYSTOLIC BLOOD PRESSURE: 150 MMHG | RESPIRATION RATE: 18 BRPM | HEART RATE: 70 BPM | BODY MASS INDEX: 39.68 KG/M2 | HEIGHT: 72 IN | OXYGEN SATURATION: 97 % | WEIGHT: 293 LBS | TEMPERATURE: 97.3 F

## 2023-09-29 DIAGNOSIS — Z23 NEED FOR VACCINATION: ICD-10-CM

## 2023-09-29 DIAGNOSIS — Z01.84 IMMUNITY STATUS TESTING: ICD-10-CM

## 2023-09-29 DIAGNOSIS — R41.89 BRAIN FOG: Primary | ICD-10-CM

## 2023-09-29 DIAGNOSIS — Z23 NEED FOR HEPATITIS B VACCINATION: ICD-10-CM

## 2023-09-29 DIAGNOSIS — I10 BENIGN ESSENTIAL HYPERTENSION: ICD-10-CM

## 2023-09-29 DIAGNOSIS — Z11.59 NEED FOR HEPATITIS C SCREENING TEST: ICD-10-CM

## 2023-09-29 DIAGNOSIS — Z12.4 CERVICAL CANCER SCREENING: ICD-10-CM

## 2023-09-29 LAB — TSH SERPL DL<=0.005 MIU/L-ACNC: 8.4 UIU/ML (ref 0.3–4.2)

## 2023-09-29 PROCEDURE — 36415 COLL VENOUS BLD VENIPUNCTURE: CPT | Performed by: FAMILY MEDICINE

## 2023-09-29 PROCEDURE — 86803 HEPATITIS C AB TEST: CPT | Performed by: FAMILY MEDICINE

## 2023-09-29 PROCEDURE — 84443 ASSAY THYROID STIM HORMONE: CPT | Performed by: FAMILY MEDICINE

## 2023-09-29 PROCEDURE — 86706 HEP B SURFACE ANTIBODY: CPT | Performed by: FAMILY MEDICINE

## 2023-09-29 PROCEDURE — 99214 OFFICE O/P EST MOD 30 MIN: CPT | Performed by: FAMILY MEDICINE

## 2023-09-29 RX ORDER — LISINOPRIL 10 MG/1
10 TABLET ORAL DAILY
Qty: 90 TABLET | Refills: 1 | Status: SHIPPED | OUTPATIENT
Start: 2023-09-29 | End: 2023-11-21
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
## 2023-09-29 NOTE — PATIENT INSTRUCTIONS
-Thank you for choosing the [facility].  -It was a pleasure to see you today.  -Please take a look at the information below for more specific details regarding the treatment plan and recommendations.  -In this after visit summary is a list of your medications and specific instructions.  Please review this carefully as there may be changes made to your medication list.  -If there are any particular questions or concerns, please feel free to reach out to Dr. Muniz.  -If any labs have been completed, we will reach out to you about results.  If the results are normal or not concerning, a letter or [employer] message will be sent to you.  If any follow-up is needed, either Dr. Muniz or the nurse will give you a call.  If you have not heard regarding results after 2 weeks, please reach out to the clinic.    Patient Instructions:    -Please wait to  the new prescription of the [place] medication until the test results are back.  Over the weekend, if the TSH is in the normal range, you may  the refill prescription and continue the medication.  If the TSH is out of the normal range, please wait until you hear from Dr. Muniz.    -Start taking the lisinopril as prescribed.  -Continue taking amlodipine.  -Monitor closely for any heart racing episodes.  -Dr. Muniz recommends that you check your blood pressure 1-2 times daily for the next 2 weeks.  Record the date, time, blood pressure readings, and heart rate.  -When checking your blood pressure, find a location where the area is calm and quiet. Try to sit down for 3-5 minutes first. Using a blood pressure cuff that wraps around the upper arm is more accurate. Keep your wrist facing up and legs straight. (If using a wrist cuff, be sure to hold the cuff up to the level of the heart when checking your blood pressure) As the blood pressure machine is working, do not talk or do anything else.   -The goal is to have the blood pressure under 140/90 on a  consistent basis.  Blood pressures above this range increases your risk of developing heart attacks, strokes, kidney issues, and many other medical issues.  -Try to limit salt intake.  Following a low-salt diet (eating about 6782-5911 mg or less of salt each day) can be helpful to control the blood pressure.  -Losing weight and getting your BMI in the normal range can also be helpful to better control the blood pressure.  -Find ways to stay active.   -Please see the end of the packet for more information regarding elevated blood pressures and things you can do at home to help improve the blood pressure.    -Due to your previous history of needing a LEEP procedure due to abnormal Pap smear, it is recommended for you to follow-up with OB/GYN for further Pap smears and evaluations.  Please let Dr. Muniz know if you would like further assistance with this process.  -Please let Dr. Muniz know if you need a referral.     -Please schedule a mammogram when you are ready.     Please seek immediate medical attention (go to the emergency room or urgent care) for the following reasons: worsening symptoms, medication side effects, or any concerning changes.      --------------------------------------------------------------------------------------------------------------------    -We are always looking for ways to improve.  You may be selected to receive a survey regarding your visit today.  We encourage you to complete the survey and provide specific, constructive feedback to help us improve our processes.  Thank you for your time!  -Please review the contact information listed on the after visit summary and in the electronic chart.  Below is the phone number that we have on file.  If there are any changes that are needed to be made, please reach out to the clinic.  [phone] (home)

## 2023-09-30 LAB
HBV SURFACE AB SERPL IA-ACNC: 0 M[IU]/ML
HBV SURFACE AB SERPL IA-ACNC: NONREACTIVE M[IU]/ML
HCV AB SERPL QL IA: NONREACTIVE

## 2023-10-06 RX ORDER — THYROID 30 MG/1
45 TABLET ORAL DAILY
Qty: 135 TABLET | Refills: 1 | Status: SHIPPED | OUTPATIENT
Start: 2023-10-06 | End: 2024-01-10

## 2023-11-16 ENCOUNTER — MYC MEDICAL ADVICE (OUTPATIENT)
Dept: FAMILY MEDICINE | Facility: CLINIC | Age: 53
End: 2023-11-16
Payer: COMMERCIAL

## 2023-11-17 ENCOUNTER — NURSE TRIAGE (OUTPATIENT)
Dept: FAMILY MEDICINE | Facility: CLINIC | Age: 53
End: 2023-11-17
Payer: COMMERCIAL

## 2023-11-17 NOTE — TELEPHONE ENCOUNTER
Called patient--she confirms she is not taking any Sudafed--is taking Coricidin for high BP. Informed her that Dr. Muniz will review her BP readings next week when he returns. She verbalized understanding.     Theodora Rm RN BSN  [facility]

## 2023-11-17 NOTE — TELEPHONE ENCOUNTER
"S-(situation): reporting high BP    B-(background): pt stated she's been having high BP readings since she got a cold and it has been staying high since.     \"I got sick on 11/4 and my blood pressure has been high since the cold and not coming down yet.       Here is a few days readings the last is today   153/87 pulse 86  167/109 pulse 76. Day 2 cold  152/95 pulse 81  158/96 pulse 82  153/87 pulse 86     Do I need to coming in? \"    A-(assessment):   - pt stated she still has some remaining symptoms such as nasal congestion  - denies chest pain, dizziness, and nausea.  - pt stated she has some headache and this started the day before she got sick. Pt has been taking tylenol to help    R-(recommendations): per protocol, pt should be seen in 2 weeks. However, pt stated Dr. Muniz wanted her to update him regarding her BP reading and might change her BP medication.    Will route to provider for advise.      Basilio Cardoza, BSN RN  [facility]        Reason for Disposition   Systolic BP >= 130 OR Diastolic >= 80, and is taking BP medications    Additional Information   Negative: Sounds like a life-threatening emergency to the triager   Negative: Pregnant > 20 weeks or postpartum (< 6 weeks after delivery) and new hand or face swelling   Negative: Pregnant > 20 weeks and BP > 140/90   Negative: Systolic BP >= 160 OR Diastolic >= 100, and any cardiac or neurologic symptoms (e.g., chest pain, difficulty breathing, unsteady gait, blurred vision)   Negative: Patient sounds very sick or weak to the triager   Negative: BP Systolic BP >= 140 OR Diastolic >= 90 and postpartum (from 0 to 6 weeks after delivery)   Negative: Systolic BP >= 180 OR Diastolic >= 110, and missed most recent dose of blood pressure medication   Negative: Systolic BP >= 180 OR Diastolic >= 110   Negative: Patient wants to be seen   Negative: Ran out of BP medications   Negative: Taking BP medications and feels is having side effects " (e.g., impotence, cough, dizziness)   Negative: Systolic BP >= 160 OR Diastolic >= 100   Negative: Systolic BP >= 130 OR Diastolic >= 80, and pregnant    Protocols used: High Blood Pressure-A-OH

## 2023-11-17 NOTE — TELEPHONE ENCOUNTER
Please ask if she is taking any sudafed. That cold medication can cause higher blood pressure. If she is taking it for her symptoms, then she should stop taking it and get blood pressure readings without it.     If she is not taking sudafed, she should be told that Dr. Muniz can see the results next week and get back to her about medication changes.

## 2023-11-17 NOTE — TELEPHONE ENCOUNTER
Writer attempt to call pt regarding recent MyChart message symptom. No answer, left non-detailed VM with call back number.    If pt calls back, please transfer call to an available RN to possibly triage pt for high BP. Thanks!    LORENA GradyN, RN   [facility]

## 2023-11-20 ENCOUNTER — TELEPHONE (OUTPATIENT)
Dept: FAMILY MEDICINE | Facility: CLINIC | Age: 53
End: 2023-11-20
Payer: COMMERCIAL

## 2023-11-20 NOTE — TELEPHONE ENCOUNTER
This morning, 153/86; headache consistent for about three weeks.  Denied all other symptoms.  Denied side affects from these medications and adhered to them at as scheduled.    Offered in-office visit with provider in which patient accepted.  Date, time and location provided.  RN advised to seek ER care if symptom worsening or combination of new arise symptoms - headache becoming severe, SOB, chest pain, weakness, numbness/tingling to lower extremities, and or blurry vision.      Will route encounter to provider for an update.    Mike

## 2023-11-20 NOTE — TELEPHONE ENCOUNTER
Reviewed BP listed.     Please return call to patient: What were your blood pressure values prior to being ill?  Any symptoms of high blood pressure (such as chest pain, shortness of breath, headaches, nausea, vomiting, weakness etc.)?  Any issues or side effects on the lisinopril and amlodipine?    Fei Muniz MD  [facility] [place] [address]  Phone: [phone]  Fax: [phone]    11/20/2023  1:45 PM

## 2023-11-20 NOTE — TELEPHONE ENCOUNTER
Patient Quality Outreach    Patient is due for the following:   Hypertension -  Hypertension follow-up visit    Next Steps:   Patient has upcoming appointment, these items will be addressed at that time.    Type of outreach:    Patient already has upcoming schedule for 11/21/2023 for HTN follow-up.    Questions for provider review:    None           Uzma Frias RN

## 2023-11-21 ENCOUNTER — OFFICE VISIT (OUTPATIENT)
Dept: FAMILY MEDICINE | Facility: CLINIC | Age: 53
End: 2023-11-21
Payer: COMMERCIAL

## 2023-11-21 VITALS
WEIGHT: 293 LBS | SYSTOLIC BLOOD PRESSURE: 146 MMHG | BODY MASS INDEX: 39.68 KG/M2 | HEIGHT: 72 IN | TEMPERATURE: 98.3 F | HEART RATE: 69 BPM | RESPIRATION RATE: 20 BRPM | DIASTOLIC BLOOD PRESSURE: 84 MMHG

## 2023-11-21 DIAGNOSIS — I10 BENIGN ESSENTIAL HYPERTENSION: Primary | ICD-10-CM

## 2023-11-21 DIAGNOSIS — N92.0 EXCESSIVE OR FREQUENT MENSTRUATION: ICD-10-CM

## 2023-11-21 DIAGNOSIS — E61.1 IRON DEFICIENCY: ICD-10-CM

## 2023-11-21 DIAGNOSIS — Z98.84 STATUS POST BARIATRIC SURGERY: ICD-10-CM

## 2023-11-21 LAB
ANION GAP SERPL CALCULATED.3IONS-SCNC: 11 MMOL/L (ref 7–15)
BASOPHILS # BLD AUTO: 0.1 10E3/UL (ref 0–0.2)
BASOPHILS NFR BLD AUTO: 1 %
BUN SERPL-MCNC: 16.3 MG/DL (ref 6–20)
CALCIUM SERPL-MCNC: 9.8 MG/DL (ref 8.6–10)
CHLORIDE SERPL-SCNC: 104 MMOL/L (ref 98–107)
CREAT SERPL-MCNC: 0.7 MG/DL (ref 0.51–0.95)
DEPRECATED HCO3 PLAS-SCNC: 26 MMOL/L (ref 22–29)
EGFRCR SERPLBLD CKD-EPI 2021: >90 ML/MIN/1.73M2
EOSINOPHIL # BLD AUTO: 0.3 10E3/UL (ref 0–0.7)
EOSINOPHIL NFR BLD AUTO: 4 %
ERYTHROCYTE [DISTWIDTH] IN BLOOD BY AUTOMATED COUNT: 13 % (ref 10–15)
FERRITIN SERPL-MCNC: 111 NG/ML (ref 11–328)
GLUCOSE SERPL-MCNC: 91 MG/DL (ref 70–99)
HCT VFR BLD AUTO: 40.7 % (ref 35–47)
HGB BLD-MCNC: 13.3 G/DL (ref 11.7–15.7)
IMM GRANULOCYTES # BLD: 0 10E3/UL
IMM GRANULOCYTES NFR BLD: 0 %
IRON SERPL-MCNC: 82 UG/DL (ref 37–145)
LYMPHOCYTES # BLD AUTO: 2.1 10E3/UL (ref 0.8–5.3)
LYMPHOCYTES NFR BLD AUTO: 26 %
MCH RBC QN AUTO: 29.4 PG (ref 26.5–33)
MCHC RBC AUTO-ENTMCNC: 32.7 G/DL (ref 31.5–36.5)
MCV RBC AUTO: 90 FL (ref 78–100)
MONOCYTES # BLD AUTO: 0.6 10E3/UL (ref 0–1.3)
MONOCYTES NFR BLD AUTO: 7 %
NEUTROPHILS # BLD AUTO: 5 10E3/UL (ref 1.6–8.3)
NEUTROPHILS NFR BLD AUTO: 62 %
PLATELET # BLD AUTO: 307 10E3/UL (ref 150–450)
POTASSIUM SERPL-SCNC: 4.5 MMOL/L (ref 3.4–5.3)
RBC # BLD AUTO: 4.53 10E6/UL (ref 3.8–5.2)
SODIUM SERPL-SCNC: 141 MMOL/L (ref 135–145)
WBC # BLD AUTO: 8 10E3/UL (ref 4–11)

## 2023-11-21 PROCEDURE — 36415 COLL VENOUS BLD VENIPUNCTURE: CPT | Performed by: FAMILY MEDICINE

## 2023-11-21 PROCEDURE — 83540 ASSAY OF IRON: CPT | Performed by: FAMILY MEDICINE

## 2023-11-21 PROCEDURE — 80048 BASIC METABOLIC PNL TOTAL CA: CPT | Performed by: FAMILY MEDICINE

## 2023-11-21 PROCEDURE — 99214 OFFICE O/P EST MOD 30 MIN: CPT | Performed by: FAMILY MEDICINE

## 2023-11-21 PROCEDURE — 82728 ASSAY OF FERRITIN: CPT | Performed by: FAMILY MEDICINE

## 2023-11-21 PROCEDURE — 85025 COMPLETE CBC W/AUTO DIFF WBC: CPT | Performed by: FAMILY MEDICINE

## 2023-11-21 RX ORDER — LISINOPRIL 20 MG/1
20 TABLET ORAL DAILY
Qty: 90 TABLET | Refills: 1 | Status: SHIPPED | OUTPATIENT
Start: 2023-11-21 | End: 2024-01-09

## 2023-11-21 NOTE — TELEPHONE ENCOUNTER
Noted. Thank you.     Fei Muniz MD  [facility] [place] [address]  Phone: [phone]  Fax: [phone]    11/20/2023  6:10 PM
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
## 2023-11-21 NOTE — PATIENT INSTRUCTIONS
-Thank you for choosing the [facility].  -It was a pleasure to see you today.  -Please take a look at the information below for more specific details regarding the treatment plan and recommendations.  -In this after visit summary is a list of your medications and specific instructions.  Please review this carefully as there may be changes made to your medication list.  -If there are any particular questions or concerns, please feel free to reach out to Dr. Muniz.  -If any labs have been completed, we will reach out to you about results.  If the results are normal or not concerning, a letter or [employer] message will be sent to you.  If any follow-up is needed, either Dr. Muniz or the nurse will give you a call.  If you have not heard regarding results after 2 weeks, please reach out to the clinic.    Patient Instructions:    -Please start taking lisinopril 20 mg once daily.  You can take two of the lisinopril 10 mg tablets once daily until you run out of your supply at home and then  the new prescription.  -Dr. Muniz recommends that you check your blood pressure 1-2 times daily for the next 2 weeks.  Record the date, time, blood pressure readings, and heart rate.  -When checking your blood pressure, find a location where the area is calm and quiet. Try to sit down for 3-5 minutes first. Using a blood pressure cuff that wraps around the upper arm is more accurate. Keep your wrist facing up and legs straight. (If using a wrist cuff, be sure to hold the cuff up to the level of the heart when checking your blood pressure) As the blood pressure machine is working, do not talk or do anything else.   -The goal is to have the blood pressure under 140/90 on a consistent basis.  Blood pressures above this range increases your risk of developing heart attacks, strokes, kidney issues, and many other medical issues.  -You are doing great and following the low-salt diet.  Keep up the good work.  Following  a low-salt diet (eating about 5684-6764 mg or less of salt each day) can be helpful to control the blood pressure.  -It is great that you are walking 6 miles a day.  Try to increase the pace a bit (by 15-30 seconds per mile) to see how you do.    -Losing weight and getting your BMI in the normal range can also be helpful to better control the blood pressure.  -Find ways to stay active.     -The preventive measures below are recommended for you.  Please let Dr. Muniz know when you are ready to complete them.    Health Maintenance Due   Topic Date Due    ASTHMA ACTION PLAN  Never done    DEPRESSION ACTION PLAN  Never done    HEPATITIS B IMMUNIZATION (1 of 3 - 3-dose series) Never done    ZOSTER IMMUNIZATION (1 of 2) Never done    PAP FOLLOW-UP  02/10/2022    HPV FOLLOW-UP  02/10/2022    MAMMO SCREENING  03/09/2023    INFLUENZA VACCINE (1) 09/01/2023    COVID-19 Vaccine (5 - 2023-24 season) 09/01/2023         Please seek immediate medical attention (go to the emergency room or urgent care) for the following reasons: worsening symptoms, chest pain, shortness of breath, severe headaches, vision changes, weakness, numbness, tingling, or any concerning changes.      --------------------------------------------------------------------------------------------------------------------    -We are always looking for ways to improve.  You may be selected to receive a survey regarding your visit today.  We encourage you to complete the survey and provide specific, constructive feedback to help us improve our processes.  Thank you for your time!  -Please review the contact information listed on the after visit summary and in the electronic chart.  Below is the phone number that we have on file.  If there are any changes that are needed to be made, please reach out to the clinic.  [phone] (home)

## 2023-12-14 ENCOUNTER — ALLIED HEALTH/NURSE VISIT (OUTPATIENT)
Dept: FAMILY MEDICINE | Facility: CLINIC | Age: 53
End: 2023-12-14
Payer: COMMERCIAL

## 2023-12-14 VITALS — DIASTOLIC BLOOD PRESSURE: 71 MMHG | HEART RATE: 75 BPM | SYSTOLIC BLOOD PRESSURE: 108 MMHG

## 2023-12-14 DIAGNOSIS — I10 HYPERTENSION, UNSPECIFIED TYPE: Primary | ICD-10-CM

## 2023-12-14 PROCEDURE — 99207 PR NO CHARGE NURSE ONLY: CPT

## 2023-12-14 NOTE — PROGRESS NOTES
I met with Paola Lobo at the request of Dr. Muniz  to recheck her blood pressure.  Blood pressure medications on the med list were reviewed with patient.    Patient has taken all medications as per usual regimen: Yes  Patient reports tolerating them without any issues or concerns: No    Vitals:    12/14/23 0826   BP: 108/71   BP Location: Right arm   Patient Position: Sitting   Cuff Size: Adult Large   Pulse: 75       Blood pressure was taken, previous encounter was reviewed, recorded blood pressure below 140/90.  Patient was discharged and the note will be sent to the provider for final review.      Kristi Michelle, MSN, RN   [facility]

## 2023-12-22 NOTE — PROGRESS NOTES
Aung is a 52 year old who is being evaluated via a billable video visit.      How would you like to obtain your AVS? MyChart  If the video visit is dropped, the invitation should be resent by: Text to cell phone: [phone]  Will anyone else be joining your video visit? No    Assessment and Plan    (R50.9) Fever, unspecified fever cause  (primary encounter diagnosis)  Comment: unclear etiology, but I agree that 6 days of fever is not normal.  Will trial azithromycin, but advised that if not seeing distinct signs of improvement, or if still getting worse, she should be seen F2F  Plan: azithromycin (ZITHROMAX) 250 MG tablet              RTC in 2-3d prn    Moise Miller MD      Subjective   Aung is a 52 year old, presenting for the following health issues:  No chief complaint on file.      History of Present Illness       Reason for visit:  Cold with fever - fever over 6 days now  Symptom onset:  3-7 days ago  Symptoms include:  Fever, fatigue. Congestion, ear pain, sore throat  Symptom intensity:  Moderate  Symptom progression:  Worsening  Had these symptoms before:  No    She eats 2-3 servings of fruits and vegetables daily.She consumes 1 sweetened beverage(s) daily.She exercises with enough effort to increase her heart rate 20 to 29 minutes per day.  She exercises with enough effort to increase her heart rate 4 days per week.   She is taking medications regularly.     Household sick, started with a fever a week, as high as 102.  Temp right now is 101.  Sore throat started three days ago.  Some congestion started last night.  Fully vaccinated, negative home COVID test three days ago.  Has this seasons flu shot last fall.  Mostly is concerned with persistent fever.  Mild cough, more recent, no shortness of breath.  Feels like she is generally getting worse.    Review of Systems   Constitutional: Positive for chills, diaphoresis and fever.   HENT: Positive for congestion and sore throat.    Respiratory: Positive  Occupational Therapy    Visit Type: initial evaluation  SUBJECTIVE  Patient agreed to participate in therapy this date.  RN in agreement to work with patient for therapy session.  Patient is very talkative about his past life.  He has caregivers that come daily to assist him and his wife.  Patient lives with his wife and she has dementia.  Patient / Family Goal: return home    Pain   Patient reports pain is not an issue/concern.    OBJECTIVE     Cognitive Status   Level of Consciousness   - alert  Affect/Behavior    - calm and cooperative  Orientation    - Oriented to: person, place, time and situation  Functional Communication   - Overall Status: within functional limits  Attention Span    - Attention: intact  Following Direction   - follows all commands and directions consistently    Hand Dominance: right-handed      Range of Motion (ROM)   (degrees unless noted; active unless noted; norms in ( ); negative=lacking to 0, positive=beyond 0)  WFL: LUE, RUE    Strength  (out of 5 unless noted, standard test position unless noted)   5/5: LUE, RUE      Standing Balance  (KAREN = base of support)  Standing balance is functional for standing ADL.  Standing tolerance is functional.      Coordination  Patient has an intention tremor in bilateral upper extremities.  This is not new.  Patient is able to compensate for the tremor.  OT suggested weighted wrist for the right hand so patient will be able to write with better accuracy.  Patient said that weighted eating utensils did not help him much.    Muscle Tone  LUE: tremor  RUE: tremor      Bed Mobility  - Repositioning in bed: independent  - Supine to sit: independent  - Sit to supine: independent  Transfers  - Sit to stand: independent  - Stand to sit: independent      Activities of Daily Living (ADLs)  Patient is able to complete upper body self care with set-up.  He is able to don/doff his socks independently.  Interventions    Training provided: ADL training  Skilled  for cough. Negative for shortness of breath.             Objective           Vitals:  No vitals were obtained today due to virtual visit.    Physical Exam   GENERAL: Healthy, alert and no distress  EYES: Eyes grossly normal to inspection.  No discharge or erythema, or obvious scleral/conjunctival abnormalities.  RESP: No audible wheeze, cough, or visible cyanosis.  No visible retractions or increased work of breathing.    SKIN: Visible skin clear. No significant rash, abnormal pigmentation or lesions.  NEURO: Cranial nerves grossly intact.  Mentation and speech appropriate for age.  PSYCH: Mentation appears normal, affect normal/bright, judgement and insight intact, normal speech and appearance well-groomed.                Video-Visit Details    Type of service:  Video Visit   Video Start Time: 1606  Video End Time:4:17 PM    Originating Location (pt. Location): Home  Distant Location (provider location):  Off-site  Platform used for Video Visit: [employer]     input: verbal instruction/cues  Verbal Consent: Writer verbally educated and received verbal consent for hand placement, positioning of patient, and techniques to be performed today from patient   Additional Intervention Details: evaluation         Education:   - Present and ready to learn: patient    ASSESSMENT   Discharge needs based on today's assessment:  - Current level of function: at baseline level of function  - Therapy needs at discharge: does not require ongoing therapy  - Impairments that require further therapy intervention: coordination  AM-PAC  - Prior Level of Function: Needs a little help ([facility] 12-21)       Key: MOD A=moderate assistance, IND/MOD I=independent/modified independent  - Generalized Current Level of Function     - Current Self-Cares: 19       Scoring Key= >21 Modified Independent; 20-21 Supervision; 18-19 Minimal assist; 13-17 Moderate assist; 9-12 Max assist; <9 Total assist        Clinical decision making: Low - Patient has few limitations (1-3), comorbidities and/or complexities, as noted in problem focused assessment noted above, that impact their occupational profile.  Resulting in few treatment options and no task modification consistent with low clinical decision making complexity.    PLAN (while hospitalized)  Suggestions for next session as indicated:     OT Frequency: DC OT         Interventions: ADL retraining  Agreement to plan and goals: patient agrees with goals and treatment plan      GOALS  Long Term Goals: (to be met by time of discharge from hospital)    Patient will complete basic self care in the room with minimum assist.  MET    Documented in the chart in the following areas: Prior Level of Function. Assessment/Plan.    Patient at End of Session:   Location: in bed  Safety measures: alarm system in place/re-engaged, bed rails x2, call light within reach, equipment intact and lines intact  Handoff to: nurse      Therapy procedure time and total treatment time can be  found documented on the Time Entry flowsheet

## 2024-01-09 ENCOUNTER — OFFICE VISIT (OUTPATIENT)
Dept: FAMILY MEDICINE | Facility: CLINIC | Age: 54
End: 2024-01-09
Payer: COMMERCIAL

## 2024-01-09 VITALS
DIASTOLIC BLOOD PRESSURE: 80 MMHG | HEART RATE: 60 BPM | WEIGHT: 293 LBS | SYSTOLIC BLOOD PRESSURE: 136 MMHG | BODY MASS INDEX: 39.68 KG/M2 | TEMPERATURE: 98 F | HEIGHT: 72 IN | OXYGEN SATURATION: 98 % | RESPIRATION RATE: 20 BRPM

## 2024-01-09 DIAGNOSIS — Z23 NEED FOR VACCINATION: ICD-10-CM

## 2024-01-09 DIAGNOSIS — J06.9 VIRAL URI: ICD-10-CM

## 2024-01-09 DIAGNOSIS — I10 BENIGN ESSENTIAL HYPERTENSION: Primary | ICD-10-CM

## 2024-01-09 DIAGNOSIS — R79.89 ELEVATED TSH: ICD-10-CM

## 2024-01-09 DIAGNOSIS — R41.89 BRAIN FOG: ICD-10-CM

## 2024-01-09 DIAGNOSIS — Z98.84 STATUS POST BARIATRIC SURGERY: ICD-10-CM

## 2024-01-09 LAB
ANION GAP SERPL CALCULATED.3IONS-SCNC: 10 MMOL/L (ref 7–15)
BUN SERPL-MCNC: 13.7 MG/DL (ref 6–20)
CALCIUM SERPL-MCNC: 9.6 MG/DL (ref 8.6–10)
CHLORIDE SERPL-SCNC: 104 MMOL/L (ref 98–107)
CREAT SERPL-MCNC: 0.74 MG/DL (ref 0.51–0.95)
DEPRECATED HCO3 PLAS-SCNC: 25 MMOL/L (ref 22–29)
EGFRCR SERPLBLD CKD-EPI 2021: >90 ML/MIN/1.73M2
GLUCOSE SERPL-MCNC: 88 MG/DL (ref 70–99)
POTASSIUM SERPL-SCNC: 4.2 MMOL/L (ref 3.4–5.3)
SODIUM SERPL-SCNC: 139 MMOL/L (ref 135–145)
TSH SERPL DL<=0.005 MIU/L-ACNC: 3.13 UIU/ML (ref 0.3–4.2)

## 2024-01-09 PROCEDURE — 90480 ADMN SARSCOV2 VAC 1/ONLY CMP: CPT | Performed by: FAMILY MEDICINE

## 2024-01-09 PROCEDURE — 91320 SARSCV2 VAC 30MCG TRS-SUC IM: CPT | Performed by: FAMILY MEDICINE

## 2024-01-09 PROCEDURE — 99214 OFFICE O/P EST MOD 30 MIN: CPT | Performed by: FAMILY MEDICINE

## 2024-01-09 PROCEDURE — 80048 BASIC METABOLIC PNL TOTAL CA: CPT | Performed by: FAMILY MEDICINE

## 2024-01-09 PROCEDURE — 84443 ASSAY THYROID STIM HORMONE: CPT | Performed by: FAMILY MEDICINE

## 2024-01-09 PROCEDURE — 36415 COLL VENOUS BLD VENIPUNCTURE: CPT | Performed by: FAMILY MEDICINE

## 2024-01-09 RX ORDER — LISINOPRIL 20 MG/1
20 TABLET ORAL DAILY
Qty: 90 TABLET | Refills: 3 | Status: SHIPPED | OUTPATIENT
Start: 2024-01-09

## 2024-01-09 RX ORDER — AMLODIPINE BESYLATE 10 MG/1
10 TABLET ORAL DAILY
Qty: 90 TABLET | Refills: 3 | Status: SHIPPED | OUTPATIENT
Start: 2024-01-09

## 2024-01-09 NOTE — PATIENT INSTRUCTIONS
-Thank you for choosing the [facility].  -It was a pleasure to see you today.  -Please take a look at the information below for more specific details regarding the treatment plan and recommendations.  -In this after visit summary is a list of your medications and specific instructions.  Please review this carefully as there may be changes made to your medication list.  -If there are any particular questions or concerns, please feel free to reach out to Dr. Muniz.  -If any labs have been completed, we will reach out to you about results.  If the results are normal or not concerning, a letter or [employer] message will be sent to you.  If any follow-up is needed, either Dr. Muniz or the nurse will give you a call.  If you have not heard regarding results after 2 weeks, please reach out to the clinic.    Patient Instructions:    -Continue the medications as prescribed.    -Continue to take care of yourself as you have been well you have the cold-like illness.   -Return to clinic if you have any significant worsening symptoms.    -You are due for a Pap smear.  Please schedule an appointment to complete this when you are ready.  Due to your previous history of needing a LEEP procedure due to abnormal Pap smear, it is recommended for you to follow-up with OB/GYN for further Pap smears and evaluations. Please let Dr. Muniz know if you would like further assistance with this process.  -Please let Dr. Muniz know if you need a referral.     -The Shingles/Shingrix vaccine is generally better covered by insurance companies if the vaccine is received at a pharmacy.  Please speak with your pharmacist regarding this vaccination as it is recommended for you.    Please seek immediate medical attention (go to the emergency room or urgent care) for the following reasons: worsening symptoms, or any concerning  changes.      --------------------------------------------------------------------------------------------------------------------    -We are always looking for ways to improve.  You may be selected to receive a survey regarding your visit today.  We encourage you to complete the survey and provide specific, constructive feedback to help us improve our processes.  Thank you for your time!  -Please review the contact information listed on the after visit summary and in the electronic chart.  Below is the phone number that we have on file.  If there are any changes that are needed to be made, please reach out to the clinic.  [phone] (home)

## 2024-01-09 NOTE — PROGRESS NOTES
OFFICE VISIT    Assessment/Plan:     Patient Instructions:    -Continue the medications as prescribed.    -Continue to take care of yourself as you have been well you have the cold-like illness.   -Return to clinic if you have any significant worsening symptoms.    -You are due for a Pap smear.  Please schedule an appointment to complete this when you are ready.  Due to your previous history of needing a LEEP procedure due to abnormal Pap smear, it is recommended for you to follow-up with OB/GYN for further Pap smears and evaluations. Please let Dr. Muniz know if you would like further assistance with this process.  -Please let Dr. Muniz know if you need a referral.     -The Shingles/Shingrix vaccine is generally better covered by insurance companies if the vaccine is received at a pharmacy.  Please speak with your pharmacist regarding this vaccination as it is recommended for you.    Please seek immediate medical attention (go to the emergency room or urgent care) for the following reasons: worsening symptoms, or any concerning changes.      Aung was seen today for hypertension.  Diagnoses and all orders for this visit:    Benign essential hypertension: Blood pressure is in the good range today.  BMP also normal.  Refills given as below.  Continue lisinopril and amlodipine as prescribed.  -     Basic metabolic panel; Future  -     lisinopril (ZESTRIL) 20 MG tablet; Take 1 tablet (20 mg) by mouth daily  -     amLODIPine (NORVASC) 10 MG tablet; Take 1 tablet (10 mg) by mouth daily    Viral URI: Mild symptoms.  Negative COVID test at home.  Plan for conservative management.    Brain fog  Elevated TSH  Status post bariatric surgery: Recheck of TSH is in the normal range.  Continue on the [place] thyroid medication as prescribed.  Refill sent for a 9 month supply.  Monitor closely as the bariatric surgery history can affect absorption of medications/nutrients.  -     TSH; Future    Need for vaccination  -     COVID-19 12+  (2023-24) (PFIZER)      Discussed with patient for follow-up for annual physicals.    The diagnoses, treatment options, risk, benefits, and recommendations were reviewed with patient/guardian.  Questions were answered to patient's/guardian satisfaction.  Red flag signs were reviewed.  Patient/guardian is in agreement with above plan.      Subjective: 53 year old female with history of hypertension, excessive menstruation, moderate recurrent major depressive disorder, morbid obesity, mild intermittent asthma, leiomyoma of uterus who presents to clinic for the following complaints:   Patient presents with:  Hypertension    Answers submitted by the patient for this visit:  Hypertension Visit (Submitted on 1/9/2024)  Chief Complaint: Chronic problems general questions HPI Form  Do you check your blood pressure regularly outside of the clinic?: Yes  Are your blood pressures ever more than 140 on the top number (systolic) OR more than 90 on the bottom number (diastolic)? (For example, greater than 140/90): No  Are you following a low salt diet?: Yes  General Questionnaire (Submitted on 1/9/2024)  Chief Complaint: Chronic problems general questions HPI Form  How many servings of fruits and vegetables do you eat daily?: 4 or more  On average, how many sweetened beverages do you drink each day (Examples: soda, juice, sweet tea, etc.  Do NOT count diet or artificially sweetened beverages)?: 1  How many minutes a day do you exercise enough to make your heart beat faster?: 10 to 19  How many days a week do you exercise enough to make your heart beat faster?: 4  How many days per week do you miss taking your medication?: 0      Patient is here for blood pressure follow-up.  Patient was last seen on 11/12/2023, blood pressure remained elevated and that lisinopril was increased from 10 mg to 20 mg once daily.  Amlodipine was continued.  Currently, she is taking lisinopril 20 mg once and amlodipine 10 mg once daily.  Denies any  "issues or side effects on the medications.  Blood pressure is 136/80 today.         9/29/2023  7:22 AM 9/29/2023  7:27 AM 11/21/2023  8:42 AM 11/21/2023  8:56 AM   Vital Signs       Systolic 142 !  150 !  158 !  146 !    Diastolic 90 !  92 (H)  90 !  90 !    Pulse 70   69     Temperature 97.3  F (36.3  C)   98.3  F (36.8  C)     Respirations 18   20     Weight (LB) 361 lb 12 oz (H)   366 lb (H)     Height 6' 0.441\"   6' 0\"     BMI (Calculated) 48.47   49.64     Pain Score       O2 97 %          11/21/2023  9:15 AM 12/14/2023  8:26 AM 1/9/2024  9:29 AM   Vital Signs      Systolic 146 !  108  136    Diastolic 84 !  71  80    Pulse  75  60    Temperature   98  F (36.7  C)    Respirations   20    Weight (LB)   368 lb (H)    Height   6' 0.008\"    BMI (Calculated)   49.9    Pain Score      O2   98 %         She is sinus congestion and feeling tired. No fever, cough. Symptoms are mild. She started getting sick last week with itchy eyes and Friday, the congestion started building and Saturday, she was sick sick. She took it easy since. COVID test was negative at home.       HM due was reviewed with patient/parent.  Recommendations, risk, benefits were reviewed.  Accepted recommendations were ordered.  Otherwise, patient/parent declined.    Health Maintenance Due   Topic Date Due    ASTHMA ACTION PLAN  Never done    DEPRESSION ACTION PLAN  Never done    HEPATITIS B IMMUNIZATION (1 of 3 - 3-dose series) Never done    ZOSTER IMMUNIZATION (1 of 2) Never done    PAP FOLLOW-UP  02/10/2022    HPV FOLLOW-UP  02/10/2022    MAMMO SCREENING  03/09/2023    INFLUENZA VACCINE (1) 09/01/2023     Patient got the COVID vaccine this morning.     The 10 point review of system is negative except as stated in the HPI.    Allergies were reviewed and updated.    Objective:   /80   Pulse 60   Temp 98  F (36.7  C) (Oral)   Resp 20   Ht 1.829 m (6' 0.01\")   Wt (!) 166.9 kg (368 lb)   LMP  (LMP Unknown)   SpO2 98%   BMI 49.90 kg/m  "   General: Active, alert, nontoxic-appearing.  No acute distress.  HEENT: Normocephalic, atraumatic.  Pupils are equal and round.  Sclera is clear.  Normal external ears. Nares patent.  Moist mucous membranes.    Cardiac: RRR.  S1, S2 present.  No murmurs, rubs, or gallops.  Respiratory/chest: Clear to auscultation bilaterally.  No wheezes, rales, rhonchi.  Breathing is not labored.  No accessory muscle usage.  Extremities: Voluntary movements intact.  Integumentary: No concerning rash or skin changes appreciated.      Fei Muniz MD  [facility] [place] [address]  Phone: [phone]  Fax: [phone]    1/10/2024  5:38 PM          Current Outpatient Medications   Medication    albuterol (PROAIR HFA/PROVENTIL HFA/VENTOLIN HFA) 108 (90 Base) MCG/ACT inhaler    amLODIPine (NORVASC) 10 MG tablet    cholecalciferol (VITAMIN D3) 125 mcg (5000 units) capsule    Cyanocobalamin (VITAMIN B 12 PO)    lisinopril (ZESTRIL) 20 MG tablet    Multiple Vitamins-Minerals (CENTRUM ADULTS PO)    order for DME    thyroid (ARMOUR) 30 MG tablet    triamcinolone (KENALOG) 0.1 % external cream    triamcinolone (KENALOG) 0.5 % external ointment    pantoprazole (PROTONIX) 20 MG EC tablet     No current facility-administered medications for this visit.       Allergies   Allergen Reactions    Black [place] Flavor Anaphylaxis    Dust Mite Extract Other (See Comments) and Shortness Of Breath    Molds & Smuts Other (See Comments) and Shortness Of Breath    Walnuts [Nuts]      Unable to breathe    Shellfish Allergy      Nausea    Grass      SOB, asthma      Plasticized Base [Plastibase]      Can't wear Latex either or Skin peels    Prednisone Other (See Comments)     Severe headache    Wheat      Rash, Derm    Keflex [Cephalexin] Rash       Patient Active Problem List    Diagnosis Date Noted    Moderate episode of recurrent major depressive disorder (H) 08/18/2023     Priority: Medium    AKBAR on CPAP 08/18/2023      Priority: Medium    Brain fog 08/18/2023     Priority: Medium    Status post bariatric surgery 08/18/2023     Priority: Medium     Gastrectomy (longitudinal) 02/2020      Mild intermittent asthma without complication 02/05/2020     Priority: Medium    Benign essential hypertension 06/13/2018     Priority: Medium    Impingement syndrome, shoulder, right 03/26/2018     Priority: Medium    Papanicolaou smear of cervix with low grade squamous intraepithelial lesion (LGSIL) 03/23/2018     Priority: Medium     3/23/18 LSIL pap, + HR HPV + 16 and other. Plan: colp bef 6/23/18  5/3/18 Angie bx @ 9 o'clock: UMAIR 1 and koilocytosis, with condyloma-like features. ECC: negative. Plan: cotest in 1 yr.  5/8/19 ASCUS pap, + HR HPV (not 16 or 18). Plan: colp  7/12/19 Angie bx: worrisome for HSIL. Plan: Consult visit with Dr. Cordova for possible CKC.  7/31/19 Consult visit. Plan: Pelvic US, then LEEP.   US showed two fibroids.   8/29/19 LEEP bx: LSIL. Plan: pap due in 4 mo  1/22/20 Pap: NIL, + HR HPV (not 16 or 18). ECC: negative. Plan: cotest in 1 yr.  2/10/21 NIL Pap, Neg HR HPV. Plan: cotest in 1 yr.   1/26/22 Reminder mychart  2/22/22 Reminder call. Left msg  3/22/22 Lost to follow up      Excessive or frequent menstruation 08/09/2017     Priority: Medium    Intramural and subserous leiomyoma of uterus 08/09/2017     Priority: Medium    Morbid obesity (H) 07/27/2017     Priority: Medium       Family History   Problem Relation Age of Onset    Uterine Cancer Mother     Hyperlipidemia Mother     Arthritis Mother     Other - See Comments Mother         Polythisimia    Anxiety Disorder Mother     Uterine Cancer Maternal Aunt     Uterine Cancer Maternal Aunt     Uterine Cancer Maternal Aunt     Uterine Cancer Maternal Aunt     Uterine Cancer Maternal Aunt     Diabetes Father     Hyperlipidemia Father     Hypertension Father     Genetic Disorder Father         Lipidystrophy    Thyroid Disease Father     Obesity Father     Diabetes  Brother     Sleep Apnea Brother     Depression Brother     Other Cancer Maternal Grandmother         Cervical    Depression Brother     Anxiety Disorder Brother     Asthma Brother     Obesity Sister        Past Surgical History:   Procedure Laterality Date    ABDOMEN SURGERY  02/12/2020    [facility]    BIOPSY CERVICAL, LOCAL EXCISION, SINGLE/MULTIPLE N/A 8/29/2019    Procedure: endometrial biopsy;  Surgeon: Neil Cordova MD;  Location: [facility] OR    BIOPSY CERVICAL, LOCAL EXCISION, SINGLE/MULTIPLE      CHOLECYSTECTOMY  02/12/2020    COLONOSCOPY N/A 1/14/2020    Procedure: COLONOSCOPY;  Surgeon: Ricardo Davis DO;  Location: [facility]    COLONOSCOPY      CONIZATION LEEP N/A 8/29/2019    Procedure: LEEP  conization of cervix surgery;  Surgeon: Neil Cordova MD;  Location: [facility] OR    DAVINCI GASTRIC SLEEVE Bilateral 02/12/2020    ENDOMETRIAL BIOPSY  2019    ORTHOPEDIC SURGERY      OTHER SURGICAL HISTORY      arm fracture    OTHER SURGICAL HISTORY      facial fracturesleft maxillary plate reported.    UT LAP, JESUSITA RESTRICT PROC, LONGITUDINAL GASTRECTOMY N/A 2/12/2020    Procedure: LAPAROSCOPIC SLEEVE GASTRECTOMY;  Surgeon: Sukh Celis MD;  Location: [facility];  Service: General    SOFT TISSUE SURGERY      [facility] LAP,CHOLECYSTECTOMY/EXPLORE N/A 2/12/2020    Procedure: LAPAROSOCPIC CHOLECYSTECTOMY;  Surgeon: Sukh Celis MD;  Location: [facility];  Service: General        Social History     Socioeconomic History    Marital status: Single     Spouse name: Not on file    Number of children: Not on file    Years of education: Not on file    Highest education level: Not on file   Occupational History    Not on file   Tobacco Use    Smoking status: Never     Passive exposure: Never    Smokeless tobacco: Never   Vaping Use    Vaping Use: Never used   Substance and Sexual Activity    Alcohol use: Yes     Comment: one - two per week    Drug use: No    Sexual activity: Yes      Partners: Male     Birth control/protection: Condom, Post-menopausal   Other Topics Concern    Parent/sibling w/ CABG, MI or angioplasty before 65F 55M? No   Social History Narrative    Not on file     Social Determinants of Health     Financial Resource Strain: Low Risk  (1/9/2024)    Financial Resource Strain     Within the past 12 months, have you or your family members you live with been unable to get utilities (heat, electricity) when it was really needed?: No   Food Insecurity: Low Risk  (1/9/2024)    Food Insecurity     Within the past 12 months, did you worry that your food would run out before you got money to buy more?: No     Within the past 12 months, did the food you bought just not last and you didn t have money to get more?: No   Transportation Needs: Low Risk  (1/9/2024)    Transportation Needs     Within the past 12 months, has lack of transportation kept you from medical appointments, getting your medicines, non-medical meetings or appointments, work, or from getting things that you need?: No   Physical Activity: Not on file   Stress: Not on file   Social Connections: Not on file   Interpersonal Safety: Low Risk  (9/29/2023)    Interpersonal Safety     Do you feel physically and emotionally safe where you currently live?: Yes     Within the past 12 months, have you been hit, slapped, kicked or otherwise physically hurt by someone?: No     Within the past 12 months, have you been humiliated or emotionally abused in other ways by your partner or ex-partner?: No   Housing Stability: Low Risk  (1/9/2024)    Housing Stability     Do you have housing? : Yes     Are you worried about losing your housing?: No

## 2024-01-10 RX ORDER — THYROID 30 MG/1
45 TABLET ORAL DAILY
Qty: 135 TABLET | Refills: 2 | Status: SHIPPED | OUTPATIENT
Start: 2024-01-10

## 2024-05-31 ENCOUNTER — MYC MEDICAL ADVICE (OUTPATIENT)
Dept: FAMILY MEDICINE | Facility: CLINIC | Age: 54
End: 2024-05-31
Payer: COMMERCIAL

## 2024-05-31 NOTE — TELEPHONE ENCOUNTER
Patient Quality Outreach    Patient is due for the following:   Asthma  -  ACT needed and Asthma follow-up visit  Hypertension -  BP check and Hypertension follow-up visit  Breast Cancer Screening - Mammogram  Cervical Cancer Screening - PAP Needed  Physical Preventive Adult Physical    Next Steps:   Schedule a Adult Preventative    Type of outreach:    Sent [employer] message.    Next Steps:  Reach out within 90 days via Phone.    Max number of attempts reached: No. Will try again in 90 days if patient still on fail list.    Questions for provider review:    None           Doc Seo  Chart routed to Care Team.

## 2024-06-01 ENCOUNTER — HEALTH MAINTENANCE LETTER (OUTPATIENT)
Age: 54
End: 2024-06-01

## 2024-06-13 ENCOUNTER — TELEPHONE (OUTPATIENT)
Dept: MAMMOGRAPHY | Facility: CLINIC | Age: 54
End: 2024-06-13
Payer: COMMERCIAL

## 2024-06-13 NOTE — TELEPHONE ENCOUNTER
Patient Quality Outreach    Patient is due for the following:   Breast Cancer Screening - Mammogram    Next Steps:   Patient declined follow up at this time.    Type of outreach:    Phone, spoke to patient/parent. Patient will CB to schedule later in the year.    Next Steps:  Reach out within 90 days via Phone.    Max number of attempts reached: No. Will try again in 90 days if patient still on fail list.    Questions for provider review:    None           SHANTI Hemphill  Chart routed to Care Team.

## 2024-08-30 ENCOUNTER — PATIENT OUTREACH (OUTPATIENT)
Dept: CARE COORDINATION | Facility: CLINIC | Age: 54
End: 2024-08-30
Payer: COMMERCIAL

## 2024-09-05 ENCOUNTER — PATIENT OUTREACH (OUTPATIENT)
Dept: CARE COORDINATION | Facility: CLINIC | Age: 54
End: 2024-09-05
Payer: COMMERCIAL

## 2024-09-12 ENCOUNTER — TELEPHONE (OUTPATIENT)
Dept: MAMMOGRAPHY | Facility: CLINIC | Age: 54
End: 2024-09-12
Payer: COMMERCIAL

## 2024-09-12 NOTE — TELEPHONE ENCOUNTER
Patient Quality Outreach    Patient is due for the following:   Breast Cancer Screening - Mammogram    Next Steps:   Schedule a office visit for mammo    Type of outreach:    Phone, left message for patient/parent to call back.    Next Steps:  Reach out within 90 days via Phone.    Max number of attempts reached: No. Will try again in 90 days if patient still on fail list.    Questions for provider review:    None           SHANTI Hemphill  Chart routed to Care Team.

## 2024-10-07 ENCOUNTER — ANCILLARY PROCEDURE (OUTPATIENT)
Dept: MAMMOGRAPHY | Facility: CLINIC | Age: 54
End: 2024-10-07
Attending: FAMILY MEDICINE
Payer: COMMERCIAL

## 2024-10-07 DIAGNOSIS — Z12.31 VISIT FOR SCREENING MAMMOGRAM: ICD-10-CM

## 2024-10-07 PROCEDURE — 77063 BREAST TOMOSYNTHESIS BI: CPT

## 2024-10-08 ENCOUNTER — OFFICE VISIT (OUTPATIENT)
Dept: MIDWIFE SERVICES | Facility: CLINIC | Age: 54
End: 2024-10-08
Payer: COMMERCIAL

## 2024-10-08 VITALS — SYSTOLIC BLOOD PRESSURE: 132 MMHG | WEIGHT: 293 LBS | DIASTOLIC BLOOD PRESSURE: 80 MMHG | BODY MASS INDEX: 52.2 KG/M2

## 2024-10-08 DIAGNOSIS — N89.8 VAGINAL ITCHING: ICD-10-CM

## 2024-10-08 DIAGNOSIS — Z12.4 PAP SMEAR FOR CERVICAL CANCER SCREENING: Primary | ICD-10-CM

## 2024-10-08 LAB
CLUE CELLS: ABNORMAL
TRICHOMONAS, WET PREP: ABNORMAL
WBC'S/HIGH POWER FIELD, WET PREP: ABNORMAL
YEAST, WET PREP: ABNORMAL

## 2024-10-08 PROCEDURE — G0145 SCR C/V CYTO,THINLAYER,RESCR: HCPCS | Performed by: ADVANCED PRACTICE MIDWIFE

## 2024-10-08 PROCEDURE — 99386 PREV VISIT NEW AGE 40-64: CPT | Performed by: ADVANCED PRACTICE MIDWIFE

## 2024-10-08 PROCEDURE — 87624 HPV HI-RISK TYP POOLED RSLT: CPT | Performed by: ADVANCED PRACTICE MIDWIFE

## 2024-10-08 PROCEDURE — 87210 SMEAR WET MOUNT SALINE/INK: CPT | Performed by: ADVANCED PRACTICE MIDWIFE

## 2024-10-08 PROCEDURE — G0124 SCREEN C/V THIN LAYER BY MD: HCPCS | Performed by: PATHOLOGY
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
## 2024-10-10 LAB
HPV HR 12 DNA CVX QL NAA+PROBE: POSITIVE
HPV16 DNA CVX QL NAA+PROBE: NEGATIVE
HPV18 DNA CVX QL NAA+PROBE: NEGATIVE
HUMAN PAPILLOMA VIRUS FINAL DIAGNOSIS: ABNORMAL

## 2024-10-11 LAB
BKR AP ASSOCIATED HPV REPORT: ABNORMAL
BKR LAB AP GYN ADEQUACY: ABNORMAL
BKR LAB AP GYN INTERPRETATION: ABNORMAL
BKR LAB AP GYN OTHER FINDINGS: ABNORMAL
BKR LAB AP PREVIOUS ABNORMAL: ABNORMAL
PATH REPORT.COMMENTS IMP SPEC: ABNORMAL
PATH REPORT.COMMENTS IMP SPEC: ABNORMAL
PATH REPORT.RELEVANT HX SPEC: ABNORMAL

## 2024-10-14 ENCOUNTER — PATIENT OUTREACH (OUTPATIENT)
Dept: MIDWIFE SERVICES | Facility: CLINIC | Age: 54
End: 2024-10-14
Payer: COMMERCIAL

## 2024-11-01 ENCOUNTER — VIRTUAL VISIT (OUTPATIENT)
Dept: URGENT CARE | Facility: CLINIC | Age: 54
End: 2024-11-01
Payer: COMMERCIAL

## 2024-11-01 DIAGNOSIS — J01.00 ACUTE MAXILLARY SINUSITIS, RECURRENCE NOT SPECIFIED: Primary | ICD-10-CM

## 2024-11-01 PROCEDURE — 99213 OFFICE O/P EST LOW 20 MIN: CPT | Mod: 95

## 2024-11-01 RX ORDER — DOXYCYCLINE 100 MG/1
100 CAPSULE ORAL 2 TIMES DAILY
Qty: 14 CAPSULE | Refills: 0 | Status: SHIPPED | OUTPATIENT
Start: 2024-11-01 | End: 2024-11-08

## 2024-11-01 NOTE — PATIENT INSTRUCTIONS
Drink plenty of fluids, rest, warm compresses on face  Netti Pot 1x in the morning 1x at night  or saline rinses or saline nasal spray such as Oceans spray  Flonase (Fluticasone) 2x each nostril twice a day for two weeks, then 2x  each nostril once a day until symptoms resolved  Benadryl (diphenhydramine) at bedtime to help with congestion and sleep

## 2024-11-01 NOTE — PROGRESS NOTES
Paola is a 53 year old female who presents for a billable video visit.    ASSESSMENT/PLAN:  Diagnoses and all orders for this visit:    Acute maxillary sinusitis, recurrence not specified  -     doxycycline hyclate (VIBRAMYCIN) 100 MG capsule; Take 1 capsule (100 mg) by mouth 2 times daily for 7 days.        Follow up with primary care provider with any problems, questions or concerns or if symptoms worsen or fail to improve. Patient agreed to plan and verbalized understanding.     SUBJECTIVE:  Patient reports sinus symptoms which started 6 days ago. She reports headache, nasal congestion, sinus pain and pressure, ear pain. She also reports cough. She has been using saline nasal rinses and OTC cold medications. She denies fever.     ROS: Pertinent ROS neg other than the symptoms noted above in the HPI.     OBJECTIVE:  Vitals not done due to this being a virtual visit    GENERAL: healthy, alert and no distress  EYES: Eyes grossly normal to inspection,conjunctivae and sclerae normal  RESP: Able to speak in complete sentences, no audible wheeze or cough  Nose: Tenderness with palpation of the maxillary sinuses  SKIN: no suspicious lesions or rashes  NEURO: mentation intact and speech normal  PSYCH: mentation appears normal, affect normal/bright    Video-Visit Details    Type of service:  Video Visit  Video Start Time: 1:32 pm  Video End Time: 1:37 pm    Originating Location: Home    Distant Location:  [facility]     Platform used for Video Visit: Susan Lemons PA-C

## 2025-01-03 PROBLEM — R87.612 PAPANICOLAOU SMEAR OF CERVIX WITH LOW GRADE SQUAMOUS INTRAEPITHELIAL LESION (LGSIL): Status: ACTIVE | Noted: 2018-03-23

## 2025-02-04 ENCOUNTER — OFFICE VISIT (OUTPATIENT)
Dept: OBGYN | Facility: CLINIC | Age: 55
End: 2025-02-04
Payer: COMMERCIAL

## 2025-02-04 VITALS
HEART RATE: 74 BPM | BODY MASS INDEX: 51.66 KG/M2 | DIASTOLIC BLOOD PRESSURE: 82 MMHG | SYSTOLIC BLOOD PRESSURE: 138 MMHG | OXYGEN SATURATION: 96 % | WEIGHT: 293 LBS

## 2025-02-04 DIAGNOSIS — R87.810 CERVICAL HIGH RISK HUMAN PAPILLOMAVIRUS (HPV) DNA TEST POSITIVE: ICD-10-CM

## 2025-02-04 DIAGNOSIS — R87.612 PAPANICOLAOU SMEAR OF CERVIX WITH LOW GRADE SQUAMOUS INTRAEPITHELIAL LESION (LGSIL): Primary | ICD-10-CM

## 2025-02-04 PROCEDURE — 57452 EXAM OF CERVIX W/SCOPE: CPT | Performed by: OBSTETRICS & GYNECOLOGY

## 2025-02-04 NOTE — PROGRESS NOTES
Colposcopy Procedure Note    Indications: Pap smear on 10/8/24 showed LSIL with +other HRHPV.  Pertinent past history includes a normal Pap with negative HRHPV on 2/10/21.  Prior to that she has had NILM with +other HRHPV on 1/22/2020.  She had a LEEP that showed LSIL with a normal ECC and a polyp on EMB on 8/29/19 after Pap was ASCUS with +other HRHPV on 5/8/19 and colposcopy biopsy at 6:00 favored HSIL with an  ECC that favored dysplasia.  On 3/23/18 her Pap was LSIL with +16 and other HRHPV.  Colposcopy on 5/3/18 showed UMAIR I on biopsy at 9:00 with a normal ECC.    Procedure Details   /82 (BP Location: Right arm, Patient Position: Sitting, Cuff Size: Adult Large)   Pulse 74   Wt (!) 172.8 kg (381 lb)   LMP  (LMP Unknown)   Body mass index is 51.66 kg/m .    The reason for procedure is explained, and informed consent is obtained.      Speculum is placed in the vagina and visualization of cervix is achieved. The cervix is swabbed with 3% acetic acid solution. Transformation zone is easily seen.  Green filter is applied with no abnormal vessels seen.  Acetowhite epithelium is not seen.  ECC is not done.  Adequate colposcopy.  The patient tolerated the procedure well.    Impression: normal cervix    Plan: Post procedure instructions are reviewed.  The role of HPV in causing Pap smear abnormalities, dysplasia, and cancer is reviewed with the patient.  We discussed the role of the immune system and ways to keep it strong.  She was counseled to return to her primary for a Pap with HPV testing as recommended in a year.

## 2025-02-26 ENCOUNTER — PATIENT OUTREACH (OUTPATIENT)
Dept: MIDWIFE SERVICES | Facility: CLINIC | Age: 55
End: 2025-02-26
Payer: COMMERCIAL

## 2025-03-04 DIAGNOSIS — I10 BENIGN ESSENTIAL HYPERTENSION: ICD-10-CM

## 2025-03-04 RX ORDER — LISINOPRIL 20 MG/1
20 TABLET ORAL DAILY
Qty: 30 TABLET | Refills: 0 | Status: SHIPPED | OUTPATIENT
Start: 2025-03-04

## 2025-03-20 ENCOUNTER — OFFICE VISIT (OUTPATIENT)
Dept: FAMILY MEDICINE | Facility: CLINIC | Age: 55
End: 2025-03-20
Payer: COMMERCIAL

## 2025-03-20 ENCOUNTER — MYC MEDICAL ADVICE (OUTPATIENT)
Dept: FAMILY MEDICINE | Facility: CLINIC | Age: 55
End: 2025-03-20

## 2025-03-20 VITALS
SYSTOLIC BLOOD PRESSURE: 126 MMHG | HEART RATE: 75 BPM | HEIGHT: 72 IN | DIASTOLIC BLOOD PRESSURE: 86 MMHG | BODY MASS INDEX: 39.68 KG/M2 | OXYGEN SATURATION: 99 % | WEIGHT: 293 LBS | RESPIRATION RATE: 20 BRPM | TEMPERATURE: 97.9 F

## 2025-03-20 DIAGNOSIS — Z98.84 STATUS POST BARIATRIC SURGERY: ICD-10-CM

## 2025-03-20 DIAGNOSIS — I10 BENIGN ESSENTIAL HYPERTENSION: Primary | ICD-10-CM

## 2025-03-20 DIAGNOSIS — J45.20 MILD INTERMITTENT ASTHMA WITHOUT COMPLICATION: ICD-10-CM

## 2025-03-20 DIAGNOSIS — E66.01 MORBID OBESITY (H): ICD-10-CM

## 2025-03-20 DIAGNOSIS — I73.00 RAYNAUD'S DISEASE WITHOUT GANGRENE: ICD-10-CM

## 2025-03-20 DIAGNOSIS — J34.89 RHINORRHEA: ICD-10-CM

## 2025-03-20 DIAGNOSIS — Z23 NEED FOR HEPATITIS B VACCINATION: ICD-10-CM

## 2025-03-20 DIAGNOSIS — R21 RASH: ICD-10-CM

## 2025-03-20 DIAGNOSIS — L30.0 NUMMULAR DERMATITIS: ICD-10-CM

## 2025-03-20 LAB — FOLATE SERPL-MCNC: 7.4 NG/ML (ref 4.6–34.8)

## 2025-03-20 PROCEDURE — 82248 BILIRUBIN DIRECT: CPT | Performed by: FAMILY MEDICINE

## 2025-03-20 PROCEDURE — 84443 ASSAY THYROID STIM HORMONE: CPT | Performed by: FAMILY MEDICINE

## 2025-03-20 PROCEDURE — 82306 VITAMIN D 25 HYDROXY: CPT | Performed by: FAMILY MEDICINE

## 2025-03-20 PROCEDURE — G2211 COMPLEX E/M VISIT ADD ON: HCPCS | Performed by: FAMILY MEDICINE

## 2025-03-20 PROCEDURE — 84439 ASSAY OF FREE THYROXINE: CPT | Performed by: FAMILY MEDICINE

## 2025-03-20 PROCEDURE — 82728 ASSAY OF FERRITIN: CPT | Performed by: FAMILY MEDICINE

## 2025-03-20 PROCEDURE — 80061 LIPID PANEL: CPT | Performed by: FAMILY MEDICINE

## 2025-03-20 PROCEDURE — 36415 COLL VENOUS BLD VENIPUNCTURE: CPT | Performed by: FAMILY MEDICINE

## 2025-03-20 PROCEDURE — 80053 COMPREHEN METABOLIC PANEL: CPT | Performed by: FAMILY MEDICINE

## 2025-03-20 PROCEDURE — 82607 VITAMIN B-12: CPT | Performed by: FAMILY MEDICINE

## 2025-03-20 PROCEDURE — 99215 OFFICE O/P EST HI 40 MIN: CPT | Performed by: FAMILY MEDICINE

## 2025-03-20 PROCEDURE — 83540 ASSAY OF IRON: CPT | Performed by: FAMILY MEDICINE

## 2025-03-20 PROCEDURE — 3074F SYST BP LT 130 MM HG: CPT | Performed by: FAMILY MEDICINE

## 2025-03-20 PROCEDURE — 82746 ASSAY OF FOLIC ACID SERUM: CPT | Performed by: FAMILY MEDICINE

## 2025-03-20 PROCEDURE — 3079F DIAST BP 80-89 MM HG: CPT | Performed by: FAMILY MEDICINE

## 2025-03-20 RX ORDER — TRIAMCINOLONE ACETONIDE 1 MG/G
CREAM TOPICAL 2 TIMES DAILY
Qty: 80 G | Refills: 3 | Status: SHIPPED | OUTPATIENT
Start: 2025-03-20

## 2025-03-20 RX ORDER — ALBUTEROL SULFATE 90 UG/1
2 INHALANT RESPIRATORY (INHALATION) EVERY 4 HOURS PRN
Qty: 18 G | Refills: 6 | Status: SHIPPED | OUTPATIENT
Start: 2025-03-20

## 2025-03-20 RX ORDER — IPRATROPIUM BROMIDE 21 UG/1
2 SPRAY, METERED NASAL EVERY 12 HOURS
COMMUNITY
Start: 2025-03-20

## 2025-03-20 RX ORDER — AMLODIPINE BESYLATE 10 MG/1
10 TABLET ORAL DAILY
Qty: 90 TABLET | Refills: 3 | Status: SHIPPED | OUTPATIENT
Start: 2025-03-20

## 2025-03-20 RX ORDER — TRIAMCINOLONE ACETONIDE 5 MG/G
OINTMENT TOPICAL
Qty: 15 G | Refills: 5 | Status: SHIPPED | OUTPATIENT
Start: 2025-03-20

## 2025-03-20 RX ORDER — LISINOPRIL 20 MG/1
20 TABLET ORAL DAILY
Qty: 90 TABLET | Refills: 3 | Status: SHIPPED | OUTPATIENT
Start: 2025-03-20

## 2025-03-20 RX ORDER — MUPIROCIN 20 MG/G
OINTMENT TOPICAL 3 TIMES DAILY
Qty: 30 G | Refills: 0 | Status: SHIPPED | OUTPATIENT
Start: 2025-03-20

## 2025-03-20 ASSESSMENT — ASTHMA QUESTIONNAIRES
QUESTION_1 LAST FOUR WEEKS HOW MUCH OF THE TIME DID YOUR ASTHMA KEEP YOU FROM GETTING AS MUCH DONE AT WORK, SCHOOL OR AT HOME: A LITTLE OF THE TIME
QUESTION_5 LAST FOUR WEEKS HOW WOULD YOU RATE YOUR ASTHMA CONTROL: SOMEWHAT CONTROLLED
QUESTION_2 LAST FOUR WEEKS HOW OFTEN HAVE YOU HAD SHORTNESS OF BREATH: ONCE OR TWICE A WEEK
QUESTION_3 LAST FOUR WEEKS HOW OFTEN DID YOUR ASTHMA SYMPTOMS (WHEEZING, COUGHING, SHORTNESS OF BREATH, CHEST TIGHTNESS OR PAIN) WAKE YOU UP AT NIGHT OR EARLIER THAN USUAL IN THE MORNING: NOT AT ALL
ACT_TOTALSCORE: 19
QUESTION_4 LAST FOUR WEEKS HOW OFTEN HAVE YOU USED YOUR RESCUE INHALER OR NEBULIZER MEDICATION (SUCH AS ALBUTEROL): TWO OR THREE TIMES PER WEEK

## 2025-03-20 ASSESSMENT — PATIENT HEALTH QUESTIONNAIRE - PHQ9
10. IF YOU CHECKED OFF ANY PROBLEMS, HOW DIFFICULT HAVE THESE PROBLEMS MADE IT FOR YOU TO DO YOUR WORK, TAKE CARE OF THINGS AT HOME, OR GET ALONG WITH OTHER PEOPLE: NOT DIFFICULT AT ALL
SUM OF ALL RESPONSES TO PHQ QUESTIONS 1-9: 2
SUM OF ALL RESPONSES TO PHQ QUESTIONS 1-9: 2

## 2025-03-20 NOTE — PATIENT INSTRUCTIONS
-Thank you for choosing the [facility].  -It was a pleasure to see you today.  -Please take a look at the information below for more specific details regarding the treatment plan and recommendations.  -In this after visit summary is a list of your medications and specific instructions.  Please review this carefully as there may be changes made to your medication list.  -If there are any particular questions or concerns, please feel free to reach out to Dr. Muniz.  -If any labs have been completed, we will reach out to you about results.  If the results are normal or not concerning, a letter or [employer] message will be sent to you.  If any follow-up is needed, either Dr. Muniz or the nurse will give you a call.  If you have not heard regarding results after 2 weeks, please reach out to the clinic.    -Dr. Muniz will be leaving the [facility] in May 2025. It has been a pleasure to be a part of your care team.  Thank you for trusting me with your health care. Dr. Muniz recommends that you schedule an appointment to establish care with a new doctor at the clinic.     Patient Instructions:    -Take medications as prescribed.  -Further recommendations will depend on lab results.   -If you have recurrence of the rainout syndrome, you may call your pharmacy to restart the amlodipine medication.  -Continue the other medications as prescribed.     -Use the mupirocin as prescribed to the rash on the legs (both sides).   -It is important to moisturize the skin on a daily basis, especially during wintertime as the air is dry and can worsen the underlying skin dryness.  Common moisturizing over-the-counter options include: Aquaphor, Vaseline, Vanicream, night balms, various lotions.         -Topical steroid medications can be utilized to help control severe eczema or dry skin, though be cautious with usage of the medication as the topical steroids can cause the skin to become darker/lighter, thin the skin,  melt/reduce the fat underneath, etc. Only apply the topical steroid medications on areas that have severely dried and thickened skin.  Avoid use of the medication on normal skin or on the face/groin/armpits unless directed otherwise.  -When bathing, try to limit bathing to 2-3 times a week (or when visibly dirty) and to 10 minutes or less with each bathing session.  It is best to use warm water when bathing as water can worsen the dry skin.  -Try oatmeal baths as this may help with moisturization as well.  -Right after bathing, it is recommended to apply the moisturizer.    You are due for the hepatitis B vaccinations.   Testing shows that you do not have immunity to the hepatitis B virus. The three shot hepatitis B vaccine series is recommended for you. Please schedule a nurse only appointment to complete these vaccines when you are ready. After completion of the first shot, the second shot should be about 1-2 months after and the third should should be about 6 months or more after the first shot.     Hepatitis B Surface Antibody  Order: [id number]  Status: Final result       Visible to patient: Yes (seen)       Dx: Immunity status testing    1 Result Note       1 Patient Communication         Component  Ref Range & Units 1 yr ago    Hepatitis B Surface Antibody Instrument Value  <8.00 m[IU]/mL 0.00    Hepatitis B Surface Antibody Nonreactive   Comment: No antibody detected when the value is less than 8.00 mIU/mL.   Resulting Agency SCORE             Specimen Collected: 09/29/23  8:08 AM Last Resulted: 09/30/23 12:39 PM           Please seek immediate medical attention (go to the emergency room or urgent care) for the following reasons: worsening symptoms, or any concerning changes.      --------------------------------------------------------------------------------------------------------------------    -We are always looking for ways to improve.  You may be selected to receive a survey regarding your visit today.   We encourage you to complete the survey and provide specific, constructive feedback to help us improve our processes.  Thank you for your time!  -Please review the contact information listed on the after visit summary and in the electronic chart.  Below is the phone number that we have on file.  If there are any changes that are needed to be made, please reach out to the clinic.  [phone] (home)
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
## 2025-03-20 NOTE — PROGRESS NOTES
Prior to immunization administration, verified patients identity using patient s name and date of birth. Please see Immunization Activity for additional information.     Screening Questionnaire for Adult Immunization    Are you sick today?   No   Do you have allergies to medications, food, a vaccine component or latex?   Yes   Have you ever had a serious reaction after receiving a vaccination?   No   Do you have a long-term health problem with heart, lung, kidney, or metabolic disease (e.g., diabetes), asthma, a blood disorder, no spleen, complement component deficiency, a cochlear implant, or a spinal fluid leak?  Are you on long-term aspirin therapy?   Yes   Do you have cancer, leukemia, HIV/AIDS, or any other immune system problem?   No   Do you have a parent, brother, or sister with an immune system problem?   No   In the past 3 months, have you taken medications that affect  your immune system, such as prednisone, other steroids, or anticancer drugs; drugs for the treatment of rheumatoid arthritis, Crohn s disease, or psoriasis; or have you had radiation treatments?   Yes   Have you had a seizure, or a brain or other nervous system problem?   No   During the past year, have you received a transfusion of blood or blood    products, or been given immune (gamma) globulin or antiviral drug?   No   For women: Are you pregnant or is there a chance you could become       pregnant during the next month?   No   Have you received any vaccinations in the past 4 weeks?   Yes     Immunization questionnaire was positive for at least one answer.  Notified Dr Muniz .      Patient instructed to remain in clinic for 15 minutes afterwards, and to report any adverse reactions.     Screening performed by Siena Stout MA on 3/20/2025 at 2:17 PM.       Answers submitted by the patient for this visit:  Patient Health Questionnaire (Submitted on 3/20/2025)  If you checked off any problems, how difficult have these problems made it  for you to do your work, take care of things at home, or get along with other people?: Not difficult at all  PHQ9 TOTAL SCORE: 2  General Questionnaire (Submitted on 3/20/2025)  Chief Complaint: Chronic problems general questions HPI Form  What is the reason for your visit today? : Med renewal  How many days per week do you miss taking your medication?: 1  What makes it hard for you to take your medication every day?: remembering to take  Questionnaire about: Chronic problems general questions HPI Form (Submitted on 3/20/2025)  Chief Complaint: Chronic problems general questions HPI Form

## 2025-03-21 LAB
ALBUMIN SERPL BCG-MCNC: 4.2 G/DL (ref 3.5–5.2)
ALP SERPL-CCNC: 97 U/L (ref 40–150)
ALT SERPL W P-5'-P-CCNC: 29 U/L (ref 0–50)
ANION GAP SERPL CALCULATED.3IONS-SCNC: 14 MMOL/L (ref 7–15)
AST SERPL W P-5'-P-CCNC: 24 U/L (ref 0–45)
BILIRUB DIRECT SERPL-MCNC: 0.1 MG/DL (ref 0–0.3)
BILIRUB SERPL-MCNC: 0.2 MG/DL
BUN SERPL-MCNC: 12.2 MG/DL (ref 6–20)
CALCIUM SERPL-MCNC: 9.7 MG/DL (ref 8.8–10.4)
CHLORIDE SERPL-SCNC: 105 MMOL/L (ref 98–107)
CHOLEST SERPL-MCNC: 206 MG/DL
CREAT SERPL-MCNC: 0.77 MG/DL (ref 0.51–0.95)
EGFRCR SERPLBLD CKD-EPI 2021: >90 ML/MIN/1.73M2
FASTING STATUS PATIENT QL REPORTED: NO
FASTING STATUS PATIENT QL REPORTED: NO
FERRITIN SERPL-MCNC: 97 NG/ML (ref 11–328)
GLUCOSE SERPL-MCNC: 97 MG/DL (ref 70–99)
HCO3 SERPL-SCNC: 23 MMOL/L (ref 22–29)
HDLC SERPL-MCNC: 44 MG/DL
IRON SERPL-MCNC: 39 UG/DL (ref 37–145)
LDLC SERPL CALC-MCNC: 132 MG/DL
NONHDLC SERPL-MCNC: 162 MG/DL
POTASSIUM SERPL-SCNC: 4.2 MMOL/L (ref 3.4–5.3)
PROT SERPL-MCNC: 7.7 G/DL (ref 6.4–8.3)
SODIUM SERPL-SCNC: 142 MMOL/L (ref 135–145)
T4 FREE SERPL-MCNC: 0.72 NG/DL (ref 0.9–1.7)
TRIGL SERPL-MCNC: 149 MG/DL
TSH SERPL DL<=0.005 MIU/L-ACNC: 6.72 UIU/ML (ref 0.3–4.2)
VIT B12 SERPL-MCNC: 469 PG/ML (ref 232–1245)
VIT D+METAB SERPL-MCNC: 39 NG/ML (ref 20–50)

## 2025-03-24 ENCOUNTER — MYC MEDICAL ADVICE (OUTPATIENT)
Dept: FAMILY MEDICINE | Facility: CLINIC | Age: 55
End: 2025-03-24
Payer: COMMERCIAL

## 2025-03-25 DIAGNOSIS — I10 BENIGN ESSENTIAL HYPERTENSION: ICD-10-CM

## 2025-03-25 NOTE — TELEPHONE ENCOUNTER
Clinic RN: Please investigate patient's chart or contact patient if the information cannot be found because  medication was prescribed 5 days ago. Request is from a different pharmacy--please clarify with patient is she is requesting a pharmacy change.     Theodora Rm RN N  [facility]

## 2025-04-01 RX ORDER — LISINOPRIL 20 MG/1
TABLET ORAL
Qty: 30 TABLET | Refills: 0 | OUTPATIENT
Start: 2025-04-01

## (undated) DEVICE — GOWN IMPERVIOUS BREATHABLE 2XL/XLONG

## (undated) DEVICE — ESU ELEC LEEP LOOP 15X12MM GREEN DLP-M11

## (undated) DEVICE — PREP POVIDONE IODINE SOLUTION 10% 120ML

## (undated) DEVICE — DRAPE GYN/UROLOGY FLUID POUCH TUR 29455

## (undated) DEVICE — SU VICRYL 0 CT-1 36" J346H

## (undated) DEVICE — PACK MINOR PROCEDURE CUSTOM

## (undated) DEVICE — PAD PERI INDIV WRAP 11" 2022A

## (undated) DEVICE — SOL NACL 0.9% IRRIG 1000ML BOTTLE 07138-09

## (undated) DEVICE — GLOVE PROTEXIS W/NEU-THERA 7.5  2D73TE75

## (undated) DEVICE — SU VICRYL 2-0 CT-1 27" UND J259H

## (undated) DEVICE — ESU PENCIL W/HOLSTER

## (undated) DEVICE — SYR 10ML FINGER CONTROL W/O NDL 309695

## (undated) DEVICE — NDL ECLIPSE 22GA 1.5"

## (undated) DEVICE — PREP POVIDONE IODINE SCRUB 7.5% 120ML

## (undated) DEVICE — SPECIMEN CULTURETTE DBL SWAB 220109

## (undated) RX ORDER — PROPOFOL 10 MG/ML
INJECTION, EMULSION INTRAVENOUS
Status: DISPENSED
Start: 2019-08-29

## (undated) RX ORDER — BUPIVACAINE HYDROCHLORIDE AND EPINEPHRINE 2.5; 5 MG/ML; UG/ML
INJECTION, SOLUTION EPIDURAL; INFILTRATION; INTRACAUDAL; PERINEURAL
Status: DISPENSED
Start: 2019-08-29

## (undated) RX ORDER — LIDOCAINE HYDROCHLORIDE 10 MG/ML
INJECTION, SOLUTION EPIDURAL; INFILTRATION; INTRACAUDAL; PERINEURAL
Status: DISPENSED
Start: 2019-08-29

## (undated) RX ORDER — FENTANYL CITRATE 50 UG/ML
INJECTION, SOLUTION INTRAMUSCULAR; INTRAVENOUS
Status: DISPENSED
Start: 2019-08-29